# Patient Record
Sex: FEMALE | Race: WHITE | NOT HISPANIC OR LATINO | Employment: PART TIME | URBAN - METROPOLITAN AREA
[De-identification: names, ages, dates, MRNs, and addresses within clinical notes are randomized per-mention and may not be internally consistent; named-entity substitution may affect disease eponyms.]

---

## 2018-11-03 ENCOUNTER — APPOINTMENT (EMERGENCY)
Dept: ULTRASOUND IMAGING | Facility: HOSPITAL | Age: 38
End: 2018-11-03
Payer: COMMERCIAL

## 2018-11-03 ENCOUNTER — HOSPITAL ENCOUNTER (EMERGENCY)
Facility: HOSPITAL | Age: 38
Discharge: HOME | End: 2018-11-03
Attending: EMERGENCY MEDICINE
Payer: COMMERCIAL

## 2018-11-03 VITALS
OXYGEN SATURATION: 100 % | SYSTOLIC BLOOD PRESSURE: 173 MMHG | DIASTOLIC BLOOD PRESSURE: 92 MMHG | TEMPERATURE: 98.7 F | HEIGHT: 67 IN | BODY MASS INDEX: 45.99 KG/M2 | RESPIRATION RATE: 16 BRPM | WEIGHT: 293 LBS | HEART RATE: 93 BPM

## 2018-11-03 DIAGNOSIS — I82.4Z1 ACUTE DEEP VEIN THROMBOSIS (DVT) OF DISTAL END OF RIGHT LOWER EXTREMITY (HCC): Primary | ICD-10-CM

## 2018-11-03 DIAGNOSIS — L03.119 CELLULITIS OF LOWER EXTREMITY, UNSPECIFIED LATERALITY: ICD-10-CM

## 2018-11-03 LAB
ANION GAP SERPL CALCULATED.3IONS-SCNC: 5 MMOL/L (ref 4–13)
BASOPHILS # BLD AUTO: 0.05 THOUSANDS/ΜL (ref 0–0.1)
BASOPHILS NFR BLD AUTO: 1 % (ref 0–1)
BUN SERPL-MCNC: 11 MG/DL (ref 5–25)
CALCIUM SERPL-MCNC: 9.3 MG/DL (ref 8.3–10.1)
CHLORIDE SERPL-SCNC: 103 MMOL/L (ref 100–108)
CO2 SERPL-SCNC: 34 MMOL/L (ref 21–32)
CREAT SERPL-MCNC: 0.86 MG/DL (ref 0.6–1.3)
EOSINOPHIL # BLD AUTO: 0.35 THOUSAND/ΜL (ref 0–0.61)
EOSINOPHIL NFR BLD AUTO: 5 % (ref 0–6)
ERYTHROCYTE [DISTWIDTH] IN BLOOD BY AUTOMATED COUNT: 14.3 % (ref 11.6–15.1)
GFR SERPL CREATININE-BSD FRML MDRD: 86 ML/MIN/1.73SQ M
GLUCOSE SERPL-MCNC: 105 MG/DL (ref 65–140)
HCT VFR BLD AUTO: 40.6 % (ref 34.8–46.1)
HGB BLD-MCNC: 12.6 G/DL (ref 11.5–15.4)
IMM GRANULOCYTES # BLD AUTO: 0.03 THOUSAND/UL (ref 0–0.2)
IMM GRANULOCYTES NFR BLD AUTO: 0 % (ref 0–2)
LYMPHOCYTES # BLD AUTO: 1.41 THOUSANDS/ΜL (ref 0.6–4.47)
LYMPHOCYTES NFR BLD AUTO: 18 % (ref 14–44)
MCH RBC QN AUTO: 27.8 PG (ref 26.8–34.3)
MCHC RBC AUTO-ENTMCNC: 31 G/DL (ref 31.4–37.4)
MCV RBC AUTO: 90 FL (ref 82–98)
MONOCYTES # BLD AUTO: 0.52 THOUSAND/ΜL (ref 0.17–1.22)
MONOCYTES NFR BLD AUTO: 7 % (ref 4–12)
NEUTROPHILS # BLD AUTO: 5.5 THOUSANDS/ΜL (ref 1.85–7.62)
NEUTS SEG NFR BLD AUTO: 69 % (ref 43–75)
NRBC BLD AUTO-RTO: 0 /100 WBCS
PLATELET # BLD AUTO: 310 THOUSANDS/UL (ref 149–390)
PMV BLD AUTO: 9.1 FL (ref 8.9–12.7)
POTASSIUM SERPL-SCNC: 3.8 MMOL/L (ref 3.5–5.3)
RBC # BLD AUTO: 4.53 MILLION/UL (ref 3.81–5.12)
SODIUM SERPL-SCNC: 142 MMOL/L (ref 136–145)
WBC # BLD AUTO: 7.86 THOUSAND/UL (ref 4.31–10.16)

## 2018-11-03 PROCEDURE — 93970 EXTREMITY STUDY: CPT

## 2018-11-03 PROCEDURE — 85025 COMPLETE CBC W/AUTO DIFF WBC: CPT | Performed by: EMERGENCY MEDICINE

## 2018-11-03 PROCEDURE — 36415 COLL VENOUS BLD VENIPUNCTURE: CPT | Performed by: EMERGENCY MEDICINE

## 2018-11-03 PROCEDURE — 99284 EMERGENCY DEPT VISIT MOD MDM: CPT

## 2018-11-03 PROCEDURE — 80048 BASIC METABOLIC PNL TOTAL CA: CPT | Performed by: EMERGENCY MEDICINE

## 2018-11-03 RX ORDER — MELATONIN
1000 DAILY
COMMUNITY

## 2018-11-03 RX ORDER — CLINDAMYCIN HYDROCHLORIDE 150 MG/1
450 CAPSULE ORAL EVERY 8 HOURS SCHEDULED
Qty: 63 CAPSULE | Refills: 0 | Status: SHIPPED | OUTPATIENT
Start: 2018-11-03 | End: 2018-11-10

## 2018-11-03 RX ORDER — CLINDAMYCIN HYDROCHLORIDE 150 MG/1
450 CAPSULE ORAL ONCE
Status: COMPLETED | OUTPATIENT
Start: 2018-11-03 | End: 2018-11-03

## 2018-11-03 RX ORDER — PHENOL 1.4 %
600 AEROSOL, SPRAY (ML) MUCOUS MEMBRANE 2 TIMES DAILY WITH MEALS
COMMUNITY
End: 2019-01-02 | Stop reason: ALTCHOICE

## 2018-11-03 RX ADMIN — RIVAROXABAN 15 MG: 15 TABLET, FILM COATED ORAL at 21:01

## 2018-11-03 RX ADMIN — CLINDAMYCIN HYDROCHLORIDE 450 MG: 150 CAPSULE ORAL at 17:45

## 2018-11-03 NOTE — ED PROVIDER NOTES
History  Chief Complaint   Patient presents with    Leg Swelling     right leg swelling x a few days but worse over night     HPI     80-year-old female with history of bilateral oophorectomy in September for endometriosis, bilateral LE lymphedema, who presents with worsening swelling in her right leg with some overlying erythema and warmth to the R calf, and another area of redness to the L medial thigh  Symptoms started a few days ago, redness got worse overnight  She does have a history of DVT to her right lower extremity numerous years ago, but is no longer on blood thinners  She is on an estrogen patch status post or oophorectomy  She endorses pain over the area of redness  No recent trauma  Denies fevers, chills, nausea, or vomiting  She wears compression stockings and uses a lymphedema pump for her lower extremity swelling  Denies abdominal pain, and surgical incision sites are healing well  No other aggravating or alleviating factors or associated symptoms  Prior to Admission Medications   Prescriptions Last Dose Informant Patient Reported? Taking? Cranberry 1000 MG CAPS   Yes Yes   Sig: Take by mouth   calcium carbonate (OS-ALEXIS) 600 MG tablet   Yes Yes   Sig: Take 600 mg by mouth 2 (two) times a day with meals   cholecalciferol (VITAMIN D3) 1,000 units tablet 11/3/2018 at Unknown time  Yes Yes   Sig: Take 1,000 Units by mouth daily   esterified estrogens (MENEST) 0 3 MG tablet   Yes Yes   Sig: Take 0 3 mg by mouth daily      Facility-Administered Medications: None       Past Medical History:   Diagnosis Date    DVT (deep venous thrombosis) (HCC)     Endometriosis     Lymphedema of right lower extremity        Past Surgical History:   Procedure Laterality Date    HYSTERECTOMY         History reviewed  No pertinent family history  I have reviewed and agree with the history as documented      Social History   Substance Use Topics    Smoking status: Never Smoker    Smokeless tobacco: Never Used    Alcohol use No        Review of Systems   Constitutional: Negative for chills and fever  HENT: Negative for congestion  Eyes: Negative for visual disturbance  Respiratory: Negative for cough and shortness of breath  Cardiovascular: Positive for leg swelling (R>L)  Negative for chest pain  Gastrointestinal: Negative for abdominal pain, nausea and vomiting  Genitourinary: Negative for dysuria and frequency  Musculoskeletal: Positive for myalgias (R calf)  Negative for arthralgias, back pain, neck pain and neck stiffness  Skin: Positive for rash (R calf, L medial thigh)  Neurological: Negative for weakness, numbness and headaches  Psychiatric/Behavioral: Negative for agitation, behavioral problems and confusion  Physical Exam  Physical Exam   Constitutional: She is oriented to person, place, and time  She appears well-developed and well-nourished  No distress  HENT:   Head: Normocephalic and atraumatic  Right Ear: External ear normal    Left Ear: External ear normal    Nose: Nose normal    Mouth/Throat: Oropharynx is clear and moist    Eyes: Conjunctivae are normal    Neck: Normal range of motion  Neck supple  Cardiovascular: Normal rate, regular rhythm, normal heart sounds and intact distal pulses  Exam reveals no gallop and no friction rub  No murmur heard  Pulmonary/Chest: Effort normal and breath sounds normal  No respiratory distress  She has no wheezes  She has no rales  Abdominal: Soft  Bowel sounds are normal  She exhibits no distension  There is no tenderness  There is no guarding  Musculoskeletal: Normal range of motion  She exhibits edema (2+ non-pitting edema to the bilateral LEs, appears symmetric  TTP over the R calf  ~8 cm areas of erythema to the R calf and L medial thigh with warmth, no fluctuance or induration  )  She exhibits no deformity  Neurological: She is alert and oriented to person, place, and time  She exhibits normal muscle tone  Skin: Skin is warm and dry  She is not diaphoretic  Vital Signs  ED Triage Vitals   Temperature Pulse Respirations Blood Pressure SpO2   11/03/18 1721 11/03/18 1640 11/03/18 1640 11/03/18 1640 11/03/18 1640   98 7 °F (37 1 °C) 93 16 (!) 173/92 100 %      Temp Source Heart Rate Source Patient Position - Orthostatic VS BP Location FiO2 (%)   11/03/18 1640 11/03/18 1640 11/03/18 1640 11/03/18 1640 --   Oral Monitor Sitting Right arm       Pain Score       11/03/18 1640       5           Vitals:    11/03/18 1640   BP: (!) 173/92   Pulse: 93   Patient Position - Orthostatic VS: Sitting       Visual Acuity      ED Medications  Medications   clindamycin (CLEOCIN) capsule 450 mg (450 mg Oral Given 11/3/18 1745)   rivaroxaban (XARELTO) tablet 15 mg (15 mg Oral Given 11/3/18 2101)       Diagnostic Studies  Results Reviewed     Procedure Component Value Units Date/Time    Basic metabolic panel [[id number]]  (Abnormal) Collected:  11/03/18 1756    Lab Status:  Final result Specimen:  Blood from Arm, Left Updated:  11/03/18 1818     Sodium 142 mmol/L      Potassium 3 8 mmol/L      Chloride 103 mmol/L      CO2 34 (H) mmol/L      ANION GAP 5 mmol/L      BUN 11 mg/dL      Creatinine 0 86 mg/dL      Glucose 105 mg/dL      Calcium 9 3 mg/dL      eGFR 86 ml/min/1 73sq m     Narrative:         National Kidney Disease Education Program recommendations are as follows:  GFR calculation is accurate only with a steady state creatinine  Chronic Kidney disease less than 60 ml/min/1 73 sq  meters  Kidney failure less than 15 ml/min/1 73 sq  meters      CBC and differential [[id number]]  (Abnormal) Collected:  11/03/18 1756    Lab Status:  Final result Specimen:  Blood from Arm, Left Updated:  11/03/18 1803     WBC 7 86 Thousand/uL      RBC 4 53 Million/uL      Hemoglobin 12 6 g/dL      Hematocrit 40 6 %      MCV 90 fL      MCH 27 8 pg      MCHC 31 0 (L) g/dL      RDW 14 3 %      MPV 9 1 fL      Platelets 042 Thousands/uL      nRBC 0 /100 WBCs      Neutrophils Relative 69 %      Immat GRANS % 0 %      Lymphocytes Relative 18 %      Monocytes Relative 7 %      Eosinophils Relative 5 %      Basophils Relative 1 %      Neutrophils Absolute 5 50 Thousands/µL      Immature Grans Absolute 0 03 Thousand/uL      Lymphocytes Absolute 1 41 Thousands/µL      Monocytes Absolute 0 52 Thousand/µL      Eosinophils Absolute 0 35 Thousand/µL      Basophils Absolute 0 05 Thousands/µL                  VAS lower limb venous duplex study, complete bilateral    (Results Pending)              Procedures  Procedures       Phone Contacts  ED Phone Contact    ED Course                               MDM  Number of Diagnoses or Management Options  Acute deep vein thrombosis (DVT) of distal end of right lower extremity ([address] ): new and requires workup  Cellulitis of lower extremity, unspecified laterality: new and requires workup  Diagnosis management comments: Nontoxic appearing  Afebrile and hemodynamically stable  Patient has swelling to her bilateral lower extremities, appears equal bilaterally to me but pt feels is worse to the R leg, reports bilateral lymphedema  She has an area of erythema consistent with cellulitis and measures approximately 8 cm to the right calf, and another area measuring about 8 cm to the left thigh  Both areas are warm, with no fluctuance or induration  Doubt abscess  Patient denies fevers or systemic symptoms and appears nontoxic  No leukocytosis, normal hemoglobin  Patient given clindamycin for concern for cellulitis  She does have right lower extremity calf tenderness and is on estrogen medications status post oophorectomy and has history of right lower extremity DVT, so bilateral lower extremity DVTs studies were performed that shows a right PTV and right peroneal DVT, all distal to the knee    The risks and benefits of anticoagulation discussed with the patient, and she was given 1st dose of Xarelto in the ED, with plan to continue James  She has a vascular surgeon that she follows with for her lymphedema who she will follow up with for further management  I have outlined her areas of cellulitis with a skin marker, and asked her to return to the emergency department immediately for fevers, spreading redness, or worsening swelling  We also discussed signs or symptoms of PE, with instructions to return to the ED for development of these  Recommend continuing compression stockings, not using her lymphedema pump until she discusses this with her vascular surgeon  Pt discharged in good condition  Amount and/or Complexity of Data Reviewed  Clinical lab tests: ordered and reviewed  Tests in the radiology section of CPT®: ordered and reviewed    Patient Progress  Patient progress: stable    CritCare Time         Disposition  Final diagnoses:   Acute deep vein thrombosis (DVT) of distal end of right lower extremity ([address] )   Cellulitis of lower extremity, unspecified laterality     Time reflects when diagnosis was documented in both MDM as applicable and the Disposition within this note     Time User Action Codes Description Comment    11/3/2018  8:51 PM Trendlynette Rufus Add [I82 4Z1] Acute deep vein thrombosis (DVT) of distal end of right lower extremity ([facility] [address] )     11/3/2018  8:52 PM Pino Rufus Add [E05 [id number]] Cellulitis of lower extremity, unspecified laterality       ED Disposition     ED Disposition Condition Comment    Discharge  [address] discharge to home/self care  Condition at discharge: Good        Follow-up Information     Follow up With Specialties Details Why Contact Info Additional [address] [facility] Emergency Department Emergency Medicine  For worsening redness, fevers, chest pain, or shortness of breath    [address] [address] ED, [address] [place] [address], [id number]          Discharge Medication List as of 11/3/2018  8:54 PM START taking these medications    Details   clindamycin (CLEOCIN) 150 mg capsule Take 3 capsules (450 mg total) by mouth every 8 (eight) hours for 7 days, Starting Sat 11/3/2018, Until Sat 11/10/2018, Print      rivaroxaban (XARELTO) 15 & 20 MG starter pack Take 15 mg by mouth twice daily for 21 days, then 20 mg once daily thereafter , Print         CONTINUE these medications which have NOT CHANGED    Details   calcium carbonate (OS-ALEXIS) 600 MG tablet Take 600 mg by mouth 2 (two) times a day with meals, Historical Med      cholecalciferol (VITAMIN D3) 1,000 units tablet Take 1,000 Units by mouth daily, Historical Med      Cranberry 1000 MG CAPS Take by mouth, Historical Med      esterified estrogens (MENEST) 0 3 MG tablet Take 0 3 mg by mouth daily, Historical Med           No discharge procedures on file      ED Provider  Electronically Signed by           Jaimee Goldman MD  11/04/18 8958

## 2018-11-04 NOTE — DISCHARGE INSTRUCTIONS
Cellulitis   WHAT YOU NEED TO KNOW:   Cellulitis is a skin infection caused by bacteria  Cellulitis may go away on its own or you may need treatment  Your healthcare provider may draw a [place] around the outside edges of your cellulitis  If your cellulitis spreads, your healthcare provider will see it outside of the [place]  DISCHARGE INSTRUCTIONS:   Call 911 if:   · You have sudden trouble breathing or chest pain  Seek care immediately if:   · Your wound gets larger and more painful  · You feel a crackling under your skin when you touch it  · You have purple dots or bumps on your skin, or you see bleeding under your skin  · You have new swelling and pain in your legs  · The red, warm, swollen area gets larger  · You see red streaks coming from the infected area  Contact your healthcare provider if:   · You have a fever  · Your fever or pain does not go away or gets worse  · The area does not get smaller after 2 days of antibiotics  · Your skin is flaking or peeling off  · You have questions or concerns about your condition or care  Medicines:   · Antibiotics  help treat the bacterial infection  · NSAIDs , such as ibuprofen, help decrease swelling, pain, and fever  NSAIDs can cause stomach bleeding or kidney problems in certain people  If you take blood thinner medicine, always ask if NSAIDs are safe for you  Always read the medicine label and follow directions  Do not give these medicines to children under 10months of age without direction from your child's healthcare provider  · Acetaminophen  decreases pain and fever  It is available without a doctor's order  Ask how much to take and how often to take it  Follow directions  Read the labels of all other medicines you are using to see if they also contain acetaminophen, or ask your doctor or pharmacist  Acetaminophen can cause liver damage if not taken correctly   Do not use more than 4 grams (4,000 milligrams) total of acetaminophen in one day  · Take your medicine as directed  Contact your healthcare provider if you think your medicine is not helping or if you have side effects  Tell him or her if you are allergic to any medicine  Keep a list of the medicines, vitamins, and herbs you take  Include the amounts, and when and why you take them  Bring the list or the pill bottles to follow-up visits  Carry your medicine list with you in case of an emergency  Self-care:   · Elevate the area above the level of your heart  as often as you can  This will help decrease swelling and pain  Prop the area on pillows or blankets to keep it elevated comfortably  · Clean the area daily until the wound scabs over  Gently wash the area with soap and water  Pat dry  Use dressings as directed  · Place cool or warm, wet cloths on the area as directed  Use clean cloths and clean water  Leave it on the area until the cloth is room temperature  Pat the area dry with a clean, dry cloth  The cloths may help decrease pain  Prevent cellulitis:   · Do not scratch bug bites or areas of injury  You increase your risk for cellulitis by scratching these areas  · Do not share personal items, such as towels, clothing, and razors  · Clean exercise equipment  with germ-killing  before and after you use it  · Wash your hands often  Use soap and water  Wash your hands after you use the bathroom, change a child's diapers, or sneeze  Wash your hands before you prepare or eat food  Use lotion to prevent dry, cracked skin  · Wear pressure stockings as directed  You may be told to wear the stockings if you have peripheral edema  The stockings improve blood flow and decrease swelling  · Treat athlete's foot  This can help prevent the spread of a bacterial skin infection  Follow up with your healthcare provider within 3 days, or as directed: Your healthcare provider will check if your cellulitis is getting better   You may need different medicine  Write down your questions so you remember to ask them during your visits  © 2017 [address] [employer] Information is for End User's use only and may not be sold, redistributed or otherwise used for commercial purposes  All illustrations and images included in CareNotes® are the copyrighted property of A D A M , Inc  or Diony Camacho  The above information is an  only  It is not intended as medical advice for individual conditions or treatments  Talk to your doctor, nurse or pharmacist before following any medical regimen to see if it is safe and effective for you  Deep Venous Thrombosis   WHAT YOU NEED TO KNOW:   Deep venous thrombosis (DVT) is a blood clot that forms in a deep vein of the body  The deep veins in the legs, thighs, and hips are the most common sites for DVT  DVT can also occur in a deep vein within your arms  The clot prevents the normal flow of blood in the vein  The blood backs up and causes pain and swelling  The DVT can break into smaller pieces and travel to your lungs and cause a blockage called a pulmonary embolism  A pulmonary embolism can become life-threatening  DISCHARGE INSTRUCTIONS:   Call 911 for any of the following:   · You feel lightheaded, short of breath, and have chest pain  · You cough up blood  Seek care immediately if:   · Your symptoms get worse  · You develop new DVT symptoms in another leg or arm  Contact your healthcare provider if:   · Your gums or nose bleed  · You see blood in your urine or bowel movements  · Your bowel movements are black or darker than normal     · You have questions or concerns about your conditions or care  Medicines:   · Blood thinners  help prevent the DVT from getting bigger and prevent new clots from forming  Examples of blood thinners include heparin, rivaroxaban, apixiban, and warfarin   The following are general safety guidelines to follow while you are taking a blood thinner:     ¨ Watch for bleeding and bruising  Watch for bleeding from your gums or nose  Watch for blood in your urine and bowel movements  Use a soft washcloth on your skin, and a soft toothbrush to brush your teeth  This can keep your skin and gums from bleeding  If you shave, use an electric shaver  Do not play contact sports  ¨ Tell your dentist and other healthcare providers that you take a blood thinner  Wear a bracelet or necklace that says you take this medicine  ¨ Do not start or stop any medicines unless your healthcare provider tells you to  Many medicines cannot be used with blood thinners  ¨ Tell your healthcare provider right away if you forget to take the blood thinner , or if you take too much  ¨ Warfarin  is a blood thinner that you may need to take  The following are additional things you should be aware of if you take warfarin:    § Foods and medicines can affect the amount of warfarin in your blood  Do not make major changes to your diet  Warfarin works best when you eat about the same amount of vitamin K every day  Vitamin K is found in green leafy vegetables and certain other foods  Ask for more information about what to eat or not to eat  § You will need to see your healthcare provider for follow-up visits  You will need regular blood tests to decide how much warfarin you need  · Take your medicine as directed  Contact your healthcare provider if you think your medicine is not helping or if you have side effects  Tell him or her if you are allergic to any medicine  Keep a list of the medicines, vitamins, and herbs you take  Include the amounts, and when and why you take them  Bring the list or the pill bottles to follow-up visits  Carry your medicine list with you in case of an emergency  Manage your DVT:   · Wear pressure stockings  The stockings are tight and put pressure on your legs  This improves blood flow and helps prevent clots   Wear the stockings during the day  Do not wear them when you sleep  · Elevate your legs  above the level of your heart as often as you can  This will help decrease swelling and pain  Prop your legs on pillows or blankets to keep them elevated comfortably  · Exercise regularly  to help increase your blood flow  Walking is a good low-impact exercise  Talk to your healthcare provider about the best exercise plan for you  · Change body positions often  If you travel by car or work at a desk, move and stretch in your seat several times each hour  In an airplane, get up and walk every hour  If you are bedridden, ask for help to change your position every 1 to 2 hours  · Maintain a healthy weight  Ask your healthcare provider how much you should weigh  Ask him to help you create a weight loss plan if you are overweight  · Do not smoke  Nicotine and other chemicals in cigarettes and cigars can damage blood vessels and make it more difficult to manage your DVT  Ask your healthcare provider for information if you currently smoke and need help to quit  E-cigarettes or smokeless tobacco still contain nicotine  Talk to your healthcare provider before you use these products  Follow up with your healthcare provider as directed:  Write down your questions so you remember to ask them during your visits  © 2017 [address]  Information is for End User's use only and may not be sold, redistributed or otherwise used for commercial purposes  All illustrations and images included in CareNotes® are the copyrighted property of [employer]  or Diony Camacho  The above information is an  only  It is not intended as medical advice for individual conditions or treatments  Talk to your doctor, nurse or pharmacist before following any medical regimen to see if it is safe and effective for you

## 2018-11-05 PROCEDURE — 93970 EXTREMITY STUDY: CPT | Performed by: SURGERY

## 2018-11-19 RX ORDER — ACETAMINOPHEN AND CODEINE PHOSPHATE 300; 30 MG/1; MG/1
TABLET ORAL
Refills: 0 | COMMUNITY
Start: 2018-09-28 | End: 2019-05-01 | Stop reason: ALTCHOICE

## 2018-11-19 RX ORDER — CEPHALEXIN 500 MG/1
CAPSULE ORAL
Refills: 0 | COMMUNITY
Start: 2018-10-01 | End: 2018-12-05 | Stop reason: CLARIF

## 2018-11-19 RX ORDER — CLOTRIMAZOLE AND BETAMETHASONE DIPROPIONATE 10; .64 MG/G; MG/G
CREAM TOPICAL
Refills: 0 | COMMUNITY
Start: 2018-10-22 | End: 2019-05-14 | Stop reason: ALTCHOICE

## 2018-12-05 ENCOUNTER — OFFICE VISIT (OUTPATIENT)
Dept: FAMILY MEDICINE CLINIC | Facility: CLINIC | Age: 38
End: 2018-12-05
Payer: COMMERCIAL

## 2018-12-05 VITALS
HEIGHT: 67 IN | SYSTOLIC BLOOD PRESSURE: 132 MMHG | HEART RATE: 79 BPM | BODY MASS INDEX: 45.99 KG/M2 | WEIGHT: 293 LBS | OXYGEN SATURATION: 100 % | DIASTOLIC BLOOD PRESSURE: 84 MMHG

## 2018-12-05 DIAGNOSIS — Z12.31 ENCOUNTER FOR SCREENING MAMMOGRAM FOR BREAST CANCER: ICD-10-CM

## 2018-12-05 DIAGNOSIS — I10 ESSENTIAL HYPERTENSION: ICD-10-CM

## 2018-12-05 DIAGNOSIS — R53.82 CHRONIC FATIGUE: ICD-10-CM

## 2018-12-05 DIAGNOSIS — R73.01 IFG (IMPAIRED FASTING GLUCOSE): Primary | ICD-10-CM

## 2018-12-05 DIAGNOSIS — E78.01 FAMILIAL HYPERCHOLESTEROLEMIA: ICD-10-CM

## 2018-12-05 DIAGNOSIS — D68.9 COAGULATION DISORDER (HCC): ICD-10-CM

## 2018-12-05 DIAGNOSIS — E55.9 HYPOVITAMINOSIS D: ICD-10-CM

## 2018-12-05 PROCEDURE — 99204 OFFICE O/P NEW MOD 45 MIN: CPT | Performed by: FAMILY MEDICINE

## 2018-12-05 PROCEDURE — 3079F DIAST BP 80-89 MM HG: CPT | Performed by: FAMILY MEDICINE

## 2018-12-05 PROCEDURE — 3008F BODY MASS INDEX DOCD: CPT | Performed by: FAMILY MEDICINE

## 2018-12-05 PROCEDURE — 3075F SYST BP GE 130 - 139MM HG: CPT | Performed by: FAMILY MEDICINE

## 2018-12-05 NOTE — PATIENT INSTRUCTIONS
We need to get you into Hematology ASAP  I need the old records from [facility] about her coagulation disorder workup  I know it was read as negative  However a maternal aunt has a history of multiple clots your mother has a history of multiple clots now you have a history of multiple clots  And you just had a DVT your 2nd 1  You must continue on Xarelto  Take off that tourniquet affecting sock  I want you out of work and I want you lying with your leg elevated but not kinked at the hip  You must drink lots and lots of water  Hydration is 1 of the best blood thinners out there  I want you either lying with her leg up or walking around no prolonged sitting    The estrogen patch has me a bit worried however you are on the Xarelto  You are on the low was dose  Let us hope you do not want to stay on that for a prolonged period of time menopause is a gradual process let us try and wean you off of that  That get you into Hematology ASAP before the end of the year because of your deductible I want that coagulation panel repeated  I want to see you back in 3 weeks for a 30 min appointment after blood work

## 2018-12-05 NOTE — PROGRESS NOTES
Standard Visit   Assessment/Plan:     Visit Diagnosis:  Diagnoses and all orders for this visit:    IFG (impaired fasting glucose)  -     Hemoglobin A1C; Future    Chronic fatigue  -     CBC and differential; Future  -     TSH, 3rd generation with Free T4 reflex; Future    Familial hypercholesterolemia  -     Lipid panel; Future    Essential hypertension  -     Comprehensive metabolic panel; Future  -     Microalbumin / creatinine urine ratio    Hypovitaminosis D  -     Vitamin D 25 hydroxy; Future    Encounter for screening mammogram for breast cancer  -     Mammo screening bilateral w cad; Future    Coagulation disorder [facility])  -     Ambulatory referral to Hematology / Oncology;  Future    Other orders  -     estradiol (CLIMARA) 0 025 mg/24 hr; Place 1 patch on the skin once a week      We need to get you into Hematology ASAP  I need the old records from [facility] about her coagulation disorder workup  I know it was read as negative  However a maternal aunt has a history of multiple clots your mother has a history of multiple clots now you have a history of multiple clots  And you just had a DVT your 2nd 1  You must continue on Xarelto  Take off that tourniquet affecting sock  I want you out of work and I want you lying with your leg elevated but not kinked at the hip  You must drink lots and lots of water  Hydration is 1 of the best blood thinners out there  I want you either lying with her leg up or walking around no prolonged sitting    The estrogen patch has me a bit worried however you are on the Xarelto  You are on the low was dose  Let us hope you do not want to stay on that for a prolonged period of time menopause is a gradual process let us try and wean you off of that  That get you into Hematology ASAP before the end of the year because of your deductible I want that coagulation panel repeated  I want to see you back in 3 weeks for a 30 min appointment after blood work    Subjective:    Patient ID: Saturnino Lay is a 45 y o  female  Patient is here with the following concerns:          Surgery for CHELSEA BSO for severe endometriosis September 2018  Developed her 2nd DVT in the right leg November 3rd at [facility]  She was placed on Xarelto after that clot  This was not her 1st clot she had her 1st clot in 2007 after she was casted because of a broken ankle    A full coagulation workup was done by Dr Avinash Villaseñor at [address] [place] [address] because of her 1st surgery for endometriosis  This was because of her history of DVT and the fact that her mother has a history of clots and her maternal aunt has a history of clots  At that point it was determined that she did not have an identifiable coag disorder  Currently she remains on Xarelto  She was just seen in [address] [place] [address] Emergency room for a swollen right leg that was warm and had an area of swelling heat and tenderness  She was treated for cellulitis with an antibiotic and the area has resolved  She does have chronic lymphedema in that leg because of the 2 clots in that leg  Both clots were provoked and in the popliteal space          The following portions of the patient's history were reviewed and updated as appropriate: allergies, current medications, past family history, past medical history, past social history, past surgical history and problem list     Review of Systems   Constitutional: Positive for unexpected weight change  Cardiovascular:        DVT   Musculoskeletal: Positive for arthralgias, gait problem and myalgias  Psychiatric/Behavioral: Positive for sleep disturbance  /84   Pulse 79   Ht 5' 7" (1 702 m)   Wt (!) 150 kg (329 lb 9 6 oz)   SpO2 100%   BMI 51 62 kg/m²   Social History     Social History    Marital status: /Civil Union     Spouse name: N/A    Number of children: N/A    Years of education: N/A     Occupational History    Not on file       Social History Main Topics    Smoking status: Never Smoker    Smokeless tobacco: Never Used    Alcohol use No    Drug use: No    Sexual activity: Not on file     Other Topics Concern    Not on file     Social History Narrative    No narrative on file     Past Medical History:   Diagnosis Date    DVT [facility]deep venous thrombosis) ([facility] [address] )     Endometriosis     Lymphedema of right lower extremity      History reviewed  No pertinent family history  Past Surgical History:   Procedure Laterality Date    ABLATION COLPOCLESIS      HYSTERECTOMY      OVARY SURGERY         Current Outpatient Prescriptions:     acetaminophen-codeine (TYLENOL #3) 300-30 mg per tablet, TAKE 1 TO 2 TABLETS EVERY 4 HOURS AS NEEDED FOR PAIN, Disp: , Rfl: 0    calcium carbonate (OS-ALEXIS) 600 MG tablet, Take 600 mg by mouth 2 (two) times a day with meals, Disp: , Rfl:     cholecalciferol (VITAMIN D3) 1,000 units tablet, Take 1,000 Units by mouth daily, Disp: , Rfl:     clotrimazole-betamethasone (LOTRISONE) 1-0 05 % cream, APPLY TOPICALLY 2 (TWO) TIMES A DAY  APPLY TO AFFECTED AREA(S) , Disp: , Rfl: 0    Cranberry 1000 MG CAPS, Take by mouth, Disp: , Rfl:     estradiol (CLIMARA) 0 025 mg/24 hr, Place 1 patch on the skin once a week, Disp: , Rfl:     rivaroxaban (XARELTO) 15 & 20 MG starter pack, Take 15 mg by mouth twice daily for 21 days, then 20 mg once daily thereafter , Disp: 51 each, Rfl: 0      Objective:     Physical Exam   Eyes: Pupils are equal, round, and reactive to light  Neck: Normal range of motion  Neck supple  Cardiovascular: Normal rate and regular rhythm  Pulmonary/Chest: Effort normal and breath sounds normal    Musculoskeletal: Normal range of motion  Edema: Right leg  Skin: Skin is warm and dry  Social History     Social History    Marital status: /Civil Union     Spouse name: N/A    Number of children: N/A    Years of education: N/A     Occupational History    Not on file       Social History Main Topics    Smoking status: Never Smoker    Smokeless tobacco: Never Used    Alcohol use No    Drug use: No    Sexual activity: Not on file     Other Topics Concern    Not on file     Social History Narrative    No narrative on file     Past Medical History:   Diagnosis Date    DVT (deep venous thrombosis) (HCC)     Endometriosis     Lymphedema of right lower extremity        Current Outpatient Prescriptions:     acetaminophen-codeine (TYLENOL #3) 300-30 mg per tablet, TAKE 1 TO 2 TABLETS EVERY 4 HOURS AS NEEDED FOR PAIN, Disp: , Rfl: 0    calcium carbonate (OS-ALEXIS) 600 MG tablet, Take 600 mg by mouth 2 (two) times a day with meals, Disp: , Rfl:     cholecalciferol (VITAMIN D3) 1,000 units tablet, Take 1,000 Units by mouth daily, Disp: , Rfl:     clotrimazole-betamethasone (LOTRISONE) 1-0 05 % cream, APPLY TOPICALLY 2 (TWO) TIMES A DAY  APPLY TO AFFECTED AREA(S) , Disp: , Rfl: 0    Cranberry 1000 MG CAPS, Take by mouth, Disp: , Rfl:     estradiol (CLIMARA) 0 025 mg/24 hr, Place 1 patch on the skin once a week, Disp: , Rfl:     rivaroxaban (XARELTO) 15 & 20 MG starter pack, Take 15 mg by mouth twice daily for 21 days, then 20 mg once daily thereafter , Disp: 51 each, Rfl: 0  History reviewed  No pertinent family history      Patient Instructions   We need to get you into Hematology ASAP  I need the old records from [facility] about her coagulation disorder workup  I know it was read as negative  However a maternal aunt has a history of multiple clots your mother has a history of multiple clots now you have a history of multiple clots  And you just had a DVT your 2nd 1  You must continue on Xarelto  Take off that tourniquet affecting sock  I want you out of work and I want you lying with your leg elevated but not kinked at the hip  You must drink lots and lots of water  Hydration is 1 of the best blood thinners out there  I want you either lying with her leg up or walking around no prolonged sitting    The estrogen patch has me a bit worried however you are on the Xarelto  You are on the low was dose  Let us hope you do not want to stay on that for a prolonged period of time menopause is a gradual process let us try and wean you off of that  That get you into Hematology ASAP before the end of the year because of your deductible I want that coagulation panel repeated  I want to see you back in 3 weeks for a 30 min appointment after blood work          Brandi, [address]

## 2018-12-07 ENCOUNTER — HOSPITAL ENCOUNTER (OUTPATIENT)
Dept: MAMMOGRAPHY | Facility: CLINIC | Age: 38
Discharge: HOME | End: 2018-12-07
Payer: COMMERCIAL

## 2018-12-07 ENCOUNTER — APPOINTMENT (OUTPATIENT)
Dept: LAB | Facility: HOSPITAL | Age: 38
End: 2018-12-07
Payer: COMMERCIAL

## 2018-12-07 VITALS — BODY MASS INDEX: 45.99 KG/M2 | HEIGHT: 67 IN | WEIGHT: 293 LBS

## 2018-12-07 DIAGNOSIS — E78.01 FAMILIAL HYPERCHOLESTEROLEMIA: ICD-10-CM

## 2018-12-07 DIAGNOSIS — R73.01 IFG (IMPAIRED FASTING GLUCOSE): ICD-10-CM

## 2018-12-07 DIAGNOSIS — R53.82 CHRONIC FATIGUE: ICD-10-CM

## 2018-12-07 DIAGNOSIS — Z12.31 ENCOUNTER FOR SCREENING MAMMOGRAM FOR BREAST CANCER: ICD-10-CM

## 2018-12-07 DIAGNOSIS — I10 ESSENTIAL HYPERTENSION: ICD-10-CM

## 2018-12-07 DIAGNOSIS — E55.9 HYPOVITAMINOSIS D: ICD-10-CM

## 2018-12-07 LAB
25(OH)D3 SERPL-MCNC: 23.3 NG/ML (ref 30–100)
ALBUMIN SERPL BCP-MCNC: 3.5 G/DL (ref 3.5–5)
ALP SERPL-CCNC: 111 U/L (ref 46–116)
ALT SERPL W P-5'-P-CCNC: 30 U/L (ref 12–78)
ANION GAP SERPL CALCULATED.3IONS-SCNC: 8 MMOL/L (ref 4–13)
AST SERPL W P-5'-P-CCNC: 16 U/L (ref 5–45)
BASOPHILS # BLD AUTO: 0.05 THOUSANDS/ΜL (ref 0–0.1)
BASOPHILS NFR BLD AUTO: 1 % (ref 0–1)
BILIRUB SERPL-MCNC: 0.4 MG/DL (ref 0.2–1)
BUN SERPL-MCNC: 11 MG/DL (ref 5–25)
CALCIUM SERPL-MCNC: 9.2 MG/DL (ref 8.3–10.1)
CHLORIDE SERPL-SCNC: 105 MMOL/L (ref 100–108)
CHOLEST SERPL-MCNC: 155 MG/DL (ref 50–200)
CO2 SERPL-SCNC: 29 MMOL/L (ref 21–32)
CREAT SERPL-MCNC: 0.81 MG/DL (ref 0.6–1.3)
CREAT UR-MCNC: 77.5 MG/DL
EOSINOPHIL # BLD AUTO: 0.26 THOUSAND/ΜL (ref 0–0.61)
EOSINOPHIL NFR BLD AUTO: 5 % (ref 0–6)
ERYTHROCYTE [DISTWIDTH] IN BLOOD BY AUTOMATED COUNT: 14.2 % (ref 11.6–15.1)
EST. AVERAGE GLUCOSE BLD GHB EST-MCNC: 111 MG/DL
GFR SERPL CREATININE-BSD FRML MDRD: 92 ML/MIN/1.73SQ M
GLUCOSE P FAST SERPL-MCNC: 86 MG/DL (ref 65–99)
HBA1C MFR BLD: 5.5 % (ref 4.2–6.3)
HCT VFR BLD AUTO: 44.2 % (ref 34.8–46.1)
HDLC SERPL-MCNC: 57 MG/DL (ref 40–60)
HGB BLD-MCNC: 13.6 G/DL (ref 11.5–15.4)
IMM GRANULOCYTES # BLD AUTO: 0.02 THOUSAND/UL (ref 0–0.2)
IMM GRANULOCYTES NFR BLD AUTO: 0 % (ref 0–2)
LDLC SERPL CALC-MCNC: 74 MG/DL (ref 0–100)
LYMPHOCYTES # BLD AUTO: 0.91 THOUSANDS/ΜL (ref 0.6–4.47)
LYMPHOCYTES NFR BLD AUTO: 16 % (ref 14–44)
MCH RBC QN AUTO: 27 PG (ref 26.8–34.3)
MCHC RBC AUTO-ENTMCNC: 30.8 G/DL (ref 31.4–37.4)
MCV RBC AUTO: 88 FL (ref 82–98)
MICROALBUMIN UR-MCNC: <5 MG/L (ref 0–20)
MICROALBUMIN/CREAT 24H UR: <6 MG/G CREATININE (ref 0–30)
MONOCYTES # BLD AUTO: 0.36 THOUSAND/ΜL (ref 0.17–1.22)
MONOCYTES NFR BLD AUTO: 6 % (ref 4–12)
NEUTROPHILS # BLD AUTO: 4.01 THOUSANDS/ΜL (ref 1.85–7.62)
NEUTS SEG NFR BLD AUTO: 72 % (ref 43–75)
NONHDLC SERPL-MCNC: 98 MG/DL
NRBC BLD AUTO-RTO: 0 /100 WBCS
PLATELET # BLD AUTO: 321 THOUSANDS/UL (ref 149–390)
PMV BLD AUTO: 9.9 FL (ref 8.9–12.7)
POTASSIUM SERPL-SCNC: 4.2 MMOL/L (ref 3.5–5.3)
PROT SERPL-MCNC: 7.6 G/DL (ref 6.4–8.2)
RBC # BLD AUTO: 5.03 MILLION/UL (ref 3.81–5.12)
SODIUM SERPL-SCNC: 142 MMOL/L (ref 136–145)
TRIGL SERPL-MCNC: 121 MG/DL
TSH SERPL DL<=0.05 MIU/L-ACNC: 2.65 UIU/ML (ref 0.36–3.74)
WBC # BLD AUTO: 5.61 THOUSAND/UL (ref 4.31–10.16)

## 2018-12-07 PROCEDURE — 36415 COLL VENOUS BLD VENIPUNCTURE: CPT

## 2018-12-07 PROCEDURE — 82043 UR ALBUMIN QUANTITATIVE: CPT | Performed by: FAMILY MEDICINE

## 2018-12-07 PROCEDURE — 80053 COMPREHEN METABOLIC PANEL: CPT

## 2018-12-07 PROCEDURE — 83036 HEMOGLOBIN GLYCOSYLATED A1C: CPT

## 2018-12-07 PROCEDURE — 80061 LIPID PANEL: CPT

## 2018-12-07 PROCEDURE — 84443 ASSAY THYROID STIM HORMONE: CPT

## 2018-12-07 PROCEDURE — 77067 SCR MAMMO BI INCL CAD: CPT

## 2018-12-07 PROCEDURE — 82570 ASSAY OF URINE CREATININE: CPT | Performed by: FAMILY MEDICINE

## 2018-12-07 PROCEDURE — 85025 COMPLETE CBC W/AUTO DIFF WBC: CPT

## 2018-12-07 PROCEDURE — 82306 VITAMIN D 25 HYDROXY: CPT

## 2018-12-11 ENCOUNTER — HOSPITAL ENCOUNTER (EMERGENCY)
Facility: HOSPITAL | Age: 38
Discharge: HOME | End: 2018-12-12
Attending: EMERGENCY MEDICINE | Admitting: EMERGENCY MEDICINE
Payer: COMMERCIAL

## 2018-12-11 ENCOUNTER — HOSPITAL ENCOUNTER (OUTPATIENT)
Dept: MAMMOGRAPHY | Facility: CLINIC | Age: 38
Discharge: HOME | End: 2018-12-11
Payer: COMMERCIAL

## 2018-12-11 ENCOUNTER — HOSPITAL ENCOUNTER (OUTPATIENT)
Dept: ULTRASOUND IMAGING | Facility: CLINIC | Age: 38
Discharge: HOME | End: 2018-12-11
Payer: COMMERCIAL

## 2018-12-11 VITALS
SYSTOLIC BLOOD PRESSURE: 120 MMHG | OXYGEN SATURATION: 98 % | DIASTOLIC BLOOD PRESSURE: 59 MMHG | BODY MASS INDEX: 45.99 KG/M2 | RESPIRATION RATE: 16 BRPM | HEART RATE: 78 BPM | WEIGHT: 293 LBS | TEMPERATURE: 97.8 F | HEIGHT: 67 IN

## 2018-12-11 DIAGNOSIS — K62.5 RECTAL BLEEDING: Primary | ICD-10-CM

## 2018-12-11 DIAGNOSIS — R92.8 ABNORMAL MAMMOGRAM: ICD-10-CM

## 2018-12-11 LAB
ALBUMIN SERPL BCP-MCNC: 3.6 G/DL (ref 3.5–5)
ALP SERPL-CCNC: 107 U/L (ref 46–116)
ALT SERPL W P-5'-P-CCNC: 29 U/L (ref 12–78)
ANION GAP SERPL CALCULATED.3IONS-SCNC: 6 MMOL/L (ref 4–13)
APTT PPP: 31 SECONDS (ref 26–38)
AST SERPL W P-5'-P-CCNC: 13 U/L (ref 5–45)
BASOPHILS # BLD AUTO: 0.06 THOUSANDS/ΜL (ref 0–0.1)
BASOPHILS NFR BLD AUTO: 1 % (ref 0–1)
BILIRUB SERPL-MCNC: 0.2 MG/DL (ref 0.2–1)
BUN SERPL-MCNC: 12 MG/DL (ref 5–25)
CALCIUM SERPL-MCNC: 9.2 MG/DL (ref 8.3–10.1)
CHLORIDE SERPL-SCNC: 102 MMOL/L (ref 100–108)
CO2 SERPL-SCNC: 32 MMOL/L (ref 21–32)
CREAT SERPL-MCNC: 0.75 MG/DL (ref 0.6–1.3)
EOSINOPHIL # BLD AUTO: 0.29 THOUSAND/ΜL (ref 0–0.61)
EOSINOPHIL NFR BLD AUTO: 4 % (ref 0–6)
ERYTHROCYTE [DISTWIDTH] IN BLOOD BY AUTOMATED COUNT: 13.9 % (ref 11.6–15.1)
GFR SERPL CREATININE-BSD FRML MDRD: 101 ML/MIN/1.73SQ M
GLUCOSE SERPL-MCNC: 91 MG/DL (ref 65–140)
HCT VFR BLD AUTO: 43 % (ref 34.8–46.1)
HGB BLD-MCNC: 13.4 G/DL (ref 11.5–15.4)
IMM GRANULOCYTES # BLD AUTO: 0.02 THOUSAND/UL (ref 0–0.2)
IMM GRANULOCYTES NFR BLD AUTO: 0 % (ref 0–2)
INR PPP: 1.05 (ref 0.86–1.17)
LYMPHOCYTES # BLD AUTO: 1.53 THOUSANDS/ΜL (ref 0.6–4.47)
LYMPHOCYTES NFR BLD AUTO: 19 % (ref 14–44)
MCH RBC QN AUTO: 27.2 PG (ref 26.8–34.3)
MCHC RBC AUTO-ENTMCNC: 31.2 G/DL (ref 31.4–37.4)
MCV RBC AUTO: 87 FL (ref 82–98)
MONOCYTES # BLD AUTO: 0.54 THOUSAND/ΜL (ref 0.17–1.22)
MONOCYTES NFR BLD AUTO: 7 % (ref 4–12)
NEUTROPHILS # BLD AUTO: 5.7 THOUSANDS/ΜL (ref 1.85–7.62)
NEUTS SEG NFR BLD AUTO: 69 % (ref 43–75)
NRBC BLD AUTO-RTO: 0 /100 WBCS
PLATELET # BLD AUTO: 307 THOUSANDS/UL (ref 149–390)
PMV BLD AUTO: 9.8 FL (ref 8.9–12.7)
POTASSIUM SERPL-SCNC: 4 MMOL/L (ref 3.5–5.3)
PROT SERPL-MCNC: 7.6 G/DL (ref 6.4–8.2)
PROTHROMBIN TIME: 13.6 SECONDS (ref 11.8–14.2)
RBC # BLD AUTO: 4.92 MILLION/UL (ref 3.81–5.12)
SODIUM SERPL-SCNC: 140 MMOL/L (ref 136–145)
WBC # BLD AUTO: 8.14 THOUSAND/UL (ref 4.31–10.16)

## 2018-12-11 PROCEDURE — 77065 DX MAMMO INCL CAD UNI: CPT

## 2018-12-11 PROCEDURE — G0279 TOMOSYNTHESIS, MAMMO: HCPCS

## 2018-12-11 PROCEDURE — 76642 ULTRASOUND BREAST LIMITED: CPT

## 2018-12-11 PROCEDURE — 85610 PROTHROMBIN TIME: CPT | Performed by: PHYSICIAN ASSISTANT

## 2018-12-11 PROCEDURE — 99284 EMERGENCY DEPT VISIT MOD MDM: CPT

## 2018-12-11 PROCEDURE — 85730 THROMBOPLASTIN TIME PARTIAL: CPT | Performed by: PHYSICIAN ASSISTANT

## 2018-12-11 PROCEDURE — 36415 COLL VENOUS BLD VENIPUNCTURE: CPT | Performed by: PHYSICIAN ASSISTANT

## 2018-12-11 PROCEDURE — 85025 COMPLETE CBC W/AUTO DIFF WBC: CPT | Performed by: PHYSICIAN ASSISTANT

## 2018-12-11 PROCEDURE — 80053 COMPREHEN METABOLIC PANEL: CPT | Performed by: PHYSICIAN ASSISTANT

## 2018-12-11 PROCEDURE — 96360 HYDRATION IV INFUSION INIT: CPT

## 2018-12-11 PROCEDURE — 82272 OCCULT BLD FECES 1-3 TESTS: CPT

## 2018-12-11 RX ADMIN — SODIUM CHLORIDE 1000 ML: 0.9 INJECTION, SOLUTION INTRAVENOUS at 23:19

## 2018-12-11 NOTE — PROGRESS NOTES
Let pt know they have abnormal lab result  Her vitamin D is very low  I want her to start otc vit d 3  2,000  iu every day    Her liver, kidney, thyroid and choles tests were fine      Continue same advice discussed in office

## 2018-12-12 DIAGNOSIS — N63.10 MASS OF RIGHT BREAST: Primary | ICD-10-CM

## 2018-12-12 NOTE — PROGRESS NOTES
Let the pt know, the radiologist has advised additional views are needed for your screening mammo    The radiologist read your mammo and they suggest that you need a 6 month follow-up to follow a small density in the right breast  I will put the order in for diagnostic mammo an ultrasound that you will have done 6 months from the date of her last mammo

## 2018-12-12 NOTE — DISCHARGE INSTRUCTIONS
Gastrointestinal Bleeding   WHAT YOU NEED TO KNOW:   Gastrointestinal (GI) bleeding may occur in any part of your digestive tract  This includes your esophagus, stomach, intestines, rectum, or anus  Bleeding may be mild to severe  Your bleeding may begin suddenly, or start slowly and last for a longer period of time  Bleeding that lasts for a longer period of time is called chronic GI bleeding  DISCHARGE INSTRUCTIONS:   Call 911 for any of the following:   · You have shortness of breath or trouble breathing  · You faint or lose consciousness  · You have chest pain  Return to the emergency department if:   · You feel dizzy or are too weak to stand  · Your heart is beating faster than usual      · You vomit blood, or your vomit looks like coffee grounds  · You have blood in your bowel movement  · You have abdominal pain or swelling  Contact your healthcare provider if:   · You have bowel movements that are tarry or black  · You have nausea or are vomiting  · You have heartburn  · You have questions or concerns about your condition or care  Activity:  Rest as directed  Ask when you can return to your usual activities, such as work  Slowly do more each day  Nutrition:  Ask if you need to be on a special diet  A special diet can help treat GI conditions and prevent problems such as GI bleeding  Eat small meals more often while your digestive system heals  Avoid or limit caffeine and spicy foods  Also avoid foods that cause heartburn, nausea, or diarrhea  Prevent GI bleeding:   · Manage GI conditions as directed  Examples of GI conditions include gastroesophageal reflux, peptic ulcer disease, and ulcerative colitis  Take all medicines for these conditions as directed  · Limit or do not take NSAIDs  Ask your healthcare provider if it is safe for you to take NSAIDs  NSAIDs can increase your risk for ulcers and GI bleeding  · Do not drink alcohol    Alcohol can cause ulcers and esophageal varices  Esophageal varices are swollen blood vessels in your esophagus  Over time the blood vessels become weak and may bleed  · Do not smoke  Nicotine and other chemicals in cigarettes and cigars can increase your risk for ulcers  Ask your healthcare provider for information if you currently smoke and need help to quit  E-cigarettes or smokeless tobacco still contain nicotine  Talk to your healthcare provider before you use these products  Follow up with your healthcare provider as directed: You may need to return for a colonoscopy, endoscopy, or other tests  These tests can make sure you do not have more bleeding  Write down your questions so you remember to ask them during your visits  © 2017 [facility] Information is for End User's use only and may not be sold, redistributed or otherwise used for commercial purposes  All illustrations and images included in CareNotes® are the copyrighted property of A D A M , Inc  or Diony Camacho  The above information is an  only  It is not intended as medical advice for individual conditions or treatments  Talk to your doctor, nurse or pharmacist before following any medical regimen to see if it is safe and effective for you

## 2018-12-12 NOTE — ED PROVIDER NOTES
History  Chief Complaint   Patient presents with    Rectal Bleeding     Pt presents to ED with rectal bleeding x 2 days  Pt states the bleeding started dark and now is bright red blood  Pt is on xarelto for DVT      Patient is a 80-year-old female presents to the emergency department with complaints of bloody stool for the last 2 days  Patient is on Xarelto for DVT  She is on her 5th week of treatment  Patient states that she had 1 episode of bowel movement that appeared to be dark  She states that just prior to arrival she had a another bowel movement that had bright red blood  She denies chest pain, shortness of breath, fever, chills, abdominal pain, nausea, vomiting  Prior to Admission Medications   Prescriptions Last Dose Informant Patient Reported? Taking? Cranberry 1000 MG CAPS   Yes No   Sig: Take by mouth   acetaminophen-codeine (TYLENOL #3) 300-30 mg per tablet   Yes No   Sig: TAKE 1 TO 2 TABLETS EVERY 4 HOURS AS NEEDED FOR PAIN   calcium carbonate (OS-ALEXIS) 600 MG tablet   Yes No   Sig: Take 600 mg by mouth 2 (two) times a day with meals   cholecalciferol (VITAMIN D3) 1,000 units tablet   Yes No   Sig: Take 1,000 Units by mouth daily   clotrimazole-betamethasone (LOTRISONE) 1-0 05 % cream   Yes No   Sig: APPLY TOPICALLY 2 (TWO) TIMES A DAY  APPLY TO AFFECTED AREA(S)    estradiol (CLIMARA) 0 025 mg/24 hr   Yes No   Sig: Place 1 patch on the skin once a week   rivaroxaban (XARELTO) 15 & 20 MG starter pack   No No   Sig: Take 15 mg by mouth twice daily for 21 days, then 20 mg once daily thereafter        Facility-Administered Medications: None       Past Medical History:   Diagnosis Date    DVT (deep venous thrombosis) (HCC)     Endometriosis     Lymphedema of right lower extremity        Past Surgical History:   Procedure Laterality Date    ABLATION COLPOCLESIS      HYSTERECTOMY  2016    OOPHORECTOMY Bilateral 09/2016    OVARY SURGERY         Family History   Problem Relation Age of Onset    Pancreatic cancer Maternal Grandmother      I have reviewed and agree with the history as documented  Social History   Substance Use Topics    Smoking status: Never Smoker    Smokeless tobacco: Never Used    Alcohol use No        Review of Systems   Constitutional: Negative for fatigue and fever  Respiratory: Negative for shortness of breath  Cardiovascular: Negative for chest pain  Gastrointestinal: Positive for blood in stool  All other systems reviewed and are negative  Physical Exam  Physical Exam   Constitutional: She is oriented to person, place, and time  She appears well-developed and well-nourished  HENT:   Head: Normocephalic and atraumatic  Right Ear: External ear normal    Left Ear: External ear normal    Nose: Nose normal    Mouth/Throat: Oropharynx is clear and moist    Eyes: Pupils are equal, round, and reactive to light  Conjunctivae and EOM are normal    Neck: Normal range of motion  Cardiovascular: Normal rate, regular rhythm and normal heart sounds  Pulmonary/Chest: Effort normal and breath sounds normal    Abdominal: Soft  Bowel sounds are normal    Guaiac positive stool   Musculoskeletal: Normal range of motion  Neurological: She is alert and oriented to person, place, and time  Skin: Skin is dry  Psychiatric: She has a normal mood and affect  Her behavior is normal  Judgment and thought content normal    Vitals reviewed        Vital Signs  ED Triage Vitals [12/11/18 2045]   Temperature Pulse Respirations Blood Pressure SpO2   97 8 °F (36 6 °C) 73 17 149/90 99 %      Temp Source Heart Rate Source Patient Position - Orthostatic VS BP Location FiO2 (%)   Oral Monitor Sitting Left arm --      Pain Score       No Pain           Vitals:    12/11/18 2045 12/11/18 2320   BP: 149/90 120/59   Pulse: 73 78   Patient Position - Orthostatic VS: Sitting Lying       Visual Acuity      ED Medications  Medications   sodium chloride 0 9 % bolus 1,000 mL (0 mL Intravenous Stopped 12/12/18 0019)       Diagnostic Studies  Results Reviewed     Procedure Component Value Units Date/Time    Comprehensive metabolic panel [[id number]] Collected:  12/11/18 2312    Lab Status:  Final result Specimen:  Blood from Arm, Left Updated:  12/11/18 2333     Sodium 140 mmol/L      Potassium 4 0 mmol/L      Chloride 102 mmol/L      CO2 32 mmol/L      ANION GAP 6 mmol/L      BUN 12 mg/dL      Creatinine 0 75 mg/dL      Glucose 91 mg/dL      Calcium 9 2 mg/dL      AST 13 U/L      ALT 29 U/L      Alkaline Phosphatase 107 U/L      Total Protein 7 6 g/dL      Albumin 3 6 g/dL      Total Bilirubin 0 20 mg/dL      eGFR 101 ml/min/1 73sq m     Narrative:         National Kidney Disease Education Program recommendations are as follows:  GFR calculation is accurate only with a steady state creatinine  Chronic Kidney disease less than 60 ml/min/1 73 sq  meters  Kidney failure less than 15 ml/min/1 73 sq  meters      APTT [[id number]]  (Normal) Collected:  12/11/18 2312    Lab Status:  Final result Specimen:  Blood from Arm, Left Updated:  12/11/18 2329     PTT 31 seconds     Protime-INR [[id number]]  (Normal) Collected:  12/11/18 2312    Lab Status:  Final result Specimen:  Blood from Arm, Left Updated:  12/11/18 2329     Protime 13 6 seconds      INR 1 05    CBC and differential [[id number]]  (Abnormal) Collected:  12/11/18 2312    Lab Status:  Final result Specimen:  Blood from Arm, Left Updated:  12/11/18 2317     WBC 8 14 Thousand/uL      RBC 4 92 Million/uL      Hemoglobin 13 4 g/dL      Hematocrit 43 0 %      MCV 87 fL      MCH 27 2 pg      MCHC 31 2 (L) g/dL      RDW 13 9 %      MPV 9 8 fL      Platelets 748 Thousands/uL      nRBC 0 /100 WBCs      Neutrophils Relative 69 %      Immat GRANS % 0 %      Lymphocytes Relative 19 %      Monocytes Relative 7 %      Eosinophils Relative 4 %      Basophils Relative 1 %      Neutrophils Absolute 5 70 Thousands/µL      Immature Grans Absolute 0 02 Thousand/uL Lymphocytes Absolute 1 53 Thousands/µL      Monocytes Absolute 0 54 Thousand/µL      Eosinophils Absolute 0 29 Thousand/µL      Basophils Absolute 0 06 Thousands/µL                  No orders to display              Procedures  Procedures       Phone Contacts  ED Phone Contact    ED Course                               MDM  Number of Diagnoses or Management Options  Rectal bleeding:   Diagnosis management comments: Patient is a 55-year-old female who presents emergency department with complaint of blood in stool x2 episodes  Patient is on her 5th week after treatment  I recommended that she hold her Xarelto  She is to contact her vascular surgeon in the morning and inform and av at the bloody stool  She already has evaluation with the GI  She is to follow up with them  Hemoglobin is stable  Patient stable for discharge  Return parameters were discussed in length  Amount and/or Complexity of Data Reviewed  Clinical lab tests: ordered and reviewed      CritCare Time    Disposition  Final diagnoses:   Rectal bleeding     Time reflects when diagnosis was documented in both MDM as applicable and the Disposition within this note     Time User Action Codes Description Comment    12/11/2018 11:46 PM Allen Finn Add [K62 5] Rectal bleeding       ED Disposition     ED Disposition Condition Comment    [facility] [address] [facility]/self care      Condition at discharge: Good        Follow-up Information     Follow up With Specialties Details Why Contact Info    Mariola Rae MD Gastroenterology Schedule an appointment as soon as possible for a visit  [address] [address]            Discharge Medication List as of 12/11/2018 11:47 PM      CONTINUE these medications which have NOT CHANGED    Details   acetaminophen-codeine (TYLENOL #3) 300-30 mg per tablet TAKE 1 TO 2 TABLETS EVERY 4 HOURS AS NEEDED FOR PAIN, Historical Med      calcium carbonate (OS-ALEXIS) 600 MG tablet Take 600 mg by mouth 2 (two) times a day with meals, Historical Med      cholecalciferol (VITAMIN D3) 1,000 units tablet Take 1,000 Units by mouth daily, Historical Med      clotrimazole-betamethasone (LOTRISONE) 1-0 05 % cream APPLY TOPICALLY 2 (TWO) TIMES A DAY  APPLY TO AFFECTED AREA(S) , Historical Med      Cranberry 1000 MG CAPS Take by mouth, Historical Med      estradiol (CLIMARA) 0 025 mg/24 hr Place 1 patch on the skin once a week, Historical Med      rivaroxaban (XARELTO) 15 & 20 MG starter pack Take 15 mg by mouth twice daily for 21 days, then 20 mg once daily thereafter , Print           No discharge procedures on file      ED Provider  Electronically Signed by           Deric Marlow PA-C  12/12/18 9892

## 2018-12-20 ENCOUNTER — CONSULT (OUTPATIENT)
Dept: HEMATOLOGY ONCOLOGY | Facility: CLINIC | Age: 38
End: 2018-12-20
Payer: COMMERCIAL

## 2018-12-20 VITALS
SYSTOLIC BLOOD PRESSURE: 116 MMHG | HEART RATE: 102 BPM | OXYGEN SATURATION: 97 % | RESPIRATION RATE: 19 BRPM | BODY MASS INDEX: 45.99 KG/M2 | HEIGHT: 67 IN | WEIGHT: 293 LBS | TEMPERATURE: 97.6 F | DIASTOLIC BLOOD PRESSURE: 84 MMHG

## 2018-12-20 DIAGNOSIS — D68.9 COAGULATION DISORDER (HCC): ICD-10-CM

## 2018-12-20 PROCEDURE — 99245 OFF/OP CONSLTJ NEW/EST HI 55: CPT | Performed by: INTERNAL MEDICINE

## 2018-12-20 NOTE — PROGRESS NOTES
Oncology Outpatient Consult Note  Madhuri Johnson 45 y o  female MRN: @ Encounter: [id number]        Date:  12/20/2018        CC:  2 DVTs      HPI:  Madhuri Johnson is seen for initial consultation 12/20/2018 regarding Multiple blood clots  The patient states she had her first blood clot around 2007 after she was immobile after a broken ankle  She did well with that and finished up anticoagulation  After that she was at baseline doing well until recently in September when she had pelvic surgery  She states she had recovered from this and was fully ambulating but did have an estrogen patch on  Regardless she ended up having a second DVT  She does have a positive family history in both her mother and her maternal aunt was had multiple blood clots and is on lifelong anticoagulation  She states she had testing done at an outside facility and was told by a hematologist that her testing was negative  At this point I explained it was not very relevant to repeat the testing as with 2 blood clots the second one having been a few months after surgery and possibly not having been provoked I think she needs lifelong anticoagulation  As far as symptoms are concerned she denies any nausea denies any vomiting denies any diarrhea  The patient does have chronic lymphedema in her right lower extremity where she had blood clots  She does follow with a vascular surgeon for this  The rest of her 14 point review of systems today was negative  Test Results:    Imaging: Mammo Screening Bilateral W Cad    Result Date: 12/10/2018  Narrative: DIAGNOSIS: Encounter for screening mammogram for breast cancer RELEVANT HISTORY: Family Breast Cancer History: No known family history of breast cancer  Family Medical history: No relevant family history has been documented for this patient  Personal History: Hormone history includes estrogen replacement therapy  Surgical history includes hysterectomy and oophorectomy   No relevant medical history has been documented for this patient  COMPARISONS: N/A INDICATION: Nichelle Kowalski is a 45 y o  female presenting for Screening for breast cancer  TECHNOLOGIST: Muriel Bowie VIEWS: 2D views: CC, MLO views of right, left breast(s) were taken  2D synth views: No views of the breast(s) were taken  3D views: No views of the breast(s) were taken  TECHNIQUE: The current study was evaluated with Computer Aided Detection  TISSUE DENSITY: The breasts are almost entirely fatty  A breast health care nurse from our facility will be contacting the patient regarding the need for additional imaging  The patient is scheduled in a reminder system for screening mammography  8-10% of cancers will be missed on mammography  Management of a palpable abnormality must be based on clinical grounds  Patients will be notified of their results via letter from our facility  Accredited by [employer] and FDA  RISK ASSESSMENT: 5 Year Tyrer-Cuzick: 0 3 % 10 Year Tyrer-Cuzick: 0 79 % Lifetime Tyrer-Cuzick: 7 87 % FINDINGS: RIGHT 1) MASS: There is an oval mass with circumscribed margins seen in the lower outer quadrant of the right breast in the anterior depth  The remainder of the breast is otherwise within normal limits  LEFT There are no suspicious masses, grouped microcalcifications or areas of architectural distortion  The skin and nipple areolar complex are unremarkable  Impression:  Additional imaging required   ASSESSMENT/BI-RADS CATEGORY: Left: 1 - Negative Right: 0 - Incomplete: Needs Additional Imaging Evaluation Overall: 0 - Incomplete: Needs Additional Imaging Evaluation RECOMMENDATION:      - Ultrasound for the right breast       - Breast Diagnostic Gilmer for the right breast       - Routine Screening Mammogram in 1 year for the left breast  Workstation ID: [id number]     Mammo Diagnostic Right W 3d & Cad, Us Breast Right Limited (diagnostic)    Result Date: 12/11/2018  Narrative: DIAGNOSIS: Abnormal mammogram RELEVANT HISTORY: Family Breast Cancer History: No known family history of breast cancer  Family Medical history: No relevant family history has been documented for this patient  Personal History: Hormone history includes estrogen replacement therapy  Surgical history includes hysterectomy and oophorectomy  No relevant medical history has been documented for this patient  COMPARISONS: Compared to: 12/07/2018 Mammo screening bilateral w cad INDICATION: Jeffery Montes is a 45 y o  female presenting for abn mammo  TECHNOLOGIST: No Landrum VIEWS: 2D views: No views of the breast(s) were taken  2D synth views: CC, MLO views of right breast(s) were taken  3D views: CC, MLO views of right breast(s) were taken  TECHNIQUE: The current study was evaluated with Computer Aided Detection  TISSUE DENSITY: The breasts are almost entirely fatty  RISK ASSESSMENT: 5 Year Tyrer-Cuzick: 0 3 % 10 Year Tyrer-Cuzick: 0 79 % Lifetime Tyrer-Cuzick: 7 87 % FINDINGS: RIGHT 1) MASS Mammo diagnostic right w 3d & cad: There is an oval mass with circumscribed margins seen in the lower outer quadrant of the right breast in the anterior depth  US breast right limited (diagnostic): There is a 9 mm oval, hypoechoic mass with circumscribed margins seen in the lower outer quadrant of the right breast at 7 o'clock in the anterior depth, 5 cm from the nipple  Impression: Recommend six-month ultrasound follow-up of the 7 o'clock right breast mass   ASSESSMENT/BI-RADS CATEGORY: Right: 3 - Probably Benign Overall: 3 - Probably Benign RECOMMENDATION:      - Six-month follow-up ultrasound for the right breast  Workstation ID: [id number]       Labs:   Lab Results   Component Value Date    WBC 8 14 12/11/2018    HGB 13 4 12/11/2018    HCT 43 0 12/11/2018    MCV 87 12/11/2018     12/11/2018     Lab Results   Component Value Date    K 4 0 12/11/2018     12/11/2018    CO2 32 12/11/2018    BUN 12 12/11/2018    CREATININE 0 75 12/11/2018    GLUF 86 12/07/2018    CALCIUM 9 2 12/11/2018    AST 13 12/11/2018    ALT 29 12/11/2018    ALKPHOS 107 12/11/2018    EGFR 101 12/11/2018           ROS: As stated in history of present illness otherwise her 14 point review of systems today was negative  Active Problems:   Patient Active Problem List   Diagnosis    Rectal bleeding       Past Medical History:   Past Medical History:   Diagnosis Date    DVT (deep venous thrombosis) (HCC)     Endometriosis     Lymphedema of right lower extremity        Surgical History:   Past Surgical History:   Procedure Laterality Date    ABLATION COLPOCLESIS      HYSTERECTOMY  2016    OOPHORECTOMY Bilateral 09/2016    OVARY SURGERY         Family History:    Family History   Problem Relation Age of Onset    Pancreatic cancer Maternal Grandmother        Cancer-related family history includes Pancreatic cancer in her maternal grandmother  Social History:   Social History     Social History    Marital status: /Civil Union     Spouse name: N/A    Number of children: N/A    Years of education: N/A     Occupational History    Not on file  Social History Main Topics    Smoking status: Never Smoker    Smokeless tobacco: Never Used    Alcohol use No    Drug use: No    Sexual activity: Not on file     Other Topics Concern    Not on file     Social History Narrative    No narrative on file       Current Medications:   Current Outpatient Prescriptions   Medication Sig Dispense Refill    calcium carbonate (OS-ALEXIS) 600 MG tablet Take 600 mg by mouth 2 (two) times a day with meals      cholecalciferol (VITAMIN D3) 1,000 units tablet Take 1,000 Units by mouth daily      Cranberry 1000 MG CAPS Take by mouth      rivaroxaban (XARELTO) 15 & 20 MG starter pack Take 15 mg by mouth twice daily for 21 days, then 20 mg once daily thereafter   51 each 0    acetaminophen-codeine (TYLENOL #3) 300-30 mg per tablet TAKE 1 TO 2 TABLETS EVERY 4 HOURS AS NEEDED FOR PAIN  0    clotrimazole-betamethasone (LOTRISONE) 1-0 05 % cream APPLY TOPICALLY 2 (TWO) TIMES A DAY  APPLY TO AFFECTED AREA(S)   0    estradiol (CLIMARA) 0 025 mg/24 hr Place 1 patch on the skin once a week       No current facility-administered medications for this visit  Allergies: No Known Allergies      Physical Exam:    Body surface area is 2 5 meters squared  Wt Readings from Last 3 Encounters:   12/20/18 (!) 150 kg (331 lb)   12/11/18 (!) 150 kg (330 lb)   12/07/18 (!) 149 kg (329 lb)        Temp Readings from Last 3 Encounters:   12/20/18 97 6 °F (36 4 °C) (Tympanic)   12/11/18 97 8 °F (36 6 °C) (Oral)   11/03/18 98 7 °F (37 1 °C) (Oral)        BP Readings from Last 3 Encounters:   12/20/18 116/84   12/11/18 120/59   12/05/18 132/84         Pulse Readings from Last 3 Encounters:   12/20/18 102   12/11/18 78   12/05/18 79       Physical Exam     Constitutional   General appearance: No acute distress, well appearing and well nourished  Eyes   Conjunctiva and lids: No swelling, erythema or discharge  Pupils and irises: Equal, round and reactive to light  Ears, Nose, Mouth, and Throat   External inspection of ears and nose: Normal     Nasal mucosa, septum, and turbinates: Normal without edema or erythema  Oropharynx: Normal with no erythema, edema, exudate or lesions  Pulmonary   Respiratory effort: No increased work of breathing or signs of respiratory distress  Auscultation of lungs: Clear to auscultation  Cardiovascular   Palpation of heart: Normal PMI, no thrills  Auscultation of heart: Normal rate and rhythm, normal S1 and S2, without murmurs  Examination of extremities for edema and/or varicosities: Normal     Carotid pulses: Normal     Abdomen   Abdomen: Non-tender, no masses  Liver and spleen: No hepatomegaly or splenomegaly  Lymphatic   Palpation of lymph nodes in neck: No lymphadenopathy      Musculoskeletal   Gait and station: Normal     Digits and nails: Normal without clubbing or cyanosis  Inspection/palpation of joints, bones, and muscles: Does have pitting edema and right lower extremity  She states it is chronic  Skin   Skin and subcutaneous tissue: Normal without rashes or lesions  Neurologic   Cranial nerves: Cranial nerves 2-12 intact  Sensation: No sensory loss  Psychiatric   Orientation to person, place, and time: Normal     Mood and affect: Normal           Assessment/ Plan:    The patient is a pleasant 80-year-old female has a personal history of 2 blood clots  The first one was provoked by an ankle fracture but the second one was 2 months after she had GYN surgery but was fully mobile and had no limitations on her movement  Her mother and her aunt both have had blood clots and her aunt is on lifelong anticoagulation because of multiple blood clots  She reports her hypercoagulable testing has been negative in the past  We could consider repeating it but he will not  and my recommendation would be for her to stay on lifelong anticoagulation  I spent the rationale to the patient and she agrees  I will see her as needed  She will follow-up with her primary care provider who will be managing her anticoagulation  She will stay on this lifelong  She'll see me as needed  Goals and Barriers:  Current Goal: Prolong Survival from Multiple DVTs  Barriers: None  Patient's Capacity to Self Care:  Patient able to self care  Portions of the record may have been created with voice recognition software   Occasional wrong word or "sound a like" substitutions may have occurred due to the inherent limitations of voice recognition software   Read the chart carefully and recognize, using context, where substitutions have occurred

## 2018-12-20 NOTE — LETTER
December 20, 2018     Brandi, [address] [facility],[address] [place] [address]    Patient: Luisa Raphael   YOB: 1980   Date of Visit: 12/20/2018       Dear Dr Zeke Abraham: Thank you for referring Luisa Raphael to me for evaluation  Below are my notes for this consultation  If you have questions, please do not hesitate to call me  I look forward to following your patient along with you  Sincerely,        Lynn Salcido MD        CC: No Recipients  Lynn Salcido MD  12/20/2018  1:37 PM  Sign at close encounter     Oncology Outpatient Consult Note  Stu Roland Sphar 45 y o  female MRN: @ Encounter: [id number]        Date:  12/20/2018        CC:  2 DVTs      HPI:  Luisa Raphael is seen for initial consultation 12/20/2018 regarding Multiple blood clots  The patient states she had her first blood clot around 2007 after she was immobile after a broken ankle  She did well with that and finished up anticoagulation  After that she was at baseline doing well until recently in September when she had pelvic surgery  She states she had recovered from this and was fully ambulating but did have an estrogen patch on  Regardless she ended up having a second DVT  She does have a positive family history in both her mother and her maternal aunt was had multiple blood clots and is on lifelong anticoagulation  She states she had testing done at an outside facility and was told by a hematologist that her testing was negative  At this point I explained it was not very relevant to repeat the testing as with 2 blood clots the second one having been a few months after surgery and possibly not having been provoked I think she needs lifelong anticoagulation  As far as symptoms are concerned she denies any nausea denies any vomiting denies any diarrhea  The patient does have chronic lymphedema in her right lower extremity where she had blood clots  She does follow with a vascular surgeon for this   The rest of her 14 point review of systems today was negative  Test Results:    Imaging: Mammo Screening Bilateral W Cad    Result Date: 12/10/2018  Narrative: DIAGNOSIS: Encounter for screening mammogram for breast cancer RELEVANT HISTORY: Family Breast Cancer History: No known family history of breast cancer  Family Medical history: No relevant family history has been documented for this patient  Personal History: Hormone history includes estrogen replacement therapy  Surgical history includes hysterectomy and oophorectomy  No relevant medical history has been documented for this patient  COMPARISONS: N/A INDICATION: Lino Campos is a 45 y o  female presenting for Screening for breast cancer  TECHNOLOGIST: Jaun Guerrero VIEWS: 2D views: CC, MLO views of right, left breast(s) were taken  2D synth views: No views of the breast(s) were taken  3D views: No views of the breast(s) were taken  TECHNIQUE: The current study was evaluated with Computer Aided Detection  TISSUE DENSITY: The breasts are almost entirely fatty  A breast health care nurse from our facility will be contacting the patient regarding the need for additional imaging  The patient is scheduled in a reminder system for screening mammography  8-10% of cancers will be missed on mammography  Management of a palpable abnormality must be based on clinical grounds  Patients will be notified of their results via letter from our facility  Accredited by [employer] and FDA  RISK ASSESSMENT: 5 Year Tyrer-Cuzick: 0 3 % 10 Year Tyrer-Cuzick: 0 79 % Lifetime Tyrer-Cuzick: 7 87 % FINDINGS: RIGHT 1) MASS: There is an oval mass with circumscribed margins seen in the lower outer quadrant of the right breast in the anterior depth  The remainder of the breast is otherwise within normal limits  LEFT There are no suspicious masses, grouped microcalcifications or areas of architectural distortion  The skin and nipple areolar complex are unremarkable       Impression:  Additional imaging required  ASSESSMENT/BI-RADS CATEGORY: Left: 1 - Negative Right: 0 - Incomplete: Needs Additional Imaging Evaluation Overall: 0 - Incomplete: Needs Additional Imaging Evaluation RECOMMENDATION:      - Ultrasound for the right breast       - Breast Diagnostic Gilmer for the right breast       - Routine Screening Mammogram in 1 year for the left breast  Workstation ID: [id number]     Mammo Diagnostic Right W 3d & Cad, Us Breast Right Limited (diagnostic)    Result Date: 12/11/2018  Narrative: DIAGNOSIS: Abnormal mammogram RELEVANT HISTORY: Family Breast Cancer History: No known family history of breast cancer  Family Medical history: No relevant family history has been documented for this patient  Personal History: Hormone history includes estrogen replacement therapy  Surgical history includes hysterectomy and oophorectomy  No relevant medical history has been documented for this patient  COMPARISONS: Compared to: 12/07/2018 Mammo screening bilateral w cad INDICATION: Rakesh James is a 45 y o  female presenting for abn mammo  TECHNOLOGIST: Mendez Stakes VIEWS: 2D views: No views of the breast(s) were taken  2D synth views: CC, MLO views of right breast(s) were taken  3D views: CC, MLO views of right breast(s) were taken  TECHNIQUE: The current study was evaluated with Computer Aided Detection  TISSUE DENSITY: The breasts are almost entirely fatty  RISK ASSESSMENT: 5 Year Tyrer-Cuzick: 0 3 % 10 Year Tyrer-Cuzick: 0 79 % Lifetime Tyrer-Cuzick: 7 87 % FINDINGS: RIGHT 1) MASS Mammo diagnostic right w 3d & cad: There is an oval mass with circumscribed margins seen in the lower outer quadrant of the right breast in the anterior depth  US breast right limited (diagnostic): There is a 9 mm oval, hypoechoic mass with circumscribed margins seen in the lower outer quadrant of the right breast at 7 o'clock in the anterior depth, 5 cm from the nipple        Impression: Recommend six-month ultrasound follow-up of the 7 o'clock right breast mass  ASSESSMENT/BI-RADS CATEGORY: Right: 3 - Probably Benign Overall: 3 - Probably Benign RECOMMENDATION:      - Six-month follow-up ultrasound for the right breast  Workstation ID: [id number]       Labs:   Lab Results   Component Value Date    WBC 8 14 12/11/2018    HGB 13 4 12/11/2018    HCT 43 0 12/11/2018    MCV 87 12/11/2018     12/11/2018     Lab Results   Component Value Date    K 4 0 12/11/2018     12/11/2018    CO2 32 12/11/2018    BUN 12 12/11/2018    CREATININE 0 75 12/11/2018    GLUF 86 12/07/2018    CALCIUM 9 2 12/11/2018    AST 13 12/11/2018    ALT 29 12/11/2018    ALKPHOS 107 12/11/2018    EGFR 101 12/11/2018           ROS: As stated in history of present illness otherwise her 14 point review of systems today was negative  Active Problems:   Patient Active Problem List   Diagnosis    Rectal bleeding       Past Medical History:   Past Medical History:   Diagnosis Date    DVT (deep venous thrombosis) (HCC)     Endometriosis     Lymphedema of right lower extremity        Surgical History:   Past Surgical History:   Procedure Laterality Date    ABLATION COLPOCLESIS      HYSTERECTOMY  2016    OOPHORECTOMY Bilateral 09/2016    OVARY SURGERY         Family History:    Family History   Problem Relation Age of Onset    Pancreatic cancer Maternal Grandmother        Cancer-related family history includes Pancreatic cancer in her maternal grandmother  Social History:   Social History     Social History    Marital status: /Civil Union     Spouse name: N/A    Number of children: N/A    Years of education: N/A     Occupational History    Not on file       Social History Main Topics    Smoking status: Never Smoker    Smokeless tobacco: Never Used    Alcohol use No    Drug use: No    Sexual activity: Not on file     Other Topics Concern    Not on file     Social History Narrative    No narrative on file       Current Medications:   Current Outpatient Prescriptions   Medication Sig Dispense Refill    calcium carbonate (OS-ALEXIS) 600 MG tablet Take 600 mg by mouth 2 (two) times a day with meals      cholecalciferol (VITAMIN D3) 1,000 units tablet Take 1,000 Units by mouth daily      Cranberry 1000 MG CAPS Take by mouth      rivaroxaban (XARELTO) 15 & 20 MG starter pack Take 15 mg by mouth twice daily for 21 days, then 20 mg once daily thereafter  51 each 0    acetaminophen-codeine (TYLENOL #3) 300-30 mg per tablet TAKE 1 TO 2 TABLETS EVERY 4 HOURS AS NEEDED FOR PAIN  0    clotrimazole-betamethasone (LOTRISONE) 1-0 05 % cream APPLY TOPICALLY 2 (TWO) TIMES A DAY  APPLY TO AFFECTED AREA(S)   0    estradiol (CLIMARA) 0 025 mg/24 hr Place 1 patch on the skin once a week       No current facility-administered medications for this visit  Allergies: No Known Allergies      Physical Exam:    Body surface area is 2 5 meters squared  Wt Readings from Last 3 Encounters:   12/20/18 (!) 150 kg (331 lb)   12/11/18 (!) 150 kg (330 lb)   12/07/18 (!) 149 kg (329 lb)        Temp Readings from Last 3 Encounters:   12/20/18 97 6 °F (36 4 °C) (Tympanic)   12/11/18 97 8 °F (36 6 °C) (Oral)   11/03/18 98 7 °F (37 1 °C) (Oral)        BP Readings from Last 3 Encounters:   12/20/18 116/84   12/11/18 120/59   12/05/18 132/84         Pulse Readings from Last 3 Encounters:   12/20/18 102   12/11/18 78   12/05/18 79       Physical Exam     Constitutional   General appearance: No acute distress, well appearing and well nourished  Eyes   Conjunctiva and lids: No swelling, erythema or discharge  Pupils and irises: Equal, round and reactive to light  Ears, Nose, Mouth, and Throat   External inspection of ears and nose: Normal     Nasal mucosa, septum, and turbinates: Normal without edema or erythema  Oropharynx: Normal with no erythema, edema, exudate or lesions  Pulmonary   Respiratory effort: No increased work of breathing or signs of respiratory distress  Auscultation of lungs: Clear to auscultation  Cardiovascular   Palpation of heart: Normal PMI, no thrills  Auscultation of heart: Normal rate and rhythm, normal S1 and S2, without murmurs  Examination of extremities for edema and/or varicosities: Normal     Carotid pulses: Normal     Abdomen   Abdomen: Non-tender, no masses  Liver and spleen: No hepatomegaly or splenomegaly  Lymphatic   Palpation of lymph nodes in neck: No lymphadenopathy  Musculoskeletal   Gait and station: Normal     Digits and nails: Normal without clubbing or cyanosis  Inspection/palpation of joints, bones, and muscles: Normal     Skin   Skin and subcutaneous tissue: Normal without rashes or lesions  Neurologic   Cranial nerves: Cranial nerves 2-12 intact  Sensation: No sensory loss  Psychiatric   Orientation to person, place, and time: Normal     Mood and affect: Normal           Assessment/ Plan:    The patient is a pleasant 77-year-old female has a personal history of 2 blood clots  The first one was provoked by an ankle fracture but the second one was 2 months after she had GYN surgery but was fully mobile and had no limitations on her movement  Her mother and her aunt both have had blood clots and her aunt is on lifelong anticoagulation because of multiple blood clots  She reports her hypercoagulable testing has been negative in the past  We could consider repeating it but he will not  and my recommendation would be for her to stay on lifelong anticoagulation  I spent the rationale to the patient and she agrees  I will see her as needed  She will follow-up with her primary care provider who will be managing her anticoagulation  She will stay on this lifelong  She'll see me as needed  Goals and Barriers:  Current Goal: Prolong Survival from Multiple DVTs  Barriers: None  Patient's Capacity to Self Care:  Patient able to self care      Portions of the record may have been created with voice recognition software   Occasional wrong word or "sound a like" substitutions may have occurred due to the inherent limitations of voice recognition software   Read the chart carefully and recognize, using context, where substitutions have occurred

## 2019-01-02 ENCOUNTER — OFFICE VISIT (OUTPATIENT)
Dept: FAMILY MEDICINE CLINIC | Facility: CLINIC | Age: 39
End: 2019-01-02
Payer: COMMERCIAL

## 2019-01-02 VITALS
SYSTOLIC BLOOD PRESSURE: 118 MMHG | OXYGEN SATURATION: 98 % | HEART RATE: 89 BPM | HEIGHT: 67 IN | DIASTOLIC BLOOD PRESSURE: 76 MMHG | WEIGHT: 293 LBS | BODY MASS INDEX: 45.99 KG/M2

## 2019-01-02 DIAGNOSIS — I82.411 DEEP VENOUS THROMBOSIS OF RIGHT PROFUNDA FEMORIS VEIN (HCC): ICD-10-CM

## 2019-01-02 DIAGNOSIS — G44.209 TENSION-TYPE HEADACHE, NOT INTRACTABLE, UNSPECIFIED CHRONICITY PATTERN: ICD-10-CM

## 2019-01-02 DIAGNOSIS — D68.9 COAGULATION DEFECT (HCC): Primary | ICD-10-CM

## 2019-01-02 DIAGNOSIS — R63.5 WEIGHT GAIN: ICD-10-CM

## 2019-01-02 PROCEDURE — 99214 OFFICE O/P EST MOD 30 MIN: CPT | Performed by: FAMILY MEDICINE

## 2019-01-02 RX ORDER — TOPIRAMATE 50 MG/1
50 TABLET, FILM COATED ORAL EVERY 12 HOURS SCHEDULED
Qty: 180 TABLET | Refills: 1 | Status: SHIPPED | OUTPATIENT
Start: 2019-01-02 | End: 2019-05-01 | Stop reason: CLARIF

## 2019-01-02 NOTE — PATIENT INSTRUCTIONS
Xarelto 20 mg daily  Must keep moving  Hydrate  Weight loss  Thirty days weight check  Phentermine 37 5 daily  Topamax 50 mg twice a day  Use the treadmill daily  Diet discussed

## 2019-01-02 NOTE — PROGRESS NOTES
Standard Visit   Assessment/Plan:     Visit Diagnosis:  Diagnoses and all orders for this visit:    Coagulation defect ([address] )    Deep venous thrombosis of right profunda femoris vein (HCC)    Weight gain  -     Discontinue: Phentermine HCl 37 5 MG TBDP; Take 1 pill daily  -     Discontinue: Phentermine HCl 37 5 MG TBDP; Take 1 pill daily  -     Phentermine HCl 37 5 MG TBDP; Take 1 pill daily    Tension-type headache, not intractable, unspecified chronicity pattern  -     topiramate (TOPAMAX) 50 MG tablet; Take 1 tablet (50 mg total) by mouth every 12 (twelve) hours      Xarelto 20 mg daily  Must keep moving  Hydrate  Weight loss  Thirty days weight check  Phentermine 37 5 daily  Topamax 50 mg twice a day  Use the treadmill daily  Diet discussed      Subjective:    Patient ID: Luisa Raphael is a 45 y o  female  Patient is here with the following concerns:    Here for follow-up visit  She was seen by Hematology  It was determined she should be on a anticoagulation agent for the rest of her life due to her family history  And also her 2 DVTs  Both of her DVTs were provoked however she has a strong family history of DVT  She is on Xarelto 20 mg once a day she also has a problem with lymphedema in the right leg and that is an issue for her  She is a nonsmoker  She does not live with a smoker   with 2 children  She just graduated from massage therapy school  Our goals here at this point are to feel better  To be active  Weight loss  And get back to Life  And get a job        The following portions of the patient's history were reviewed and updated as appropriate: allergies, current medications, past family history, past medical history, past social history, past surgical history and problem list     Review of Systems   Constitutional: Positive for activity change, fatigue and unexpected weight change  Negative for fever  Respiratory: Negative for cough, choking, chest tightness and shortness of breath  Cardiovascular: Positive for leg swelling  Musculoskeletal: Positive for back pain and myalgias  Neurological: Negative  Hematological: Bruises/bleeds easily  /76   Pulse 89   Ht 5' 7" (1 702 m)   Wt (!) 152 kg (335 lb)   SpO2 98%   BMI 52 47 kg/m²   Social History     Social History    Marital status: /Civil Union     Spouse name: N/A    Number of children: N/A    Years of education: N/A     Occupational History    Not on file  Social History Main Topics    Smoking status: Never Smoker    Smokeless tobacco: Never Used    Alcohol use No    Drug use: No    Sexual activity: Not on file     Other Topics Concern    Not on file     Social History Narrative    No narrative on file     Past Medical History:   Diagnosis Date    DVT (deep venous thrombosis) (HCC)     Endometriosis     Lymphedema of right lower extremity      Family History   Problem Relation Age of Onset    Pancreatic cancer Maternal Grandmother      Past Surgical History:   Procedure Laterality Date    ABLATION COLPOCLESIS      HYSTERECTOMY  2016    OOPHORECTOMY Bilateral 09/2016    OVARY SURGERY         Current Outpatient Prescriptions:     acetaminophen-codeine (TYLENOL #3) 300-30 mg per tablet, TAKE 1 TO 2 TABLETS EVERY 4 HOURS AS NEEDED FOR PAIN, Disp: , Rfl: 0    cholecalciferol (VITAMIN D3) 1,000 units tablet, Take 1,000 Units by mouth daily, Disp: , Rfl:     clotrimazole-betamethasone (LOTRISONE) 1-0 05 % cream, APPLY TOPICALLY 2 (TWO) TIMES A DAY   APPLY TO AFFECTED AREA(S) , Disp: , Rfl: 0    Cranberry 1000 MG CAPS, Take by mouth, Disp: , Rfl:     rivaroxaban (XARELTO) 15 & 20 MG starter pack, Take 15 mg by mouth twice daily for 21 days, then 20 mg once daily thereafter , Disp: 51 each, Rfl: 0    Phentermine HCl 37 5 MG TBDP, Take 1 pill daily, Disp: 30 tablet, Rfl: 0    topiramate (TOPAMAX) 50 MG tablet, Take 1 tablet (50 mg total) by mouth every 12 (twelve) hours, Disp: 180 tablet, Rfl: 1      Objective:     Physical Exam   Constitutional: She is oriented to person, place, and time  She appears well-developed and well-nourished  Obese   HENT:   Head: Normocephalic and atraumatic  Eyes: Pupils are equal, round, and reactive to light  Neck: Normal range of motion  Neck supple  Cardiovascular: Normal rate  Pulmonary/Chest: Effort normal and breath sounds normal    Abdominal: Soft  Musculoskeletal: Normal range of motion  She exhibits edema  Lymphedema of right leg   Neurological: She is alert and oriented to person, place, and time  Skin: Skin is warm and dry  Psychiatric: She has a normal mood and affect  Nursing note and vitals reviewed  Social History     Social History    Marital status: /Civil Union     Spouse name: N/A    Number of children: N/A    Years of education: N/A     Occupational History    Not on file  Social History Main Topics    Smoking status: Never Smoker    Smokeless tobacco: Never Used    Alcohol use No    Drug use: No    Sexual activity: Not on file     Other Topics Concern    Not on file     Social History Narrative    No narrative on file     Past Medical History:   Diagnosis Date    DVT (deep venous thrombosis) (HCC)     Endometriosis     Lymphedema of right lower extremity        Current Outpatient Prescriptions:     acetaminophen-codeine (TYLENOL #3) 300-30 mg per tablet, TAKE 1 TO 2 TABLETS EVERY 4 HOURS AS NEEDED FOR PAIN, Disp: , Rfl: 0    cholecalciferol (VITAMIN D3) 1,000 units tablet, Take 1,000 Units by mouth daily, Disp: , Rfl:     clotrimazole-betamethasone (LOTRISONE) 1-0 05 % cream, APPLY TOPICALLY 2 (TWO) TIMES A DAY   APPLY TO AFFECTED AREA(S) , Disp: , Rfl: 0    Cranberry 1000 MG CAPS, Take by mouth, Disp: , Rfl:     rivaroxaban (XARELTO) 15 & 20 MG starter pack, Take 15 mg by mouth twice daily for 21 days, then 20 mg once daily thereafter , Disp: 51 each, Rfl: 0    Phentermine HCl 37 5 MG TBDP, Take 1 pill daily, Disp: 30 tablet, Rfl: 0    topiramate (TOPAMAX) 50 MG tablet, Take 1 tablet (50 mg total) by mouth every 12 (twelve) hours, Disp: 180 tablet, Rfl: 1  Family History   Problem Relation Age of Onset    Pancreatic cancer Maternal Grandmother        Patient Instructions   Xarelto 20 mg daily  Must keep moving  Hydrate  Weight loss  Thirty days weight check  Phentermine 37 5 daily  Topamax 50 mg twice a day  Use the treadmill daily  Diet discussed            EYAD Jenkins

## 2019-01-21 ENCOUNTER — PATIENT MESSAGE (OUTPATIENT)
Dept: FAMILY MEDICINE CLINIC | Facility: CLINIC | Age: 39
End: 2019-01-21

## 2019-01-21 DIAGNOSIS — R63.5 WEIGHT GAIN: ICD-10-CM

## 2019-01-22 DIAGNOSIS — R63.5 WEIGHT GAIN: ICD-10-CM

## 2019-01-22 RX ORDER — PHENTERMINE HYDROCHLORIDE 37.5 MG/1
37.5 CAPSULE ORAL EVERY MORNING
Qty: 30 CAPSULE | Refills: 0
Start: 2019-01-22 | End: 2019-01-22 | Stop reason: SDUPTHER

## 2019-01-22 RX ORDER — PHENTERMINE HYDROCHLORIDE 37.5 MG/1
37.5 CAPSULE ORAL EVERY MORNING
Qty: 30 CAPSULE | Refills: 1 | Status: SHIPPED | OUTPATIENT
Start: 2019-01-22 | End: 2019-05-01 | Stop reason: ALTCHOICE

## 2019-01-22 NOTE — TELEPHONE ENCOUNTER
From: Maria Del Carmen Andino  To: EYAD Collins  Sent: 1/21/2019 2:13 PM EST  Subject: Prescription Question    I was wondering if you could change my phentermine 37 5 mg prescription from tablets to capsules  The taste of the tablets are very sour and I like the capsules better   Thank Ifeoma Poole

## 2019-01-22 NOTE — TELEPHONE ENCOUNTER
Nicola Wilkes! This is not a refill request  I changed pt's phentermine from tablets to capsules to avoid confusion next time pt needs a refill

## 2019-02-06 ENCOUNTER — OFFICE VISIT (OUTPATIENT)
Dept: FAMILY MEDICINE CLINIC | Facility: CLINIC | Age: 39
End: 2019-02-06
Payer: COMMERCIAL

## 2019-02-06 VITALS
SYSTOLIC BLOOD PRESSURE: 114 MMHG | DIASTOLIC BLOOD PRESSURE: 72 MMHG | OXYGEN SATURATION: 98 % | BODY MASS INDEX: 45.99 KG/M2 | HEART RATE: 76 BPM | WEIGHT: 293 LBS | HEIGHT: 67 IN

## 2019-02-06 DIAGNOSIS — G44.229 CHRONIC TENSION-TYPE HEADACHE, NOT INTRACTABLE: ICD-10-CM

## 2019-02-06 DIAGNOSIS — I82.4Z1 ACUTE DEEP VEIN THROMBOSIS (DVT) OF DISTAL END OF RIGHT LOWER EXTREMITY (HCC): ICD-10-CM

## 2019-02-06 DIAGNOSIS — I89.0 LYMPHEDEMA OF RIGHT LOWER EXTREMITY: Primary | ICD-10-CM

## 2019-02-06 DIAGNOSIS — R63.5 WEIGHT GAIN: ICD-10-CM

## 2019-02-06 DIAGNOSIS — E66.01 CLASS 3 SEVERE OBESITY IN ADULT, UNSPECIFIED BMI, UNSPECIFIED OBESITY TYPE, UNSPECIFIED WHETHER SERIOUS COMORBIDITY PRESENT (HCC): ICD-10-CM

## 2019-02-06 PROBLEM — I82.409 DVT (DEEP VENOUS THROMBOSIS) (HCC): Status: RESOLVED | Noted: 2019-02-06 | Resolved: 2019-02-06

## 2019-02-06 PROBLEM — N80.9 ENDOMETRIOSIS: Status: RESOLVED | Noted: 2019-02-06 | Resolved: 2019-02-06

## 2019-02-06 PROBLEM — K62.5 RECTAL BLEEDING: Status: RESOLVED | Noted: 2018-12-11 | Resolved: 2019-02-06

## 2019-02-06 PROCEDURE — 1036F TOBACCO NON-USER: CPT | Performed by: FAMILY MEDICINE

## 2019-02-06 PROCEDURE — 99214 OFFICE O/P EST MOD 30 MIN: CPT | Performed by: FAMILY MEDICINE

## 2019-02-06 PROCEDURE — 3008F BODY MASS INDEX DOCD: CPT | Performed by: FAMILY MEDICINE

## 2019-02-06 NOTE — PROGRESS NOTES
Standard Visit   Assessment/Plan:     Visit Diagnosis:  Diagnoses and all orders for this visit:    Lymphedema of right lower extremity    Class 3 severe obesity in adult, unspecified BMI, unspecified obesity type, unspecified whether serious comorbidity present (HCC)    Chronic tension-type headache, not intractable    Acute deep vein thrombosis (DVT) of distal end of right lower extremity (HCC)  -     rivaroxaban (XARELTO) 20 mg tablet; Take 1 pill daily    Start the Topamax 50 mg in the evening  After 1 week bump it up to 1 pill in the morning and 1 pill at night   the goal for the Topamax is eventually 100 mg in the morning and 100 mg in the evening  Continue the phentermine once a day  Do not take the phentermine with orange juice as it decreases its absorption  Do not take the phentermine late in the afternoon because of will interfere with sleep  When the phentermine starts to lose its effectiveness then take it with the Tums tablet and it will help is absorption  I will see back in 3 months for weight check          Subjective:    Patient ID: Virginia Marino is a 45 y o  female       Patient is here with the following concerns:    Here for a checkup  Is currently taking Topamax 50 mg in the evening  Will eventually get to twice a day and will be using this for weight loss  Phentermine 37 5 mg once a day  Today's weight is actually down 331 lbs  Austin Zayas has a coagulation defect for which she will have to be on Xarelto lifelong  She had DVT in the right leg  She got cleared from hematologist  to use a manual lymphedema pump to relieve the lymphedema  She was also cleared by the vascular surgeon to be up to use the pump          The following portions of the patient's history were reviewed and updated as appropriate: allergies, current medications, past family history, past medical history, past social history, past surgical history and problem list     Review of Systems   Cardiovascular: Positive for leg swelling  Musculoskeletal: Positive for arthralgias and joint swelling  /72   Pulse 76   Ht 5' 7" (1 702 m)   Wt (!) 150 kg (331 lb)   SpO2 98%   BMI 51 84 kg/m²   Social History     Social History    Marital status: /Civil Union     Spouse name: N/A    Number of children: N/A    Years of education: N/A     Occupational History    Not on file  Social History Main Topics    Smoking status: Never Smoker    Smokeless tobacco: Never Used    Alcohol use No    Drug use: No    Sexual activity: Not on file     Other Topics Concern    Not on file     Social History Narrative    No narrative on file     Past Medical History:   Diagnosis Date    DVT (deep venous thrombosis) (HCC)     Endometriosis     Lymphedema of right lower extremity      Family History   Problem Relation Age of Onset    Pancreatic cancer Maternal Grandmother      Past Surgical History:   Procedure Laterality Date    ABLATION COLPOCLESIS      HYSTERECTOMY  2016    OOPHORECTOMY Bilateral 09/2016    OVARY SURGERY         Current Outpatient Prescriptions:     acetaminophen-codeine (TYLENOL #3) 300-30 mg per tablet, TAKE 1 TO 2 TABLETS EVERY 4 HOURS AS NEEDED FOR PAIN, Disp: , Rfl: 0    cholecalciferol (VITAMIN D3) 1,000 units tablet, Take 1,000 Units by mouth daily, Disp: , Rfl:     clotrimazole-betamethasone (LOTRISONE) 1-0 05 % cream, APPLY TOPICALLY 2 (TWO) TIMES A DAY   APPLY TO AFFECTED AREA(S) , Disp: , Rfl: 0    Cranberry 1000 MG CAPS, Take by mouth, Disp: , Rfl:     phentermine 37 5 MG capsule, Take 1 capsule (37 5 mg total) by mouth every morning, Disp: 30 capsule, Rfl: 1    rivaroxaban (XARELTO) 15 & 20 MG starter pack, Take 15 mg by mouth twice daily for 21 days, then 20 mg once daily thereafter , Disp: 51 each, Rfl: 0    rivaroxaban (XARELTO) 20 mg tablet, Take 1 pill daily, Disp: 90 tablet, Rfl: 2    topiramate (TOPAMAX) 50 MG tablet, Take 1 tablet (50 mg total) by mouth every 12 (twelve) hours, Disp: 180 tablet, Rfl: 1      Objective:     Physical Exam   Constitutional: She is oriented to person, place, and time  She appears well-developed and well-nourished  HENT:   Head: Normocephalic and atraumatic  Eyes: Pupils are equal, round, and reactive to light  Neck: Normal range of motion  Neck supple  Cardiovascular: Normal rate  Pulmonary/Chest: Effort normal and breath sounds normal    Musculoskeletal: Normal range of motion  She exhibits edema  Neurological: She is alert and oriented to person, place, and time  Skin: Skin is warm and dry  Psychiatric: She has a normal mood and affect  Nursing note and vitals reviewed  Social History     Social History    Marital status: /Civil Union     Spouse name: N/A    Number of children: N/A    Years of education: N/A     Occupational History    Not on file  Social History Main Topics    Smoking status: Never Smoker    Smokeless tobacco: Never Used    Alcohol use No    Drug use: No    Sexual activity: Not on file     Other Topics Concern    Not on file     Social History Narrative    No narrative on file     Past Medical History:   Diagnosis Date    DVT (deep venous thrombosis) (HCC)     Endometriosis     Lymphedema of right lower extremity        Current Outpatient Prescriptions:     acetaminophen-codeine (TYLENOL #3) 300-30 mg per tablet, TAKE 1 TO 2 TABLETS EVERY 4 HOURS AS NEEDED FOR PAIN, Disp: , Rfl: 0    cholecalciferol (VITAMIN D3) 1,000 units tablet, Take 1,000 Units by mouth daily, Disp: , Rfl:     clotrimazole-betamethasone (LOTRISONE) 1-0 05 % cream, APPLY TOPICALLY 2 (TWO) TIMES A DAY   APPLY TO AFFECTED AREA(S) , Disp: , Rfl: 0    Cranberry 1000 MG CAPS, Take by mouth, Disp: , Rfl:     phentermine 37 5 MG capsule, Take 1 capsule (37 5 mg total) by mouth every morning, Disp: 30 capsule, Rfl: 1    rivaroxaban (XARELTO) 15 & 20 MG starter pack, Take 15 mg by mouth twice daily for 21 days, then 20 mg once daily thereafter , Disp: 51 each, Rfl: 0    rivaroxaban (XARELTO) 20 mg tablet, Take 1 pill daily, Disp: 90 tablet, Rfl: 2    topiramate (TOPAMAX) 50 MG tablet, Take 1 tablet (50 mg total) by mouth every 12 (twelve) hours, Disp: 180 tablet, Rfl: 1  Family History   Problem Relation Age of Onset    Pancreatic cancer Maternal Grandmother        Patient Instructions   Start the Topamax 50 mg in the evening  After 1 week bump it up to 1 pill in the morning and 1 pill at night   the goal for the Topamax is eventually 100 mg in the morning and 100 mg in the evening  Continue the phentermine once a day  Do not take the phentermine with orange juice as it decreases its absorption  Do not take the phentermine late in the afternoon because of will interfere with sleep  When the phentermine starts to lose its effectiveness then take it with the Tums tablet and it will help is absorption  I will see back in 3 months for weight check            EYAD Fountain

## 2019-02-06 NOTE — PATIENT INSTRUCTIONS
Start the Topamax 50 mg in the evening  After 1 week bump it up to 1 pill in the morning and 1 pill at night   the goal for the Topamax is eventually 100 mg in the morning and 100 mg in the evening  Continue the phentermine once a day  Do not take the phentermine with orange juice as it decreases its absorption  Do not take the phentermine late in the afternoon because of will interfere with sleep  When the phentermine starts to lose its effectiveness then take it with the Tums tablet and it will help is absorption  I will see back in 3 months for weight check

## 2019-02-21 ENCOUNTER — APPOINTMENT (OUTPATIENT)
Dept: LAB | Facility: HOSPITAL | Age: 39
End: 2019-02-21
Payer: COMMERCIAL

## 2019-02-21 ENCOUNTER — TELEPHONE (OUTPATIENT)
Dept: FAMILY MEDICINE CLINIC | Facility: CLINIC | Age: 39
End: 2019-02-21

## 2019-02-21 DIAGNOSIS — K92.1 BLOOD IN STOOL: Primary | ICD-10-CM

## 2019-02-21 DIAGNOSIS — K92.1 BLOOD IN STOOL: ICD-10-CM

## 2019-02-21 LAB
ERYTHROCYTE [DISTWIDTH] IN BLOOD BY AUTOMATED COUNT: 15.1 % (ref 11.6–15.1)
HCT VFR BLD AUTO: 41.4 % (ref 34.8–46.1)
HGB BLD-MCNC: 12.8 G/DL (ref 11.5–15.4)
MCH RBC QN AUTO: 26.7 PG (ref 26.8–34.3)
MCHC RBC AUTO-ENTMCNC: 30.9 G/DL (ref 31.4–37.4)
MCV RBC AUTO: 86 FL (ref 82–98)
PLATELET # BLD AUTO: 354 THOUSANDS/UL (ref 149–390)
PMV BLD AUTO: 9.8 FL (ref 8.9–12.7)
RBC # BLD AUTO: 4.79 MILLION/UL (ref 3.81–5.12)
WBC # BLD AUTO: 7.37 THOUSAND/UL (ref 4.31–10.16)

## 2019-02-21 PROCEDURE — 36415 COLL VENOUS BLD VENIPUNCTURE: CPT

## 2019-02-21 PROCEDURE — 85027 COMPLETE CBC AUTOMATED: CPT

## 2019-02-21 NOTE — TELEPHONE ENCOUNTER
Pt called stating she is having some bleeding while moving her bowels did ask pt how much blood does she see afterwards stated there is some blood after she wipes and some in the bowel as well, pt was treated for same sx's in ER back in December pt is currently taking Xarelto for DVT  Pt states she had appt with GI w/ colo done which came back normal, Lupis will be placing CBC for pt to have done pt states she will head over to [facility] lab today or tomorrow morning, I did advise pt if bleeding seems to worsen or concern her to head over to the ER as soon as possible

## 2019-02-21 NOTE — PROGRESS NOTES
Pt reported having blood in stool  Is taking xarelto for a dvt  Advised to follow up with gasterenterology  Said she saw a gastroenterologist for this already  Will order a cbc  Advised not to discontinue xarelto  Will monitor and provide plan of care when cbc is resulted  May need to follow up with hematology or go to the ED if the bleeding gets worse

## 2019-02-26 ENCOUNTER — HOSPITAL ENCOUNTER (OUTPATIENT)
Dept: ULTRASOUND IMAGING | Facility: HOSPITAL | Age: 39
Discharge: HOME | End: 2019-02-26
Payer: COMMERCIAL

## 2019-02-26 ENCOUNTER — TELEPHONE (OUTPATIENT)
Dept: FAMILY MEDICINE CLINIC | Facility: CLINIC | Age: 39
End: 2019-02-26

## 2019-02-26 DIAGNOSIS — R60.0 LEG EDEMA, RIGHT: Primary | ICD-10-CM

## 2019-02-26 DIAGNOSIS — R60.0 LEG EDEMA, RIGHT: ICD-10-CM

## 2019-02-26 PROCEDURE — 93971 EXTREMITY STUDY: CPT

## 2019-02-26 NOTE — TELEPHONE ENCOUNTER
I called central scheduling and spoke with [facility] and she stated that the person who schedules the [facility] is no longer there she left at 4:30 so the patient would have to wait until tomorrow I expressed to her that the order needs to be STAT because we are looking for DVT and she told me "well when you change the order you can give us a call back" so I told her that I will change the order while she is on the phone and then I proceed to change the order and got the patient scheduled at the [facility] for now

## 2019-02-27 ENCOUNTER — TELEPHONE (OUTPATIENT)
Dept: FAMILY MEDICINE CLINIC | Facility: CLINIC | Age: 39
End: 2019-02-27

## 2019-02-27 PROCEDURE — 93971 EXTREMITY STUDY: CPT | Performed by: SURGERY

## 2019-02-27 NOTE — TELEPHONE ENCOUNTER
Spoke to pt she is aware pt would like to know results of labs she had done as well resulted in pt's chart

## 2019-02-27 NOTE — TELEPHONE ENCOUNTER
Spoke to pt stated she is leaning off of the phentermine and Topamax right now is taking pills every other day then will be doing down to every two days for both  Pt states she met with specialist today and discovered she has a lot of scar tissue in the stomach due to past procedures and was directed to lay off of weight loss medication for the time being, Lupis did run labs and [address] on pt due to excessive bleeding but all came back normal as well

## 2019-05-01 ENCOUNTER — OFFICE VISIT (OUTPATIENT)
Dept: FAMILY MEDICINE CLINIC | Facility: CLINIC | Age: 39
End: 2019-05-01
Payer: COMMERCIAL

## 2019-05-01 VITALS
HEIGHT: 67 IN | DIASTOLIC BLOOD PRESSURE: 72 MMHG | SYSTOLIC BLOOD PRESSURE: 122 MMHG | WEIGHT: 293 LBS | BODY MASS INDEX: 45.99 KG/M2 | HEART RATE: 92 BPM | OXYGEN SATURATION: 98 %

## 2019-05-01 DIAGNOSIS — E78.2 MIXED HYPERLIPIDEMIA: ICD-10-CM

## 2019-05-01 DIAGNOSIS — I89.0 LYMPHEDEMA OF RIGHT LOWER EXTREMITY: Primary | ICD-10-CM

## 2019-05-01 DIAGNOSIS — I10 ESSENTIAL HYPERTENSION: ICD-10-CM

## 2019-05-01 DIAGNOSIS — D68.9 COAGULATION DEFECT (HCC): ICD-10-CM

## 2019-05-01 DIAGNOSIS — K92.1 BLOOD IN STOOL: ICD-10-CM

## 2019-05-01 DIAGNOSIS — E66.01 CLASS 3 SEVERE OBESITY IN ADULT, UNSPECIFIED BMI, UNSPECIFIED OBESITY TYPE, UNSPECIFIED WHETHER SERIOUS COMORBIDITY PRESENT (HCC): ICD-10-CM

## 2019-05-01 PROCEDURE — 99214 OFFICE O/P EST MOD 30 MIN: CPT | Performed by: FAMILY MEDICINE

## 2019-05-01 PROCEDURE — 3074F SYST BP LT 130 MM HG: CPT | Performed by: FAMILY MEDICINE

## 2019-05-01 PROCEDURE — 3078F DIAST BP <80 MM HG: CPT | Performed by: FAMILY MEDICINE

## 2019-05-01 RX ORDER — HYDROCHLOROTHIAZIDE 12.5 MG/1
TABLET ORAL
Qty: 90 TABLET | Refills: 1 | Status: SHIPPED | OUTPATIENT
Start: 2019-05-01 | End: 2019-11-06 | Stop reason: SDUPTHER

## 2019-05-14 ENCOUNTER — OFFICE VISIT (OUTPATIENT)
Dept: BARIATRICS | Facility: CLINIC | Age: 39
End: 2019-05-14
Payer: COMMERCIAL

## 2019-05-14 VITALS
WEIGHT: 293 LBS | HEIGHT: 67 IN | TEMPERATURE: 97.9 F | HEART RATE: 72 BPM | DIASTOLIC BLOOD PRESSURE: 76 MMHG | SYSTOLIC BLOOD PRESSURE: 122 MMHG | BODY MASS INDEX: 45.99 KG/M2

## 2019-05-14 DIAGNOSIS — I89.0 LYMPHEDEMA OF RIGHT LOWER EXTREMITY: ICD-10-CM

## 2019-05-14 DIAGNOSIS — E66.01 MORBID OBESITY WITH BMI OF 50.0-59.9, ADULT (HCC): Primary | ICD-10-CM

## 2019-05-14 PROBLEM — E66.813 OBESITY, CLASS III, BMI 40-49.9 (MORBID OBESITY): Status: ACTIVE | Noted: 2019-05-14

## 2019-05-14 PROCEDURE — 99203 OFFICE O/P NEW LOW 30 MIN: CPT | Performed by: PHYSICIAN ASSISTANT

## 2019-05-16 ENCOUNTER — OFFICE VISIT (OUTPATIENT)
Dept: BARIATRICS | Facility: CLINIC | Age: 39
End: 2019-05-16

## 2019-05-16 DIAGNOSIS — R63.5 ABNORMAL WEIGHT GAIN: ICD-10-CM

## 2019-05-16 PROCEDURE — RECHECK

## 2019-05-16 PROCEDURE — WMPRO12

## 2019-05-22 ENCOUNTER — CLINICAL SUPPORT (OUTPATIENT)
Dept: BARIATRICS | Facility: CLINIC | Age: 39
End: 2019-05-22

## 2019-05-22 VITALS — BODY MASS INDEX: 45.99 KG/M2 | WEIGHT: 293 LBS | HEIGHT: 67 IN

## 2019-05-22 DIAGNOSIS — R63.5 ABNORMAL WEIGHT GAIN: Primary | ICD-10-CM

## 2019-05-22 PROCEDURE — RECHECK

## 2019-05-23 VITALS — WEIGHT: 293 LBS | HEIGHT: 67 IN | BODY MASS INDEX: 45.99 KG/M2

## 2019-05-29 ENCOUNTER — CLINICAL SUPPORT (OUTPATIENT)
Dept: BARIATRICS | Facility: CLINIC | Age: 39
End: 2019-05-29

## 2019-05-29 VITALS — BODY MASS INDEX: 45.99 KG/M2 | HEIGHT: 67 IN | WEIGHT: 293 LBS

## 2019-05-29 DIAGNOSIS — R63.5 ABNORMAL WEIGHT GAIN: Primary | ICD-10-CM

## 2019-05-29 PROCEDURE — RECHECK

## 2019-05-30 ENCOUNTER — OFFICE VISIT (OUTPATIENT)
Dept: BARIATRICS | Facility: CLINIC | Age: 39
End: 2019-05-30

## 2019-05-30 VITALS — HEIGHT: 67 IN | BODY MASS INDEX: 45.99 KG/M2 | WEIGHT: 293 LBS

## 2019-05-30 DIAGNOSIS — R63.5 ABNORMAL WEIGHT GAIN: Primary | ICD-10-CM

## 2019-05-30 PROCEDURE — RECHECK

## 2019-06-12 ENCOUNTER — HOSPITAL ENCOUNTER (OUTPATIENT)
Dept: MAMMOGRAPHY | Facility: CLINIC | Age: 39
Discharge: HOME | End: 2019-06-12

## 2019-06-12 ENCOUNTER — HOSPITAL ENCOUNTER (OUTPATIENT)
Dept: ULTRASOUND IMAGING | Facility: CLINIC | Age: 39
Discharge: HOME | End: 2019-06-12
Payer: COMMERCIAL

## 2019-06-12 DIAGNOSIS — N63.10 MASS OF RIGHT BREAST: ICD-10-CM

## 2019-06-12 DIAGNOSIS — N63.10 LUMP OF BREAST, RIGHT: ICD-10-CM

## 2019-06-12 PROCEDURE — 76642 ULTRASOUND BREAST LIMITED: CPT

## 2019-06-24 ENCOUNTER — TELEPHONE (OUTPATIENT)
Dept: FAMILY MEDICINE CLINIC | Facility: CLINIC | Age: 39
End: 2019-06-24

## 2019-06-24 NOTE — TELEPHONE ENCOUNTER
Called pt left v/m to return call         ----- Message from Sy Aguilera, Rosalind Duran sent at 6/21/2019  2:40 PM EDT -----  The lump in thr right breast is now smaller  They want a 6 month follow up US and at that time she will be due for her routine bilat mammo

## 2019-06-28 ENCOUNTER — APPOINTMENT (OUTPATIENT)
Dept: LAB | Facility: HOSPITAL | Age: 39
End: 2019-06-28
Payer: COMMERCIAL

## 2019-06-28 ENCOUNTER — TELEPHONE (OUTPATIENT)
Dept: BARIATRICS | Facility: CLINIC | Age: 39
End: 2019-06-28

## 2019-06-28 DIAGNOSIS — E66.01 MORBID OBESITY WITH BMI OF 50.0-59.9, ADULT (HCC): ICD-10-CM

## 2019-06-28 DIAGNOSIS — E66.01 MORBID OBESITY WITH BMI OF 50.0-59.9, ADULT (HCC): Primary | ICD-10-CM

## 2019-06-28 LAB
ALBUMIN SERPL BCP-MCNC: 3.5 G/DL (ref 3.5–5)
ALP SERPL-CCNC: 128 U/L (ref 46–116)
ALT SERPL W P-5'-P-CCNC: 26 U/L (ref 12–78)
ANION GAP SERPL CALCULATED.3IONS-SCNC: 8 MMOL/L (ref 4–13)
AST SERPL W P-5'-P-CCNC: 16 U/L (ref 5–45)
BILIRUB SERPL-MCNC: 0.2 MG/DL (ref 0.2–1)
BUN SERPL-MCNC: 15 MG/DL (ref 5–25)
CALCIUM SERPL-MCNC: 9.2 MG/DL (ref 8.3–10.1)
CHLORIDE SERPL-SCNC: 102 MMOL/L (ref 100–108)
CO2 SERPL-SCNC: 31 MMOL/L (ref 21–32)
CREAT SERPL-MCNC: 0.81 MG/DL (ref 0.6–1.3)
GFR SERPL CREATININE-BSD FRML MDRD: 92 ML/MIN/1.73SQ M
GLUCOSE SERPL-MCNC: 105 MG/DL (ref 65–140)
POTASSIUM SERPL-SCNC: 3.8 MMOL/L (ref 3.5–5.3)
PROT SERPL-MCNC: 7.7 G/DL (ref 6.4–8.2)
SODIUM SERPL-SCNC: 141 MMOL/L (ref 136–145)

## 2019-06-28 PROCEDURE — 36415 COLL VENOUS BLD VENIPUNCTURE: CPT

## 2019-06-28 PROCEDURE — 80053 COMPREHEN METABOLIC PANEL: CPT

## 2019-07-01 ENCOUNTER — TELEPHONE (OUTPATIENT)
Dept: BARIATRICS | Facility: CLINIC | Age: 39
End: 2019-07-01

## 2019-07-01 NOTE — TELEPHONE ENCOUNTER
----- Message from Anitha Gallagher PA-C sent at 7/1/2019  9:49 AM EDT -----  Please call patient and inform her that her cmp is within acceptable range and ok to start vlcd

## 2019-07-01 NOTE — TELEPHONE ENCOUNTER
Pt informed of lab results  No questions at this time   Transferred to  to make appt with dietitian for VLCD

## 2019-07-02 ENCOUNTER — OFFICE VISIT (OUTPATIENT)
Dept: BARIATRICS | Facility: CLINIC | Age: 39
End: 2019-07-02

## 2019-07-02 VITALS — HEIGHT: 67 IN | WEIGHT: 293 LBS | BODY MASS INDEX: 45.99 KG/M2

## 2019-07-02 DIAGNOSIS — R63.5 ABNORMAL WEIGHT GAIN: ICD-10-CM

## 2019-07-02 PROCEDURE — RECHECK

## 2019-07-02 PROCEDURE — VLCD

## 2019-07-02 NOTE — PROGRESS NOTES
Reviewed VLCD diet principles  Developed meal plan and reviewed product use  Ordered 2 week supply  Gave patient written VLCD education packet and left patient contact number  She had started the [employer] and wanted to switch to VLCD  OK per ILDA Ashraf  Explained program and how it works  She kept asking about how many snacks she could have  Reiterated that the snacks are used only if needed, and if she needs to have one, she should only take one that is on the list   She also asked about following it for 1 week and then going off and back on a few weeks later  Explained again that she cannot go on and off  She also mentioned that she would like to follow a vegan diet  Reviewed ingredients with her  She did not take the soups, but said she was fine with the other products even though there is milk in some  Not clear if patient has an understanding of how important it is to follow this plan exactly  Reviewed patient expectation page in detail  Will call contact patient in 2-3 days to assess tolerance

## 2019-07-17 ENCOUNTER — APPOINTMENT (OUTPATIENT)
Dept: LAB | Facility: HOSPITAL | Age: 39
End: 2019-07-17
Payer: COMMERCIAL

## 2019-07-17 ENCOUNTER — OFFICE VISIT (OUTPATIENT)
Dept: BARIATRICS | Facility: CLINIC | Age: 39
End: 2019-07-17

## 2019-07-17 DIAGNOSIS — R63.5 ABNORMAL WEIGHT GAIN: Primary | ICD-10-CM

## 2019-07-17 DIAGNOSIS — R63.5 ABNORMAL WEIGHT GAIN: ICD-10-CM

## 2019-07-17 LAB
ANION GAP SERPL CALCULATED.3IONS-SCNC: 8 MMOL/L (ref 4–13)
BUN SERPL-MCNC: 15 MG/DL (ref 5–25)
CALCIUM SERPL-MCNC: 9.7 MG/DL (ref 8.3–10.1)
CHLORIDE SERPL-SCNC: 103 MMOL/L (ref 100–108)
CO2 SERPL-SCNC: 30 MMOL/L (ref 21–32)
CREAT SERPL-MCNC: 0.82 MG/DL (ref 0.6–1.3)
GFR SERPL CREATININE-BSD FRML MDRD: 91 ML/MIN/1.73SQ M
GLUCOSE SERPL-MCNC: 117 MG/DL (ref 65–140)
MAGNESIUM SERPL-MCNC: 1.9 MG/DL (ref 1.6–2.6)
POTASSIUM SERPL-SCNC: 3.4 MMOL/L (ref 3.5–5.3)
SODIUM SERPL-SCNC: 141 MMOL/L (ref 136–145)

## 2019-07-17 PROCEDURE — 83735 ASSAY OF MAGNESIUM: CPT

## 2019-07-17 PROCEDURE — 80048 BASIC METABOLIC PNL TOTAL CA: CPT

## 2019-07-17 PROCEDURE — RECHECK

## 2019-07-17 PROCEDURE — VLCD

## 2019-07-17 PROCEDURE — 36415 COLL VENOUS BLD VENIPUNCTURE: CPT

## 2019-07-17 NOTE — PROGRESS NOTES
Weight Management Medical Nutrition Assessment  Jenn Echols was her today for her 2 week follow-up in VLCD  Today she weighs 329 8 lbs giving her a loss of 8 8 lbs in the last 2 weeks  She reports that she did have some constipation but is taking colace and she order some meal replacements with fiber  She reports that once in ketosis, she was not hungry and did struggle to have that last shake at dinner time  She reports that there were a few times when she did not have it  Reiterated the importance of eating all 4 meal replacements and explainted how not eating them all could caouse her to lose less weight  At this time she plans to continue on VLCD  BP taken and labs ordered  Anthropometric Measurements  Start Weight (lbs): 338 6 lbs  Current Weight (lbs):  329 8  lbs  TBW % Change from start weight: 2%  Ideal Body Weight (lbs): 125 lbs  Goal Weight (lbs):  200lbs    Weight Loss History  Previous weight loss attempts: Self Created Diets (Portion Control, Healthy Food Choices, etc )    Food and Nutrition Related History    Food Recall  Breakfast:Shake   Snack:bar  Lunch:shake  Snack:  Dinner:shake  Snack:      Beverages: water  Volume of beverage intake: 80oz    Weekends: Same  Cravings: sweets  Trouble area of day: 3pm    Frequency of Eating out: irregularly  Food restrictions:none  Cooking: self   Food Shopping: self    Physical Activity Intake  Activity:none  Frequency:rarely  Physical limitations/barriers to exercise: none    Estimated Needs  Energy  [employer] Needs: BMR :  3005  1-2# loss weekly sedentary:  1650 - 2150        1-2# loss weekly lightly active: 2057 - 2537  Protein:   74 - 92 gr   (1 2-1 5g/kg IBW)  Fluid:  72 oz   (35mL/kg IBW)    Nutrition Diagnosis  Yes;     Overweight/obesity  related to Excess energy intake as evidenced by  BMI more than normative standard for age and sex (obesity-grade III 36+)       Nutrition Intervention    Nutrition Prescription  Calories:800  Protein:96  Fluid:80    Meal Plan  Breakfast: shake  Snack:  Lunch: shake  Snack  Dinner:soup  Snack:bar    Nutrition Education:    VLCD guidelines  Calorie controlled menu  Lean protein food choices  Healthy snack options  Food journaling tips      Nutrition Counseling:  Strategies: meal planning, portion sizes, healthy snack choices, hydration, fiber intake, protein intake, exercise, food journal      Monitoring and Evaluation:  Evaluation criteria:  Energy Intake  Meet protein needs  Maintain adequate hydration  Monitor weekly weight  Meal planning/preparation  Food journal   Decreased portions at mealtimes and snacks  Physical activity     Barriers to learning:none  Readiness to change: Action  Comprehension: good  Expected Compliance: good

## 2019-07-18 ENCOUNTER — TELEPHONE (OUTPATIENT)
Dept: BARIATRICS | Facility: CLINIC | Age: 39
End: 2019-07-18

## 2019-07-18 NOTE — TELEPHONE ENCOUNTER
I left a generic message on patient's voicemail explaining her labs are within normal range and she could return my call to discuss the results  ----- Message from Alethea Parks PA-C sent at 7/18/2019  8:39 AM EDT -----  Please call patient and inform her that her bmp and magnesium are within normal range   Ok to continue with vlcd

## 2019-07-31 ENCOUNTER — OFFICE VISIT (OUTPATIENT)
Dept: BARIATRICS | Facility: CLINIC | Age: 39
End: 2019-07-31

## 2019-07-31 DIAGNOSIS — R63.5 ABNORMAL WEIGHT GAIN: ICD-10-CM

## 2019-07-31 PROCEDURE — VLCD

## 2019-07-31 PROCEDURE — RECHECK

## 2019-07-31 NOTE — PROGRESS NOTES
Weight Management Medical Nutrition Assessment  Remy Lowry was here for her 2 week VLCD follow up   Today she weighs 326 6 lbs giving her a loss of 3 2 lbs in 2 weeks   She reports that she continues to struggle to eat the last meal replacement of the day since she is not hungry so there were several days that she had one of the snacks from the list instead of the shake  She also did not get her labs done so at this point she will not continue on VLCD  She plans to have 2 meal replacements and 1 sensible meal plus a bar  Reviewed meal plan and ordered more product      Start Weight (lbs): 335 8 lbs  Current Weight (lbs): 326 5 lbs  TBW % Change from start weight:3%  Ideal Body Weight (lbs): 135 lbs  Goal Weight (lbs): 200 lbs     Weight Loss History  Previous weight loss attempts: Self Created Diets (Portion Control, Healthy Food Choices, etc )     Food and Nutrition Related History  Wake up: 6:30 am         Bed Time: 10 pm     Food Recall  Breakfast: shake         Snack:bar  Lunch: shake  Snack:   Dinner: string cheese, vegetables  Snack:         Beverages: water  Volume of beverage intake: 100 oz     Weekends: Same  Cravings: sweets  Trouble area of day: evening     Frequency of Eating out: every 3 days  Food restrictions:none  Cooking: self   Food Shopping: self     Physical Activity Intake  Activity:Walking  Frequency:daily  Physical limitations/barriers to exercise:  Abdominal surgery and lymphedema     Estimated Needs  Energy     [employer] Needs:  BMR :  7809  1-2# loss weekly sedentary:   1021 - 2133     1-2# loss weekly lightly active: 2017 - 2517  Protein:   74 - 92  (1 2-1 5g/kg IBW)  Fluid: 72 oz     (35mL/kg IBW)     Nutrition Diagnosis  Yes;    Overweight/obesity  related to Excess energy intake as evidenced by  BMI more than normative standard for age and sex (obesity-grade III 36+)     Nutrition Intervention     Nutrition Prescription  Calories:1000  Protein:80  Fluid:72 oz     Nutrition Education:    Calorie controlled menu  Lean protein food choices  Healthy snack options  Food journaling tips        Nutrition Counseling:  Strategies: meal planning, portion sizes, healthy snack choices, hydration, fiber intake, protein intake, exercise, food journal        Monitoring and Evaluation:  Evaluation criteria:  Energy Intake  Meet protein needs  Maintain adequate hydration  Monitor weekly weight  Meal planning/preparation  Food journal   Decreased portions at mealtimes and snacks  Physical activity      Barriers to learning:none  Readiness to change: Action  Comprehension: fair  Expected Compliance: fair

## 2019-08-07 ENCOUNTER — CLINICAL SUPPORT (OUTPATIENT)
Dept: BARIATRICS | Facility: CLINIC | Age: 39
End: 2019-08-07

## 2019-08-07 VITALS — WEIGHT: 293 LBS | BODY MASS INDEX: 45.99 KG/M2 | HEIGHT: 67 IN

## 2019-08-07 DIAGNOSIS — R63.5 ABNORMAL WEIGHT GAIN: Primary | ICD-10-CM

## 2019-08-07 PROCEDURE — RECHECK

## 2019-08-21 ENCOUNTER — CLINICAL SUPPORT (OUTPATIENT)
Dept: BARIATRICS | Facility: CLINIC | Age: 39
End: 2019-08-21

## 2019-08-21 VITALS — BODY MASS INDEX: 45.99 KG/M2 | WEIGHT: 293 LBS | HEIGHT: 67 IN

## 2019-08-21 DIAGNOSIS — R63.5 ABNORMAL WEIGHT GAIN: Primary | ICD-10-CM

## 2019-08-21 PROCEDURE — RECHECK

## 2019-10-02 ENCOUNTER — OFFICE VISIT (OUTPATIENT)
Dept: FAMILY MEDICINE CLINIC | Facility: CLINIC | Age: 39
End: 2019-10-02
Payer: COMMERCIAL

## 2019-10-02 VITALS
WEIGHT: 293 LBS | TEMPERATURE: 98.1 F | BODY MASS INDEX: 45.99 KG/M2 | HEIGHT: 67 IN | DIASTOLIC BLOOD PRESSURE: 80 MMHG | OXYGEN SATURATION: 97 % | HEART RATE: 85 BPM | SYSTOLIC BLOOD PRESSURE: 132 MMHG

## 2019-10-02 DIAGNOSIS — R22.33 MASS OF AXILLA, BILATERAL: Primary | ICD-10-CM

## 2019-10-02 DIAGNOSIS — I89.0 LYMPHEDEMA OF RIGHT LOWER EXTREMITY: ICD-10-CM

## 2019-10-02 DIAGNOSIS — E66.01 MORBID OBESITY WITH BMI OF 50.0-59.9, ADULT (HCC): ICD-10-CM

## 2019-10-02 PROCEDURE — 99214 OFFICE O/P EST MOD 30 MIN: CPT | Performed by: FAMILY MEDICINE

## 2019-10-02 PROCEDURE — 3008F BODY MASS INDEX DOCD: CPT | Performed by: FAMILY MEDICINE

## 2019-10-02 NOTE — PROGRESS NOTES
Standard Visit   Assessment/Plan:     Visit Diagnosis:  Diagnoses and all orders for this visit:    Mass of axilla, bilateral  -     US extremity soft tissue; Future    Lymphedema of right lower extremity    Morbid obesity with BMI of 50 0-59 9, adult (HCC)          Subjective:    Patient ID: Tejas Crump is a 44 y o  female  Patient is here with the following concerns:    Patient is here with complaints of lump under left arm  The lump has been there for about a year  Last mammo was done June 2019 and she had pain during the mammo  The pain was described to her as the cause - a blocked duct  The mammo was read as negative  The lump under her arm sometimes gets softer and harder  The lump is not described as becoming inflamed  It does not pop and drain  It is never under the right armpit  She does not have any lumps underneath the other arm or in the groin  She denies any lumps or bumps anywhere else  She does have lymphatic issues  She does have lymphedema  A she does have a history of DVTs  She does get massages to help drain the lymph drainage system  She does have a lymph pump that she uses  He also complains of a sore throat  Rapid strep test done in the office was negative      Last mammo was done June 2019 and she had pain during the mammo  The pain was described to her as the cause - a blocked duct  The mammo was read as negative        The following portions of the patient's history were reviewed and updated as appropriate: allergies, current medications, past family history, past medical history, past social history, past surgical history and problem list     Review of Systems   Constitutional: Negative for activity change and fever  HENT: Positive for sore throat  Negative for nosebleeds and trouble swallowing  Eyes: Negative  Respiratory: Negative  Cardiovascular: Positive for leg swelling  Negative for palpitations     Gastrointestinal: Negative for abdominal pain, blood in stool and vomiting  Endocrine: Negative for cold intolerance  Genitourinary: Negative  Musculoskeletal: Negative for neck stiffness  Skin: Negative for pallor  Allergic/Immunologic: Negative for immunocompromised state  Neurological: Negative for seizures and facial asymmetry  Hematological: Positive for adenopathy  Bilateral axilla mass   Psychiatric/Behavioral: Negative for agitation and suicidal ideas           /80   Pulse 85   Temp 98 1 °F (36 7 °C)   Ht 5' 7" (1 702 m)   Wt (!) 149 kg (329 lb)   SpO2 97%   BMI 51 53 kg/m²   Social History     Socioeconomic History    Marital status: /Civil Union     Spouse name: Not on file    Number of children: Not on file    Years of education: Not on file    Highest education level: Not on file   Occupational History    Not on file   Social Needs    Financial resource strain: Not on file    Food insecurity:     Worry: Not on file     Inability: Not on file    Transportation needs:     Medical: Not on file     Non-medical: Not on file   Tobacco Use    Smoking status: Never Smoker    Smokeless tobacco: Never Used   Substance and Sexual Activity    Alcohol use: Yes     Comment: extremely rare    Drug use: No    Sexual activity: Not on file   Lifestyle    Physical activity:     Days per week: Not on file     Minutes per session: Not on file    Stress: Not on file   Relationships    Social connections:     Talks on phone: Not on file     Gets together: Not on file     Attends Anabaptism service: Not on file     Active member of club or organization: Not on file     Attends meetings of clubs or organizations: Not on file     Relationship status: Not on file    Intimate partner violence:     Fear of current or ex partner: Not on file     Emotionally abused: Not on file     Physically abused: Not on file     Forced sexual activity: Not on file   Other Topics Concern    Not on file   Social History Narrative    Not on file     Past Medical History:   Diagnosis Date    DVT (deep venous thrombosis) (HCC)     Endometriosis     Lymphedema of right lower extremity     Obesity, Class III, BMI 40-49 9 (morbid obesity) ([facility] [address] )      Family History   Problem Relation Age of Onset    Pancreatic cancer Maternal Grandmother     Clotting disorder Mother     Stroke Maternal Aunt     Diabetes Paternal Grandmother     Thyroid disease Paternal Grandmother     Hypertension Paternal Grandfather     Heart disease Paternal Grandfather      Past Surgical History:   Procedure Laterality Date    ABLATION COLPOCLESIS      HYSTERECTOMY  2016    OOPHORECTOMY Bilateral 09/2016    OVARY SURGERY      TUBAL LIGATION         Current Outpatient Medications:     cholecalciferol (VITAMIN D3) 1,000 units tablet, Take 1,000 Units by mouth daily, Disp: , Rfl:     Cranberry 1000 MG CAPS, Take by mouth, Disp: , Rfl:     hydrochlorothiazide (HYDRODIURIL) 12 5 mg tablet, May take 1 pill every 24 hours p r n  Edema in legs, Disp: 90 tablet, Rfl: 1    rivaroxaban (XARELTO) 20 mg tablet, Take 1 pill daily, Disp: 90 tablet, Rfl: 2      Objective:     Physical Exam   Constitutional: She is oriented to person, place, and time  She appears well-developed and well-nourished  HENT:   Head: Normocephalic and atraumatic  Eyes: Pupils are equal, round, and reactive to light  Cardiovascular: Normal rate  Pulmonary/Chest: Effort normal and breath sounds normal    Musculoskeletal: Normal range of motion  Bilateral lymphadenopathy axilla  area   Neurological: She is alert and oriented to person, place, and time  Skin: Skin is warm and dry  Nursing note and vitals reviewed        Social History     Socioeconomic History    Marital status: /Civil Union     Spouse name: Not on file    Number of children: Not on file    Years of education: Not on file    Highest education level: Not on file   Occupational History    Not on file   Social Needs    Financial resource strain: Not on file    Food insecurity:     Worry: Not on file     Inability: Not on file    Transportation needs:     Medical: Not on file     Non-medical: Not on file   Tobacco Use    Smoking status: Never Smoker    Smokeless tobacco: Never Used   Substance and Sexual Activity    Alcohol use: Yes     Comment: extremely rare    Drug use: No    Sexual activity: Not on file   Lifestyle    Physical activity:     Days per week: Not on file     Minutes per session: Not on file    Stress: Not on file   Relationships    Social connections:     Talks on phone: Not on file     Gets together: Not on file     Attends Moravian service: Not on file     Active member of club or organization: Not on file     Attends meetings of clubs or organizations: Not on file     Relationship status: Not on file    Intimate partner violence:     Fear of current or ex partner: Not on file     Emotionally abused: Not on file     Physically abused: Not on file     Forced sexual activity: Not on file   Other Topics Concern    Not on file   Social History Narrative    Not on file     Past Medical History:   Diagnosis Date    [facility] thrombosis) ([facility] )     Endometriosis     Lymphedema of right lower extremity     Obesity, Class III, BMI 40-49 9 (morbid obesity) ([place] [address] )        Current Outpatient Medications:     cholecalciferol (VITAMIN D3) 1,000 units tablet, Take 1,000 Units by mouth daily, Disp: , Rfl:     Cranberry 1000 MG CAPS, Take by mouth, Disp: , Rfl:     hydrochlorothiazide (HYDRODIURIL) 12 5 mg tablet, May take 1 pill every 24 hours p r n   Edema in legs, Disp: 90 tablet, Rfl: 1    rivaroxaban (XARELTO) 20 mg tablet, Take 1 pill daily, Disp: 90 tablet, Rfl: 2  Family History   Problem Relation Age of Onset    Pancreatic cancer Maternal Grandmother     Clotting disorder Mother     Stroke Maternal Aunt     Diabetes Paternal Grandmother     Thyroid disease Paternal Grandmother     Hypertension Paternal Grandfather     Heart disease Paternal Grandfather      Social History     Socioeconomic History    Marital status: /Civil Union     Spouse name: Not on file    Number of children: Not on file    Years of education: Not on file    Highest education level: Not on file   Occupational History    Not on file   Social Needs    Financial resource strain: Not on file    Food insecurity:     Worry: Not on file     Inability: Not on file    Transportation needs:     Medical: Not on file     Non-medical: Not on file   Tobacco Use    Smoking status: Never Smoker    Smokeless tobacco: Never Used   Substance and Sexual Activity    Alcohol use: Yes     Comment: extremely rare    Drug use: No    Sexual activity: Not on file   Lifestyle    Physical activity:     Days per week: Not on file     Minutes per session: Not on file    Stress: Not on file   Relationships    Social connections:     Talks on phone: Not on file     Gets together: Not on file     Attends Caodaism service: Not on file     Active member of club or organization: Not on file     Attends meetings of clubs or organizations: Not on file     Relationship status: Not on file    Intimate partner violence:     Fear of current or ex partner: Not on file     Emotionally abused: Not on file     Physically abused: Not on file     Forced sexual activity: Not on file   Other Topics Concern    Not on file   Social History Narrative    Not on file     Past Medical History:   Diagnosis Date    DVT (deep venous thrombosis) ([facility] )     Endometriosis     Lymphedema of right lower extremity     Obesity, Class III, BMI 40-49 9 (morbid obesity) ([facility] )        Current Outpatient Medications:     cholecalciferol (VITAMIN D3) 1,000 units tablet, Take 1,000 Units by mouth daily, Disp: , Rfl:     Cranberry 1000 MG CAPS, Take by mouth, Disp: , Rfl:     hydrochlorothiazide (HYDRODIURIL) 12 5 mg tablet, May take 1 pill every 24 hours p r n   Edema in legs, Disp: 90 tablet, Rfl: 1    rivaroxaban (XARELTO) 20 mg tablet, Take 1 pill daily, Disp: 90 tablet, Rfl: 2  Family History   Problem Relation Age of Onset    Pancreatic cancer Maternal Grandmother     Clotting disorder Mother     Stroke Maternal Aunt     Diabetes Paternal Grandmother     Thyroid disease Paternal Grandmother     Hypertension Paternal Grandfather     Heart disease Paternal Grandfather        There are no Patient Instructions on file for this visit          EYAD Joshi

## 2019-10-23 ENCOUNTER — HOSPITAL ENCOUNTER (OUTPATIENT)
Dept: ULTRASOUND IMAGING | Facility: CLINIC | Age: 39
Discharge: HOME | End: 2019-10-23
Payer: COMMERCIAL

## 2019-10-23 ENCOUNTER — TRANSCRIBE ORDERS (OUTPATIENT)
Dept: MAMMOGRAPHY | Facility: CLINIC | Age: 39
End: 2019-10-23

## 2019-10-23 ENCOUNTER — HOSPITAL ENCOUNTER (OUTPATIENT)
Dept: ULTRASOUND IMAGING | Facility: HOSPITAL | Age: 39
End: 2019-10-23
Payer: COMMERCIAL

## 2019-10-23 DIAGNOSIS — N63.0 PALPABLE MASS OF BREAST: ICD-10-CM

## 2019-10-23 DIAGNOSIS — N63.0 PALPABLE MASS OF BREAST: Primary | ICD-10-CM

## 2019-10-23 PROCEDURE — 76642 ULTRASOUND BREAST LIMITED: CPT

## 2019-10-31 ENCOUNTER — TELEPHONE (OUTPATIENT)
Dept: FAMILY MEDICINE CLINIC | Facility: CLINIC | Age: 39
End: 2019-10-31

## 2019-10-31 NOTE — TELEPHONE ENCOUNTER
----- Message from Juaquin Wren, [address] sent at 10/31/2019  1:24 PM EDT -----  Let the pt know, the radiologist has advised repeat ultrasound of the right breast this coming December  And since her having pain in the left breast we will do an ultrasound of that left breast as well  If any thing changes if you develop any other symptoms in either breast your to let us know immediately

## 2019-10-31 NOTE — RESULT ENCOUNTER NOTE
Let the pt know, the radiologist has advised repeat ultrasound of the right breast this coming December  And since her having pain in the left breast we will do an ultrasound of that left breast as well  If any thing changes if you develop any other symptoms in either breast your to let us know immediately

## 2019-11-06 DIAGNOSIS — I89.0 LYMPHEDEMA OF RIGHT LOWER EXTREMITY: ICD-10-CM

## 2019-11-06 RX ORDER — HYDROCHLOROTHIAZIDE 12.5 MG/1
TABLET ORAL
Qty: 30 TABLET | Refills: 5 | Status: SHIPPED | OUTPATIENT
Start: 2019-11-06 | End: 2021-02-11

## 2020-02-08 ENCOUNTER — APPOINTMENT (EMERGENCY)
Dept: RADIOLOGY | Facility: HOSPITAL | Age: 40
End: 2020-02-08
Payer: COMMERCIAL

## 2020-02-08 ENCOUNTER — HOSPITAL ENCOUNTER (EMERGENCY)
Facility: HOSPITAL | Age: 40
Discharge: HOME | End: 2020-02-08
Attending: EMERGENCY MEDICINE | Admitting: EMERGENCY MEDICINE
Payer: COMMERCIAL

## 2020-02-08 VITALS
OXYGEN SATURATION: 99 % | RESPIRATION RATE: 18 BRPM | HEART RATE: 70 BPM | TEMPERATURE: 98 F | HEIGHT: 67 IN | DIASTOLIC BLOOD PRESSURE: 70 MMHG | WEIGHT: 293 LBS | BODY MASS INDEX: 45.99 KG/M2 | SYSTOLIC BLOOD PRESSURE: 151 MMHG

## 2020-02-08 DIAGNOSIS — R59.1 LYMPHADENOPATHY: Primary | ICD-10-CM

## 2020-02-08 DIAGNOSIS — R07.89 CHEST TIGHTNESS: ICD-10-CM

## 2020-02-08 LAB
ALBUMIN SERPL BCP-MCNC: 3.7 G/DL (ref 3.5–5)
ALP SERPL-CCNC: 121 U/L (ref 46–116)
ALT SERPL W P-5'-P-CCNC: 49 U/L (ref 12–78)
ANION GAP SERPL CALCULATED.3IONS-SCNC: 8 MMOL/L (ref 4–13)
AST SERPL W P-5'-P-CCNC: 39 U/L (ref 5–45)
BASOPHILS # BLD AUTO: 0.05 THOUSANDS/ΜL (ref 0–0.1)
BASOPHILS NFR BLD AUTO: 1 % (ref 0–1)
BILIRUB SERPL-MCNC: 0.3 MG/DL (ref 0.2–1)
BUN SERPL-MCNC: 12 MG/DL (ref 5–25)
CALCIUM SERPL-MCNC: 9 MG/DL (ref 8.3–10.1)
CHLORIDE SERPL-SCNC: 101 MMOL/L (ref 100–108)
CO2 SERPL-SCNC: 30 MMOL/L (ref 21–32)
CREAT SERPL-MCNC: 0.79 MG/DL (ref 0.6–1.3)
EOSINOPHIL # BLD AUTO: 0.24 THOUSAND/ΜL (ref 0–0.61)
EOSINOPHIL NFR BLD AUTO: 4 % (ref 0–6)
ERYTHROCYTE [DISTWIDTH] IN BLOOD BY AUTOMATED COUNT: 14.4 % (ref 11.6–15.1)
EXT PREG TEST URINE: NEGATIVE
EXT. CONTROL ED NAV: NORMAL
GFR SERPL CREATININE-BSD FRML MDRD: 95 ML/MIN/1.73SQ M
GLUCOSE SERPL-MCNC: 85 MG/DL (ref 65–140)
HCT VFR BLD AUTO: 44.5 % (ref 34.8–46.1)
HGB BLD-MCNC: 13.5 G/DL (ref 11.5–15.4)
IMM GRANULOCYTES # BLD AUTO: 0.02 THOUSAND/UL (ref 0–0.2)
IMM GRANULOCYTES NFR BLD AUTO: 0 % (ref 0–2)
LYMPHOCYTES # BLD AUTO: 1.97 THOUSANDS/ΜL (ref 0.6–4.47)
LYMPHOCYTES NFR BLD AUTO: 29 % (ref 14–44)
MCH RBC QN AUTO: 27.3 PG (ref 26.8–34.3)
MCHC RBC AUTO-ENTMCNC: 30.3 G/DL (ref 31.4–37.4)
MCV RBC AUTO: 90 FL (ref 82–98)
MONOCYTES # BLD AUTO: 0.53 THOUSAND/ΜL (ref 0.17–1.22)
MONOCYTES NFR BLD AUTO: 8 % (ref 4–12)
NEUTROPHILS # BLD AUTO: 3.99 THOUSANDS/ΜL (ref 1.85–7.62)
NEUTS SEG NFR BLD AUTO: 58 % (ref 43–75)
NRBC BLD AUTO-RTO: 0 /100 WBCS
PLATELET # BLD AUTO: 317 THOUSANDS/UL (ref 149–390)
PMV BLD AUTO: 10.1 FL (ref 8.9–12.7)
POTASSIUM SERPL-SCNC: 3.5 MMOL/L (ref 3.5–5.3)
PROT SERPL-MCNC: 7.8 G/DL (ref 6.4–8.2)
RBC # BLD AUTO: 4.95 MILLION/UL (ref 3.81–5.12)
SODIUM SERPL-SCNC: 139 MMOL/L (ref 136–145)
TROPONIN I SERPL-MCNC: <0.02 NG/ML
WBC # BLD AUTO: 6.8 THOUSAND/UL (ref 4.31–10.16)

## 2020-02-08 PROCEDURE — 99283 EMERGENCY DEPT VISIT LOW MDM: CPT | Performed by: EMERGENCY MEDICINE

## 2020-02-08 PROCEDURE — 84484 ASSAY OF TROPONIN QUANT: CPT | Performed by: EMERGENCY MEDICINE

## 2020-02-08 PROCEDURE — 80053 COMPREHEN METABOLIC PANEL: CPT | Performed by: EMERGENCY MEDICINE

## 2020-02-08 PROCEDURE — 81025 URINE PREGNANCY TEST: CPT | Performed by: EMERGENCY MEDICINE

## 2020-02-08 PROCEDURE — 85025 COMPLETE CBC W/AUTO DIFF WBC: CPT | Performed by: EMERGENCY MEDICINE

## 2020-02-08 PROCEDURE — 36415 COLL VENOUS BLD VENIPUNCTURE: CPT | Performed by: EMERGENCY MEDICINE

## 2020-02-08 PROCEDURE — 99284 EMERGENCY DEPT VISIT MOD MDM: CPT

## 2020-02-08 PROCEDURE — 71046 X-RAY EXAM CHEST 2 VIEWS: CPT

## 2020-02-08 PROCEDURE — 93005 ELECTROCARDIOGRAM TRACING: CPT

## 2020-02-08 NOTE — ED PROVIDER NOTES
History  Chief Complaint   Patient presents with    Mass     Patient c/o lump in groin area  Patient states"lower right leg is hurting and she has a history of DVT and lymphedema"     HPI      70-year-old female with history of 2 prior provoked DVTs on Xarelto with which she reports compliance, lymphedema to the bilateral lower extremities who presents for evaluation of a lump in my groin   Patient states that over the last couple of days she has had a lump in her right groin  She is concerned that this is another DVT and it is going to go to my heart and kill me    She also endorses some tightness in her chest since the onset of symptoms  No shortness of breath  States that her right leg hurts over the area of the lump in her groin  No increased swelling to her legs  No overlying erythema  Denies fevers or chills  Prior to Admission Medications   Prescriptions Last Dose Informant Patient Reported? Taking?    Cranberry 1000 MG CAPS   Yes Yes   Sig: Take by mouth   cholecalciferol (VITAMIN D3) 1,000 units tablet   Yes Yes   Sig: Take 1,000 Units by mouth daily   hydrochlorothiazide (HYDRODIURIL) 12 5 mg tablet   No Yes   Sig: MAY TAKE 1 PILL EVERY 24 HOURS AS NEEDED FOR EDEMA IN LEGS   rivaroxaban (XARELTO) 20 mg tablet   No Yes   Sig: Take 1 pill daily      Facility-Administered Medications: None       Past Medical History:   Diagnosis Date    DVT (deep venous thrombosis) (HCC)     Endometriosis     Lymphedema of right lower extremity     Obesity, Class III, BMI 40-49 9 (morbid obesity) (HCC)        Past Surgical History:   Procedure Laterality Date    ABLATION COLPOCLESIS      HYSTERECTOMY  2016    OOPHORECTOMY Bilateral 09/2016    OVARY SURGERY      TUBAL LIGATION         Family History   Problem Relation Age of Onset    Pancreatic cancer Maternal Grandmother     Clotting disorder Mother     Stroke Maternal Aunt     Diabetes Paternal Grandmother     Thyroid disease Paternal Grandmother  Hypertension Paternal Grandfather     Heart disease Paternal Grandfather      I have reviewed and agree with the history as documented  Social History     Tobacco Use    Smoking status: Never Smoker    Smokeless tobacco: Never Used   Substance Use Topics    Alcohol use: Yes     Comment: extremely rare    Drug use: No        Review of Systems   Constitutional: Negative for chills and fever  HENT: Negative for congestion  Eyes: Negative for visual disturbance  Respiratory: Positive for chest tightness  Negative for cough, shortness of breath and wheezing  Cardiovascular: Negative for chest pain and leg swelling  Gastrointestinal: Negative for abdominal pain, diarrhea, nausea and vomiting  Genitourinary: Negative for dysuria and frequency  Musculoskeletal: Negative for arthralgias, back pain, joint swelling, myalgias, neck pain and neck stiffness         "lump" to right groin   Skin: Negative for rash  Neurological: Negative for weakness, numbness and headaches  Psychiatric/Behavioral: Negative for agitation, behavioral problems and confusion  Physical Exam  Physical Exam   Constitutional: She is oriented to person, place, and time  She appears well-developed and well-nourished  No distress  HENT:   Head: Normocephalic and atraumatic  Right Ear: External ear normal    Left Ear: External ear normal    Nose: Nose normal    Mouth/Throat: Oropharynx is clear and moist    Eyes: Conjunctivae are normal    Neck: Normal range of motion  Neck supple  Cardiovascular: Normal rate, regular rhythm, normal heart sounds and intact distal pulses  Exam reveals no gallop and no friction rub  No murmur heard  Pulmonary/Chest: Effort normal and breath sounds normal  No respiratory distress  She has no wheezes  She has no rales  Abdominal: Soft  Bowel sounds are normal  She exhibits no distension  There is no tenderness  There is no guarding  Musculoskeletal: Normal range of motion   She exhibits edema  She exhibits no deformity  Tenderness: 2+ non-pitting edema to the bilateral LEs, no overlying erythema or calf tenderness  1 cm mobile tender lymph node in the R inguinal crease, no overlying erythema   Neurological: She is alert and oriented to person, place, and time  She exhibits normal muscle tone  Skin: Skin is warm and dry  She is not diaphoretic         Vital Signs  ED Triage Vitals [02/08/20 1729]   Temperature Pulse Respirations Blood Pressure SpO2   98 °F (36 7 °C) 67 18 158/65 100 %      Temp Source Heart Rate Source Patient Position - Orthostatic VS BP Location FiO2 (%)   Oral Monitor -- -- --      Pain Score       5           Vitals:    02/08/20 1729 02/08/20 2103   BP: 158/65 151/70   Pulse: 67 70         Visual Acuity      ED Medications  Medications - No data to display    Diagnostic Studies  Results Reviewed     Procedure Component Value Units Date/Time    Troponin I [[id number]]  (Normal) Collected:  02/08/20 1831    Lab Status:  Final result Specimen:  Blood from Arm, Left Updated:  02/08/20 1857     Troponin I <0 02 ng/mL     Comprehensive metabolic panel [[id number]]  (Abnormal) Collected:  02/08/20 1831    Lab Status:  Final result Specimen:  Blood from Arm, Left Updated:  02/08/20 1855     Sodium 139 mmol/L      Potassium 3 5 mmol/L      Chloride 101 mmol/L      CO2 30 mmol/L      ANION GAP 8 mmol/L      BUN 12 mg/dL      Creatinine 0 79 mg/dL      Glucose 85 mg/dL      Calcium 9 0 mg/dL      AST 39 U/L      ALT 49 U/L      Alkaline Phosphatase 121 U/L      Total Protein 7 8 g/dL      Albumin 3 7 g/dL      Total Bilirubin 0 30 mg/dL      eGFR 95 ml/min/1 73sq m     Narrative:       Romaine guidelines for Chronic Kidney Disease (CKD):     Stage 1 with normal or high GFR (GFR > 90 mL/min/1 73 square meters)    Stage 2 Mild CKD (GFR = 60-89 mL/min/1 73 square meters)    Stage 3A Moderate CKD (GFR = 45-59 mL/min/1 73 square meters)    Stage 3B Moderate CKD (GFR = 30-44 mL/min/1 73 square meters)    Stage 4 Severe CKD (GFR = 15-29 mL/min/1 73 square meters)    Stage 5 End Stage CKD (GFR <15 mL/min/1 73 square meters)  Note: GFR calculation is accurate only with a steady state creatinine    POCT pregnancy, urine [[id number]]  (Normal) Resulted:  02/08/20 1836    Lab Status:  Final result Updated:  02/08/20 1836     EXT PREG TEST UR (Ref: Negative) negative     Control valid    CBC and differential [[id number]]  (Abnormal) Collected:  02/08/20 1831    Lab Status:  Final result Specimen:  Blood from Arm, Left Updated:  02/08/20 1836     WBC 6 80 Thousand/uL      RBC 4 95 Million/uL      Hemoglobin 13 5 g/dL      Hematocrit 44 5 %      MCV 90 fL      MCH 27 3 pg      MCHC 30 3 g/dL      RDW 14 4 %      MPV 10 1 fL      Platelets 739 Thousands/uL      nRBC 0 /100 WBCs      Neutrophils Relative 58 %      Immat GRANS % 0 %      Lymphocytes Relative 29 %      Monocytes Relative 8 %      Eosinophils Relative 4 %      Basophils Relative 1 %      Neutrophils Absolute 3 99 Thousands/µL      Immature Grans Absolute 0 02 Thousand/uL      Lymphocytes Absolute 1 97 Thousands/µL      Monocytes Absolute 0 53 Thousand/µL      Eosinophils Absolute 0 24 Thousand/µL      Basophils Absolute 0 05 Thousands/µL                  XR chest 2 views   Final Result by Cordell Calvert MD (02/08 2024)      No acute cardiopulmonary disease              Workstation performed: [id number]                    Procedures  Procedures         ED Course         HEART Risk Score      Most Recent Value   History  0 Filed at: 02/09/2020 0216   ECG  0 Filed at: 02/09/2020 0216   Age  0 Filed at: 02/09/2020 0216   Risk Factors  0 Filed at: 02/09/2020 0216   Troponin  0 Filed at: 02/09/2020 0216   Heart Score Risk Calculator   History  0 Filed at: 02/09/2020 0216   ECG  0 Filed at: 02/09/2020 0216   Age  0 Filed at: 02/09/2020 0216   Risk Factors  0 Filed at: 02/09/2020 0216   Troponin  0 Filed at: 02/09/2020 0216   HEART Score  0 Filed at: 02/09/2020 0216   HEART Score  0 Filed at: 02/09/2020 0216                            MDM  Number of Diagnoses or Management Options  Chest tightness: new and requires workup  Lymphadenopathy: new and requires workup  Diagnosis management comments: Well appearing  Afebrile and hemodynamically stable  Patient's swelling a 1 cm mobile, tender lymph node in the R inguinal crease, no overlying erythema  Patient was reassured that this is lymphadenopathy, currently of unknown origin, though she tells me that she does have lymphadenopathy that comes and goes with her lymphedema  She was counseled to have a recheck by her doctor in 1 week, if it remains persistently swollen, grows in size, or gets overlying erythema, she may need further testing  No calf swelling or tenderness, no swelling to the lower extremities that is out of the range of normal for the patient with her lymphedema  She does endorse some chest tightness  I personally interpreted her EKG which reveals normal rate, sinus rhythm with sinus arrhythmia, normal axis, normal intervals, no ischemic changes, no prior available for comparison  Doubt PE as the patient has been compliant with her home Xarelto  Single troponin obtained which is undetectable  Patient is low risk by HEART score  No indication for delta troponin as pain is been present for greater than 3 hours  Remainder of labs unremarkable  Patient discharged with PCP follow-up         Amount and/or Complexity of Data Reviewed  Clinical lab tests: ordered and reviewed  Independent visualization of images, tracings, or specimens: yes    Patient Progress  Patient progress: stable          Disposition  Final diagnoses:   Lymphadenopathy   Chest tightness     Time reflects when diagnosis was documented in both MDM as applicable and the Disposition within this note     Time User Action Codes Description Comment    2/8/2020  8:27 PM Migel Griffith Add [R59 1] Lymphadenopathy     2/8/2020  8:28 PM Desire Camacho Add [R07 89] Chest tightness       ED Disposition     ED Disposition Condition Date/Time Comment    Discharge Stable Sat Feb 8, 2020  8:27 PM Marilyn Andino discharge to home/self care  Follow-up Information     Follow up With Specialties Details Why Contact Info Additional Information    Izabel Cornea, [address], Nurse Practitioner In 1 week For recheck of your swollen lymph node  [address] [facility] Emergency Medicine  As we discussed, return to the Emergency Department for worsening chest discomfort, shortness of breath, an abnormal level of swelling or pain in your right leg, or redness overlying your swollen lymph node  [address] [facility] [address] ED, [address] [place], [place], [facility]          Discharge Medication List as of 2/8/2020  8:29 PM      CONTINUE these medications which have NOT CHANGED    Details   cholecalciferol (VITAMIN D3) 1,000 units tablet Take 1,000 Units by mouth daily, Historical Med      Cranberry 1000 MG CAPS Take by mouth, Historical Med      hydrochlorothiazide (HYDRODIURIL) 12 5 mg tablet MAY TAKE 1 PILL EVERY 24 HOURS AS NEEDED FOR EDEMA IN LEGS, Normal      rivaroxaban (XARELTO) 20 mg tablet Take 1 pill daily, Normal           No discharge procedures on file      ED Provider  Electronically Signed by           Shonda Patel MD  02/09/20 0128

## 2020-02-09 ENCOUNTER — PATIENT MESSAGE (OUTPATIENT)
Dept: FAMILY MEDICINE CLINIC | Facility: CLINIC | Age: 40
End: 2020-02-09

## 2020-02-09 LAB
ATRIAL RATE: 62 BPM
P AXIS: 76 DEGREES
PR INTERVAL: 150 MS
QRS AXIS: 31 DEGREES
QRSD INTERVAL: 84 MS
QT INTERVAL: 418 MS
QTC INTERVAL: 424 MS
T WAVE AXIS: 61 DEGREES
VENTRICULAR RATE: 62 BPM

## 2020-02-09 PROCEDURE — 93010 ELECTROCARDIOGRAM REPORT: CPT | Performed by: INTERNAL MEDICINE

## 2020-02-09 NOTE — ED NOTES
Discharge instructions and medications reviewed  Pt with no questions or concerns at this time  Pt ambulatory off unit with steady gait        Lena Stokes RN  02/08/20 [id number]

## 2020-02-10 NOTE — TELEPHONE ENCOUNTER
Sharita Driver 2/10/2020 7:18 AM EST        ----- Message -----  From: Jeane Malcolm  Sent: 2/9/2020 10:04 PM EST  To: [address] [place] [address] Clinical  Subject: Test Results Question     I have a question about POCT PERFORM URINE PREGNANCY resulted on 2/8/20, 6:36 PM   Not sure why a pregnancy urine test was ran on me in the ER??? I have no uterus or ovaries  It's quite impossible for me to be pregnant

## 2020-07-22 ENCOUNTER — OFFICE VISIT (OUTPATIENT)
Dept: FAMILY MEDICINE CLINIC | Facility: CLINIC | Age: 40
End: 2020-07-22
Payer: COMMERCIAL

## 2020-07-22 VITALS
SYSTOLIC BLOOD PRESSURE: 126 MMHG | OXYGEN SATURATION: 97 % | DIASTOLIC BLOOD PRESSURE: 78 MMHG | TEMPERATURE: 98.2 F | BODY MASS INDEX: 45.99 KG/M2 | WEIGHT: 293 LBS | HEIGHT: 67 IN | HEART RATE: 105 BPM

## 2020-07-22 DIAGNOSIS — E55.9 HYPOVITAMINOSIS D: ICD-10-CM

## 2020-07-22 DIAGNOSIS — E78.01 FAMILIAL HYPERCHOLESTEROLEMIA: ICD-10-CM

## 2020-07-22 DIAGNOSIS — R52 PAIN: ICD-10-CM

## 2020-07-22 DIAGNOSIS — I10 ESSENTIAL HYPERTENSION: ICD-10-CM

## 2020-07-22 DIAGNOSIS — K08.89 PAIN, DENTAL: ICD-10-CM

## 2020-07-22 DIAGNOSIS — Z86.718 HISTORY OF DVT (DEEP VEIN THROMBOSIS): ICD-10-CM

## 2020-07-22 DIAGNOSIS — Z00.00 ANNUAL PHYSICAL EXAM: Primary | ICD-10-CM

## 2020-07-22 DIAGNOSIS — Z91.89 AT RISK FOR DENTAL PROBLEMS: ICD-10-CM

## 2020-07-22 DIAGNOSIS — R53.82 CHRONIC FATIGUE: ICD-10-CM

## 2020-07-22 DIAGNOSIS — Z79.01 CURRENT USE OF LONG TERM ANTICOAGULATION: ICD-10-CM

## 2020-07-22 PROCEDURE — 99395 PREV VISIT EST AGE 18-39: CPT | Performed by: FAMILY MEDICINE

## 2020-07-22 RX ORDER — TRAMADOL HYDROCHLORIDE 50 MG/1
TABLET ORAL
Qty: 45 TABLET | Refills: 0 | Status: SHIPPED | OUTPATIENT
Start: 2020-07-22 | End: 2021-04-01 | Stop reason: ALTCHOICE

## 2020-07-22 NOTE — PATIENT INSTRUCTIONS
Wellness Visit for Adults   AMBULATORY CARE:   A wellness visit  is when you see your healthcare provider to get screened for health problems  You can also get advice on how to stay healthy  Write down your questions so you remember to ask them  Ask your healthcare provider how often you should have a wellness visit  What happens at a wellness visit:  Your healthcare provider will ask about your health, and your family history of health problems  This includes high blood pressure, heart disease, and cancer  He or she will ask if you have symptoms that concern you, if you smoke, and about your mood  You may also be asked about your intake of medicines, supplements, food, and alcohol  Any of the following may be done:  · Your weight  will be checked  Your height may also be checked so your body mass index (BMI) can be calculated  Your BMI shows if you are at a healthy weight  · Your blood pressure  and heart rate will be checked  Your temperature may also be checked  · Blood and urine tests  may be done  Blood tests may be done to check your cholesterol levels  Abnormal cholesterol levels increase your risk for heart disease and stroke  You may also need a blood or urine test to check for diabetes if you are at increased risk  Urine tests may be done to look for signs of an infection or kidney disease  · A physical exam  includes checking your heartbeat and lungs with a stethoscope  Your healthcare provider may also check your skin to look for sun damage  · Screening tests  may be recommended  A screening test is done to check for diseases that may not cause symptoms  The screening tests you may need depend on your age, gender, family history, and lifestyle habits  For example, colorectal screening may be recommended if you are 48years old or older  Screening tests you need if you are a woman:   · A Pap smear  is used to screen for cervical cancer   Pap smears are usually done every 3 to 5 years depending on your age  You may need them more often if you have had abnormal Pap smear test results in the past  Ask your healthcare provider how often you should have a Pap smear  · A mammogram  is an x-ray of your breasts to screen for breast cancer  Experts recommend mammograms every 2 years starting at age 48 years  You may need a mammogram at age 52 years or younger if you have an increased risk for breast cancer  Talk to your healthcare provider about when you should start having mammograms and how often you need them  Vaccines you may need:   · Get an influenza vaccine  every year  The influenza vaccine protects you from the flu  Several types of viruses cause the flu  The viruses change over time, so new vaccines are made each year  · Get a tetanus-diphtheria (Td) booster vaccine  every 10 years  This vaccine protects you against tetanus and diphtheria  Tetanus is a severe infection that may cause painful muscle spasms and lockjaw  Diphtheria is a severe bacterial infection that causes a thick covering in the back of your mouth and throat  · Get a human papillomavirus (HPV) vaccine  if you are female and aged 23 to 32 or male 23 to 24 and never received it  This vaccine protects you from HPV infection  HPV is the most common infection spread by sexual contact  HPV may also cause vaginal, penile, and anal cancers  · Get a pneumococcal vaccine  if you are aged 72 years or older  The pneumococcal vaccine is an injection given to protect you from pneumococcal disease  Pneumococcal disease is an infection caused by pneumococcal bacteria  The infection may cause pneumonia, meningitis, or an ear infection  · Get a shingles vaccine  if you are aged 61 or older, even if you have had shingles before  The shingles vaccine is an injection to protect you from the varicella-zoster virus  This is the same virus that causes chickenpox   Shingles is a painful rash that develops in people who had chickenpox or have been exposed to the virus  How to eat healthy:  My Plate is a model for planning healthy meals  It shows the types and amounts of foods that should go on your plate  Fruits and vegetables make up about half of your plate, and grains and protein make up the other half  A serving of dairy is included on the side of your plate  The amount of calories and serving sizes you need depends on your age, gender, weight, and height  Examples of healthy foods are listed below:  · Eat a variety of vegetables  such as dark green, red, and orange vegetables  You can also include canned vegetables low in sodium (salt) and frozen vegetables without added butter or sauces  · Eat a variety of fresh fruits , canned fruit in 100% juice, frozen fruit, and dried fruit  · Include whole grains  At least half of the grains you eat should be whole grains  Examples include whole-wheat bread, wheat pasta, brown rice, and whole-grain cereals such as oatmeal     · Eat a variety of protein foods such as seafood (fish and shellfish), lean meat, and poultry without skin (turkey and chicken)  Examples of lean meats include pork leg, shoulder, or tenderloin, and beef round, sirloin, tenderloin, and extra lean ground beef  Other protein foods include eggs and egg substitutes, beans, peas, soy products, nuts, and seeds  · Choose low-fat dairy products such as skim or 1% milk or low-fat yogurt, cheese, and cottage cheese  · Limit unhealthy fats  such as butter, hard margarine, and shortening  Exercise:  Exercise at least 30 minutes per day on most days of the week  Some examples of exercise include walking, biking, dancing, and swimming  You can also fit in more physical activity by taking the stairs instead of the elevator or parking farther away from stores  Include muscle strengthening activities 2 days each week  Regular exercise provides many health benefits   It helps you manage your weight, and decreases your risk for type 2 diabetes, heart disease, stroke, and high blood pressure  Exercise can also help improve your mood  Ask your healthcare provider about the best exercise plan for you  General health and safety guidelines:   · Do not smoke  Nicotine and other chemicals in cigarettes and cigars can cause lung damage  Ask your healthcare provider for information if you currently smoke and need help to quit  E-cigarettes or smokeless tobacco still contain nicotine  Talk to your healthcare provider before you use these products  · Limit alcohol  A drink of alcohol is 12 ounces of beer, 5 ounces of wine, or 1½ ounces of liquor  · Lose weight, if needed  Being overweight increases your risk of certain health conditions  These include heart disease, high blood pressure, type 2 diabetes, and certain types of cancer  · Protect your skin  Do not sunbathe or use tanning beds  Use sunscreen with a SPF 15 or higher  Apply sunscreen at least 15 minutes before you go outside  Reapply sunscreen every 2 hours  Wear protective clothing, hats, and sunglasses when you are outside  · Drive safely  Always wear your seatbelt  Make sure everyone in your car wears a seatbelt  A seatbelt can save your life if you are in an accident  Do not use your cell phone when you are driving  This could distract you and cause an accident  Pull over if you need to make a call or send a text message  · Practice safe sex  Use latex condoms if are sexually active and have more than one partner  Your healthcare provider may recommend screening tests for sexually transmitted infections (STIs)  · Wear helmets, lifejackets, and protective gear  Always wear a helmet when you ride a bike or motorcycle, go skiing, or play sports that could cause a head injury  Wear protective equipment when you play sports  Wear a lifejacket when you are on a boat or doing water sports    © 2017 [employer] Information is for End User's use only and may not be sold, redistributed or otherwise used for commercial purposes  All illustrations and images included in CareNotes® are the copyrighted property of [employer]  or [employer]  The above information is an  only  It is not intended as medical advice for individual conditions or treatments  Talk to your doctor, nurse or pharmacist before following any medical regimen to see if it is safe and effective for you  Low Fat Diet   AMBULATORY CARE:   A low-fat diet  is an eating plan that is low in total fat, unhealthy fat, and cholesterol  You may need to follow a low-fat diet if you have trouble digesting or absorbing fat  You may also need to follow this diet if you have high cholesterol  You can also lower your cholesterol by increasing the amount of fiber in your diet  Soluble fiber is a type of fiber that helps to decrease cholesterol levels  Different types of fat in food:   · Limit unhealthy fats  A diet that is high in cholesterol, saturated fat, and trans fat may cause unhealthy cholesterol levels  Unhealthy cholesterol levels increase your risk of heart disease  ¨ Cholesterol:  Limit intake of cholesterol to less than 200 mg per day  Cholesterol is found in meat, eggs, and dairy  ¨ Saturated fat:  Limit saturated fat to less than 7% of your total daily calories  Ask your dietitian how many calories you need each day  Saturated fat is found in butter, cheese, ice cream, whole milk, and palm oil  Saturated fat is also found in meat, such as beef, pork, chicken skin, and processed meats  Processed meats include sausage, hot dogs, and bologna  ¨ Trans fat:  Avoid trans fat as much as possible  Trans fat is used in fried and baked foods  Foods that say trans fat free on the label may still have up to 0 5 grams of trans fat per serving  · Include healthy fats  Replace foods that are high in saturated and trans fat with foods high in healthy fats   This may help to decrease high cholesterol levels  ¨ Monounsaturated fats: These are found in avocados, nuts, and vegetable oils, such as olive, canola, and sunflower oil  ¨ Polyunsaturated fats: These can be found in vegetable oils, such as soybean or corn oil  Omega-3 fats can help to decrease the risk of heart disease  Omega-3 fats are found in fish, such as salmon, herring, trout, and tuna  Omega-3 fats can also be found in plant foods, such as walnuts, flaxseed, soybeans, and canola oil    Foods to limit or avoid:   · Grains:      ¨ Snacks that are made with partially hydrogenated oils, such as chips, regular crackers, and butter-flavored popcorn    ¨ High-fat baked goods, such as biscuits, croissants, doughnuts, pies, cookies, and pastries    · Dairy:      ¨ Whole milk, 2% milk, and yogurt and ice cream made with whole milk    ¨ Half and half creamer, heavy cream, and whipping cream    ¨ Cheese, cream cheese, and sour cream    · Meats and proteins:      ¨ High-fat cuts of meat (T-bone steak, regular hamburger, and ribs)    ¨ Fried meat, poultry (turkey and chicken), and fish    ¨ Poultry (chicken and turkey) with skin    ¨ Cold cuts (salami or bologna), hot dogs, hallman, and sausage    ¨ Whole eggs and egg yolks    · Vegetables and fruits with added fat:      ¨ Fried vegetables or vegetables in butter or high-fat sauces, such as cream or cheese sauces    ¨ Fried fruit or fruit served with butter or cream    · Fats:      ¨ Butter, stick margarine, and shortening    ¨ Coconut, palm oil, and palm kernel oil  Foods to include:   · Grains:      ¨ Whole-grain breads, cereals, pasta, and brown rice    ¨ Low-fat crackers and pretzels    · Vegetables and fruits:      ¨ Fresh, frozen, or canned vegetables (no salt or low-sodium)    ¨ Fresh, frozen, dried, or canned fruit (canned in light syrup or fruit juice)    ¨ Avocado    · Low-fat dairy products:      ¨ Nonfat (skim) or 1% milk    ¨ Nonfat or low-fat cheese, yogurt, and cottage cheese    · Meats and proteins:      ¨ Chicken or turkey with no skin    ¨ Baked or broiled fish    ¨ Lean beef and pork (loin, round, extra lean hamburger)    ¨ Beans and peas, unsalted nuts, soy products    ¨ Egg whites and substitutes    ¨ Seeds and nuts    · Fats:      ¨ Unsaturated oil, such as canola, olive, peanut, soybean, or sunflower oil    ¨ Soft or liquid margarine and vegetable oil spread    ¨ Low-fat salad dressing  Other ways to decrease fat:   · Read food labels before you buy foods  Choose foods that have less than 30% of calories from fat  Choose low-fat or fat-free dairy products  Remember that fat free does not mean calorie free  These foods still contain calories, and too many calories can lead to weight gain  · Trim fat from meat and avoid fried food  Trim all visible fat from meat before you cook it  Remove the skin from poultry  Do not guidry meat, fish, or poultry  Bake, roast, boil, or broil these foods instead  Avoid fried foods  Eat a baked potato instead of [place] fries  Steam vegetables instead of sautéing them in butter  · Add less fat to foods  Use imitation hallman bits on salads and baked potatoes instead of regular hallman bits  Use fat-free or low-fat salad dressings instead of regular dressings  Use low-fat or nonfat butter-flavored topping instead of regular butter or margarine on popcorn and other foods  Ways to decrease fat in recipes:  Replace high-fat ingredients with low-fat or nonfat ones  This may cause baked goods to be drier than usual  You may need to use nonfat cooking spray on pans to prevent food from sticking  You also may need to change the amount of other ingredients, such as water, in the recipe  Try the following:  · Use low-fat or light margarine instead of regular margarine or shortening  · Use lean ground turkey breast or chicken, or lean ground beef (less than 5% fat) instead of hamburger       · Add 1 teaspoon of canola oil to 8 ounces of skim milk instead of using cream or half and half  · Use grated zucchini, carrots, or apples in breads instead of coconut  · Use blenderized, low-fat cottage cheese, plain tofu, or low-fat ricotta cheese instead of cream cheese  · Use 1 egg white and 1 teaspoon of canola oil, or use ¼ cup (2 ounces) of fat-free egg substitute instead of a whole egg  · Replace half of the oil that is called for in a recipe with applesauce when you bake  Use 3 tablespoons of cocoa powder and 1 tablespoon of canola oil instead of a square of baking chocolate  How to increase fiber:  Eat enough high-fiber foods to get 20 to 30 grams of fiber every day  Slowly increase your fiber intake to avoid stomach cramps, gas, and other problems  · Eat 3 ounces of whole-grain foods each day  An ounce is about 1 slice of bread  Eat whole-grain breads, such as whole-wheat bread  Whole wheat, whole-wheat flour, or other whole grains should be listed as the first ingredient on the food label  Replace white flour with whole-grain flour or use half of each in recipes  Whole-grain flour is heavier than white flour, so you may have to add more yeast or baking powder  · Eat a high-fiber cereal for breakfast   Oatmeal is a good source of soluble fiber  Look for cereals that have bran or fiber in the name  Choose whole-grain products, such as brown rice, barley, and whole-wheat pasta  · Eat more beans, peas, and lentils  For example, add beans to soups or salads  Eat at least 5 cups of fruits and vegetables each day  Eat fruits and vegetables with the peel because the peel is high in fiber  © 2017 [address] [employer]  Information is for End User's use only and may not be sold, redistributed or otherwise used for commercial purposes  All illustrations and images included in CareNotes® are the copyrighted property of [employer]  or [employer]  The above information is an  only   It is not intended as medical advice for individual conditions or treatments  Talk to your doctor, nurse or pharmacist before following any medical regimen to see if it is safe and effective for you  Heart Healthy Diet   AMBULATORY CARE:   A heart healthy diet  is an eating plan low in total fat, unhealthy fats, and sodium (salt)  A heart healthy diet helps decrease your risk for heart disease and stroke  Limit the amount of fat you eat to 25% to 35% of your total daily calories  Limit sodium to less than 2,300 mg each day  Healthy fats:  Healthy fats can help improve cholesterol levels  The risk for heart disease is decreased when cholesterol levels are normal  Choose healthy fats, such as the following:  · Unsaturated fat  is found in foods such as soybean, canola, olive, corn, and safflower oils  It is also found in soft tub margarine that is made with liquid vegetable oil  · Omega-3 fat  is found in certain fish, such as salmon, tuna, and trout, and in walnuts and flaxseed  Unhealthy fats:  Unhealthy fats can cause unhealthy cholesterol levels in your blood and increase your risk of heart disease  Limit unhealthy fats, such as the following:  · Cholesterol  is found in animal foods, such as eggs and lobster, and in dairy products made from whole milk  Limit cholesterol to less than 300 milligrams (mg) each day  You may need to limit cholesterol to 200 mg each day if you have heart disease  · Saturated fat  is found in meats, such as hallman and hamburger  It is also found in chicken or turkey skin, whole milk, and butter  Limit saturated fat to less than 7% of your total daily calories  Limit saturated fat to less than 6% if you have heart disease or are at increased risk for it  · Trans fat  is found in packaged foods, such as potato chips and cookies  It is also in hard margarine, some fried foods, and shortening  Avoid trans fats as much as possible    Heart healthy foods and drinks to include:  Ask your dietitian or healthcare provider how many servings to have from each of the following food groups:  · Grains:      ¨ Whole-wheat breads, cereals, and pastas, and brown rice    ¨ Low-fat, low-sodium crackers and chips    · Vegetables:      ¨ Broccoli, green beans, green peas, and spinach    ¨ Collards, kale, and lima beans    ¨ Carrots, sweet potatoes, tomatoes, and peppers    ¨ Canned vegetables with no salt added    · Fruits:      ¨ Bananas, peaches, pears, and pineapple    ¨ Grapes, raisins, and dates    ¨ Oranges, tangerines, grapefruit, orange juice, and grapefruit juice    ¨ Apricots, mangoes, melons, and papaya    ¨ Raspberries and strawberries    ¨ Canned fruit with no added sugar    · Low-fat dairy products:      ¨ Nonfat (skim) milk, 1% milk, and low-fat almond, cashew, or soy milks fortified with calcium    ¨ Low-fat cheese, regular or frozen yogurt, and cottage cheese    · Meats and proteins , such as lean cuts of beef and pork (loin, leg, round), skinless chicken and turkey, legumes, soy products, egg whites, and nuts  Foods and drinks to limit or avoid:  Ask your dietitian or healthcare provider about these and other foods that are high in unhealthy fat, sodium, and sugar:  · Snack or packaged foods , such as frozen dinners, cookies, macaroni and cheese, and cereals with more than 300 mg of sodium per serving    · Canned or dry mixes  for cakes, soups, sauces, or gravies    · Vegetables with added sodium , such as instant potatoes, vegetables with added sauces, or regular canned vegetables    · Other foods high in sodium , such as ketchup, barbecue sauce, salad dressing, pickles, olives, soy sauce, and miso    · High-fat dairy foods  such as whole or 2% milk, cream cheese, or sour cream, and cheeses     · High-fat protein foods  such as high-fat cuts of beef (T-bone steaks, ribs), chicken or turkey with skin, and organ meats, such as liver    · Cured or smoked meats , such as hot dogs, hallman, and sausage    · Unhealthy fats and oils , such as butter, stick margarine, shortening, and cooking oils such as coconut or palm oil    · Food and drinks high in sugar , such as soft drinks (soda), sports drinks, sweetened tea, candy, cake, cookies, pies, and doughnuts  Other diet guidelines to follow:   · Eat more foods containing omega-3 fats  Eat fish high in omega-3 fats at least 2 times a week  · Limit alcohol  Too much alcohol can damage your heart and raise your blood pressure  Women should limit alcohol to 1 drink a day  Men should limit alcohol to 2 drinks a day  A drink of alcohol is 12 ounces of beer, 5 ounces of wine, or 1½ ounces of liquor  · Choose low-sodium foods  High-sodium foods can lead to high blood pressure  Add little or no salt to food you prepare  Use herbs and spices in place of salt  · Eat more fiber  to help lower cholesterol levels  Eat at least 5 servings of fruits and vegetables each day  Eat 3 ounces of whole-grain foods each day  Legumes (beans) are also a good source of fiber  Lifestyle guidelines:   · Do not smoke  Nicotine and other chemicals in cigarettes and cigars can cause lung and heart damage  Ask your healthcare provider for information if you currently smoke and need help to quit  E-cigarettes or smokeless tobacco still contain nicotine  Talk to your healthcare provider before you use these products  · Exercise regularly  to help you maintain a healthy weight and improve your blood pressure and cholesterol levels  Ask your healthcare provider about the best exercise plan for you  Do not start an exercise program without asking your healthcare provider  Follow up with your healthcare provider as directed:  Write down your questions so you remember to ask them during your visits  © 2017 [employer] [employer] Information is for End User's use only and may not be sold, redistributed or otherwise used for commercial purposes   All illustrations and images included in CareNotes® are the copyrighted property of [employer]  or [employer]  The above information is an  only  It is not intended as medical advice for individual conditions or treatments  Talk to your doctor, nurse or pharmacist before following any medical regimen to see if it is safe and effective for you

## 2020-07-22 NOTE — PROGRESS NOTES
[facility] [address] [facility]    NAME: Maria Del Carmen Andino  AGE: 44 y o  SEX: female  : 1980     DATE: 2020     Assessme     Problem List Items Addressed This Visit     None      Visit Diagnoses     Annual physical exam    -  Primary    At risk for dental problems        Essential hypertension        Relevant Orders    Microalbumin / creatinine urine ratio    Chronic fatigue        Relevant Orders    TSH, 3rd generation with Free T4 reflex    Comprehensive metabolic panel    CBC and differential    Vitamin D 25 hydroxy    Hypovitaminosis D        Relevant Orders    Vitamin D 25 hydroxy    Familial hypercholesterolemia        Relevant Orders    Lipid panel    Pain, dental        Relevant Medications    traMADol (ULTRAM) 50 mg tablet    Current use of long term anticoagulation        Relevant Orders    Ambulatory referral to Hematology / Oncology    History of DVT (deep vein thrombosis)        Relevant Orders    Ambulatory referral to Hematology / Oncology    Pain              Immunizations and preventive care screenings were discussed with patient today  Appropriate education was printed on patient's after visit summary  Counseling:  Alcohol/drug use: discussed moderation in alcohol intake, the recommendations for healthy alcohol use, and avoidance of illicit drug use  Dental Health: discussed importance of regular tooth brushing, flossing, and dental visits  Injury prevention: discussed safety/seat belts, safety helmets, smoke detectors, carbon dioxide detectors, and smoking near bedding or upholstery  Sexual health: discussed sexually transmitted diseases, partner selection, use of condoms, avoidance of unintended pregnancy, and contraceptive alternatives  · Exercise: the importance of regular exercise/physical activity was discussed  Recommend exercise 3-5 times per week for at least 30 minutes       BMI Counseling: Body mass index is 53 25 kg/m²  The BMI is above normal  Nutrition recommendations include consuming healthier snacks, limiting drinks that contain sugar, increasing intake of lean protein, reducing intake of saturated and trans fat and reducing intake of cholesterol  Exercise recommendations include vigorous physical activity 75 minutes/week  No pharmacotherapy was ordered  No follow-ups on file  Chief Complaint:     Chief Complaint   Patient presents with    Physical Exam      History of Present Illness:   Concerned about her right ankle   she injured her right ankle in March     She was seen in the emergency room she got the x-ray  But she was not seen   She got an email saying there was no fracture  But she continues to have pain   Hurts to walk on it   She has a limp         Diet and Physical Activity  · Diet/Nutrition: well balanced diet  · Exercise: walking  Depression Screening  PHQ-9 Depression Screening    PHQ-9:    Frequency of the following problems over the past two weeks:       Little interest or pleasure in doing things:  0 - not at all  Feeling down, depressed, or hopeless:  0 - not at all  PHQ-2 Score:  0       General Health  · Sleep: sleeps well  · Hearing: normal - bilateral   · Vision: no vision problems  · Dental: regular dental visits  /GYN Health  · Last menstrual period: CHELSEA  ·      Review of Systems:     Review of Systems   Constitutional: Negative for activity change and fever  HENT: Positive for dental problem  Negative for nosebleeds and trouble swallowing  Has to dental issues that need dealt with   Her dentist has told her that she needs oral surgery and he wishes her to come off her anticoagulant Xarelto for at least 2 weeks   She is on Xarelto for 2 DVTs and needs to stay on this for life  She has been seen by Hematology in the past   Eyes: Negative  Respiratory: Negative  Cardiovascular: Negative for palpitations  Gastrointestinal: Negative for abdominal pain, blood in stool and vomiting  Endocrine: Negative for cold intolerance  Genitourinary: Negative  Musculoskeletal: Negative for neck stiffness  Skin: Negative for pallor  Allergic/Immunologic: Negative for immunocompromised state  Neurological: Negative for seizures and facial asymmetry  Hematological: Negative for adenopathy  Psychiatric/Behavioral: Negative for agitation and suicidal ideas        Past Medical History:     Past Medical History:   Diagnosis Date    DVT [facility]deep venous thrombosis) ([facility]     Endometriosis     Lymphedema of right lower extremity     Obesity, Class III, BMI 40-49 9 (morbid obesity) ([facility])       Past Surgical History:     Past Surgical History:   Procedure Laterality Date    ABLATION COLPOCLESIS      HYSTERECTOMY  2016    OOPHORECTOMY Bilateral 09/2016    OVARY SURGERY      TUBAL LIGATION        Social History:        Social History     Socioeconomic History    Marital status: /Civil Union     Spouse name: None    Number of children: None    Years of education: None    Highest education level: None   Occupational History    None   Social Needs    Financial resource strain: None    Food insecurity:     Worry: None     Inability: None    Transportation needs:     Medical: None     Non-medical: None   Tobacco Use    Smoking status: Never Smoker    Smokeless tobacco: Never Used   Substance and Sexual Activity    Alcohol use: Yes     Comment: extremely rare    Drug use: No    Sexual activity: None   Lifestyle    Physical activity:     Days per week: None     Minutes per session: None    Stress: None   Relationships    Social connections:     Talks on phone: None     Gets together: None     Attends [employer] service: None     Active member of club or organization: None     Attends meetings of clubs or organizations: None     Relationship status: None    Intimate partner violence:     Fear of current or ex partner: None     Emotionally abused: None     Physically abused: None     Forced sexual activity: None   Other Topics Concern    None   Social History Narrative    None      Family History:     Family History   Problem Relation Age of Onset    Pancreatic cancer Maternal Grandmother     Clotting disorder Mother     Stroke Maternal Aunt     Diabetes Paternal Grandmother     Thyroid disease Paternal Grandmother     Hypertension Paternal Grandfather     Heart disease Paternal Grandfather       Current Medications:     Current Outpatient Medications   Medication Sig Dispense Refill    cholecalciferol (VITAMIN D3) 1,000 units tablet Take 1,000 Units by mouth daily      Cranberry 1000 MG CAPS Take by mouth      hydrochlorothiazide (HYDRODIURIL) 12 5 mg tablet MAY TAKE 1 PILL EVERY 24 HOURS AS NEEDED FOR EDEMA IN LEGS 30 tablet 5    rivaroxaban (XARELTO) 20 mg tablet Take 1 pill daily 90 tablet 2    traMADol (ULTRAM) 50 mg tablet May take 1 pill twice a day p r n  Dental pain 45 tablet 0     No current facility-administered medications for this visit  Allergies:     No Known Allergies   Physical Exam:     /78   Pulse 105   Temp 98 2 °F (36 8 °C)   Ht 5' 7" (1 702 m)   Wt (!) 154 kg (340 lb)   SpO2 97%   BMI 53 25 kg/m²     Physical Exam   Constitutional: She is oriented to person, place, and time  She appears well-developed and well-nourished  HENT:   Head: Normocephalic and atraumatic  Right Ear: External ear normal    Left Ear: External ear normal    Eyes: Pupils are equal, round, and reactive to light  Right eye exhibits no discharge  Left eye exhibits no discharge  No scleral icterus  Neck: Normal range of motion  Neck supple  No JVD present  No thyromegaly present  Cardiovascular: Normal rate  Pulmonary/Chest: Effort normal and breath sounds normal    Musculoskeletal: Normal range of motion  She exhibits no edema     Uses the lymphedema pump   had the DVT in the right leg  Two clots in the right leg   no clots during pregnancy   Two pregnancies   First clot was after a broken ankle  Second clot was after surgery   Neurological: She is alert and oriented to person, place, and time  Skin: Skin is warm and dry  Psychiatric: She has a normal mood and affect  Her behavior is normal  Thought content normal    Nursing note and vitals reviewed  EYAD Mike   [address]  BMI Counseling: Body mass index is 53 25 kg/m²  The BMI is above normal  Nutrition recommendations include reducing portion sizes

## 2020-07-23 ENCOUNTER — TELEPHONE (OUTPATIENT)
Dept: SURGICAL ONCOLOGY | Facility: CLINIC | Age: 40
End: 2020-07-23

## 2020-07-23 NOTE — TELEPHONE ENCOUNTER
New Patient Encounter    New Patient Intake Form   Patient Details:  Tamara Ramsey  1980  [id number]    Background Information:  [address] starts by opening a telephone encounter and gathering the following information   Who is calling to schedule? If not self, relationship to patient? SELF   Referring Provider Steve Ruiz   What is the diagnosis? HX OF DVT LONG TERM ANTICOAG   Is this diagnosis confirmed? Yes   When was the diagnosis? 7/2020   Is there a confirmed diagnosis from a biopsy/tissue reviewed by pathology? Is patient aware of diagnosis? Yes   Is there a personal history and what kind? Is there a family history and what kind? Reason for visit? History Of   Have you had any imaging or labs done? If so: when, where? yes     Are records in EPIC? yes   Was the patient told to bring a disk? no   Does the patient smoke or Vape? no   If yes, how many packs or cartridges per day? Scheduling Information:   Preferred [place]:  Any     Are there any dates/time the patient cannot be seen? Miscellaneous:    After completing the above information, please route to Financial Counselor and the appropriate Nurse Navigator for review

## 2020-07-28 ENCOUNTER — HOSPITAL ENCOUNTER (OUTPATIENT)
Dept: RADIOLOGY | Facility: HOSPITAL | Age: 40
Discharge: HOME | End: 2020-07-28
Payer: COMMERCIAL

## 2020-07-28 ENCOUNTER — TELEPHONE (OUTPATIENT)
Dept: FAMILY MEDICINE CLINIC | Facility: CLINIC | Age: 40
End: 2020-07-28

## 2020-07-28 DIAGNOSIS — R52 PAIN: ICD-10-CM

## 2020-07-28 DIAGNOSIS — S82.891D CLOSED FRACTURE OF RIGHT ANKLE WITH ROUTINE HEALING, SUBSEQUENT ENCOUNTER: Primary | ICD-10-CM

## 2020-07-28 PROCEDURE — 73610 X-RAY EXAM OF ANKLE: CPT

## 2020-07-28 NOTE — TELEPHONE ENCOUNTER
Please let patient know that she a distal medial tibial healing non displaced fracture-- please follow with ortho, will place order in her chart

## 2020-07-29 ENCOUNTER — TELEPHONE (OUTPATIENT)
Dept: OBGYN CLINIC | Facility: CLINIC | Age: 40
End: 2020-07-29

## 2020-07-30 ENCOUNTER — OFFICE VISIT (OUTPATIENT)
Dept: OBGYN CLINIC | Facility: CLINIC | Age: 40
End: 2020-07-30
Payer: COMMERCIAL

## 2020-07-30 VITALS
HEIGHT: 67 IN | SYSTOLIC BLOOD PRESSURE: 111 MMHG | HEART RATE: 73 BPM | WEIGHT: 293 LBS | DIASTOLIC BLOOD PRESSURE: 76 MMHG | BODY MASS INDEX: 45.99 KG/M2

## 2020-07-30 DIAGNOSIS — M76.829 POSTERIOR TIBIALIS TENDON INSUFFICIENCY: Primary | ICD-10-CM

## 2020-07-30 PROCEDURE — 99203 OFFICE O/P NEW LOW 30 MIN: CPT | Performed by: ORTHOPAEDIC SURGERY

## 2020-07-30 PROCEDURE — 3078F DIAST BP <80 MM HG: CPT | Performed by: ORTHOPAEDIC SURGERY

## 2020-07-30 PROCEDURE — 3008F BODY MASS INDEX DOCD: CPT | Performed by: ORTHOPAEDIC SURGERY

## 2020-07-30 PROCEDURE — 3074F SYST BP LT 130 MM HG: CPT | Performed by: ORTHOPAEDIC SURGERY

## 2020-07-30 PROCEDURE — 1036F TOBACCO NON-USER: CPT | Performed by: ORTHOPAEDIC SURGERY

## 2020-07-30 NOTE — PROGRESS NOTES
_____________________________________________________  CHIEF COMPLAINT:  Chief Complaint   Patient presents with    Right Ankle - Pain         SUBJECTIVE:  Lino Campos is a 44y o  year old female who presents right ankle pain  Patient states that she has noticed the ankle pain since February 2020  She did have a fall and had x-rays at a different hospital which demonstrate no fractures  She states she has continued to notice discomfort over the medial aspect of the ankle that radiates up the medial aspect of the tibia  She states she has pain with certain motions like inversion and dorsiflexion  She has discomfort while performing weightlifting exercises, particularly leg day  She denies any numbness or tingling into the extremity  Patient does have a history of DVTs into the right lower extremity as well as lymphedema on the right side        PAST MEDICAL HISTORY:  Past Medical History:   Diagnosis Date    [facility]     Endometriosis     Lymphedema of right lower extremity     Obesity, Class III, BMI 40-49 9 (morbid obesity) ([address] )        PAST SURGICAL HISTORY:  Past Surgical History:   Procedure Laterality Date    ABLATION COLPOCLESIS      HYSTERECTOMY  2016    OOPHORECTOMY Bilateral 09/2016    OVARY SURGERY      TUBAL LIGATION         FAMILY HISTORY:  Family History   Problem Relation Age of Onset    Pancreatic cancer Maternal Grandmother     Clotting disorder Mother     Stroke Maternal Aunt     Diabetes Paternal Grandmother     Thyroid disease Paternal Grandmother     Hypertension Paternal Grandfather     Heart disease Paternal Grandfather        SOCIAL HISTORY:  Social History     Tobacco Use    Smoking status: Never Smoker    Smokeless tobacco: Never Used   Substance Use Topics    Alcohol use: Yes     Comment: extremely rare    Drug use: No       MEDICATIONS:    Current Outpatient Medications:     cholecalciferol (VITAMIN D3) 1,000 units tablet, Take 1,000 Units by mouth daily, Disp: , Rfl:     Cranberry 1000 MG CAPS, Take by mouth, Disp: , Rfl:     hydrochlorothiazide (HYDRODIURIL) 12 5 mg tablet, MAY TAKE 1 PILL EVERY 24 HOURS AS NEEDED FOR EDEMA IN LEGS, Disp: 30 tablet, Rfl: 5    rivaroxaban (XARELTO) 20 mg tablet, Take 1 pill daily, Disp: 90 tablet, Rfl: 2    traMADol (ULTRAM) 50 mg tablet, May take 1 pill twice a day p r n   Dental pain, Disp: 45 tablet, Rfl: 0    diclofenac sodium (VOLTAREN) 1 %, Apply 2 g topically 4 (four) times a day, Disp: 1 Tube, Rfl: 0    ALLERGIES:  No Known Allergies    REVIEW OF SYSTEMS:  General: no fever, no chills  HEENT:  No loss of hearing or eyesight problems  Eyes:  No red eyes  Respiratory:  No coughing, shortness of breath or wheezing  Cardiovascular:  No chest pain, no palpitations  GI:  Abdomen soft nontender, denies nausea  Endocrine:  No muscle weakness, no frequent urination, no excessive thirst  Urinary:  No dysuria, no incontinence  Musculoskeletal: see HPI and PE  SKIN:  No skin rash, no dry skin  Neurological:  No headaches, no confusion  Psychiatric:  No suicide thoughts, no anxiety, no depression  Review of all other systems is negative    LABS:  HgA1c:   Lab Results   Component Value Date    HGBA1C 5 5 12/07/2018     BMP:   Lab Results   Component Value Date    CALCIUM 9 0 02/08/2020    K 3 5 02/08/2020    CO2 30 02/08/2020     02/08/2020    BUN 12 02/08/2020    CREATININE 0 79 02/08/2020       _____________________________________________________  PHYSICAL EXAMINATION:  Vitals:    07/30/20 0906   BP: 111/76   Pulse: 73       General: well developed and well nourished, alert, oriented times 3 and appears comfortable  Psychiatric: Normal  HEENT: Trachea Midline, No torticollis  Cardiovascular: No discernable arrhythmia  Pulmonary: No wheezing or stridor  Skin: No masses, erthema, lacerations, fluctation, ulcerations  Neurovascular:  L1-S1 motor and sensory intact, pulses were compared bilaterally and were equal, capillary refill less than 3 sec    MUSCULOSKELETAL EXAMINATION:  Right ankle  Swelling is noted diffusely throughout the ankle, no erythema or ecchymosis noted  Tenderness to palpation over the navicular, medial malleolus and up the medial aspect of the tibia  Patient has full range of motion of the ankle compared to the contralateral side with dorsiflexion, plantar flexion, inversion and eversion  Patient has pain with resisted dorsiflexion inversion and plantar flexion inversion  Negative Devries test, negative Sree sign, negative anterior drawer, negative posterior drawer, negative Kleiger's test  Patient is neurovascular intact    _____________________________________________________  STUDIES REVIEWED:  I personally reviewed the x-rays from 7- which demonstrated some callus formation noted at the distal tibial metaphysis which is healed at this time        ASSESSMENT/PLAN:      Right Posterior tibialis tendon insufficiency  - will have the patient start some topical diclofenac gel as the patient is not able to take oral anti-inflammatories due to being on a blood thinner for previous DVT  - she will start physical therapy to work on range of motion, stretching, strengthening modalities as needed for pain as well as proprioception  - she can take Tylenol as needed for the pain  - she will follow-up in 6 weeks with Dr Romi Orantes for re-evaluation

## 2020-08-17 ENCOUNTER — EVALUATION (OUTPATIENT)
Dept: PHYSICAL THERAPY | Facility: CLINIC | Age: 40
End: 2020-08-17
Payer: COMMERCIAL

## 2020-08-17 DIAGNOSIS — M76.829 POSTERIOR TIBIALIS TENDON INSUFFICIENCY: Primary | ICD-10-CM

## 2020-08-17 PROCEDURE — 97161 PT EVAL LOW COMPLEX 20 MIN: CPT | Performed by: PHYSICAL THERAPIST

## 2020-08-17 PROCEDURE — 97110 THERAPEUTIC EXERCISES: CPT | Performed by: PHYSICAL THERAPIST

## 2020-08-17 NOTE — PROGRESS NOTES
PT Evaluation     Today's date: 2020  Patient name: Madhuri Johnson  : 1980  MRN: [id number]  Referring provider: Elizabet Lawson, FRANCO  Dx:   Encounter Diagnosis     ICD-10-CM    1  Posterior tibialis tendon insufficiency  [id number] Ambulatory referral to Physical Therapy     PT plan of care cert/re-cert       Start Time: 0438  Stop Time: 1310  Total time in clinic (min): 35 minutes    Assessment  Assessment details: Pt is a 43 y/o female presenting to physical therapy with chief complaint of medial ankle/foot pain following a fall in 2020  Pt presents with significant calcaneovalgus in standing, decreased R inversion/eversion AROM and strength, and TTP over the medial malleolus  Pt's lymphedema in the RLE is contributing to her swelling in the R ankle  Pt would benefit from physical therapy in order to improve the aforementioned deficits to return to PLOF  Impairments: abnormal gait, abnormal or restricted ROM, activity intolerance, impaired balance, impaired physical strength, lacks appropriate home exercise program and pain with function  Functional limitations: stairs, walking  Symptom irritability: lowUnderstanding of Dx/Px/POC: good   Prognosis: good    Goals  STG: 3 weeks  1  Pt will demonstrate independence with HEP  2  Pt will improve R ankle A/PROM by at least 10%  3  Pt will improve R ankle strength by at least 1/2 grade  4  Pt will be able to perform SLS for at least 5s without use of BUE  LT weeks  1  Pt will improve R ankle to equal that of the L to return to PLOF  2  Pt will improve R ankle strength to at least 4+/5  3  Pt will be able to perform SLS on foam for at least 30s to show improved balance and proprioception  4  Pt will demonstrate WNL joint mobility to return to PLOF  5  Pt will ambulate without pain and without gait abnormalities        Plan  Patient would benefit from: skilled physical therapy  Planned modality interventions: cryotherapy and thermotherapy: hydrocollator packs  Planned therapy interventions: therapeutic exercise, therapeutic activities, stretching, strengthening, patient education, neuromuscular re-education, massage, manual therapy, balance, gait training and home exercise program  Frequency: 2x week  Duration in weeks: 6  Treatment plan discussed with: patient        Subjective Evaluation    History of Present Illness  Mechanism of injury: trauma  Mechanism of injury: Pt had an ankle fracture in  and then this past January she fell and the urgent care said there was not a break  She reports that when she had an x-ray recently, it showed a healing fracture  She continues to go to the gym and do her daily activities  She is not taking pain medications, but she has tried ice, heat, salt baths, essential oils, and compression  Pt reports walking is "okay" while she is doing it, but she is sore afterwards  Recurrent probem    Quality of life: good    Pain  Current pain ratin  At best pain ratin  At worst pain ratin  Quality: sharp, discomfort and dull ache  Aggravating factors: standing, walking, lifting and stair climbing  Progression: improved    Patient Goals  Patient goals for therapy: decreased pain, increased strength, increased motion, independence with ADLs/IADLs and return to sport/leisure activities          Objective     Observations     Additional Observation Details  Standing: significant pes planus/calcaneovalgus on the R foot    Tenderness     Right Ankle/Foot   Tenderness in the medial malleolus       Active Range of Motion   Left Ankle/Foot   Dorsiflexion (ke): 0 degrees   Inversion: 20 degrees   Eversion: 30 degrees     Right Ankle/Foot   Dorsiflexion (ke): -3 degrees   Dorsiflexion (kf): 0 degrees   Inversion: 10 degrees   Eversion: 15 degrees     Passive Range of Motion     Right Ankle/Foot    Inversion: 18 degrees   Eversion: 28 degrees     Joint Play     Right Ankle/Foot  Joints within functional limits are the talocrural joint       Strength/Myotome Testing     Right Ankle/Foot   Dorsiflexion: 4  Inversion: 4-  Eversion: 4-    Swelling     Right Ankle/Foot   Figure 8: 60 5 cm      Flowsheet Rows      Most Recent Value   PT/OT G-Codes   Current Score  65   Projected Score  78             Precautions: RLE lymphedema      Manuals 8/17                                                                Neuro Re-Ed             Arch raises 10x            SLS                                                                              Ther Ex             Bike             Ankle 4 way 10x ea            [facility] wipers                                                                              Ther Activity                                       Gait Training                                       Modalities

## 2020-08-19 ENCOUNTER — CONSULT (OUTPATIENT)
Dept: HEMATOLOGY ONCOLOGY | Facility: CLINIC | Age: 40
End: 2020-08-19
Payer: COMMERCIAL

## 2020-08-19 VITALS
WEIGHT: 293 LBS | OXYGEN SATURATION: 97 % | DIASTOLIC BLOOD PRESSURE: 76 MMHG | HEIGHT: 67 IN | HEART RATE: 72 BPM | SYSTOLIC BLOOD PRESSURE: 112 MMHG | RESPIRATION RATE: 18 BRPM | BODY MASS INDEX: 45.99 KG/M2 | TEMPERATURE: 98.2 F

## 2020-08-19 DIAGNOSIS — Z79.01 CURRENT USE OF LONG TERM ANTICOAGULATION: ICD-10-CM

## 2020-08-19 DIAGNOSIS — Z86.718 HISTORY OF DVT (DEEP VEIN THROMBOSIS): ICD-10-CM

## 2020-08-19 PROCEDURE — 1036F TOBACCO NON-USER: CPT | Performed by: INTERNAL MEDICINE

## 2020-08-19 PROCEDURE — 3008F BODY MASS INDEX DOCD: CPT | Performed by: INTERNAL MEDICINE

## 2020-08-19 PROCEDURE — 99243 OFF/OP CNSLTJ NEW/EST LOW 30: CPT | Performed by: INTERNAL MEDICINE

## 2020-08-19 NOTE — PROGRESS NOTES
Hematology/Oncology Outpatient Follow-up  Alberto Sethi 44 y o  female 1980 [id number]    Date:  8/19/2020    Assessment and Plan:  1  Current use of long term anticoagulation  I had a lengthy discussion with the patient regarding the 2 previous history is of deep vein thrombosis of the right lower extremity  The patient seems to have multiple reasons to stay on indefinite anticoagulation  She has morbid obesity and significant family history of clotting of unknown etiology on top of the unprovoked deep vein thrombosis of the right lower extremity in 2018  I did review with the patient the potential risk of getting another clotting event if she would be off of anticoagulation for a long time, the risk is estimated to be around somewhere between 10 to 25% within the 1st 2 years after stopping the anticoagulation  She was asked to stop the Xarelto about 48-72 hours before the planned dental work since she is going to have extraction to prevent excessive bleeding  The patient seems to be interested in more continuing the anticoagulation after her planned dental work for the time being  She stated that she is having active lifestyle and interested in weight loss/health lifestyle  - CBC and differential; Future  - Comprehensive metabolic panel; Future  - Beta-2 glycoprotein antibodies; Future  - Cardiolipin antibody; Future  - Prothrombin gene mutation; Future  - Factor 5 leiden; Future    2  History of DVT (deep vein thrombosis)  The patient seems to be interested in pursuing some limited workup to see if she has any hint of inherited etiology for clotting  She also stated that she would like to know also for the family 6 since her daughter is currently pregnant     - CBC and differential; Future  - Comprehensive metabolic panel; Future  - Beta-2 glycoprotein antibodies; Future  - Cardiolipin antibody; Future  - Prothrombin gene mutation;  Future  - Factor 5 leiden; Future      HPI:  This is a 26-year-old female with multiple comorbid conditions including morbid obesity, lymphedema of the right lower extremity, endometriosis status post hysterectomy and bilateral salpingo-oophorectomy  She also had a right ankle fracture in 2007 followed by a deep vein thrombosis of the right lower extremity below the knee  She then had a 2nd right lower extremity deep vein thrombosis 2 months after her abdominal surgery in 2018  She has a history of 2 pregnancies without any history of miscarriages  The patient was seen by Dr Desire Narvaez from our Hematology team around December 2018 who recommended lifelong anticoagulation  The patient has been taking Xarelto 20 mg once a day since November 3, 2018 after she was found to have her 2nd episode of right lower extremity DVT which was in the posterior tibial veins and the peroneal veins  The patient also stated that she has a significant family history of clotting events of unknown etiology  The patient came in to discuss anticoagulation recommendation since she is about to get significant dental workup including extraction of 1 of her teeth  She did complain about easy bruising but no hint of erné bleeding from any sites  Interval history:    ROS: Review of Systems   Constitutional: Negative for activity change, appetite change, chills, diaphoresis, fatigue, fever and unexpected weight change  HENT: Negative for congestion, dental problem, ear discharge, ear pain, facial swelling, hearing loss, mouth sores, nosebleeds, postnasal drip, sore throat, tinnitus and trouble swallowing  Eyes: Negative for discharge, redness, itching and visual disturbance  Respiratory: Negative for cough, chest tightness, shortness of breath and wheezing  Cardiovascular: Negative for chest pain, palpitations and leg swelling  Gastrointestinal: Negative for abdominal distention, abdominal pain, anal bleeding, blood in stool, constipation, diarrhea, nausea and vomiting  Genitourinary: Negative for difficulty urinating, dysuria, flank pain, frequency, hematuria and urgency  Musculoskeletal: Positive for arthralgias  Negative for back pain, gait problem, joint swelling, myalgias and neck pain  Skin: Negative for color change, pallor, rash and wound  Neurological: Positive for numbness  Negative for dizziness, syncope, speech difficulty, weakness, light-headedness and headaches  Hematological: Negative for adenopathy  Does not bruise/bleed easily  Psychiatric/Behavioral: Negative for agitation, behavioral problems, confusion and sleep disturbance         Past Medical History:   Diagnosis Date    [facility])     Endometriosis     Lymphedema of right lower extremity     Obesity, Class III, BMI 40-49 9 (morbid obesity) ([facility] [address] )        Past Surgical History:   Procedure Laterality Date    ABLATION COLPOCLESIS      HYSTERECTOMY  2016    OOPHORECTOMY Bilateral 09/2016    OVARY SURGERY      TUBAL LIGATION         Social History     Socioeconomic History    Marital status: /Civil Union     Spouse name: None    Number of children: None    Years of education: None    Highest education level: None   Occupational History    None   Social Needs    Financial resource strain: None    Food insecurity     Worry: None     Inability: None    Transportation needs     Medical: None     Non-medical: None   Tobacco Use    Smoking status: Never Smoker    Smokeless tobacco: Never Used   Substance and Sexual Activity    Alcohol use: Yes     Comment: extremely rare    Drug use: No    Sexual activity: None   Lifestyle    Physical activity     Days per week: None     Minutes per session: None    Stress: None   Relationships    Social connections     Talks on phone: None     Gets together: None     Attends Yarsanism service: None     Active member of club or organization: None     Attends meetings of clubs or organizations: None     Relationship status: None    Intimate partner violence     Fear of current or ex partner: None     Emotionally abused: None     Physically abused: None     Forced sexual activity: None   Other Topics Concern    None   Social History Narrative    None       Family History   Problem Relation Age of Onset    Pancreatic cancer Maternal Grandmother     Clotting disorder Mother     Stroke Maternal Aunt     Diabetes Paternal Grandmother     Thyroid disease Paternal Grandmother     Hypertension Paternal Grandfather     Heart disease Paternal Grandfather        No Known Allergies      Current Outpatient Medications:     cholecalciferol (VITAMIN D3) 1,000 units tablet, Take 1,000 Units by mouth daily, Disp: , Rfl:     Cranberry 1000 MG CAPS, Take by mouth, Disp: , Rfl:     hydrochlorothiazide (HYDRODIURIL) 12 5 mg tablet, MAY TAKE 1 PILL EVERY 24 HOURS AS NEEDED FOR EDEMA IN LEGS, Disp: 30 tablet, Rfl: 5    rivaroxaban (XARELTO) 20 mg tablet, Take 1 pill daily, Disp: 90 tablet, Rfl: 2    traMADol (ULTRAM) 50 mg tablet, May take 1 pill twice a day p r n  Dental pain, Disp: 45 tablet, Rfl: 0    diclofenac sodium (VOLTAREN) 1 %, Apply 2 g topically 4 (four) times a day (Patient not taking: Reported on 8/19/2020), Disp: 1 Tube, Rfl: 0      Physical Exam:  /76 (BP Location: Left arm, Patient Position: Sitting, Cuff Size: Adult)   Pulse 72   Temp 98 2 °F (36 8 °C) (Tympanic)   Resp 18   Ht 5' 7" (1 702 m)   Wt (!) 154 kg (339 lb 9 6 oz)   SpO2 97%   BMI 53 19 kg/m²     Physical Exam  Constitutional:       General: She is not in acute distress  Appearance: She is well-developed  She is obese  She is not diaphoretic  HENT:      Head: Normocephalic and atraumatic  Eyes:      General: No scleral icterus  Right eye: No discharge  Left eye: No discharge  Conjunctiva/sclera: Conjunctivae normal       Pupils: Pupils are equal, round, and reactive to light     Neck:      Musculoskeletal: Normal range of motion and neck supple  Thyroid: No thyromegaly  Vascular: No JVD  Trachea: No tracheal deviation  Cardiovascular:      Rate and Rhythm: Normal rate and regular rhythm  Heart sounds: Normal heart sounds  No murmur  No friction rub  Pulmonary:      Effort: Pulmonary effort is normal  No respiratory distress  Breath sounds: Normal breath sounds  No stridor  No wheezing or rales  Chest:      Chest wall: No tenderness  Abdominal:      General: Bowel sounds are normal  There is no distension  Palpations: Abdomen is soft  There is no hepatomegaly, splenomegaly or mass  Tenderness: There is no abdominal tenderness  There is no guarding or rebound  Comments: Morbidly obese abdomen   Musculoskeletal: Normal range of motion  General: No tenderness or deformity  Lymphadenopathy:      Cervical: No cervical adenopathy  Skin:     General: Skin is warm and dry  Coloration: Skin is not pale  Findings: No erythema or rash  Neurological:      Mental Status: She is alert and oriented to person, place, and time  Cranial Nerves: No cranial nerve deficit  Coordination: Coordination normal       Deep Tendon Reflexes: Reflexes are normal and symmetric  Psychiatric:         Behavior: Behavior normal          Thought Content: Thought content normal          Judgment: Judgment normal            Labs:  Lab Results   Component Value Date    WBC 6 80 02/08/2020    HGB 13 5 02/08/2020    HCT 44 5 02/08/2020    MCV 90 02/08/2020     02/08/2020     Lab Results   Component Value Date    K 3 5 02/08/2020     02/08/2020    CO2 30 02/08/2020    BUN 12 02/08/2020    CREATININE 0 79 02/08/2020    GLUF 86 12/07/2018    CALCIUM 9 0 02/08/2020    AST 39 02/08/2020    ALT 49 02/08/2020    ALKPHOS 121 (H) 02/08/2020    EGFR 95 02/08/2020       Patient voiced understanding and agreement in the above discussion   Aware to contact our office with questions/symptoms in the interim

## 2020-08-24 ENCOUNTER — OFFICE VISIT (OUTPATIENT)
Dept: PHYSICAL THERAPY | Facility: CLINIC | Age: 40
End: 2020-08-24
Payer: COMMERCIAL

## 2020-08-24 DIAGNOSIS — M76.829 POSTERIOR TIBIALIS TENDON INSUFFICIENCY: Primary | ICD-10-CM

## 2020-08-24 PROCEDURE — 97112 NEUROMUSCULAR REEDUCATION: CPT | Performed by: PHYSICAL THERAPIST

## 2020-08-24 PROCEDURE — 97110 THERAPEUTIC EXERCISES: CPT | Performed by: PHYSICAL THERAPIST

## 2020-08-24 NOTE — PROGRESS NOTES
Daily Note     Today's date: 2020  Patient name: Yogesh Gordon  : 1980  MRN: [id number]  Referring provider: Yessy Epps PA-C  Dx:   Encounter Diagnosis     ICD-10-CM    1  Posterior tibialis tendon insufficiency  M76 829        Start Time: 317  Stop Time: 1301  Total time in clinic (min): 33 minutes    Subjective: Pt reports the exercises and walking on the TM have been making her foot sore  Objective: See treatment diary below      Assessment: Minimal discomfort with IASTM to the medial malleolus  Difficulty with eversion windshield wipers secondary to discomfort  Minimal postural sway and min A from each UE to maintain balance during SLS on foam       Plan: Progress as tolerated       Precautions: RLE lymphedema      Manuals            IASTM R med mallelous  5'                                                  Neuro Re-Ed             Arch raises 10x 20x           SLS foam  15"x5                                                                            Ther Ex             Bike  5'           Ankle 4 way 10x ea 20x ea           Windshield wipers  10x ea                                                                            Ther Activity                                       Gait Training                                       Modalities

## 2020-08-26 ENCOUNTER — OFFICE VISIT (OUTPATIENT)
Dept: PHYSICAL THERAPY | Facility: CLINIC | Age: 40
End: 2020-08-26
Payer: COMMERCIAL

## 2020-08-26 DIAGNOSIS — M76.829 POSTERIOR TIBIALIS TENDON INSUFFICIENCY: Primary | ICD-10-CM

## 2020-08-26 PROCEDURE — 97110 THERAPEUTIC EXERCISES: CPT

## 2020-08-26 PROCEDURE — 97140 MANUAL THERAPY 1/> REGIONS: CPT

## 2020-08-26 PROCEDURE — 97112 NEUROMUSCULAR REEDUCATION: CPT

## 2020-08-26 NOTE — PROGRESS NOTES
Daily Note     Today's date: 2020  Patient name: Justin Gallegos  : 1980  MRN: [id number]  Referring provider: Ike Simpson PA-C  Dx:   Encounter Diagnosis     ICD-10-CM    1  Posterior tibialis tendon insufficiency  M76 829                   Subjective: Pt reports R medial ankle feels "better than it has been in awhile"  Objective: See treatment diary below      Assessment:  Resisted inversion is painful  Tissue restriction present along R posterior tib  Light IASTM only performed in localized region posterior, superior and inferior to R medial malleolus  Patient demonstrating decreased ankle stability during SLS  Plan: Continue per plan of care        Precautions: RLE lymphedema      Manuals           IASTM R med mallelous  5' 8' lightly                                                 Neuro Re-Ed             Arch raises 10x 20x x20          SLS foam  15"x5 15"x5                                                                           Ther Ex             Bike  5' 5'          Ankle 4 way 10x ea 20x ea RTB x20 ea          Windshield wipers  10x ea x10 ea          HR/TR   x20 ea          Pro stretch   30"x3                                                 Ther Activity                                       Gait Training                                       Modalities

## 2020-08-31 ENCOUNTER — OFFICE VISIT (OUTPATIENT)
Dept: PHYSICAL THERAPY | Facility: CLINIC | Age: 40
End: 2020-08-31
Payer: COMMERCIAL

## 2020-08-31 DIAGNOSIS — M76.829 POSTERIOR TIBIALIS TENDON INSUFFICIENCY: Primary | ICD-10-CM

## 2020-08-31 PROCEDURE — 97112 NEUROMUSCULAR REEDUCATION: CPT

## 2020-08-31 PROCEDURE — 97110 THERAPEUTIC EXERCISES: CPT

## 2020-08-31 PROCEDURE — 97140 MANUAL THERAPY 1/> REGIONS: CPT

## 2020-08-31 NOTE — PROGRESS NOTES
Daily Note     Today's date: 2020  Patient name: Valeen Lombard  : 1980  MRN: [id number]  Referring provider: Gabriella Mendoza PA-C  Dx:   Encounter Diagnosis     ICD-10-CM    1  Posterior tibialis tendon insufficiency  M76 829                   Subjective: Pt reports she was walking on TM right before PT and is experiencing R arch discomfort  Pt states this discomfort is not as bad as it used to be  Objective: See treatment diary below      Assessment: Tissue restriction remains along R posterior tib tendon  Patient does have pain with eversion in R medial arch  Requires FTA in order to maintain SLS  Demonstrates decreased proprioception and has difficulty maintaining weight distribution on lateral portion of R foot  Plan: Continue per plan of care        Precautions: RLE lymphedema      Manuals          IASTM R med mallelous  5' 8' lightly 8' lightly                                                Neuro Re-Ed             Arch raises 10x 20x x20 x20         SLS foam  15"x5 15"x5 15"x5                                                                            Ther Ex             Bike  5' 5' 8'         Ankle 4 way 10x ea 20x ea RTB x20 ea RTB x20 ea         Windshield wipers  10x ea x10 ea x10 ea         HR/TR   x20 ea x20 ea         Pro stretch   30"x3 30"x3         Spider walk    RTB x20                                   Ther Activity                                       Gait Training                                       Modalities

## 2020-09-02 ENCOUNTER — APPOINTMENT (OUTPATIENT)
Dept: PHYSICAL THERAPY | Facility: CLINIC | Age: 40
End: 2020-09-02
Payer: COMMERCIAL

## 2020-09-09 ENCOUNTER — APPOINTMENT (OUTPATIENT)
Dept: PHYSICAL THERAPY | Facility: CLINIC | Age: 40
End: 2020-09-09
Payer: COMMERCIAL

## 2020-09-10 ENCOUNTER — OFFICE VISIT (OUTPATIENT)
Dept: PHYSICAL THERAPY | Facility: CLINIC | Age: 40
End: 2020-09-10
Payer: COMMERCIAL

## 2020-09-10 DIAGNOSIS — M76.829 POSTERIOR TIBIALIS TENDON INSUFFICIENCY: Primary | ICD-10-CM

## 2020-09-10 PROCEDURE — 97140 MANUAL THERAPY 1/> REGIONS: CPT

## 2020-09-10 PROCEDURE — 97112 NEUROMUSCULAR REEDUCATION: CPT

## 2020-09-10 PROCEDURE — 97110 THERAPEUTIC EXERCISES: CPT

## 2020-09-10 NOTE — PROGRESS NOTES
Daily Note     Today's date: 9/10/2020  Patient name: Abby Kulkarni  : 1980  MRN: [id number]  Referring provider: Benigno Vazquez PA-C  Dx:   Encounter Diagnosis     ICD-10-CM    1  Posterior tibialis tendon insufficiency  M76 829                   Subjective: Patient reports R medial arch usually feels painful following PT and improves by next day  Upon arrival, pt reports pain in R medial ankle (post tib region)  Objective: See treatment diary below      Assessment: Patient has difficulty with SLS secondary to decreased proprioception and lateral ankle stability  Presents with collapsed arches during SLS  Patient reported increased R medial ankle sx with addition of new TE  Resisted inversion is painful  Plan: Continue per plan of care        Precautions: RLE lymphedema      Manuals 8/17 8/24 8/26 8/31 9/10        IASTM R med mallelous  5' 8' lightly 8' lightly 8' lightly                                               Neuro Re-Ed             Arch raises 10x 20x x20 x20 x20        SLS foam  15"x5 15"x5 15"x5 15"x5        SLS star tap on foam       x10        Biodex LOS     L12 x3                                               Ther Ex             Bike  5' 5' 8' 10'        Ankle 4 way 10x ea 20x ea RTB x20 ea RTB x20 ea GTB x20 ea        Windshield wipers  10x ea x10 ea x10 ea         HR/TR   x20 ea x20 ea x20 ea        Pro stretch   30"x3 30"x3 30"x3        Spider walk    RTB x20 GTB x20        Long sit gastroc stretch     30"x3                     Ther Activity                                       Gait Training                                       Modalities

## 2020-09-14 ENCOUNTER — OFFICE VISIT (OUTPATIENT)
Dept: PHYSICAL THERAPY | Facility: CLINIC | Age: 40
End: 2020-09-14
Payer: COMMERCIAL

## 2020-09-14 DIAGNOSIS — M76.829 POSTERIOR TIBIALIS TENDON INSUFFICIENCY: Primary | ICD-10-CM

## 2020-09-14 PROCEDURE — 97140 MANUAL THERAPY 1/> REGIONS: CPT | Performed by: PHYSICAL THERAPIST

## 2020-09-14 PROCEDURE — 97112 NEUROMUSCULAR REEDUCATION: CPT | Performed by: PHYSICAL THERAPIST

## 2020-09-14 PROCEDURE — 97110 THERAPEUTIC EXERCISES: CPT | Performed by: PHYSICAL THERAPIST

## 2020-09-14 NOTE — PROGRESS NOTES
Daily Note     Today's date: 2020  Patient name: Lino Campos  : 1980  MRN: [id number]  Referring provider: Randall Tucker PA-C  Dx:   Encounter Diagnosis     ICD-10-CM    1  Posterior tibialis tendon insufficiency  M76 829        Start Time:   Stop Time: 131  Total time in clinic (min): 37 minutes    Subjective: Pt reports she just came from the gym, so her foot is a little aggravated  She reports overall she is having more good days than bad days  Objective: See treatment diary below      Assessment: Minimal difficulty with BAPS today, particularly with eversion  Pt continues to have pain with resisted inversion  Difficulty avoiding toe flexion/scrunching during arch raises, showing decreased motor control of the posterior tib  Plan: Progress as tolerated       Precautions: RLE lymphedema      Manuals 8/17 8/24 8/26 8/31 9/10 9/14       IASTM R med mallelous  5' 8' lightly 8' lightly 8' lightly 8' lightly                                              Neuro Re-Ed             Arch raises 10x 20x x20 x20 x20 30x       SLS foam  15"x5 15"x5 15"x5 15"x5        SLS star tap on foam       x10        Biodex LOS     L12 x3 L12 3x       BAPS      20x ea       Biodex HR      20x                    Ther Ex             Bike  5' 5' 8' 10' 5'       Ankle 4 way 10x ea 20x ea RTB x20 ea RTB x20 ea GTB x20 ea GTB 20x       Windshield wipers  10x ea x10 ea x10 ea         HR/TR   x20 ea x20 ea x20 ea        Pro stretch   30"x3 30"x3 30"x3        Spider walk    RTB x20 GTB x20 GTB 20x       Long sit gastroc stretch     30"x3 Stand 30"x3                    Ther Activity                                       Gait Training                                       Modalities

## 2020-09-16 ENCOUNTER — APPOINTMENT (OUTPATIENT)
Dept: PHYSICAL THERAPY | Facility: CLINIC | Age: 40
End: 2020-09-16
Payer: COMMERCIAL

## 2020-09-18 ENCOUNTER — APPOINTMENT (OUTPATIENT)
Dept: PHYSICAL THERAPY | Facility: CLINIC | Age: 40
End: 2020-09-18
Payer: COMMERCIAL

## 2020-09-21 ENCOUNTER — OFFICE VISIT (OUTPATIENT)
Dept: PHYSICAL THERAPY | Facility: CLINIC | Age: 40
End: 2020-09-21
Payer: COMMERCIAL

## 2020-09-21 ENCOUNTER — OFFICE VISIT (OUTPATIENT)
Dept: OBGYN CLINIC | Facility: CLINIC | Age: 40
End: 2020-09-21
Payer: COMMERCIAL

## 2020-09-21 VITALS
WEIGHT: 293 LBS | BODY MASS INDEX: 45.99 KG/M2 | HEART RATE: 74 BPM | HEIGHT: 67 IN | DIASTOLIC BLOOD PRESSURE: 79 MMHG | SYSTOLIC BLOOD PRESSURE: 115 MMHG | TEMPERATURE: 98.7 F

## 2020-09-21 DIAGNOSIS — M76.829 POSTERIOR TIBIALIS TENDON INSUFFICIENCY: Primary | ICD-10-CM

## 2020-09-21 DIAGNOSIS — S99.911D INJURY OF RIGHT ANKLE, SUBSEQUENT ENCOUNTER: Primary | ICD-10-CM

## 2020-09-21 PROCEDURE — 97112 NEUROMUSCULAR REEDUCATION: CPT | Performed by: PHYSICAL THERAPIST

## 2020-09-21 PROCEDURE — 97110 THERAPEUTIC EXERCISES: CPT | Performed by: PHYSICAL THERAPIST

## 2020-09-21 PROCEDURE — 99213 OFFICE O/P EST LOW 20 MIN: CPT | Performed by: FAMILY MEDICINE

## 2020-09-21 PROCEDURE — 97140 MANUAL THERAPY 1/> REGIONS: CPT | Performed by: PHYSICAL THERAPIST

## 2020-09-21 NOTE — PROGRESS NOTES
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, so she is feeling some discomfort in her foot  Objective: See treatment diary below      Assessment: Discomfort noted during cone taps on foam, suggesting decreased strength and stability of the foot intrinsics  Pt able to tolerate more IASTM today compared to previous visits  Plan: Progress as tolerated       Precautions: RLE lymphedema      Manuals 8/17 8/24 8/26 8/31 9/10 9/14 9/21      IASTM R med mallelous  5' 8' lightly 8' lightly 8' lightly 8' lightly 10' lightly                                             Neuro Re-Ed             Arch raises 10x 20x x20 x20 x20 30x Stand 20x      SLS foam  15"x5 15"x5 15"x5 15"x5  15"x5      SLS star tap on foam       x10  10x      Biodex LOS     L12 x3 L12 3x L12 3x      BAPS      20x ea 20x ea      Biodex HR      20x 20x                   Ther Ex             Bike  5' 5' 8' 10' 5' 5'      Ankle 4 way 10x ea 20x ea RTB x20 ea RTB x20 ea GTB x20 ea GTB 20x GTB 30x      Windshield wipers  10x ea x10 ea x10 ea         HR/TR   x20 ea x20 ea x20 ea        Pro stretch   30"x3 30"x3 30"x3        Spider walk    RTB x20 GTB x20 GTB 20x       Long sit gastroc stretch     30"x3 Stand 30"x3 Stand 30"x3                   Ther Activity                                       Gait Training                                       Modalities

## 2020-09-21 NOTE — PROGRESS NOTES
Assessment/Plan:  Assessment/Plan   Diagnoses and all orders for this visit:    Injury of right ankle, subsequent encounter  -     Ambulatory referral to Orthopedic Surgery  -     MRI ankle/heel right  wo contrast; Future        77-year-old active female with persistent right ankle and distal leg pain of onset from injury in January 2020  Discussed with patient physical exam, impression and plan  Physical exam noted for tenderness at the anterior and medial aspect the distal tibia, anterior ankle, and navicular  She has intact range of motion and pain with inversion  There is no pain with passive external rotation or syndesmosis squeeze  She has decreased sensation light touch S1 dermatome  She is now more than 8 months since injury and still symptomatic despite conservative management a form of rest,  topical Voltaren gel since 07/30/2020, attending formal physical therapy and doing home exercises since 08/17/2020  There is concern for osteochondral lesion as well as loose body within the ankle  At this time I will refer for MRI of the ankle to evaluate for occult osseous and soft tissue abnormality as more invasive management may be warranted  She will follow up with me after getting MRI done  Subjective:   Patient ID: Armida Clemens is a 44 y o  female  Chief Complaint   Patient presents with    Right Ankle - Pain       77-year-old active female following up for right ankle pain of onset from injury in January 2020  She reports having sustained injury and having pain described as sudden onset, localized to the anterior medial aspect the ankle, radiating proximally along the medial aspect of lower leg, achy and throbbing, worse with standing and ambulation, associated with swelling, and improved with rest   She rested and stress activity for several months  She had follow-up appointment with her primary care provider and was referred for x-rays  X-rays were noted for healing fracture of distal tibia    She was referred to Orthopedics  She was seen by Orthopedics on 07/30/2020 at which point she was referred to physical therapy  She has been attending formal physical therapy and doing home exercises since 08/17/2020  She has also been applying topical Voltaren gel 3 to 4 times a day as she is on oral anticoagulation  She reports very little improvement in her symptoms  Ankle Pain   This is a chronic problem  The current episode started more than 1 month ago  The problem occurs constantly  The problem has been unchanged  Associated symptoms include arthralgias, joint swelling and numbness  Pertinent negatives include no weakness  The symptoms are aggravated by standing, twisting and walking  She has tried rest and NSAIDs (Physical therapy, home exercise) for the symptoms  The treatment provided mild relief  The following portions of the patient's history were reviewed and updated as appropriate: She  has a past medical history of DVT (deep venous thrombosis) ([facility] ), Endometriosis, Lymphedema of right lower extremity, and Obesity, Class III, BMI 40-49 9 (morbid obesity) ([facility] )  She  has a past surgical history that includes Ovary surgery; Ablation colpoclesis; Hysterectomy (2016); Oophorectomy (Bilateral, 09/2016); and Tubal ligation  Her family history includes Clotting disorder in her mother; Diabetes in her paternal grandmother; Heart disease in her paternal grandfather; Hypertension in her paternal grandfather; Pancreatic cancer in her maternal grandmother; Stroke in her maternal aunt; Thyroid disease in her paternal grandmother  She  reports that she has never smoked  She has never used smokeless tobacco  She reports current alcohol use  She reports that she does not use drugs  She has No Known Allergies       Review of Systems   Musculoskeletal: Positive for arthralgias and joint swelling  Neurological: Positive for numbness  Negative for weakness         Objective:  Vitals:    09/21/20 1335   BP: 115/79   Pulse: 74   Temp: 98 7 °F (37 1 °C)   Weight: (!) 153 kg (338 lb)   Height: 5' 7" (1 702 m)     Right Ankle Exam     Muscle Strength   The patient has normal right ankle strength  Other   Sensation: decreased       Right Knee Exam     Muscle Strength   The patient has normal right knee strength  Tenderness   The patient is experiencing no tenderness  Range of Motion   Extension: normal           Observations     Right Ankle/Foot   Negative for deformity  Tenderness     Right Ankle/Foot   Tenderness in the anterior ankle, medial malleolus, navicular, plantar fascia and talar dome  No tenderness in the Achilles insertion, anterior talofibular ligament, fifth metatarsal base, calcaneofibular ligament, deltoid ligament, dorsum foot, lateral malleolus, peroneal tendon, posterior talofibular ligament and proximal Achilles  Active Range of Motion     Right Ankle/Foot   Normal active range of motion  Inversion: with pain    Strength/Myotome Testing     Right Ankle/Foot   Normal strength    Tests     Right Ankle/Foot   Negative for anterior drawer, calcaneal squeeze, metatarsal squeeze, posterior drawer, syndesmosis squeeze and syndesmosis external rotation  Physical Exam  Vitals signs and nursing note reviewed  Constitutional:       General: She is not in acute distress  Appearance: She is well-developed  HENT:      Head: Normocephalic  Eyes:      Conjunctiva/sclera: Conjunctivae normal    Neck:      Trachea: No tracheal deviation  Cardiovascular:      Rate and Rhythm: Normal rate  Pulmonary:      Effort: Pulmonary effort is normal  No respiratory distress  Abdominal:      General: There is no distension  Musculoskeletal:         General: Swelling and tenderness present  Right ankle: Medial malleolus tenderness found  No lateral malleolus, no CF ligament and no posterior TFL tenderness found  Right foot: No deformity  Skin:     General: Skin is warm and dry  Neurological:      Mental Status: She is alert and oriented to person, place, and time     Psychiatric:         Behavior: Behavior normal

## 2020-09-23 ENCOUNTER — APPOINTMENT (OUTPATIENT)
Dept: PHYSICAL THERAPY | Facility: CLINIC | Age: 40
End: 2020-09-23
Payer: COMMERCIAL

## 2020-09-24 ENCOUNTER — APPOINTMENT (OUTPATIENT)
Dept: PHYSICAL THERAPY | Facility: CLINIC | Age: 40
End: 2020-09-24
Payer: COMMERCIAL

## 2020-09-25 ENCOUNTER — APPOINTMENT (OUTPATIENT)
Dept: PHYSICAL THERAPY | Facility: CLINIC | Age: 40
End: 2020-09-25
Payer: COMMERCIAL

## 2020-09-28 ENCOUNTER — APPOINTMENT (OUTPATIENT)
Dept: PHYSICAL THERAPY | Facility: CLINIC | Age: 40
End: 2020-09-28
Payer: COMMERCIAL

## 2020-09-30 ENCOUNTER — APPOINTMENT (OUTPATIENT)
Dept: PHYSICAL THERAPY | Facility: CLINIC | Age: 40
End: 2020-09-30
Payer: COMMERCIAL

## 2020-10-08 ENCOUNTER — HOSPITAL ENCOUNTER (OUTPATIENT)
Dept: MRI IMAGING | Facility: HOSPITAL | Age: 40
Discharge: HOME | End: 2020-10-08
Attending: FAMILY MEDICINE
Payer: COMMERCIAL

## 2020-10-08 DIAGNOSIS — S99.911D INJURY OF RIGHT ANKLE, SUBSEQUENT ENCOUNTER: ICD-10-CM

## 2020-10-08 PROCEDURE — 73721 MRI JNT OF LWR EXTRE W/O DYE: CPT

## 2020-10-08 PROCEDURE — G1004 CDSM NDSC: HCPCS

## 2020-10-20 ENCOUNTER — OFFICE VISIT (OUTPATIENT)
Dept: OBGYN CLINIC | Facility: CLINIC | Age: 40
End: 2020-10-20
Payer: COMMERCIAL

## 2020-10-20 VITALS
HEART RATE: 76 BPM | DIASTOLIC BLOOD PRESSURE: 76 MMHG | SYSTOLIC BLOOD PRESSURE: 119 MMHG | WEIGHT: 293 LBS | BODY MASS INDEX: 45.99 KG/M2 | HEIGHT: 67 IN

## 2020-10-20 DIAGNOSIS — M21.42 PES PLANUS OF BOTH FEET: ICD-10-CM

## 2020-10-20 DIAGNOSIS — M21.6X2 PRONATION OF BOTH FEET: ICD-10-CM

## 2020-10-20 DIAGNOSIS — S93.491D SPRAIN OF ANTERIOR TALOFIBULAR LIGAMENT, RIGHT, SUBSEQUENT ENCOUNTER: ICD-10-CM

## 2020-10-20 DIAGNOSIS — M21.6X1 PRONATION OF BOTH FEET: ICD-10-CM

## 2020-10-20 DIAGNOSIS — M76.821 TIBIALIS POSTERIOR TENDINITIS, RIGHT: Primary | ICD-10-CM

## 2020-10-20 DIAGNOSIS — M19.071 ARTHRITIS OF RIGHT ANKLE: ICD-10-CM

## 2020-10-20 DIAGNOSIS — M21.41 PES PLANUS OF BOTH FEET: ICD-10-CM

## 2020-10-20 PROCEDURE — 1036F TOBACCO NON-USER: CPT | Performed by: FAMILY MEDICINE

## 2020-10-20 PROCEDURE — 3078F DIAST BP <80 MM HG: CPT | Performed by: FAMILY MEDICINE

## 2020-10-20 PROCEDURE — 99213 OFFICE O/P EST LOW 20 MIN: CPT | Performed by: FAMILY MEDICINE

## 2020-12-01 ENCOUNTER — LAB (OUTPATIENT)
Dept: LAB | Facility: HOSPITAL | Age: 40
End: 2020-12-01
Payer: COMMERCIAL

## 2020-12-01 DIAGNOSIS — Z79.01 CURRENT USE OF LONG TERM ANTICOAGULATION: ICD-10-CM

## 2020-12-01 DIAGNOSIS — Z86.718 HISTORY OF DVT (DEEP VEIN THROMBOSIS): ICD-10-CM

## 2020-12-01 LAB
ALBUMIN SERPL BCP-MCNC: 3.8 G/DL (ref 3.5–5)
ALP SERPL-CCNC: 119 U/L (ref 46–116)
ALT SERPL W P-5'-P-CCNC: 22 U/L (ref 12–78)
ANION GAP SERPL CALCULATED.3IONS-SCNC: 8 MMOL/L (ref 4–13)
AST SERPL W P-5'-P-CCNC: 13 U/L (ref 5–45)
BASOPHILS # BLD AUTO: 0.03 THOUSANDS/ΜL (ref 0–0.1)
BASOPHILS NFR BLD AUTO: 1 % (ref 0–1)
BILIRUB SERPL-MCNC: 0.3 MG/DL (ref 0.2–1)
BUN SERPL-MCNC: 12 MG/DL (ref 5–25)
CALCIUM SERPL-MCNC: 9.4 MG/DL (ref 8.3–10.1)
CHLORIDE SERPL-SCNC: 105 MMOL/L (ref 100–108)
CO2 SERPL-SCNC: 31 MMOL/L (ref 21–32)
CREAT SERPL-MCNC: 0.83 MG/DL (ref 0.6–1.3)
EOSINOPHIL # BLD AUTO: 0.18 THOUSAND/ΜL (ref 0–0.61)
EOSINOPHIL NFR BLD AUTO: 3 % (ref 0–6)
ERYTHROCYTE [DISTWIDTH] IN BLOOD BY AUTOMATED COUNT: 14.4 % (ref 11.6–15.1)
GFR SERPL CREATININE-BSD FRML MDRD: 88 ML/MIN/1.73SQ M
GLUCOSE SERPL-MCNC: 78 MG/DL (ref 65–140)
HCT VFR BLD AUTO: 45.3 % (ref 34.8–46.1)
HGB BLD-MCNC: 13.9 G/DL (ref 11.5–15.4)
IMM GRANULOCYTES # BLD AUTO: 0.02 THOUSAND/UL (ref 0–0.2)
IMM GRANULOCYTES NFR BLD AUTO: 0 % (ref 0–2)
LYMPHOCYTES # BLD AUTO: 1.28 THOUSANDS/ΜL (ref 0.6–4.47)
LYMPHOCYTES NFR BLD AUTO: 22 % (ref 14–44)
MCH RBC QN AUTO: 27.3 PG (ref 26.8–34.3)
MCHC RBC AUTO-ENTMCNC: 30.7 G/DL (ref 31.4–37.4)
MCV RBC AUTO: 89 FL (ref 82–98)
MONOCYTES # BLD AUTO: 0.36 THOUSAND/ΜL (ref 0.17–1.22)
MONOCYTES NFR BLD AUTO: 6 % (ref 4–12)
NEUTROPHILS # BLD AUTO: 3.92 THOUSANDS/ΜL (ref 1.85–7.62)
NEUTS SEG NFR BLD AUTO: 68 % (ref 43–75)
NRBC BLD AUTO-RTO: 0 /100 WBCS
PLATELET # BLD AUTO: 310 THOUSANDS/UL (ref 149–390)
PMV BLD AUTO: 10.1 FL (ref 8.9–12.7)
POTASSIUM SERPL-SCNC: 4.4 MMOL/L (ref 3.5–5.3)
PROT SERPL-MCNC: 7.9 G/DL (ref 6.4–8.2)
RBC # BLD AUTO: 5.1 MILLION/UL (ref 3.81–5.12)
SODIUM SERPL-SCNC: 144 MMOL/L (ref 136–145)
WBC # BLD AUTO: 5.79 THOUSAND/UL (ref 4.31–10.16)

## 2020-12-01 PROCEDURE — 85025 COMPLETE CBC W/AUTO DIFF WBC: CPT

## 2020-12-01 PROCEDURE — 86146 BETA-2 GLYCOPROTEIN ANTIBODY: CPT

## 2020-12-01 PROCEDURE — 36415 COLL VENOUS BLD VENIPUNCTURE: CPT

## 2020-12-01 PROCEDURE — 86147 CARDIOLIPIN ANTIBODY EA IG: CPT

## 2020-12-01 PROCEDURE — 80053 COMPREHEN METABOLIC PANEL: CPT

## 2020-12-02 LAB
CARDIOLIPIN IGA SER IA-ACNC: <9 APL U/ML (ref 0–11)
CARDIOLIPIN IGG SER IA-ACNC: <9 GPL U/ML (ref 0–14)
CARDIOLIPIN IGM SER IA-ACNC: 14 MPL U/ML (ref 0–12)

## 2020-12-03 ENCOUNTER — TELEPHONE (OUTPATIENT)
Dept: HEMATOLOGY ONCOLOGY | Facility: CLINIC | Age: 40
End: 2020-12-03

## 2020-12-03 LAB
B2 GLYCOPROT1 IGA SER-ACNC: <9 GPI IGA UNITS (ref 0–25)
B2 GLYCOPROT1 IGG SER-ACNC: <9 GPI IGG UNITS (ref 0–20)
B2 GLYCOPROT1 IGM SER-ACNC: <9 GPI IGM UNITS (ref 0–32)
F2 GENE MUT ANL BLD/T: NORMAL

## 2020-12-06 NOTE — PROGRESS NOTES
PT Discharge    Today's date: 2020  Patient name: Luisa Raphael  : 1980  MRN: [id number]  Referring provider: David Zuluaga PA-C  Dx:   Encounter Diagnosis     ICD-10-CM    1  Posterior tibialis tendon insufficiency  M76 [id number]    Assessment  Assessment details: PT and pt discussed discontinuing physical therapy over messaging system, as pt was scheduled for an MRI and did not see improvements with physical therapy  Subjective and objective information and goals unable to be updated at this time  Pt DC from skilled therapy  Impairments: abnormal gait, abnormal or restricted ROM, activity intolerance, impaired balance, impaired physical strength, lacks appropriate home exercise program and pain with function  Functional limitations: stairs, walking  Symptom irritability: lowUnderstanding of Dx/Px/POC: good   Prognosis: good    Goals  STG: 3 weeks - unable to assess  1  Pt will demonstrate independence with HEP  2  Pt will improve R ankle A/PROM by at least 10%  3  Pt will improve R ankle strength by at least 1/2 grade  4  Pt will be able to perform SLS for at least 5s without use of BUE  LT weeks - unable to assess  1  Pt will improve R ankle to equal that of the L to return to PLOF  2  Pt will improve R ankle strength to at least 4+/5  3  Pt will be able to perform SLS on foam for at least 30s to show improved balance and proprioception  4  Pt will demonstrate WNL joint mobility to return to PLOF  5  Pt will ambulate without pain and without gait abnormalities        Plan  Patient would benefit from: skilled physical therapy  Planned modality interventions: cryotherapy and thermotherapy: hydrocollator packs  Planned therapy interventions: therapeutic exercise, therapeutic activities, stretching, strengthening, patient education, neuromuscular re-education, massage, manual therapy, balance, gait training and home exercise program  Treatment plan discussed with: patient        Subjective Evaluation    History of Present Illness  Mechanism of injury: trauma  Mechanism of injury: Pt had an ankle fracture in  and then this past January she fell and the urgent care said there was not a break  She reports that when she had an x-ray recently, it showed a healing fracture  She continues to go to the gym and do her daily activities  She is not taking pain medications, but she has tried ice, heat, salt baths, essential oils, and compression  Pt reports walking is "okay" while she is doing it, but she is sore afterwards  Recurrent probem    Quality of life: good    Pain  Current pain ratin  At best pain ratin  At worst pain ratin  Quality: sharp, discomfort and dull ache  Aggravating factors: standing, walking, lifting and stair climbing  Progression: improved    Patient Goals  Patient goals for therapy: decreased pain, increased strength, increased motion, independence with ADLs/IADLs and return to sport/leisure activities          Objective     Observations     Additional Observation Details  Standing: significant pes planus/calcaneovalgus on the R foot    Tenderness     Right Ankle/Foot   Tenderness in the medial malleolus  Active Range of Motion   Left Ankle/Foot   Dorsiflexion (ke): 0 degrees   Inversion: 20 degrees   Eversion: 30 degrees     Right Ankle/Foot   Dorsiflexion (ke): -3 degrees   Dorsiflexion (kf): 0 degrees   Inversion: 10 degrees   Eversion: 15 degrees     Passive Range of Motion     Right Ankle/Foot    Inversion: 18 degrees   Eversion: 28 degrees     Joint Play     Right Ankle/Foot  Joints within functional limits are the talocrural joint       Strength/Myotome Testing     Right Ankle/Foot   Dorsiflexion: 4  Inversion: 4-  Eversion: 4-    Swelling     Right Ankle/Foot   Figure 8: 60 5 cm

## 2020-12-07 LAB — F5 GENE MUT ANL BLD/T: NORMAL

## 2020-12-10 ENCOUNTER — OFFICE VISIT (OUTPATIENT)
Dept: HEMATOLOGY ONCOLOGY | Facility: CLINIC | Age: 40
End: 2020-12-10
Payer: COMMERCIAL

## 2020-12-10 VITALS
OXYGEN SATURATION: 98 % | TEMPERATURE: 97.7 F | HEART RATE: 92 BPM | WEIGHT: 293 LBS | BODY MASS INDEX: 45.99 KG/M2 | RESPIRATION RATE: 18 BRPM | SYSTOLIC BLOOD PRESSURE: 112 MMHG | DIASTOLIC BLOOD PRESSURE: 82 MMHG | HEIGHT: 67 IN

## 2020-12-10 DIAGNOSIS — Z79.01 CURRENT USE OF LONG TERM ANTICOAGULATION: Primary | ICD-10-CM

## 2020-12-10 DIAGNOSIS — Z86.718 HISTORY OF DVT (DEEP VEIN THROMBOSIS): ICD-10-CM

## 2020-12-10 PROCEDURE — 1036F TOBACCO NON-USER: CPT | Performed by: INTERNAL MEDICINE

## 2020-12-10 PROCEDURE — 99213 OFFICE O/P EST LOW 20 MIN: CPT | Performed by: INTERNAL MEDICINE

## 2020-12-10 PROCEDURE — 3079F DIAST BP 80-89 MM HG: CPT | Performed by: INTERNAL MEDICINE

## 2020-12-10 PROCEDURE — 3074F SYST BP LT 130 MM HG: CPT | Performed by: INTERNAL MEDICINE

## 2020-12-10 PROCEDURE — 3008F BODY MASS INDEX DOCD: CPT | Performed by: INTERNAL MEDICINE

## 2021-01-20 DIAGNOSIS — I82.4Z1 ACUTE DEEP VEIN THROMBOSIS (DVT) OF DISTAL END OF RIGHT LOWER EXTREMITY (HCC): ICD-10-CM

## 2021-02-09 DIAGNOSIS — I89.0 LYMPHEDEMA OF RIGHT LOWER EXTREMITY: ICD-10-CM

## 2021-02-10 DIAGNOSIS — I89.0 LYMPHEDEMA OF RIGHT LOWER EXTREMITY: ICD-10-CM

## 2021-02-11 RX ORDER — HYDROCHLOROTHIAZIDE 12.5 MG/1
TABLET ORAL
Qty: 30 TABLET | Refills: 5 | Status: SHIPPED | OUTPATIENT
Start: 2021-02-11 | End: 2021-07-27

## 2021-02-11 RX ORDER — HYDROCHLOROTHIAZIDE 12.5 MG/1
TABLET ORAL
Qty: 30 TABLET | Refills: 0 | Status: SHIPPED | OUTPATIENT
Start: 2021-02-11 | End: 2021-09-04 | Stop reason: SDUPTHER

## 2021-04-01 ENCOUNTER — OFFICE VISIT (OUTPATIENT)
Dept: FAMILY MEDICINE CLINIC | Facility: CLINIC | Age: 41
End: 2021-04-01
Payer: COMMERCIAL

## 2021-04-01 VITALS
TEMPERATURE: 97.4 F | OXYGEN SATURATION: 95 % | DIASTOLIC BLOOD PRESSURE: 78 MMHG | HEIGHT: 67 IN | HEART RATE: 102 BPM | BODY MASS INDEX: 45.99 KG/M2 | WEIGHT: 293 LBS | SYSTOLIC BLOOD PRESSURE: 102 MMHG

## 2021-04-01 DIAGNOSIS — Z12.31 ENCOUNTER FOR SCREENING MAMMOGRAM FOR MALIGNANT NEOPLASM OF BREAST: ICD-10-CM

## 2021-04-01 DIAGNOSIS — R63.5 WEIGHT GAIN: Primary | ICD-10-CM

## 2021-04-01 PROCEDURE — 1036F TOBACCO NON-USER: CPT | Performed by: NURSE PRACTITIONER

## 2021-04-01 PROCEDURE — 99213 OFFICE O/P EST LOW 20 MIN: CPT | Performed by: NURSE PRACTITIONER

## 2021-04-01 RX ORDER — PHENTERMINE HYDROCHLORIDE 15 MG/1
15 CAPSULE ORAL EVERY MORNING
Qty: 30 CAPSULE | Refills: 0 | Status: SHIPPED | OUTPATIENT
Start: 2021-04-01 | End: 2021-04-30

## 2021-04-01 NOTE — PATIENT INSTRUCTIONS
Take phentermine  Daily   Maintain 1500 calorie diet keep food diary continue with exercise    Obesity   AMBULATORY CARE:   Obesity  is when your body mass index (BMI) is greater than 30  Your healthcare provider will use your height and weight to measure your BMI  The risks of obesity include  many health problems, such as injuries or physical disability  You may need tests to check for the following:  · Diabetes    · High blood pressure or high cholesterol    · Heart disease    · Gallbladder or liver disease    · Cancer of the colon, breast, prostate, liver, or kidney    · Sleep apnea    · Arthritis or gout    Seek care immediately if:   · You have a severe headache, confusion, or difficulty speaking  · You have weakness on one side of your body  · You have chest pain, sweating, or shortness of breath  Contact your healthcare provider if:   · You have symptoms of gallbladder or liver disease, such as pain in your upper abdomen  · You have knee or hip pain and discomfort while walking  · You have symptoms of diabetes, such as intense hunger and thirst, and frequent urination  · You have symptoms of sleep apnea, such as snoring or daytime sleepiness  · You have questions or concerns about your condition or care  Treatment for obesity  focuses on helping you lose weight to improve your health  Even a small decrease in BMI can reduce the risk for many health problems  Your healthcare provider will help you set a weight-loss goal   · Lifestyle changes  are the first step in treating obesity  These include making healthy food choices and getting regular physical activity  Your healthcare provider may suggest a weight-loss program that involves coaching, education, and therapy  · Medicine  may help you lose weight when it is used with a healthy diet and physical activity  · Surgery  can help you lose weight if you are very obese and have other health problems   There are several types of weight-loss surgery  Ask your healthcare provider for more information  Be successful losing weight:   · Set small, realistic goals  An example of a small goal is to walk for 20 minutes 5 days a week  Anther goal is to lose 5% of your body weight  · Tell friends, family members, and coworkers about your goals  and ask for their support  Ask a friend to lose weight with you, or join a weight-loss support group  · Identify foods or triggers that may cause you to overeat , and find ways to avoid them  Remove tempting high-calorie foods from your home and workplace  Place a bowl of fresh fruit on your kitchen counter  If stress causes you to eat, then find other ways to cope with stress  · Keep a diary to track what you eat and drink  Also write down how many minutes of physical activity you do each day  Weigh yourself once a week and record it in your diary  Eating changes: You will need to eat 500 to 1,000 fewer calories each day than you currently eat to lose 1 to 2 pounds a week  The following changes will help you cut calories:  · Eat smaller portions  Use small plates, no larger than 9 inches in diameter  Fill your plate half full of fruits and vegetables  Measure your food using measuring cups until you know what a serving size looks like  · Eat 3 meals and 1 or 2 snacks each day  Plan your meals in advance  Terry[place] and eat at home most of the time  Eat slowly  Do not skip meals  Skipping meals can lead to overeating later in the day  This can make it harder for you to lose weight  Talk with a dietitian to help you make a meal plan and schedule that is right for you  · Eat fruits and vegetables at every meal   They are low in calories and high in fiber, which makes you feel full  Do not add butter, margarine, or cream sauce to vegetables  Use herbs to season steamed vegetables  · Eat less fat and fewer fried foods  Eat more baked or grilled chicken and fish   These protein sources are lower in calories and fat than red meat  Limit fast food  Dress your salads with olive oil and vinegar instead of bottled dressing  · Limit the amount of sugar you eat  Do not drink sugary beverages  Limit alcohol  Activity changes:  Physical activity is good for your body in many ways  It helps you burn calories and build strong muscles  It decreases stress and depression, and improves your mood  It can also help you sleep better  Talk to your healthcare provider before you begin an exercise program   · Exercise for at least 30 minutes 5 days a week  Start slowly  Set aside time each day for physical activity that you enjoy and that is convenient for you  It is best to do both weight training and an activity that increases your heart rate, such as walking, bicycling, or swimming  · Find ways to be more active  Do yard work and housecleaning  Walk up the stairs instead of using elevators  Spend your leisure time going to events that require walking, such as outdoor festivals or fairs  This extra physical activity can help you lose weight and keep it off  Follow up with your healthcare provider as directed: You may need to meet with a dietitian  Write down your questions so you remember to ask them during your visits  © Copyright [address] [place] [address] Drive Information is for End User's use only and may not be sold, redistributed or otherwise used for commercial purposes  All illustrations and images included in CareNotes® are the copyrighted property of [employer]  or [employer]  The above information is an  only  It is not intended as medical advice for individual conditions or treatments  Talk to your doctor, nurse or pharmacist before following any medical regimen to see if it is safe and effective for you      Weight Management   AMBULATORY CARE:   Why it is important to manage your weight:  Being overweight increases your risk of health conditions such as heart disease, high blood pressure, type 2 diabetes, and certain types of cancer  It can also increase your risk for osteoarthritis, sleep apnea, and other respiratory problems  Aim for a slow, steady weight loss  Even a small amount of weight loss can lower your risk of health problems  How to lose weight safely:  A safe and healthy way to lose weight is to eat fewer calories and get regular exercise  · You can lose up about 1 pound a week by decreasing the number of calories you eat by 500 calories each day  You can decrease calories by eating smaller portion sizes or by cutting out high-calorie foods  Read labels to find out how many calories are in the foods you eat  · You can also burn calories with exercise such as walking, swimming, or biking  You will be more likely to keep weight off if you make these changes part of your lifestyle  Exercise at least 30 minutes per day on most days of the week  You can also fit in more physical activity by taking the stairs instead of the elevator or parking farther away from stores  Ask your healthcare provider about the best exercise plan for you  Healthy meal plan for weight management:  A healthy meal plan includes a variety of foods, contains fewer calories, and helps you stay healthy  A healthy meal plan includes the following:     · Eat whole-grain foods more often  A healthy meal plan should contain fiber  Fiber is the part of grains, fruits, and vegetables that is not broken down by your body  Whole-grain foods are healthy and provide extra fiber in your diet  Some examples of whole-grain foods are whole-wheat breads and pastas, oatmeal, brown rice, and bulgur  · Eat a variety of vegetables every day  Include dark, leafy greens such as spinach, kale, nayana greens, and mustard greens  Eat yellow and orange vegetables such as carrots, sweet potatoes, and winter squash  · Eat a variety of fruits every day    Choose fresh or canned fruit (canned in its own juice or light syrup) instead of juice  Fruit juice has very little or no fiber  · Eat low-fat dairy foods  Drink fat-free (skim) milk or 1% milk  Eat fat-free yogurt and low-fat cottage cheese  Try low-fat cheeses such as mozzarella and other reduced-fat cheeses  · Choose meat and other protein foods that are low in fat  Choose beans or other legumes such as split peas or lentils  Choose fish, skinless poultry (chicken or turkey), or lean cuts of red meat (beef or pork)  Before you cook meat or poultry, cut off any visible fat  · Use less fat and oil  Try baking foods instead of frying them  Add less fat, such as margarine, sour cream, regular salad dressing and mayonnaise to foods  Eat fewer high-fat foods  Some examples of high-fat foods include french fries, doughnuts, ice cream, and cakes  · Eat fewer sweets  Limit foods and drinks that are high in sugar  This includes candy, cookies, regular soda, and sweetened drinks  Ways to decrease calories:   · Eat smaller portions  ? Use a small plate with smaller servings  ? Do not eat second helpings  ? When you eat at a restaurant, ask for a box and place half of your meal in the box before you eat  ? Share an entrée with someone else  · Replace high-calorie snacks with healthy, low-calorie snacks  ? Choose fresh fruit, vegetables, fat-free rice cakes, or air-popped popcorn instead of potato chips, nuts, or chocolate  ? Choose water or calorie-free drinks instead of soda or sweetened drinks  · Do not shop for groceries when you are hungry  You may be more likely to make unhealthy food choices  Take a grocery list of healthy foods and shop after you have eaten  · Eat regular meals  Do not skip meals  Skipping meals can lead to overeating later in the day  This can make it harder for you to lose weight   Eat a healthy snack in place of a meal if you do not have time to eat a regular meal  Talk with a dietitian to help you create a meal plan and schedule that is right for you  Other things to consider as you try to lose weight:   · Be aware of situations that may give you the urge to overeat, such as eating while watching television  Find ways to avoid these situations  For example, read a book, go for a walk, or do crafts  · Meet with a weight loss support group or friends who are also trying to lose weight  This may help you stay motivated to continue working on your weight loss goals  © Copyright [address] Information is for End User's use only and may not be sold, redistributed or otherwise used for commercial purposes  All illustrations and images included in CareNotes® are the copyrighted property of [employer]  or [facility] Samuel Parker   The above information is an  only  It is not intended as medical advice for individual conditions or treatments  Talk to your doctor, nurse or pharmacist before following any medical regimen to see if it is safe and effective for you  Low Fat Diet   AMBULATORY CARE:   A low-fat diet  is an eating plan that is low in total fat, unhealthy fat, and cholesterol  You may need to follow a low-fat diet if you have trouble digesting or absorbing fat  You may also need to follow this diet if you have high cholesterol  You can also lower your cholesterol by increasing the amount of fiber in your diet  Soluble fiber is a type of fiber that helps to decrease cholesterol levels  Different types of fat in food:   · Limit unhealthy fats  A diet that is high in cholesterol, saturated fat, and trans fat may cause unhealthy cholesterol levels  Unhealthy cholesterol levels increase your risk of heart disease  ? Cholesterol:  Limit intake of cholesterol to less than 200 mg per day  Cholesterol is found in meat, eggs, and dairy  ? Saturated fat:  Limit saturated fat to less than 7% of your total daily calories   Ask your dietitian how many calories you need each day  Saturated fat is found in butter, cheese, ice cream, whole milk, and palm oil  Saturated fat is also found in meat, such as beef, pork, chicken skin, and processed meats  Processed meats include sausage, hot dogs, and bologna  ? Trans fat:  Avoid trans fat as much as possible  Trans fat is used in fried and baked foods  Foods that say trans fat free on the label may still have up to 0 5 grams of trans fat per serving  · Include healthy fats  Replace foods that are high in saturated and trans fat with foods high in healthy fats  This may help to decrease high cholesterol levels  ? Monounsaturated fats: These are found in avocados, nuts, and vegetable oils, such as olive, canola, and sunflower oil  ? Polyunsaturated fats: These can be found in vegetable oils, such as soybean or corn oil  Omega-3 fats can help to decrease the risk of heart disease  Omega-3 fats are found in fish, such as salmon, herring, trout, and tuna  Omega-3 fats can also be found in plant foods, such as walnuts, flaxseed, soybeans, and canola oil  Foods to limit or avoid:   · Grains:      ? Snacks that are made with partially hydrogenated oils, such as chips, regular crackers, and butter-flavored popcorn    ? High-fat baked goods, such as biscuits, croissants, doughnuts, pies, cookies, and pastries    · Dairy:      ? Whole milk, 2% milk, and yogurt and ice cream made with whole milk    ? Half and half creamer, heavy cream, and whipping cream    ? Cheese, cream cheese, and sour cream    · Meats and proteins:      ? High-fat cuts of meat (T-bone steak, regular hamburger, and ribs)    ? Fried meat, poultry (turkey and chicken), and fish    ? Poultry (chicken and turkey) with skin    ? Cold cuts (salami or bologna), hot dogs, hallman, and sausage    ? Whole eggs and egg yolks    · Vegetables and fruits with added fat:      ? Fried vegetables or vegetables in butter or high-fat sauces, such as cream or cheese sauces    ?  [place] fruit or fruit served with butter or cream    · Fats:      ? Butter, stick margarine, and shortening    ? Coconut, palm oil, and palm kernel oil    Foods to include:   · Grains:      ? Whole-grain breads, cereals, pasta, and brown rice    ? Low-fat crackers and pretzels    · Vegetables and fruits:      ? Fresh, frozen, or canned vegetables (no salt or low-sodium)    ? Fresh, frozen, dried, or canned fruit (canned in light syrup or fruit juice)    ? Avocado    · Low-fat dairy products:      ? Nonfat (skim) or 1% milk    ? Nonfat or low-fat cheese, yogurt, and cottage cheese    · Meats and proteins:      ? Chicken or turkey with no skin    ? Baked or broiled fish    ? Lean beef and pork (loin, round, extra lean hamburger)    ? Beans and peas, unsalted nuts, soy products    ? Egg whites and substitutes    ? Seeds and nuts    · Fats:      ? Unsaturated oil, such as canola, olive, peanut, soybean, or sunflower oil    ? Soft or liquid margarine and vegetable oil spread    ? Low-fat salad dressing    Other ways to decrease fat:   · Read food labels before you buy foods  Choose foods that have less than 30% of calories from fat  Choose low-fat or fat-free dairy products  Remember that fat free does not mean calorie free  These foods still contain calories, and too many calories can lead to weight gain  · Trim fat from meat and avoid fried food  Trim all visible fat from meat before you cook it  Remove the skin from poultry  Do not guidry meat, fish, or poultry  Bake, roast, boil, or broil these foods instead  Avoid fried foods  Eat a baked potato instead of [place] fries  Steam vegetables instead of sautéing them in butter  · Add less fat to foods  Use imitation hallman bits on salads and baked potatoes instead of regular hallman bits  Use fat-free or low-fat salad dressings instead of regular dressings   Use low-fat or nonfat butter-flavored topping instead of regular butter or margarine on popcorn and other foods  Ways to decrease fat in recipes:  Replace high-fat ingredients with low-fat or nonfat ones  This may cause baked goods to be drier than usual  You may need to use nonfat cooking spray on pans to prevent food from sticking  You also may need to change the amount of other ingredients, such as water, in the recipe  Try the following:  · Use low-fat or light margarine instead of regular margarine or shortening  · Use lean ground turkey breast or chicken, or lean ground beef (less than 5% fat) instead of hamburger  · Add 1 teaspoon of canola oil to 8 ounces of skim milk instead of using cream or half and half  · Use grated zucchini, carrots, or apples in breads instead of coconut  · Use blenderized, low-fat cottage cheese, plain tofu, or low-fat ricotta cheese instead of cream cheese  · Use 1 egg white and 1 teaspoon of canola oil, or use ¼ cup (2 ounces) of fat-free egg substitute instead of a whole egg  · Replace half of the oil that is called for in a recipe with applesauce when you bake  Use 3 tablespoons of cocoa powder and 1 tablespoon of canola oil instead of a square of baking chocolate  How to increase fiber:  Eat enough high-fiber foods to get 20 to 30 grams of fiber every day  Slowly increase your fiber intake to avoid stomach cramps, gas, and other problems  · Eat 3 ounces of whole-grain foods each day  An ounce is about 1 slice of bread  Eat whole-grain breads, such as whole-wheat bread  Whole wheat, whole-wheat flour, or other whole grains should be listed as the first ingredient on the food label  Replace white flour with whole-grain flour or use half of each in recipes  Whole-grain flour is heavier than white flour, so you may have to add more yeast or baking powder  · Eat a high-fiber cereal for breakfast   Oatmeal is a good source of soluble fiber  Look for cereals that have bran or fiber in the name   Choose whole-grain products, such as brown rice, barley, and whole-wheat pasta  · Eat more beans, peas, and lentils  For example, add beans to soups or salads  Eat at least 5 cups of fruits and vegetables each day  Eat fruits and vegetables with the peel because the peel is high in fiber  © Copyright [address] Information is for End User's use only and may not be sold, redistributed or otherwise used for commercial purposes  All illustrations and images included in CareNotes® are the copyrighted property of [employer] Inc  or [facility] Samuel Parker   The above information is an  only  It is not intended as medical advice for individual conditions or treatments  Talk to your doctor, nurse or pharmacist before following any medical regimen to see if it is safe and effective for you  Heart Healthy Diet   AMBULATORY CARE:   A heart healthy diet  is an eating plan low in unhealthy fats and sodium (salt)  The plan is high in healthy fats and fiber  A heart healthy diet helps improve your cholesterol levels and lowers your risk for heart disease and stroke  A dietitian will teach you how to read and understand food labels  Heart healthy diet guidelines to follow:   · Choose foods that contain healthy fats  ? Unsaturated fats  include monounsaturated and polyunsaturated fats  Unsaturated fat is found in foods such as soybean, canola, olive, corn, and safflower oils  It is also found in soft tub margarine that is made with liquid vegetable oil  ? Omega-3 fat  is found in certain fish, such as salmon, tuna, and trout, and in walnuts and flaxseed  Eat fish high in omega-3 fats at least 2 times a week  · Get 20 to 30 grams of fiber each day  Fruits, vegetables, whole-grain foods, and legumes (cooked beans) are good sources of fiber  · Limit or do not have unhealthy fats  ? Cholesterol  is found in animal foods, such as eggs and lobster, and in dairy products made from whole milk   Limit cholesterol to less than 200 mg each day     ? Saturated fat  is found in meats, such as hallman and hamburger  It is also found in chicken or turkey skin, whole milk, and butter  Limit saturated fat to less than 7% of your total daily calories  ? Trans fat  is found in packaged foods, such as potato chips and cookies  It is also in hard margarine, some fried foods, and shortening  Do not eat foods that contain trans fats  · Limit sodium as directed  You may be told to limit sodium to 2,000 to 2,300 mg each day  Choose low-sodium or no-salt-added foods  Add little or no salt to food you prepare  Use herbs and spices in place of salt  Include the following in your heart healthy plan:  Ask your dietitian or healthcare provider how many servings to have from each of the following food groups:  · Grains:      ? Whole-wheat breads, cereals, and pastas, and brown rice    ? Low-fat, low-sodium crackers and chips    · Vegetables:      ? Broccoli, green beans, green peas, and spinach    ? Collards, kale, and lima beans    ? Carrots, sweet potatoes, tomatoes, and peppers    ? Canned vegetables with no salt added    · Fruits:      ? Bananas, peaches, pears, and pineapple    ? Grapes, raisins, and dates    ? Oranges, tangerines, grapefruit, orange juice, and grapefruit juice    ? Apricots, mangoes, melons, and papaya    ? Raspberries and strawberries    ? Canned fruit with no added sugar    · Low-fat dairy:      ? Nonfat (skim) milk, 1% milk, and low-fat almond, cashew, or soy milks fortified with calcium    ? Low-fat cheese, regular or frozen yogurt, and cottage cheese    · Meats and proteins:      ? Lean cuts of beef and pork (loin, leg, round), skinless chicken and turkey    ? Legumes, soy products, egg whites, or nuts    Limit or do not include the following in your heart healthy plan:   · Unhealthy fats and oils:      ? Whole or 2% milk, cream cheese, sour cream, or cheese    ?  High-fat cuts of beef (T-bone steaks, ribs), chicken or turkey with skin, and organ meats such as liver    ? Butter, stick margarine, shortening, and cooking oils such as coconut or palm oil    · Foods and liquids high in sodium:      ? Packaged foods, such as frozen dinners, cookies, macaroni and cheese, and cereals with more than 300 mg of sodium per serving    ? Vegetables with added sodium, such as instant potatoes, vegetables with added sauces, or regular canned vegetables    ? Cured or smoked meats, such as hot dogs, hallman, and sausage    ? High-sodium ketchup, barbecue sauce, salad dressing, pickles, olives, soy sauce, or miso    · Foods and liquids high in sugar:      ? Candy, cake, cookies, pies, or doughnuts    ? Soft drinks (soda), sports drinks, or sweetened tea    ? Canned or dry mixes for cakes, soups, sauces, or gravies    Other healthy heart guidelines:   · Do not smoke  Nicotine and other chemicals in cigarettes and cigars can cause lung and heart damage  Ask your healthcare provider for information if you currently smoke and need help to quit  E-cigarettes or smokeless tobacco still contain nicotine  Talk to your healthcare provider before you use these products  · Limit or do not drink alcohol as directed  Alcohol can damage your heart and raise your blood pressure  Your healthcare provider may give you specific daily and weekly limits  The general recommended limit is 1 drink a day for women 21 or older and for men 72 or older  Do not have more than 3 drinks in a day or 7 in a week  The recommended limit is 2 drinks a day for men 24to 59years of age  Do not have more than 4 drinks in a day or 14 in a week  A drink of alcohol is 12 ounces of beer, 5 ounces of wine, or 1½ ounces of liquor  · Exercise regularly  Exercise can help you maintain a healthy weight and improve your blood pressure and cholesterol levels  Regular exercise can also decrease your risk for heart problems  Ask your healthcare provider about the best exercise plan for you   Do not start an exercise program without asking your healthcare provider  Follow up with your doctor or cardiologist as directed:  Write down your questions so you remember to ask them during your visits  © Copyright [address] Information is for End User's use only and may not be sold, redistributed or otherwise used for commercial purposes  All illustrations and images included in CareNotes® are the copyrighted property of [employer]  or Former[facility] Samuel Parker   The above information is an  only  It is not intended as medical advice for individual conditions or treatments  Talk to your doctor, nurse or pharmacist before following any medical regimen to see if it is safe and effective for you  Calorie Counting Diet   WHAT YOU NEED TO KNOW:   What is a calorie counting diet? It is a meal plan based on counting calories each day to reach a healthy body weight  You will need to eat fewer calories if you are trying to lose weight  Weight loss may decrease your risk for certain health problems or improve your health if you have health problems  Some of these health problems include heart disease, high blood pressure, and diabetes  What foods should I avoid? Your dietitian will tell you if you need to avoid certain foods based on your body weight and health condition  You may need to avoid high-fat foods if you are at risk for or have heart disease  You may need to eat fewer foods from the breads and starches food group if you have diabetes  How many calories are in foods? The following is a list of foods and drinks with the approximate number of calories in each  Check the food label to find the exact number of calories  A dietitian can tell you how many calories you should have from each food group each day    · Carbohydrate:      ? ½ of a 3-inch bagel, 1 slice of bread, or ½ of a hamburger bun or hot dog bun (80)    ? 1 (8-inch) flour tortilla or ½ cup of cooked rice (100)    ? 1 (6-inch) corn tortilla (80)    ? 1 (6-inch) pancake or 1 cup of bran flakes cereal (110)    ? ½ cup of cooked cereal (80)    ? ½ cup of cooked pasta (85)    ? 1 ounce of pretzels (100)    ? 3 cups of air-popped popcorn without butter or oil (80)    · Dairy:      ? 1 cup of skim or 1% milk (90)    ? 1 cup of 2% milk (120)    ? 1 cup of whole milk (160)    ? 1 cup of 2% chocolate milk (220)    ? 1 ounce of low-fat cheese with 3 grams of fat per ounce (70)    ? 1 ounce of cheddar cheese (114)    ? ½ cup of 1% fat cottage cheese (80)    ? 1 cup of plain or sugar-free, fat-free yogurt (90)    · Protein foods:      ? 3 ounces of fish (not breaded or fried) (95)    ? 3 ounces of breaded, fried fish (195)    ? ¾ cup of tuna canned in water (105)    ? 3 ounces of chicken breast without skin (105)    ? 1 fried chicken breast with skin (350)    ? ¼ cup of fat free egg substitute (40)    ? 1 large egg (75)    ? 3 ounces of lean beef or pork (165)    ? 3 ounces of fried pork chop or ham (185)    ? ½ cup of cooked dried beans, such as kidney, wood, lentils, or navy (115)    ? 3 ounces of bologna or lunch meat (225)    ? 2 links of breakfast sausage (140)    · Vegetables:      ? ½ cup of sliced mushrooms (10)    ? 1 cup of salad greens, such as lettuce, spinach, or michelle (15)    ? ½ cup of steamed asparagus (20)    ? ½ cup of cooked summer squash, zucchini squash, or green or wax beans (25)    ? 1 cup of broccoli or cauliflower florets, or 1 medium tomato (25)    ? 1 large raw carrot or ½ cup of cooked carrots (40)    ? ? of a medium cucumber or 1 stalk of celery (5)    ? 1 small baked potato (160)    ? 1 cup of breaded, fried vegetables (230)    · Fruit:      ? 1 (6-inch) banana (55)     ? ½ of a 4-inch grapefruit (55)    ? 15 grapes (60)    ? 1 medium orange or apple (70)    ? 1 large peach (65)    ? 1 cup of fresh pineapple chunks (75)    ? 1 cup of melon cubes (50)    ? 1¼ cups of whole strawberries (45)    ?  ½ cup of fruit canned in juice (55)    ? ½ cup of fruit canned in heavy syrup (110)    ? ? cup of raisins (130)    ? ½ cup of unsweetened fruit juice (60)    ? ½ cup of grape, cranberry, or prune juice (90)    · Fat:      ? 10 peanuts or 2 teaspoons of peanut butter (55)    ? 2 tablespoons of avocado or 1 tablespoon of regular salad dressing (45)    ? 2 slices of hallman (90)    ? 1 teaspoon of oil, such as safflower, canola, corn, or olive oil (45)    ? 2 teaspoons of low-fat margarine, or 1 tablespoon of low-fat mayonnaise (50)    ? 1 teaspoon of regular margarine (40)    ? 1 tablespoon of regular mayonnaise (135)    ? 1 tablespoon of cream cheese or 2 tablespoons of low-fat cream cheese (45)    ? 2 tablespoons of vegetable shortening (215)    · Dessert and sweets:      ? 8 animal crackers or 5 vanilla wafers (80)    ? 1 frozen fruit juice bar (80)    ? ½ cup of ice milk or low-fat frozen yogurt (90)    ? ½ cup of sherbet or sorbet (125)    ? ½ cup of sugar-free pudding or custard (60)    ? ½ cup of ice cream (140)    ? ½ cup of pudding or custard (175)    ? 1 (2-inch) square chocolate brownie (185)    · Combination foods:      ? Bean burrito made with an 8-inch tortilla, without cheese (275)    ? Chicken breast sandwich with lettuce and tomato (325)    ? 1 cup of chicken noodle soup (60)    ? 1 beef taco (175)    ? Regular hamburger with lettuce and tomato (310)    ? Regular cheeseburger with lettuce and tomato (410)     ? ¼ of a 12-inch cheese pizza (280)    ? Fried fish sandwich with lettuce and tomato (425)    ? Hot dog and bun (275)    ? 1½ cups of macaroni and cheese (310)    ? Taco salad with a fried tortilla shell (870)    · Low-calorie foods:      ? 1 tablespoon of ketchup or 1 tablespoon of fat free sour cream (15)    ? 1 teaspoon of mustard (5)    ? ¼ cup of salsa (20)    ? 1 large dill pickle (15)    ? 1 tablespoon of fat free salad dressing (10)    ?  2 teaspoons of low-sugar, light jam or jelly, or 1 tablespoon of sugar-free syrup (15)    ? 1 sugar-free popsicle (15)    ? 1 cup of club soda, seltzer water, or diet soda (0)    CARE AGREEMENT:   You have the right to help plan your care  Discuss treatment options with your healthcare provider to decide what care you want to receive  You always have the right to refuse treatment  The above information is an  only  It is not intended as medical advice for individual conditions or treatments  Talk to your doctor, nurse or pharmacist before following any medical regimen to see if it is safe and effective for you  © Copyright [address] [place] [address] Information is for End User's use only and may not be sold, redistributed or otherwise used for commercial purposes   All illustrations and images included in CareNotes® are the copyrighted property of A D [employer] M [employer]  or [address] [place] [address] [facility]

## 2021-04-01 NOTE — PROGRESS NOTES
Assessment/Plan:  BMI Counseling: Body mass index is 54 82 kg/m²  The BMI is above normal  Nutrition recommendations include decreasing portion sizes, encouraging healthy choices of fruits and vegetables, decreasing fast food intake, consuming healthier snacks, limiting drinks that contain sugar, moderation in carbohydrate intake, increasing intake of lean protein, reducing intake of saturated and trans fat and reducing intake of cholesterol  Exercise recommendations include exercising 3-5 times per week and strength training exercises  Pharmacotherapy was ordered to help aid in weight loss  Weight gain    Phentermine ordered  Education regarding the medication provided  Keep a food diary  Maintain 1500 calorie diet  Increase exercise activity  Monitor blood pressure  Discussed that needs with monthly weight check  Problem List Items Addressed This Visit        Other    Weight gain - Primary       Phentermine ordered  Education regarding the medication provided  Keep a food diary  Maintain 1500 calorie diet  Increase exercise activity  Monitor blood pressure  Discussed that needs with monthly weight check  Relevant Medications    phentermine 15 MG capsule      Other Visit Diagnoses     Encounter for screening mammogram for malignant neoplasm of breast        Relevant Orders    Mammo screening bilateral w cad            Subjective:      Patient ID: Shemar Villeda is a 36 y o  female  Patient is being seen with complaints of weight gain  Has been doing weight watchers, has been exercising in the gym  States is very difficult for her to lose weight  Does have lymphedema and sometimes has increased water weight  Feels like to weight loss will help with pain in her knees hips help with mobility  Reports that she just had her 1st grandbaby, grandson and would like to be able to be more active with him        The following portions of the patient's history were reviewed and updated as appropriate: allergies, current medications, past family history, past medical history, past social history, past surgical history and problem list     Review of Systems   Constitutional: Negative  HENT: Negative  Eyes: Negative  Respiratory: Negative  Cardiovascular: Negative  Gastrointestinal: Negative  Endocrine: Negative  Genitourinary: Negative  Musculoskeletal: Positive for arthralgias  Skin: Negative  Allergic/Immunologic: Negative  Neurological: Negative  Psychiatric/Behavioral: Negative  Objective:      /78 (BP Location: Left arm, Patient Position: Sitting)   Pulse 102   Temp (!) 97 4 °F (36 3 °C) (Tympanic)   Ht 5' 7" (1 702 m)   Wt (!) 159 kg (350 lb)   SpO2 95%   BMI 54 82 kg/m²          Physical Exam  Vitals signs and nursing note reviewed  Constitutional:       Appearance: She is well-developed  HENT:      Head: Normocephalic and atraumatic  Neck:      Musculoskeletal: Normal range of motion  Pulmonary:      Effort: Pulmonary effort is normal       Breath sounds: Normal breath sounds  Musculoskeletal: Normal range of motion  Skin:     General: Skin is warm and dry  Neurological:      General: No focal deficit present  Mental Status: She is alert and oriented to person, place, and time  Psychiatric:         Mood and Affect: Mood normal          Behavior: Behavior normal          Thought Content:  Thought content normal          Judgment: Judgment normal            Labs:    Lab Results   Component Value Date    WBC 5 79 12/01/2020    HGB 13 9 12/01/2020    HCT 45 3 12/01/2020    MCV 89 12/01/2020     12/01/2020     Lab Results   Component Value Date    K 4 4 12/01/2020     12/01/2020    CO2 31 12/01/2020    BUN 12 12/01/2020    CREATININE 0 83 12/01/2020    GLUF 86 12/07/2018    CALCIUM 9 4 12/01/2020    AST 13 12/01/2020    ALT 22 12/01/2020    ALKPHOS 119 (H) 12/01/2020    EGFR 88 12/01/2020     Lab Results Component Value Date    CALCIUM 9 4 12/01/2020    K 4 4 12/01/2020    CO2 31 12/01/2020     12/01/2020    BUN 12 12/01/2020    CREATININE 0 83 12/01/2020       BMI Counseling: Body mass index is 54 82 kg/m²  The BMI is above normal  Nutrition recommendations include reducing portion sizes, decreasing overall calorie intake, 3-5 servings of fruits/vegetables daily, reducing fast food intake, consuming healthier snacks, decreasing soda and/or juice intake, moderation in carbohydrate intake, increasing intake of lean protein, reducing intake of saturated fat and trans fat and reducing intake of cholesterol  Exercise recommendations include joining a gym and strength training exercises

## 2021-04-01 NOTE — ASSESSMENT & PLAN NOTE
Phentermine ordered  Education regarding the medication provided  Keep a food diary  Maintain 1500 calorie diet  Increase exercise activity  Monitor blood pressure  Discussed that needs with monthly weight check

## 2021-04-13 DIAGNOSIS — Z23 ENCOUNTER FOR IMMUNIZATION: ICD-10-CM

## 2021-04-30 ENCOUNTER — OFFICE VISIT (OUTPATIENT)
Dept: FAMILY MEDICINE CLINIC | Facility: CLINIC | Age: 41
End: 2021-04-30
Payer: COMMERCIAL

## 2021-04-30 VITALS
HEART RATE: 88 BPM | WEIGHT: 293 LBS | BODY MASS INDEX: 45.99 KG/M2 | OXYGEN SATURATION: 95 % | HEIGHT: 67 IN | TEMPERATURE: 97.9 F | RESPIRATION RATE: 19 BRPM | SYSTOLIC BLOOD PRESSURE: 128 MMHG | DIASTOLIC BLOOD PRESSURE: 84 MMHG

## 2021-04-30 DIAGNOSIS — B49 FUNGAL INFECTION: ICD-10-CM

## 2021-04-30 DIAGNOSIS — R63.5 WEIGHT GAIN: Primary | ICD-10-CM

## 2021-04-30 PROCEDURE — 99213 OFFICE O/P EST LOW 20 MIN: CPT | Performed by: NURSE PRACTITIONER

## 2021-04-30 PROCEDURE — 3008F BODY MASS INDEX DOCD: CPT | Performed by: NURSE PRACTITIONER

## 2021-04-30 RX ORDER — CLOTRIMAZOLE AND BETAMETHASONE DIPROPIONATE 10; .64 MG/G; MG/G
CREAM TOPICAL 2 TIMES DAILY
Qty: 30 G | Refills: 0 | Status: SHIPPED | OUTPATIENT
Start: 2021-04-30 | End: 2021-07-27

## 2021-04-30 RX ORDER — PHENTERMINE HYDROCHLORIDE 30 MG/1
30 CAPSULE ORAL EVERY MORNING
Qty: 30 CAPSULE | Refills: 0 | Status: SHIPPED | OUTPATIENT
Start: 2021-04-30 | End: 2021-05-28 | Stop reason: DRUGHIGH

## 2021-04-30 RX ORDER — FLUCONAZOLE 150 MG/1
150 TABLET ORAL ONCE
Qty: 2 TABLET | Refills: 0 | Status: SHIPPED | OUTPATIENT
Start: 2021-04-30 | End: 2021-04-30

## 2021-04-30 NOTE — PROGRESS NOTES
Assessment/Plan:     Weight gain  Crease phentermine to 30 mg  Discussed importance of making good food choices  Good nutrition, decrease calorie intake  Increase exercise activity  Discussed in order for phentermine to work the other components also has to be present  Education and encouragement provided  Fungal infection    Patient has been outside gardening  Looks like ringworm  Diflucan ordered  Topical cream ordered  Call if no improvement         Problem List Items Addressed This Visit        Other    Weight gain - Primary     Crease phentermine to 30 mg  Discussed importance of making good food choices  Good nutrition, decrease calorie intake  Increase exercise activity  Discussed in order for phentermine to work the other components also has to be present  Education and encouragement provided  Relevant Medications    phentermine 30 MG capsule    Fungal infection       Patient has been outside gardening  Looks like ringworm  Diflucan ordered  Topical cream ordered  Call if no improvement         Relevant Medications    fluconazole (DIFLUCAN) 150 mg tablet    clotrimazole-betamethasone (LOTRISONE) 1-0 05 % cream            Subjective:      Patient ID: Ashutosh Dorado is a 36 y o  female  Patient is here for evaluation of weight loss efforts  Has been taking the phentermine 15 mg  Has lost 1 lb  States the scale at home was better  Does not feel like  15 mg is helping appetite  Has not been able to walk  Patient also has a rash on her left hand  Reports that she has been gardening and since then  itis very itchy  The following portions of the patient's history were reviewed and updated as appropriate: allergies, current medications, past family history, past medical history, past social history, past surgical history and problem list     Review of Systems   Constitutional: Negative  HENT: Negative  Eyes: Negative  Respiratory: Negative      Cardiovascular: Negative  Gastrointestinal: Negative  Endocrine: Negative  Genitourinary: Negative  Musculoskeletal: Negative  Skin: Positive for rash (  Left hand)  Allergic/Immunologic: Negative  Neurological: Negative  Psychiatric/Behavioral: Negative  Objective:      /84   Pulse 88   Temp 97 9 °F (36 6 °C)   Resp 19   Ht 5' 7" (1 702 m)   Wt (!) 158 kg (349 lb)   SpO2 95%   BMI 54 66 kg/m²          Physical Exam  Vitals signs and nursing note reviewed  Constitutional:       Appearance: She is well-developed  HENT:      Head: Normocephalic and atraumatic  Right Ear: External ear normal       Left Ear: External ear normal    Eyes:      Pupils: Pupils are equal, round, and reactive to light  Neck:      Musculoskeletal: Normal range of motion  Cardiovascular:      Rate and Rhythm: Normal rate and regular rhythm  Pulmonary:      Effort: Pulmonary effort is normal    Abdominal:      General: Bowel sounds are normal       Palpations: Abdomen is soft  Musculoskeletal: Normal range of motion  Skin:     General: Skin is warm and dry  Findings: Rash ( left hand mild edema erythema noted) present  Neurological:      Mental Status: She is alert and oriented to person, place, and time  Psychiatric:         Mood and Affect: Mood normal          Behavior: Behavior normal          Thought Content:  Thought content normal          Judgment: Judgment normal            Labs:    Lab Results   Component Value Date    WBC 5 79 12/01/2020    HGB 13 9 12/01/2020    HCT 45 3 12/01/2020    MCV 89 12/01/2020     12/01/2020     Lab Results   Component Value Date    K 4 4 12/01/2020     12/01/2020    CO2 31 12/01/2020    BUN 12 12/01/2020    CREATININE 0 83 12/01/2020    GLUF 86 12/07/2018    CALCIUM 9 4 12/01/2020    AST 13 12/01/2020    ALT 22 12/01/2020    ALKPHOS 119 (H) 12/01/2020    EGFR 88 12/01/2020     Lab Results   Component Value Date    CALCIUM 9 4 12/01/2020 K 4 4 12/01/2020    CO2 31 12/01/2020     12/01/2020    BUN 12 12/01/2020    CREATININE 0 83 12/01/2020

## 2021-04-30 NOTE — ASSESSMENT & PLAN NOTE
Crease phentermine to 30 mg  Discussed importance of making good food choices  Good nutrition, decrease calorie intake  Increase exercise activity  Discussed in order for phentermine to work the other components also has to be present  Education and encouragement provided

## 2021-04-30 NOTE — ASSESSMENT & PLAN NOTE
Patient has been outside gardening  Looks like ringworm  Diflucan ordered  Topical cream ordered    Call if no improvement

## 2021-05-28 ENCOUNTER — OFFICE VISIT (OUTPATIENT)
Dept: FAMILY MEDICINE CLINIC | Facility: CLINIC | Age: 41
End: 2021-05-28
Payer: COMMERCIAL

## 2021-05-28 VITALS
HEART RATE: 97 BPM | BODY MASS INDEX: 45.99 KG/M2 | SYSTOLIC BLOOD PRESSURE: 127 MMHG | RESPIRATION RATE: 19 BRPM | DIASTOLIC BLOOD PRESSURE: 68 MMHG | OXYGEN SATURATION: 99 % | WEIGHT: 293 LBS | HEIGHT: 67 IN

## 2021-05-28 DIAGNOSIS — L70.0 ACNE VULGARIS: ICD-10-CM

## 2021-05-28 DIAGNOSIS — R63.5 WEIGHT GAIN: Primary | ICD-10-CM

## 2021-05-28 PROCEDURE — 3008F BODY MASS INDEX DOCD: CPT | Performed by: NURSE PRACTITIONER

## 2021-05-28 PROCEDURE — 99213 OFFICE O/P EST LOW 20 MIN: CPT | Performed by: NURSE PRACTITIONER

## 2021-05-28 PROCEDURE — 1036F TOBACCO NON-USER: CPT | Performed by: NURSE PRACTITIONER

## 2021-05-28 RX ORDER — PHENTERMINE HYDROCHLORIDE 37.5 MG/1
37.5 CAPSULE ORAL EVERY MORNING
Qty: 30 CAPSULE | Refills: 0 | Status: SHIPPED | OUTPATIENT
Start: 2021-05-28 | End: 2021-06-30 | Stop reason: SDUPTHER

## 2021-05-28 RX ORDER — CLINDAMYCIN PHOSPHATE 10 MG/G
GEL TOPICAL 2 TIMES DAILY
Qty: 30 G | Refills: 0 | Status: SHIPPED | OUTPATIENT
Start: 2021-05-28 | End: 2021-08-10 | Stop reason: SDUPTHER

## 2021-05-28 NOTE — ASSESSMENT & PLAN NOTE
Reports that she does get a rash with the phentermine  Has use Clindagel with success  Medication reordered  Discussed good skin care  Routine

## 2021-05-28 NOTE — ASSESSMENT & PLAN NOTE
Patient did lose 5lbs with phentermine 30 mg  Will increase dosage 37 5 mg   Encouraged diet, exercise

## 2021-05-28 NOTE — PATIENT INSTRUCTIONS
Increase phentermine to 37 5 mg may take Tums at times with the medication continue with diet continue with exercise increase fluid hydration lots of water  Apply clinda gel at least nightly to acne

## 2021-06-30 ENCOUNTER — OFFICE VISIT (OUTPATIENT)
Dept: FAMILY MEDICINE CLINIC | Facility: CLINIC | Age: 41
End: 2021-06-30
Payer: COMMERCIAL

## 2021-06-30 VITALS
BODY MASS INDEX: 45.99 KG/M2 | OXYGEN SATURATION: 100 % | HEART RATE: 98 BPM | SYSTOLIC BLOOD PRESSURE: 122 MMHG | HEIGHT: 67 IN | WEIGHT: 293 LBS | DIASTOLIC BLOOD PRESSURE: 82 MMHG

## 2021-06-30 DIAGNOSIS — R60.0 LEG EDEMA, LEFT: ICD-10-CM

## 2021-06-30 DIAGNOSIS — R10.9 ABDOMINAL DISCOMFORT: Primary | ICD-10-CM

## 2021-06-30 DIAGNOSIS — I89.0 LYMPHEDEMA: ICD-10-CM

## 2021-06-30 DIAGNOSIS — R63.5 WEIGHT GAIN: ICD-10-CM

## 2021-06-30 PROCEDURE — 99214 OFFICE O/P EST MOD 30 MIN: CPT | Performed by: NURSE PRACTITIONER

## 2021-06-30 RX ORDER — PHENTERMINE HYDROCHLORIDE 37.5 MG/1
37.5 CAPSULE ORAL EVERY MORNING
Qty: 30 CAPSULE | Refills: 0 | Status: SHIPPED | OUTPATIENT
Start: 2021-06-30 | End: 2021-07-13 | Stop reason: SDUPTHER

## 2021-06-30 RX ORDER — FLUCONAZOLE 150 MG/1
TABLET ORAL
COMMUNITY
Start: 2021-04-30 | End: 2021-07-27

## 2021-06-30 NOTE — PATIENT INSTRUCTIONS
With phentermine 37 5 good job with the weight loss so far   Follow-up with ultrasound for the hernia evaluation in abdomen also ultrasound for your left leg  Wound Care Dr Kennedy Flowers call make an appointment at your convenience continue with fluid hydration

## 2021-07-01 PROBLEM — R60.0 LEG EDEMA, LEFT: Status: ACTIVE | Noted: 2021-07-01

## 2021-07-01 PROBLEM — I89.0 LYMPHEDEMA: Status: ACTIVE | Noted: 2021-07-01

## 2021-07-01 PROBLEM — R10.9 ABDOMINAL DISCOMFORT: Status: ACTIVE | Noted: 2021-07-01

## 2021-07-01 NOTE — PROGRESS NOTES
Assessment/Plan:     Weight gain   Continue phentermine 37 5  Continue with diet and exercise  Education and encouragement provided    Abdominal discomfort   Patient reports discomfort in upper abdomen area  Feels like something moves in and out  Sometimes a lump is there sometimes disappears  Will get ultrasound  Discussed with patient possible hernia  Reports that she help moved her family member to a new home  The lower lifting  Felt like this started with that  Lymphedema    Referral made to wound care  Problem List Items Addressed This Visit        Other    Weight gain      Continue phentermine 37 5  Continue with diet and exercise  Education and encouragement provided         Relevant Medications    phentermine 37 5 MG capsule    Abdominal discomfort - Primary      Patient reports discomfort in upper abdomen area  Feels like something moves in and out  Sometimes a lump is there sometimes disappears  Will get ultrasound  Discussed with patient possible hernia  Reports that she help moved her family member to a new home  The lower lifting  Felt like this started with that  Relevant Orders    US abdomen limited    Leg edema, left    Relevant Orders    VAS lower limb venous duplex study, unilateral/limited    Lymphedema       Referral made to wound care  Relevant Orders    Ambulatory referral to Wound Care            Subjective:      Patient ID: Clerance Meckel is a 36 y o  female  Patient is here for follow-up on her weight loss efforts  Has lost 11 lb  Reports the medication is working  Also has complaints of a lump in her abdomen  Feels at it pokes road times  Does become uncomfortable  Also has concerns regarding return of lymphedema  He has a swollen area in the back of her left leg  Is very concerned that there might be some circulation problems   Needs to have a new wound care referral       The following portions of the patient's history were reviewed and updated as appropriate: allergies, current medications, past family history, past medical history, past social history, past surgical history and problem list     Review of Systems   Constitutional: Negative  HENT: Negative  Eyes: Negative  Respiratory: Negative  Cardiovascular: Negative  Gastrointestinal: Negative  Endocrine: Negative  Genitourinary: Negative  Musculoskeletal: Negative  Skin: Negative  Allergic/Immunologic: Negative  Neurological: Negative  Psychiatric/Behavioral: Negative  Objective:      /82 (BP Location: Right arm, Patient Position: Sitting)   Pulse 98   Ht 5' 7" (1 702 m)   Wt (!) 150 kg (331 lb)   SpO2 100%   BMI 51 84 kg/m²          Physical Exam  Vitals and nursing note reviewed  Constitutional:       Appearance: She is well-developed  She is obese  HENT:      Head: Normocephalic and atraumatic  Right Ear: External ear normal       Left Ear: External ear normal    Eyes:      Pupils: Pupils are equal, round, and reactive to light  Cardiovascular:      Rate and Rhythm: Normal rate and regular rhythm  Pulses: Normal pulses  Heart sounds: Normal heart sounds  Pulmonary:      Effort: Pulmonary effort is normal    Abdominal:      General: Bowel sounds are normal       Palpations: Abdomen is soft  Hernia: A hernia is present  Musculoskeletal:         General: Tenderness (  Left thigh) present  Normal range of motion  Cervical back: Normal range of motion  Skin:     General: Skin is warm and dry  Neurological:      General: No focal deficit present  Mental Status: She is alert and oriented to person, place, and time  Psychiatric:         Mood and Affect: Mood normal          Behavior: Behavior normal          Thought Content:  Thought content normal          Judgment: Judgment normal            Labs:    Lab Results   Component Value Date    WBC 5 79 12/01/2020    HGB 13 9 12/01/2020    HCT 45 3 12/01/2020    MCV 89 12/01/2020     12/01/2020     Lab Results   Component Value Date    K 4 4 12/01/2020     12/01/2020    CO2 31 12/01/2020    BUN 12 12/01/2020    CREATININE 0 83 12/01/2020    GLUF 86 12/07/2018    CALCIUM 9 4 12/01/2020    AST 13 12/01/2020    ALT 22 12/01/2020    ALKPHOS 119 (H) 12/01/2020    EGFR 88 12/01/2020     Lab Results   Component Value Date    CALCIUM 9 4 12/01/2020    K 4 4 12/01/2020    CO2 31 12/01/2020     12/01/2020    BUN 12 12/01/2020    CREATININE 0 83 12/01/2020

## 2021-07-01 NOTE — ASSESSMENT & PLAN NOTE
Patient reports discomfort in upper abdomen area  Feels like something moves in and out  Sometimes a lump is there sometimes disappears  Will get ultrasound  Discussed with patient possible hernia  Reports that she help moved her family member to a new home  The lower lifting  Felt like this started with that

## 2021-07-04 ENCOUNTER — HOSPITAL ENCOUNTER (EMERGENCY)
Facility: HOSPITAL | Age: 41
Discharge: HOME | End: 2021-07-04
Attending: EMERGENCY MEDICINE | Admitting: EMERGENCY MEDICINE
Payer: COMMERCIAL

## 2021-07-04 ENCOUNTER — APPOINTMENT (EMERGENCY)
Dept: CT IMAGING | Facility: HOSPITAL | Age: 41
End: 2021-07-04
Payer: COMMERCIAL

## 2021-07-04 VITALS
SYSTOLIC BLOOD PRESSURE: 111 MMHG | HEART RATE: 60 BPM | WEIGHT: 293 LBS | HEIGHT: 69 IN | RESPIRATION RATE: 14 BRPM | BODY MASS INDEX: 43.4 KG/M2 | OXYGEN SATURATION: 99 % | DIASTOLIC BLOOD PRESSURE: 63 MMHG | TEMPERATURE: 97.8 F

## 2021-07-04 DIAGNOSIS — K29.70 GASTRITIS: Primary | ICD-10-CM

## 2021-07-04 DIAGNOSIS — N39.0 UTI (URINARY TRACT INFECTION): ICD-10-CM

## 2021-07-04 LAB
ALBUMIN SERPL BCP-MCNC: 3.8 G/DL (ref 3.5–5)
ALP SERPL-CCNC: 119 U/L (ref 46–116)
ALT SERPL W P-5'-P-CCNC: 24 U/L (ref 12–78)
ANION GAP SERPL CALCULATED.3IONS-SCNC: 8 MMOL/L (ref 4–13)
AST SERPL W P-5'-P-CCNC: 13 U/L (ref 5–45)
ATRIAL RATE: 67 BPM
BACTERIA UR QL AUTO: NORMAL /HPF
BASOPHILS # BLD AUTO: 0.06 THOUSANDS/ΜL (ref 0–0.1)
BASOPHILS NFR BLD AUTO: 1 % (ref 0–1)
BILIRUB SERPL-MCNC: 0.37 MG/DL (ref 0.2–1)
BILIRUB UR QL STRIP: NEGATIVE
BUN SERPL-MCNC: 10 MG/DL (ref 5–25)
CALCIUM SERPL-MCNC: 9.7 MG/DL (ref 8.3–10.1)
CHLORIDE SERPL-SCNC: 103 MMOL/L (ref 100–108)
CLARITY UR: CLEAR
CO2 SERPL-SCNC: 30 MMOL/L (ref 21–32)
COLOR UR: ABNORMAL
CREAT SERPL-MCNC: 0.96 MG/DL (ref 0.6–1.3)
EOSINOPHIL # BLD AUTO: 0.14 THOUSAND/ΜL (ref 0–0.61)
EOSINOPHIL NFR BLD AUTO: 2 % (ref 0–6)
ERYTHROCYTE [DISTWIDTH] IN BLOOD BY AUTOMATED COUNT: 14.3 % (ref 11.6–15.1)
GFR SERPL CREATININE-BSD FRML MDRD: 74 ML/MIN/1.73SQ M
GLUCOSE SERPL-MCNC: 95 MG/DL (ref 65–140)
GLUCOSE UR STRIP-MCNC: NEGATIVE MG/DL
HCG SERPL QL: NEGATIVE
HCT VFR BLD AUTO: 42.6 % (ref 34.8–46.1)
HGB BLD-MCNC: 13.3 G/DL (ref 11.5–15.4)
HGB UR QL STRIP.AUTO: NEGATIVE
IMM GRANULOCYTES # BLD AUTO: 0.03 THOUSAND/UL (ref 0–0.2)
IMM GRANULOCYTES NFR BLD AUTO: 0 % (ref 0–2)
KETONES UR STRIP-MCNC: NEGATIVE MG/DL
LEUKOCYTE ESTERASE UR QL STRIP: ABNORMAL
LIPASE SERPL-CCNC: 68 U/L (ref 73–393)
LYMPHOCYTES # BLD AUTO: 1.62 THOUSANDS/ΜL (ref 0.6–4.47)
LYMPHOCYTES NFR BLD AUTO: 18 % (ref 14–44)
MCH RBC QN AUTO: 27.2 PG (ref 26.8–34.3)
MCHC RBC AUTO-ENTMCNC: 31.2 G/DL (ref 31.4–37.4)
MCV RBC AUTO: 87 FL (ref 82–98)
MONOCYTES # BLD AUTO: 0.57 THOUSAND/ΜL (ref 0.17–1.22)
MONOCYTES NFR BLD AUTO: 6 % (ref 4–12)
NEUTROPHILS # BLD AUTO: 6.47 THOUSANDS/ΜL (ref 1.85–7.62)
NEUTS SEG NFR BLD AUTO: 73 % (ref 43–75)
NITRITE UR QL STRIP: NEGATIVE
NON-SQ EPI CELLS URNS QL MICRO: NORMAL /HPF
NRBC BLD AUTO-RTO: 0 /100 WBCS
P AXIS: 54 DEGREES
PH UR STRIP.AUTO: 6.5 [PH]
PLATELET # BLD AUTO: 304 THOUSANDS/UL (ref 149–390)
PMV BLD AUTO: 9.8 FL (ref 8.9–12.7)
POTASSIUM SERPL-SCNC: 4.1 MMOL/L (ref 3.5–5.3)
PR INTERVAL: 152 MS
PROT SERPL-MCNC: 7.4 G/DL (ref 6.4–8.2)
PROT UR STRIP-MCNC: NEGATIVE MG/DL
QRS AXIS: 47 DEGREES
QRSD INTERVAL: 84 MS
QT INTERVAL: 392 MS
QTC INTERVAL: 414 MS
RBC # BLD AUTO: 4.89 MILLION/UL (ref 3.81–5.12)
RBC #/AREA URNS AUTO: NORMAL /HPF
SODIUM SERPL-SCNC: 141 MMOL/L (ref 136–145)
SP GR UR STRIP.AUTO: <=1.005 (ref 1–1.03)
T WAVE AXIS: 50 DEGREES
UROBILINOGEN UR QL STRIP.AUTO: 0.2 E.U./DL
VENTRICULAR RATE: 67 BPM
WBC # BLD AUTO: 8.89 THOUSAND/UL (ref 4.31–10.16)
WBC #/AREA URNS AUTO: NORMAL /HPF

## 2021-07-04 PROCEDURE — 85025 COMPLETE CBC W/AUTO DIFF WBC: CPT | Performed by: EMERGENCY MEDICINE

## 2021-07-04 PROCEDURE — 93010 ELECTROCARDIOGRAM REPORT: CPT | Performed by: INTERNAL MEDICINE

## 2021-07-04 PROCEDURE — 96375 TX/PRO/DX INJ NEW DRUG ADDON: CPT

## 2021-07-04 PROCEDURE — 93005 ELECTROCARDIOGRAM TRACING: CPT

## 2021-07-04 PROCEDURE — 81001 URINALYSIS AUTO W/SCOPE: CPT | Performed by: EMERGENCY MEDICINE

## 2021-07-04 PROCEDURE — 99285 EMERGENCY DEPT VISIT HI MDM: CPT

## 2021-07-04 PROCEDURE — C9113 INJ PANTOPRAZOLE SODIUM, VIA: HCPCS | Performed by: EMERGENCY MEDICINE

## 2021-07-04 PROCEDURE — 99285 EMERGENCY DEPT VISIT HI MDM: CPT | Performed by: EMERGENCY MEDICINE

## 2021-07-04 PROCEDURE — 36415 COLL VENOUS BLD VENIPUNCTURE: CPT | Performed by: EMERGENCY MEDICINE

## 2021-07-04 PROCEDURE — 96361 HYDRATE IV INFUSION ADD-ON: CPT

## 2021-07-04 PROCEDURE — 83690 ASSAY OF LIPASE: CPT | Performed by: EMERGENCY MEDICINE

## 2021-07-04 PROCEDURE — 80053 COMPREHEN METABOLIC PANEL: CPT | Performed by: EMERGENCY MEDICINE

## 2021-07-04 PROCEDURE — 84703 CHORIONIC GONADOTROPIN ASSAY: CPT | Performed by: EMERGENCY MEDICINE

## 2021-07-04 PROCEDURE — 74177 CT ABD & PELVIS W/CONTRAST: CPT

## 2021-07-04 PROCEDURE — 96374 THER/PROPH/DIAG INJ IV PUSH: CPT

## 2021-07-04 RX ORDER — ONDANSETRON 2 MG/ML
4 INJECTION INTRAMUSCULAR; INTRAVENOUS ONCE
Status: COMPLETED | OUTPATIENT
Start: 2021-07-04 | End: 2021-07-04

## 2021-07-04 RX ORDER — PANTOPRAZOLE SODIUM 40 MG/1
40 INJECTION, POWDER, FOR SOLUTION INTRAVENOUS ONCE
Status: COMPLETED | OUTPATIENT
Start: 2021-07-04 | End: 2021-07-04

## 2021-07-04 RX ORDER — CEPHALEXIN 250 MG/1
500 CAPSULE ORAL ONCE
Status: COMPLETED | OUTPATIENT
Start: 2021-07-04 | End: 2021-07-04

## 2021-07-04 RX ORDER — CEPHALEXIN 500 MG/1
500 CAPSULE ORAL EVERY 12 HOURS SCHEDULED
Qty: 14 CAPSULE | Refills: 0 | Status: SHIPPED | OUTPATIENT
Start: 2021-07-04 | End: 2021-07-11

## 2021-07-04 RX ORDER — OMEPRAZOLE 20 MG/1
20 CAPSULE, DELAYED RELEASE ORAL DAILY
Qty: 30 CAPSULE | Refills: 0 | Status: SHIPPED | OUTPATIENT
Start: 2021-07-04 | End: 2021-07-27 | Stop reason: SDUPTHER

## 2021-07-04 RX ORDER — MAGNESIUM HYDROXIDE/ALUMINUM HYDROXICE/SIMETHICONE 120; 1200; 1200 MG/30ML; MG/30ML; MG/30ML
30 SUSPENSION ORAL ONCE
Status: COMPLETED | OUTPATIENT
Start: 2021-07-04 | End: 2021-07-04

## 2021-07-04 RX ORDER — MORPHINE SULFATE 10 MG/ML
6 INJECTION, SOLUTION INTRAMUSCULAR; INTRAVENOUS ONCE
Status: COMPLETED | OUTPATIENT
Start: 2021-07-04 | End: 2021-07-04

## 2021-07-04 RX ORDER — SUCRALFATE 1 G/1
1 TABLET ORAL 4 TIMES DAILY
Qty: 56 TABLET | Refills: 0 | Status: SHIPPED | OUTPATIENT
Start: 2021-07-04 | End: 2021-08-10 | Stop reason: ALTCHOICE

## 2021-07-04 RX ADMIN — ALUMINA, MAGNESIA, AND SIMETHICONE ORAL SUSPENSION REGULAR STRENGTH 30 ML: 1200; 1200; 120 SUSPENSION ORAL at 20:48

## 2021-07-04 RX ADMIN — MORPHINE SULFATE 6 MG: 10 INJECTION INTRAVENOUS at 18:34

## 2021-07-04 RX ADMIN — IOHEXOL 100 ML: 350 INJECTION, SOLUTION INTRAVENOUS at 19:30

## 2021-07-04 RX ADMIN — SODIUM CHLORIDE 1000 ML: 0.9 INJECTION, SOLUTION INTRAVENOUS at 18:30

## 2021-07-04 RX ADMIN — ONDANSETRON 4 MG: 2 INJECTION INTRAMUSCULAR; INTRAVENOUS at 18:32

## 2021-07-04 RX ADMIN — FAMOTIDINE 20 MG: 10 INJECTION, SOLUTION INTRAVENOUS at 20:52

## 2021-07-04 RX ADMIN — PANTOPRAZOLE SODIUM 40 MG: 40 INJECTION, POWDER, FOR SOLUTION INTRAVENOUS at 20:50

## 2021-07-04 RX ADMIN — CEPHALEXIN 500 MG: 250 CAPSULE ORAL at 21:23

## 2021-07-04 NOTE — ED PROVIDER NOTES
History  Chief Complaint   Patient presents with    Abdominal Pain     Patient "c/o mid left abd radiating towards groin, pain with urination and diarrhea, denies nausea vomiting"     Patient is a 66-year-old female past medical history of DVT on Xarelto, endometriosis presenting with abdominal pain  Patient notes and mid left-sided abdominal pain radiating into her groin which is stabbing in nature and has been intermittent for 1 year, constant x1 week and worsened today at 3:00 p m  Roughly 3-1/2 hours ago  Notes urinary urgency as well as dysuria and diarrhea, states the pain is worse with defecation and worse with bending today  Has not taken any medication for it denies any nausea or vomiting  Notes decreased p o  And states that she was recently helping someone move and a large amount of heavy lifting  States that days ago it seemed to be radiating to her chest but that has since resolved  She denies any current chest pain, shortness breath, dizziness, fevers, rashes, vision changes, upper respiratory symptoms but does note a cough times several days  Notes compliance with her Xarelto  Prior to Admission Medications   Prescriptions Last Dose Informant Patient Reported? Taking?    Cranberry 1000 MG CAPS  Self Yes Yes   Sig: Take by mouth   cholecalciferol (VITAMIN D3) 1,000 units tablet  Self Yes Yes   Sig: Take 1,000 Units by mouth daily   clindamycin (CLINDAGEL) 1 % gel   No Yes   Sig: Apply topically 2 (two) times a day   clotrimazole-betamethasone (LOTRISONE) 1-0 05 % cream   No No   Sig: Apply topically 2 (two) times a day   diclofenac sodium (VOLTAREN) 1 %  Self No No   Sig: Apply 2 g topically 4 (four) times a day   Patient not taking: Reported on 8/19/2020   fluconazole (DIFLUCAN) 150 mg tablet Not Taking at Unknown time  Yes No   Sig: TAKE 1 TABLET BY MOUTH AS ONE DOSE   Patient not taking: Reported on 7/4/2021   hydrochlorothiazide (HYDRODIURIL) 12 5 mg tablet   No Yes   Sig: MAY TAKE 1 PILL EVERY 24 HOURS AS NEEDED FOR EDEMA IN LEGS   hydrochlorothiazide (HYDRODIURIL) 12 5 mg tablet   No Yes   Sig: MAY TAKE 1 PILL EVERY 24 HOURS AS NEEDED FOR EDEMA IN LEGS   mupirocin (BACTROBAN) 2 % ointment   Yes No   Sig: Apply 1 application topically 2 (two) times a day To affected area   phentermine 37 5 MG capsule   No Yes   Sig: Take 1 capsule (37 5 mg total) by mouth every morning   rivaroxaban (XARELTO) 20 mg tablet   No Yes   Sig: Take 1 pill daily      Facility-Administered Medications: None       Past Medical History:   Diagnosis Date    DVT (deep venous thrombosis) (HCC)     Endometriosis     Lymphedema of right lower extremity     Obesity, Class III, BMI 40-49 9 (morbid obesity) (HCC)        Past Surgical History:   Procedure Laterality Date    ABLATION COLPOCLESIS      HYSTERECTOMY  2016    OOPHORECTOMY Bilateral 09/2016    OVARY SURGERY      TUBAL LIGATION         Family History   Problem Relation Age of Onset    Pancreatic cancer Maternal Grandmother     Clotting disorder Mother     Stroke Maternal Aunt     Diabetes Paternal Grandmother     Thyroid disease Paternal Grandmother     Hypertension Paternal Grandfather     Heart disease Paternal Grandfather      I have reviewed and agree with the history as documented  E-Cigarette/Vaping     E-Cigarette/Vaping Substances     Social History     Tobacco Use    Smoking status: Never Smoker    Smokeless tobacco: Never Used   Substance Use Topics    Alcohol use: Yes     Comment: extremely rare    Drug use: No       Review of Systems   All other systems reviewed and are negative  Physical Exam  Physical Exam  Vitals reviewed  Constitutional:       General: She is not in acute distress  Appearance: Normal appearance  She is not ill-appearing     HENT:      Mouth/Throat:      Mouth: Mucous membranes are moist    Eyes:      Conjunctiva/sclera: Conjunctivae normal    Cardiovascular:      Rate and Rhythm: Normal rate and regular rhythm  Heart sounds: Normal heart sounds  Pulmonary:      Effort: Pulmonary effort is normal       Breath sounds: Normal breath sounds  Abdominal:      General: Abdomen is flat  Palpations: Abdomen is soft  Tenderness: There is abdominal tenderness in the left upper quadrant and left lower quadrant  There is no right CVA tenderness, left CVA tenderness or guarding  Musculoskeletal:         General: No swelling  Normal range of motion  Cervical back: Neck supple  Skin:     General: Skin is warm and dry  Neurological:      General: No focal deficit present  Mental Status: She is alert     Psychiatric:         Mood and Affect: Mood normal          Vital Signs  ED Triage Vitals [07/04/21 1752]   Temperature Pulse Respirations Blood Pressure SpO2   97 8 °F (36 6 °C) 84 20 125/78 100 %      Temp Source Heart Rate Source Patient Position - Orthostatic VS BP Location FiO2 (%)   Oral Monitor -- Right arm --      Pain Score       9           Vitals:    07/04/21 1752 07/04/21 1930 07/04/21 2000   BP: 125/78 113/64 111/63   Pulse: 84 74 60         Visual Acuity      ED Medications  Medications   morphine (PF) 10 mg/mL injection 6 mg (6 mg Intravenous Given 7/4/21 1834)   ondansetron (ZOFRAN) injection 4 mg (4 mg Intravenous Given 7/4/21 1832)   sodium chloride 0 9 % bolus 1,000 mL (0 mL Intravenous Stopped 7/4/21 2010)   iohexol (OMNIPAQUE) 350 MG/ML injection (SINGLE-DOSE) 100 mL (100 mL Intravenous Given 7/4/21 1930)   pantoprazole (PROTONIX) injection 40 mg (40 mg Intravenous Given 7/4/21 2050)   famotidine (PEPCID) injection 20 mg (20 mg Intravenous Given 7/4/21 2052)   aluminum-magnesium hydroxide-simethicone (MYLANTA) oral suspension 30 mL (30 mL Oral Given 7/4/21 2048)   cephalexin (KEFLEX) capsule 500 mg (500 mg Oral Given 7/4/21 2123)       Diagnostic Studies  Results Reviewed     Procedure Component Value Units Date/Time    Urine Microscopic [[id number]]  (Normal) Collected: 07/04/21 1917    Lab Status: Final result Specimen: Urine, Clean Catch Updated: 07/04/21 1946     RBC, UA None Seen /hpf      WBC, UA 1-2 /hpf      Epithelial Cells Occasional /hpf      Bacteria, UA None Seen /hpf     UA w Reflex to Microscopic w Reflex to Culture [[id number]]  (Abnormal) Collected: 07/04/21 1917    Lab Status: Final result Specimen: Urine, Clean Catch Updated: 07/04/21 1930     Color, UA Light Yellow     Clarity, UA Clear     Specific Gravity, UA <=1 005     pH, UA 6 5     Leukocytes, UA Trace     Nitrite, UA Negative     Protein, UA Negative mg/dl      Glucose, UA Negative mg/dl      Ketones, UA Negative mg/dl      Urobilinogen, UA 0 2 E U /dl      Bilirubin, UA Negative     Blood, UA Negative    hCG, qualitative pregnancy [[id number]]  (Normal) Collected: 07/04/21 1830    Lab Status: Final result Specimen: Blood from Arm, Left Updated: 07/04/21 1907     Preg, Serum Negative    Lipase [[id number]]  (Abnormal) Collected: 07/04/21 1830    Lab Status: Final result Specimen: Blood from Arm, Left Updated: 07/04/21 1907     Lipase 68 u/L     Comprehensive metabolic panel [[id number]]  (Abnormal) Collected: 07/04/21 1830    Lab Status: Final result Specimen: Blood from Arm, Left Updated: 07/04/21 1902     Sodium 141 mmol/L      Potassium 4 1 mmol/L      Chloride 103 mmol/L      CO2 30 mmol/L      ANION GAP 8 mmol/L      BUN 10 mg/dL      Creatinine 0 96 mg/dL      Glucose 95 mg/dL      Calcium 9 7 mg/dL      AST 13 U/L      ALT 24 U/L      Alkaline Phosphatase 119 U/L      Total Protein 7 4 g/dL      Albumin 3 8 g/dL      Total Bilirubin 0 37 mg/dL      eGFR 74 ml/min/1 73sq m     Narrative:      [facility] for Chronic Kidney Disease (CKD):     Stage 1 with normal or high GFR (GFR > 90 mL/min/1 73 square meters)    Stage 2 Mild CKD (GFR = 60-89 mL/min/1 73 square meters)    Stage 3A Moderate CKD (GFR = 45-59 mL/min/1 73 square meters)    Stage 3B Moderate CKD (GFR = 30-44 mL/min/1 73 square meters)    Stage 4 Severe CKD (GFR = 15-29 mL/min/1 73 square meters)    Stage 5 End Stage CKD (GFR <15 mL/min/1 73 square meters)  Note: GFR calculation is accurate only with a steady state creatinine    CBC and differential [[id number]]  (Abnormal) Collected: 07/04/21 1830    Lab Status: Final result Specimen: Blood from Arm, Left Updated: 07/04/21 1843     WBC 8 89 Thousand/uL      RBC 4 89 Million/uL      Hemoglobin 13 3 g/dL      Hematocrit 42 6 %      MCV 87 fL      MCH 27 2 pg      MCHC 31 2 g/dL      RDW 14 3 %      MPV 9 8 fL      Platelets 916 Thousands/uL      nRBC 0 /100 WBCs      Neutrophils Relative 73 %      Immat GRANS % 0 %      Lymphocytes Relative 18 %      Monocytes Relative 6 %      Eosinophils Relative 2 %      Basophils Relative 1 %      Neutrophils Absolute 6 47 Thousands/µL      Immature Grans Absolute 0 03 Thousand/uL      Lymphocytes Absolute 1 62 Thousands/µL      Monocytes Absolute 0 57 Thousand/µL      Eosinophils Absolute 0 14 Thousand/µL      Basophils Absolute 0 06 Thousands/µL                  CT abdomen pelvis with contrast   Final Result by Dalton Harrell DO (07/04 2032)      No acute findings            Workstation performed: [id number]                    Procedures  ECG 12 Lead Documentation Only    Date/Time: 7/4/2021 6:31 PM  Performed by: Onelia Dutta DO  Authorized by: Onelia Dutta DO     ECG reviewed by me, the ED Provider: yes    Patient location:  ED  Previous ECG:     Previous ECG:  Compared to current    Similarity:  No change  Interpretation:     Interpretation: normal    Rate:     ECG rate assessment: normal    Rhythm:     Rhythm: sinus rhythm    Ectopy:     Ectopy: none    QRS:     QRS axis:  Normal    QRS intervals:  Normal  Conduction:     Conduction: normal    ST segments:     ST segments:  Normal  T waves:     T waves: normal               ED Course  ED Course as of Jul 07 1332   Sun Jul 04, 2021 2036 CT unremarkable, patient did note urinary symptoms, has very mild white blood cells urine however with location of pain, urinary symptoms and no other acute causes will treat for pyelonephritis and have discussed return precautions worse or change abdominal pain, vomiting, fevers not responding to medication, lightheadedness or passing out patient states she understands  2037 Patient notes no change in pain however appears more comfortable, patient states that she has been having increasing acid reflux symptoms over the past several days, concern for gastritis and will treat with PPI, H2 blocker, Maalox and reassess  If patient has improved symptoms will likely treat for UTI as opposed to pyelonephritis as well as gastritis  2120 Patient notes mildly improved pressure after PPI, will discharge with PPI, gastroenterology follow-up, treatment for UTI and have discussed return precautions worse or change abdominal pain, fevers, uncontrollable vomiting, lightheadedness or passing out and patient states she understands  SBIRT 20yo+      Most Recent Value   SBIRT (22 yo +)   In order to provide better care to our patients, we are screening all of our patients for alcohol and drug use  Would it be okay to ask you these screening questions? Yes Filed at: 07/04/2021 1801   Initial Alcohol Screen: US AUDIT-C    1  How often do you have a drink containing alcohol?  0 Filed at: 07/04/2021 1801   2  How many drinks containing alcohol do you have on a typical day you are drinking? 0 Filed at: 07/04/2021 1801   3b  FEMALE Any Age, or MALE 65+: How often do you have 4 or more drinks on one occassion? 0 Filed at: 07/04/2021 1801   Audit-C Score  0 Filed at: 07/04/2021 1801   KWABENA: How many times in the past year have you    Used an illegal drug or used a prescription medication for non-medical reasons?   Never Filed at: 07/04/2021 1801                    MDM  Number of Diagnoses or Management Options  Gastritis  UTI (urinary tract infection)  Diagnosis management comments: Patient is a 79-year-old female past medical history of DVT on Xarelto, endometriosis presenting with left-sided abdominal pain  Patient is well-appearing bedside stable vitals and in no acute distress  She does have tenderness the left upper and lower quadrants without guarding, no CVA tenderness, no other significant physical exam findings  Patient notes chronicity to her pain and suspect that she is experiencing flare of gastritis versus IBS however will obtain CT abdomen pelvis to rule out obstruction, perforation, other pathology as well as urinalysis to rule out pyelonephritis, give pain control and reassess  Disposition  Final diagnoses:   Gastritis   UTI (urinary tract infection)     Time reflects when diagnosis was documented in both MDM as applicable and the Disposition within this note     Time User Action Codes Description Comment    7/4/2021  9:21 PM Brendashannan Barrera Add [K29 70] Gastritis     7/4/2021  9:21 PM Jacey Knight Add [N39 0] UTI (urinary tract infection)       ED Disposition     ED Disposition Condition Date/Time Comment    Discharge Stable Sun Jul 4, 2021  9:20 PM Andrew Andino discharge to home/self care              Follow-up Information     Follow up With Specialties Details Why Contact Info Additional Information    General [facility], [address] [facility] In 1 week  [address]  [address] [place] [address]  [phone]       [facility] Schedule an appointment as soon as possible for a visit in 1 week  [facility] [address] [facility], [address] [facility], [address] [place], [place], [address]          Discharge Medication List as of 7/4/2021  9:22 PM      START taking these medications Details   cephalexin (KEFLEX) 500 mg capsule Take 1 capsule (500 mg total) by mouth every 12 (twelve) hours for 7 days, Starting Sun 7/4/2021, Until Sun 7/11/2021, Normal      omeprazole (PriLOSEC) 20 mg delayed release capsule Take 1 capsule (20 mg total) by mouth daily, Starting Sun 7/4/2021, Until Tue 8/3/2021, Normal      sucralfate (CARAFATE) 1 g tablet Take 1 tablet (1 g total) by mouth 4 (four) times a day for 14 days, Starting Sun 7/4/2021, Until Sun 7/18/2021, Normal         CONTINUE these medications which have NOT CHANGED    Details   cholecalciferol (VITAMIN D3) 1,000 units tablet Take 1,000 Units by mouth daily, Historical Med      clindamycin (CLINDAGEL) 1 % gel Apply topically 2 (two) times a day, Starting Fri 5/28/2021, Normal      clotrimazole-betamethasone (LOTRISONE) 1-0 05 % cream Apply topically 2 (two) times a day, Starting Fri 4/30/2021, Normal      Cranberry 1000 MG CAPS Take by mouth, Historical Med      diclofenac sodium (VOLTAREN) 1 % Apply 2 g topically 4 (four) times a day, Starting Thu 7/30/2020, Normal      fluconazole (DIFLUCAN) 150 mg tablet TAKE 1 TABLET BY MOUTH AS ONE DOSE, Historical Med      !! hydrochlorothiazide (HYDRODIURIL) 12 5 mg tablet MAY TAKE 1 PILL EVERY 24 HOURS AS NEEDED FOR EDEMA IN LEGS, Normal      !! hydrochlorothiazide (HYDRODIURIL) 12 5 mg tablet MAY TAKE 1 PILL EVERY 24 HOURS AS NEEDED FOR EDEMA IN LEGS, Normal      mupirocin (BACTROBAN) 2 % ointment Apply 1 application topically 2 (two) times a day To affected area, Starting Thu 4/15/2021, Historical Med      phentermine 37 5 MG capsule Take 1 capsule (37 5 mg total) by mouth every morning, Starting Wed 6/30/2021, Normal      rivaroxaban (XARELTO) 20 mg tablet Take 1 pill daily, Normal       !! - Potential duplicate medications found  Please discuss with provider  No discharge procedures on file      PDMP Review       Value Time User    PDMP Reviewed  Yes 6/30/2021  2:26 PM Alonzo Vazquez, [address] [place] [address] Provider  Electronically Signed by           Ivon Luna DO  07/07/21 7851

## 2021-07-12 ENCOUNTER — HOSPITAL ENCOUNTER (OUTPATIENT)
Dept: VASCULAR ULTRASOUND | Facility: HOSPITAL | Age: 41
Discharge: HOME | End: 2021-07-12
Payer: COMMERCIAL

## 2021-07-12 DIAGNOSIS — R60.0 LEG EDEMA, LEFT: ICD-10-CM

## 2021-07-12 PROCEDURE — 93971 EXTREMITY STUDY: CPT | Performed by: SURGERY

## 2021-07-12 PROCEDURE — 93971 EXTREMITY STUDY: CPT

## 2021-07-13 ENCOUNTER — OFFICE VISIT (OUTPATIENT)
Dept: FAMILY MEDICINE CLINIC | Facility: CLINIC | Age: 41
End: 2021-07-13
Payer: COMMERCIAL

## 2021-07-13 VITALS
TEMPERATURE: 97.2 F | SYSTOLIC BLOOD PRESSURE: 118 MMHG | DIASTOLIC BLOOD PRESSURE: 80 MMHG | HEIGHT: 69 IN | HEART RATE: 83 BPM | BODY MASS INDEX: 43.4 KG/M2 | OXYGEN SATURATION: 96 % | WEIGHT: 293 LBS

## 2021-07-13 DIAGNOSIS — R63.5 WEIGHT GAIN: ICD-10-CM

## 2021-07-13 DIAGNOSIS — K29.00 ACUTE SUPERFICIAL GASTRITIS WITHOUT HEMORRHAGE: Primary | ICD-10-CM

## 2021-07-13 PROCEDURE — 99213 OFFICE O/P EST LOW 20 MIN: CPT | Performed by: NURSE PRACTITIONER

## 2021-07-13 PROCEDURE — 3008F BODY MASS INDEX DOCD: CPT | Performed by: NURSE PRACTITIONER

## 2021-07-13 RX ORDER — PHENTERMINE HYDROCHLORIDE 37.5 MG/1
37.5 CAPSULE ORAL EVERY MORNING
Qty: 30 CAPSULE | Refills: 0 | Status: SHIPPED | OUTPATIENT
Start: 2021-07-13 | End: 2021-08-10 | Stop reason: SDUPTHER

## 2021-07-13 RX ORDER — NITROFURANTOIN 25; 75 MG/1; MG/1
CAPSULE ORAL
COMMUNITY
Start: 2021-07-09 | End: 2021-08-10 | Stop reason: ALTCHOICE

## 2021-07-13 NOTE — PATIENT INSTRUCTIONS
Phentermine  Anti inflammatory diet  Follow up with gastro    Diet for Stomach Ulcers and Gastritis   WHAT YOU NEED TO KNOW:   A diet for stomach ulcers and gastritis is a meal plan that limits foods that irritate your stomach  Certain foods may worsen symptoms such as stomach pain, bloating, heartburn, or indigestion  DISCHARGE INSTRUCTIONS:   Foods to limit or avoid:  You may need to avoid acidic, spicy, or high-fat foods  Not all foods affect everyone the same way  You will need to learn which foods worsen your symptoms and limit those foods  The following are some foods that may worsen ulcer or gastritis symptoms:  · Beverages:      ? Whole milk and chocolate milk    ? Hot cocoa and cola    ? Any beverage with caffeine    ? Regular and decaffeinated coffee    ? Peppermint and spearmint tea    ? Green and black tea, with or without caffeine    ? Orange and grapefruit juices    ? Drinks that contain alcohol    · Spices and seasonings:      ? Black and red pepper    ? Chili powder    ? Mustard seed and nutmeg    · Other foods:      ? Dairy foods made from whole milk or cream    ? Chocolate    ? Spicy or strongly flavored cheeses, such as jalapeno or black pepper    ? Highly seasoned, high-fat meats, such as sausage, salami, hallman, ham, and cold cuts    ? Hot chiles and peppers    ? Tomato products, such as tomato paste, tomato sauce, or tomato juice    Foods to include:  Eat a variety of healthy foods from all the food groups  Eat fruits, vegetables, whole grains, and fat-free or low-fat dairy foods  Whole grains include whole-wheat breads, cereals, pasta, and brown rice  Choose lean meats, poultry (chicken and turkey), fish, beans, eggs, and nuts  A healthy meal plan is low in unhealthy fats, salt, and added sugar  Healthy fats include olive oil and canola oil  Ask your dietitian for more information about a healthy diet  Other helpful guidelines:   · Do not eat right before bedtime    Stop eating at least 2 hours before bedtime  · Eat small, frequent meals  Your stomach may tolerate small, frequent meals better than large meals  © Copyright [address] Information is for End User's use only and may not be sold, redistributed or otherwise used for commercial purposes  All illustrations and images included in CareNotes® are the copyrighted property of [employer]  or [address] [place], [place] [address]isaiah Parker   The above information is an  only  It is not intended as medical advice for individual conditions or treatments  Talk to your doctor, nurse or pharmacist before following any medical regimen to see if it is safe and effective for you

## 2021-07-14 PROBLEM — K29.00 ACUTE SUPERFICIAL GASTRITIS WITHOUT HEMORRHAGE: Status: ACTIVE | Noted: 2021-07-14

## 2021-07-14 NOTE — PROGRESS NOTES
Assessment/Plan:     Weight gain   Continue with phentermine 37 5  Continue with diet  Increase exercise activity  Acute superficial gastritis without hemorrhage   Discussed management of gastritis  Give hours a rest   Anti-inflammatory diet  Increase fluid hydration  Decrease caffeine intake  Decrease spicy food intake  Continue with Carafate  Problem List Items Addressed This Visit        Digestive    Acute superficial gastritis without hemorrhage - Primary      Discussed management of gastritis  Give hours a rest   Anti-inflammatory diet  Increase fluid hydration  Decrease caffeine intake  Decrease spicy food intake  Continue with Carafate  Other    Weight gain      Continue with phentermine 37 5  Continue with diet  Increase exercise activity  Relevant Medications    phentermine 37 5 MG capsule            Subjective:      Patient ID: Cielo Headley is a 36 y o  female  Patient is here for follow-up on weight loss efforts  Has been doing well with the phentermine  Also reports that she has changed her diet  Is trying to do an anti-inflammatory diet  Has a lot of problem with acid reflux  Recently seen in the emergency room  Diagnosed with a hydration hernia and gastritis  Reports no new diet has been helping a little bit  Needed a letter for work to limit  Heavy lifting  Works at a [employer]      The following portions of the patient's history were reviewed and updated as appropriate: allergies, current medications, past family history, past medical history, past social history, past surgical history and problem list     Review of Systems   Constitutional: Negative  HENT: Negative  Eyes: Negative  Respiratory: Negative  Cardiovascular: Negative  Gastrointestinal: Positive for abdominal distention and abdominal pain  Endocrine: Negative  Genitourinary: Negative  Musculoskeletal: Positive for joint swelling  Skin: Negative  Allergic/Immunologic: Negative  Neurological: Negative  Psychiatric/Behavioral: Negative  Objective:      /80 (BP Location: Right arm, Patient Position: Sitting)   Pulse 83   Temp (!) 97 2 °F (36 2 °C) (Tympanic)   Ht 5' 9" (1 753 m)   Wt (!) 146 kg (322 lb 9 6 oz)   SpO2 96%   BMI 47 64 kg/m²          Physical Exam  Vitals and nursing note reviewed  Constitutional:       Appearance: She is well-developed  She is obese  HENT:      Head: Normocephalic and atraumatic  Cardiovascular:      Rate and Rhythm: Normal rate and regular rhythm  Pulses: Normal pulses  Heart sounds: Normal heart sounds  Pulmonary:      Effort: Pulmonary effort is normal       Breath sounds: Normal breath sounds  Abdominal:      General: Bowel sounds are normal  There is distension  Palpations: Abdomen is soft  Musculoskeletal:         General: Normal range of motion  Cervical back: Normal range of motion  Skin:     General: Skin is warm and dry  Neurological:      General: No focal deficit present  Mental Status: She is alert and oriented to person, place, and time  Psychiatric:         Mood and Affect: Mood normal          Behavior: Behavior normal          Thought Content:  Thought content normal          Judgment: Judgment normal            Labs:    Lab Results   Component Value Date    WBC 8 89 07/04/2021    HGB 13 3 07/04/2021    HCT 42 6 07/04/2021    MCV 87 07/04/2021     07/04/2021     Lab Results   Component Value Date    K 4 1 07/04/2021     07/04/2021    CO2 30 07/04/2021    BUN 10 07/04/2021    CREATININE 0 96 07/04/2021    GLUF 86 12/07/2018    CALCIUM 9 7 07/04/2021    AST 13 07/04/2021    ALT 24 07/04/2021    ALKPHOS 119 (H) 07/04/2021    EGFR 74 07/04/2021     Lab Results   Component Value Date    CALCIUM 9 7 07/04/2021    K 4 1 07/04/2021    CO2 30 07/04/2021     07/04/2021    BUN 10 07/04/2021    CREATININE 0 96 07/04/2021

## 2021-07-14 NOTE — ASSESSMENT & PLAN NOTE
Discussed management of gastritis  Give hours a rest   Anti-inflammatory diet  Increase fluid hydration  Decrease caffeine intake  Decrease spicy food intake  Continue with Carafate

## 2021-07-16 ENCOUNTER — TELEPHONE (OUTPATIENT)
Dept: FAMILY MEDICINE CLINIC | Facility: CLINIC | Age: 41
End: 2021-07-16

## 2021-07-16 ENCOUNTER — CLINICAL SUPPORT (OUTPATIENT)
Dept: FAMILY MEDICINE CLINIC | Facility: CLINIC | Age: 41
End: 2021-07-16
Payer: COMMERCIAL

## 2021-07-16 DIAGNOSIS — R30.0 DYSURIA: Primary | ICD-10-CM

## 2021-07-16 LAB
SL AMB  POCT GLUCOSE, UA: NORMAL
SL AMB LEUKOCYTE ESTERASE,UA: NORMAL
SL AMB POCT BILIRUBIN,UA: NORMAL
SL AMB POCT BLOOD,UA: NORMAL
SL AMB POCT CLARITY,UA: CLEAR
SL AMB POCT COLOR,UA: YELLOW
SL AMB POCT KETONES,UA: NORMAL
SL AMB POCT NITRITE,UA: NORMAL
SL AMB POCT PH,UA: 5
SL AMB POCT SPECIFIC GRAVITY,UA: 1.01
SL AMB POCT URINE PROTEIN: 15
SL AMB POCT UROBILINOGEN: 0.2

## 2021-07-16 PROCEDURE — 81002 URINALYSIS NONAUTO W/O SCOPE: CPT

## 2021-07-16 NOTE — TELEPHONE ENCOUNTER
I spoke with the patient int he office  She stated that her symptoms has subsided  However, she feels that she does not urinate enough  I Spoke with Lev Tesfaye in regards to the patients urine dip and her complaint of feeling as though is not urinating enough  Lupis advised that the patient increases her fluid intake(water) and of there is no change we will refer her to urology  I called the patient and made her aware

## 2021-07-27 ENCOUNTER — OFFICE VISIT (OUTPATIENT)
Dept: GASTROENTEROLOGY | Facility: CLINIC | Age: 41
End: 2021-07-27
Payer: COMMERCIAL

## 2021-07-27 VITALS
DIASTOLIC BLOOD PRESSURE: 82 MMHG | SYSTOLIC BLOOD PRESSURE: 104 MMHG | WEIGHT: 293 LBS | HEART RATE: 91 BPM | BODY MASS INDEX: 43.4 KG/M2 | HEIGHT: 69 IN

## 2021-07-27 DIAGNOSIS — R10.13 EPIGASTRIC PAIN: Primary | ICD-10-CM

## 2021-07-27 DIAGNOSIS — K29.70 GASTRITIS: ICD-10-CM

## 2021-07-27 DIAGNOSIS — K44.9 HIATAL HERNIA: ICD-10-CM

## 2021-07-27 PROCEDURE — 1036F TOBACCO NON-USER: CPT | Performed by: PHYSICIAN ASSISTANT

## 2021-07-27 PROCEDURE — 99204 OFFICE O/P NEW MOD 45 MIN: CPT | Performed by: PHYSICIAN ASSISTANT

## 2021-07-27 RX ORDER — OMEPRAZOLE 20 MG/1
20 CAPSULE, DELAYED RELEASE ORAL DAILY
Qty: 30 CAPSULE | Refills: 1 | Status: SHIPPED | OUTPATIENT
Start: 2021-07-27 | End: 2022-02-14

## 2021-07-27 NOTE — H&P (VIEW-ONLY)
[facility] - Outpatient Consultation  Starr Fernandes 36 y o  female MRN: [id number]  Encounter: [id number]          ASSESSMENT AND PLAN:      1  Epigastric pain  2  Hiatal hernia    Patient presents for an evaluation of epigastric abdominal discomfort x 1 month  She had a CT A/P which showed a small hiatal hernia  She was started on Omeprazole 20mg po daily and is reporting some improvement  Will plan for EGD to investigate for PUD, gastritis, h pylori, etc   Follow up ultrasound ordered by PCP to evaluate the gallbladder  Continue the Omeprazole 20mg po daily course  GERD modifications/dietary modifications reviewed  ______________________________________________________________________    HPI:  Patient is a 36year old female who presents to the office for an evaluation of abdominal pain  Patient reports she has been experiencing epigastric pain over the past month  She went to the ER and had a CT Scan A/P which showed a hiatal hernia  She also reports reflux symptoms over the past months  She denies vomiting  No melena or rectal bleeding  She was started on Omeprazole and is reporting some improvement  She also has made dietary changes and is working on weight loss  She denies frequent NSAIDs  She has never had an EGD  She is on Xarelto for DVTs and follows with Hematology  She has held her Xarelto for other procedures for 48 hours in the past without problems  She will hold the Xarelto 2 days before the EGD  REVIEW OF SYSTEMS:    CONSTITUTIONAL: Denies any fever, chills, rigors, and weight loss  HEENT: No earache or tinnitus  Denies hearing loss or visual disturbances  CARDIOVASCULAR: No chest pain or palpitations  RESPIRATORY: Denies any cough, hemoptysis, shortness of breath or dyspnea on exertion  GASTROINTESTINAL: As noted in the History of Present Illness  GENITOURINARY: No problems with urination  Denies any hematuria or dysuria    NEUROLOGIC: No dizziness or vertigo, denies headaches  MUSCULOSKELETAL: Denies any muscle or joint pain  SKIN: Denies skin rashes or itching  ENDOCRINE: Denies excessive thirst  Denies intolerance to heat or cold  PSYCHOSOCIAL: Denies depression or anxiety  Denies any recent memory loss  Historical Information   Past Medical History:   Diagnosis Date    Diverticulosis     DVT (deep venous thrombosis) (HCC)     Endometriosis     Gastritis     Lymphedema of right lower extremity     Obesity, Class III, BMI 40-49 9 (morbid obesity) (HCC)      Past Surgical History:   Procedure Laterality Date    ABLATION COLPOCLESIS      HYSTERECTOMY  2016    OOPHORECTOMY Bilateral 09/2016    OVARY SURGERY      TUBAL LIGATION       Social History   Social History     Substance and Sexual Activity   Alcohol Use Not Currently    Comment: extremely rare     Social History     Substance and Sexual Activity   Drug Use No     Social History     Tobacco Use   Smoking Status Never Smoker   Smokeless Tobacco Never Used     Family History   Problem Relation Age of Onset    Pancreatic cancer Maternal Grandmother     Clotting disorder Mother     Stroke Maternal Aunt     Diabetes Paternal Grandmother     Thyroid disease Paternal Grandmother     Hypertension Paternal Grandfather     Heart disease Paternal Grandfather        Meds/Allergies       Current Outpatient Medications:     cholecalciferol (VITAMIN D3) 1,000 units tablet    clindamycin (CLINDAGEL) 1 % gel    hydrochlorothiazide (HYDRODIURIL) 12 5 mg tablet    nitrofurantoin (MACROBID) 100 mg capsule    omeprazole (PriLOSEC) 20 mg delayed release capsule    phentermine 37 5 MG capsule    rivaroxaban (XARELTO) 20 mg tablet    sucralfate (CARAFATE) 1 g tablet    No Known Allergies        Objective     Blood pressure 104/82, pulse 91, height 5' 9" (1 753 m), weight (!) 146 kg (322 lb 3 2 oz), not currently breastfeeding  Body mass index is 47 58 kg/m²          PHYSICAL EXAM: General Appearance:   Alert, cooperative, no distress   HEENT:   Normocephalic, atraumatic, anicteric      Neck:  Supple, symmetrical, trachea midline   Lungs:   Clear to auscultation bilaterally; no rales, rhonchi or wheezing; respirations unlabored    Heart[de-identified]   Regular rate and rhythm; no murmur, rub, or gallop  Abdomen:   Soft, non-tender, non-distended; normal bowel sounds; no masses, no organomegaly    Genitalia:   Deferred    Rectal:   Deferred    Extremities:  No cyanosis, clubbing or edema    Pulses:  2+ and symmetric    Skin:  No jaundice, rashes, or lesions    Lymph nodes:  No palpable cervical lymphadenopathy        Lab Results:   No visits with results within 1 Day(s) from this visit  Latest known visit with results is:   Clinical Support on 07/16/2021   Component Date Value    LEUKOCYTE ESTERASE,UA 07/16/2021 -     NITRITE,UA 07/16/2021 -     SL AMB POCT UROBILINOGEN 07/16/2021 0 2     POCT URINE PROTEIN 07/16/2021 15      PH,UA 07/16/2021 5 0     BLOOD,UA 07/16/2021 -     SPECIFIC GRAVITY,UA 07/16/2021 1 010     KETONES,UA 07/16/2021 -     BILIRUBIN,UA 07/16/2021 -     GLUCOSE, UA 07/16/2021 -      COLOR,UA 07/16/2021 yellow     CLARITY,UA 07/16/2021 clear          Radiology Results:   VAS lower limb venous duplex study, unilateral/limited    Result Date: 7/12/2021  Narrative:  [facility]ICAL: Indications: Localized edema [R60 0]  Patient presents with left distal posterior thigh tenderness/lump x several days  History of DVT  Currently on Xarelto  Operative History: 2018-09-28 Oophorectomy Risk Factors The patient has history of Obesity and DVT  FINDINGS:  Segment  Right            Left              Impression       Impression       CFV      Normal (Patent)  Normal (Patent)     CONCLUSION:  Impression:  RIGHT LOWER LIMB LIMITED: Evaluation shows no evidence of thrombus in the common femoral vein   Doppler evaluation shows a normal response to augmentation maneuvers  LEFT LOWER LIMB: No evidence of acute or chronic deep vein thrombosis No evidence of superficial thrombophlebitis noted  Doppler evaluation shows a normal response to augmentation maneuvers  Popliteal, posterior tibial and anterior tibial arterial Doppler waveforms are triphasic/biphasic  SIGNATURE: Electronically Signed Jason Petersen on 2021-07-12 04:24:53 PM    CT abdomen pelvis with contrast    Result Date: 7/4/2021  Narrative: CT ABDOMEN AND PELVIS WITH IV CONTRAST INDICATION:   luq pain  COMPARISON:  None  TECHNIQUE:  CT examination of the abdomen and pelvis was performed  Axial, sagittal, and coronal 2D reformatted images were created from the source data and submitted for interpretation  Radiation dose length product (DLP) for this visit:  1864 mGy-cm   This examination, like all CT scans performed in the [facility], was performed utilizing techniques to minimize radiation dose exposure, including the use of iterative reconstruction and automated exposure control  IV Contrast:  100 mL of iohexol (OMNIPAQUE) Enteric Contrast:  Enteric contrast was not administered  FINDINGS: ABDOMEN LOWER CHEST:  No clinically significant abnormality identified in the visualized lower chest  LIVER/BILIARY TREE:  Unremarkable  GALLBLADDER:  No calcified gallstones  No pericholecystic inflammatory change  SPLEEN:  Unremarkable  PANCREAS:  Unremarkable  ADRENAL GLANDS:  Unremarkable  KIDNEYS/URETERS:  Unremarkable  No hydronephrosis  STOMACH AND BOWEL:  There is colonic diverticulosis without evidence of acute diverticulitis  Small hiatal hernia is seen  APPENDIX:  No findings to suggest appendicitis  ABDOMINOPELVIC CAVITY:  No ascites  No pneumoperitoneum  No lymphadenopathy  VESSELS:  Unremarkable for patient's age  PELVIS REPRODUCTIVE ORGANS:  Unremarkable for patient's age  URINARY BLADDER:  Unremarkable  ABDOMINAL WALL/INGUINAL REGIONS:  Unremarkable   OSSEOUS STRUCTURES:  No acute fracture or destructive osseous lesion       Impression: No acute findings Workstation performed: [id number]

## 2021-07-27 NOTE — PROGRESS NOTES
[facility] - Outpatient Consultation  Sandhya Tadeo 36 y o  female MRN: [id number]  Encounter: [id number]          ASSESSMENT AND PLAN:      1  Epigastric pain  2  Hiatal hernia    Patient presents for an evaluation of epigastric abdominal discomfort x 1 month  She had a CT A/P which showed a small hiatal hernia  She was started on Omeprazole 20mg po daily and is reporting some improvement  Will plan for EGD to investigate for PUD, gastritis, h pylori, etc   Follow up ultrasound ordered by PCP to evaluate the gallbladder  Continue the Omeprazole 20mg po daily course  GERD modifications/dietary modifications reviewed  ______________________________________________________________________    HPI:  Patient is a 36year old female who presents to the office for an evaluation of abdominal pain  Patient reports she has been experiencing epigastric pain over the past month  She went to the ER and had a CT Scan A/P which showed a hiatal hernia  She also reports reflux symptoms over the past months  She denies vomiting  No melena or rectal bleeding  She was started on Omeprazole and is reporting some improvement  She also has made dietary changes and is working on weight loss  She denies frequent NSAIDs  She has never had an EGD  She is on Xarelto for DVTs and follows with Hematology  She has held her Xarelto for other procedures for 48 hours in the past without problems  She will hold the Xarelto 2 days before the EGD  REVIEW OF SYSTEMS:    CONSTITUTIONAL: Denies any fever, chills, rigors, and weight loss  HEENT: No earache or tinnitus  Denies hearing loss or visual disturbances  CARDIOVASCULAR: No chest pain or palpitations  RESPIRATORY: Denies any cough, hemoptysis, shortness of breath or dyspnea on exertion  GASTROINTESTINAL: As noted in the History of Present Illness  GENITOURINARY: No problems with urination  Denies any hematuria or dysuria    NEUROLOGIC: No dizziness or vertigo, denies headaches  MUSCULOSKELETAL: Denies any muscle or joint pain  SKIN: Denies skin rashes or itching  ENDOCRINE: Denies excessive thirst  Denies intolerance to heat or cold  PSYCHOSOCIAL: Denies depression or anxiety  Denies any recent memory loss  Historical Information   Past Medical History:   Diagnosis Date    Diverticulosis     DVT (deep venous thrombosis) (HCC)     Endometriosis     Gastritis     Lymphedema of right lower extremity     Obesity, Class III, BMI 40-49 9 (morbid obesity) (HCC)      Past Surgical History:   Procedure Laterality Date    ABLATION COLPOCLESIS      HYSTERECTOMY  2016    OOPHORECTOMY Bilateral 09/2016    OVARY SURGERY      TUBAL LIGATION       Social History   Social History     Substance and Sexual Activity   Alcohol Use Not Currently    Comment: extremely rare     Social History     Substance and Sexual Activity   Drug Use No     Social History     Tobacco Use   Smoking Status Never Smoker   Smokeless Tobacco Never Used     Family History   Problem Relation Age of Onset    Pancreatic cancer Maternal Grandmother     Clotting disorder Mother     Stroke Maternal Aunt     Diabetes Paternal Grandmother     Thyroid disease Paternal Grandmother     Hypertension Paternal Grandfather     Heart disease Paternal Grandfather        Meds/Allergies       Current Outpatient Medications:     cholecalciferol (VITAMIN D3) 1,000 units tablet    clindamycin (CLINDAGEL) 1 % gel    hydrochlorothiazide (HYDRODIURIL) 12 5 mg tablet    nitrofurantoin (MACROBID) 100 mg capsule    omeprazole (PriLOSEC) 20 mg delayed release capsule    phentermine 37 5 MG capsule    rivaroxaban (XARELTO) 20 mg tablet    sucralfate (CARAFATE) 1 g tablet    No Known Allergies        Objective     Blood pressure 104/82, pulse 91, height 5' 9" (1 753 m), weight (!) 146 kg (322 lb 3 2 oz), not currently breastfeeding  Body mass index is 47 58 kg/m²          PHYSICAL EXAM: General Appearance:   Alert, cooperative, no distress   HEENT:   Normocephalic, atraumatic, anicteric      Neck:  Supple, symmetrical, trachea midline   Lungs:   Clear to auscultation bilaterally; no rales, rhonchi or wheezing; respirations unlabored    Heart[de-identified]   Regular rate and rhythm; no murmur, rub, or gallop  Abdomen:   Soft, non-tender, non-distended; normal bowel sounds; no masses, no organomegaly    Genitalia:   Deferred    Rectal:   Deferred    Extremities:  No cyanosis, clubbing or edema    Pulses:  2+ and symmetric    Skin:  No jaundice, rashes, or lesions    Lymph nodes:  No palpable cervical lymphadenopathy        Lab Results:   No visits with results within 1 Day(s) from this visit  Latest known visit with results is:   Clinical Support on 07/16/2021   Component Date Value    LEUKOCYTE ESTERASE,UA 07/16/2021 -     NITRITE,UA 07/16/2021 -     SL AMB POCT UROBILINOGEN 07/16/2021 0 2     POCT URINE PROTEIN 07/16/2021 15      PH,UA 07/16/2021 5 0     BLOOD,UA 07/16/2021 -     SPECIFIC GRAVITY,UA 07/16/2021 1 010     KETONES,UA 07/16/2021 -     BILIRUBIN,UA 07/16/2021 -     GLUCOSE, UA 07/16/2021 -      COLOR,UA 07/16/2021 yellow     CLARITY,UA 07/16/2021 clear          Radiology Results:   VAS lower limb venous duplex study, unilateral/limited    Result Date: 7/12/2021  Narrative:  [facility] REPORT [facility]INICAL: Indications: Localized edema [R60 0]  Patient presents with left distal posterior thigh tenderness/lump x several days  History of DVT  Currently on Xarelto  Operative History: 2018-09-28 Oophorectomy Risk Factors The patient has history of Obesity and DVT  FINDINGS:  Segment  Right            Left              Impression       Impression       CFV      Normal (Patent)  Normal (Patent)     CONCLUSION:  Impression:  RIGHT LOWER LIMB LIMITED: Evaluation shows no evidence of thrombus in the common femoral vein   Doppler evaluation shows a normal response to augmentation maneuvers  LEFT LOWER LIMB: No evidence of acute or chronic deep vein thrombosis No evidence of superficial thrombophlebitis noted  Doppler evaluation shows a normal response to augmentation maneuvers  Popliteal, posterior tibial and anterior tibial arterial Doppler waveforms are triphasic/biphasic  SIGNATURE: Electronically Signed Demetria Hazel on 2021-07-12 04:24:53 PM    CT abdomen pelvis with contrast    Result Date: 7/4/2021  Narrative: CT ABDOMEN AND PELVIS WITH IV CONTRAST INDICATION:   luq pain  COMPARISON:  None  TECHNIQUE:  CT examination of the abdomen and pelvis was performed  Axial, sagittal, and coronal 2D reformatted images were created from the source data and submitted for interpretation  Radiation dose length product (DLP) for this visit:  1864 mGy-cm   This examination, like all CT scans performed in the [facility], was performed utilizing techniques to minimize radiation dose exposure, including the use of iterative reconstruction and automated exposure control  IV Contrast:  100 mL of iohexol (OMNIPAQUE) Enteric Contrast:  Enteric contrast was not administered  FINDINGS: ABDOMEN LOWER CHEST:  No clinically significant abnormality identified in the visualized lower chest  LIVER/BILIARY TREE:  Unremarkable  GALLBLADDER:  No calcified gallstones  No pericholecystic inflammatory change  SPLEEN:  Unremarkable  PANCREAS:  Unremarkable  ADRENAL GLANDS:  Unremarkable  KIDNEYS/URETERS:  Unremarkable  No hydronephrosis  STOMACH AND BOWEL:  There is colonic diverticulosis without evidence of acute diverticulitis  Small hiatal hernia is seen  APPENDIX:  No findings to suggest appendicitis  ABDOMINOPELVIC CAVITY:  No ascites  No pneumoperitoneum  No lymphadenopathy  VESSELS:  Unremarkable for patient's age  PELVIS REPRODUCTIVE ORGANS:  Unremarkable for patient's age  URINARY BLADDER:  Unremarkable  ABDOMINAL WALL/INGUINAL REGIONS:  Unremarkable   OSSEOUS STRUCTURES:  No acute fracture or destructive osseous lesion       Impression: No acute findings Workstation performed: [id number]

## 2021-08-10 ENCOUNTER — OFFICE VISIT (OUTPATIENT)
Dept: FAMILY MEDICINE CLINIC | Facility: CLINIC | Age: 41
End: 2021-08-10
Payer: COMMERCIAL

## 2021-08-10 VITALS
SYSTOLIC BLOOD PRESSURE: 108 MMHG | TEMPERATURE: 97.3 F | BODY MASS INDEX: 43.4 KG/M2 | WEIGHT: 293 LBS | HEART RATE: 114 BPM | HEIGHT: 69 IN | OXYGEN SATURATION: 96 % | DIASTOLIC BLOOD PRESSURE: 80 MMHG

## 2021-08-10 DIAGNOSIS — Z12.31 SCREENING MAMMOGRAM, ENCOUNTER FOR: Primary | ICD-10-CM

## 2021-08-10 DIAGNOSIS — L70.0 ACNE VULGARIS: ICD-10-CM

## 2021-08-10 DIAGNOSIS — R63.5 WEIGHT GAIN: ICD-10-CM

## 2021-08-10 DIAGNOSIS — L73.9 FOLLICULITIS OF BOTH AXILLAE: ICD-10-CM

## 2021-08-10 PROCEDURE — 99214 OFFICE O/P EST MOD 30 MIN: CPT | Performed by: NURSE PRACTITIONER

## 2021-08-10 PROCEDURE — 1036F TOBACCO NON-USER: CPT | Performed by: NURSE PRACTITIONER

## 2021-08-10 PROCEDURE — 3008F BODY MASS INDEX DOCD: CPT | Performed by: PHYSICIAN ASSISTANT

## 2021-08-10 RX ORDER — PHENTERMINE HYDROCHLORIDE 37.5 MG/1
37.5 CAPSULE ORAL EVERY MORNING
Qty: 30 CAPSULE | Refills: 0 | Status: SHIPPED | OUTPATIENT
Start: 2021-08-10 | End: 2021-09-15 | Stop reason: SDUPTHER

## 2021-08-10 RX ORDER — CLINDAMYCIN PHOSPHATE 10 MG/G
GEL TOPICAL 2 TIMES DAILY
Qty: 30 G | Refills: 0 | Status: SHIPPED | OUTPATIENT
Start: 2021-08-10 | End: 2021-09-02

## 2021-08-10 NOTE — PATIENT INSTRUCTIONS
On Friday if there is no improvement with folliculitis and then I can add an antibiotic  Continue with phentermine continue with diet and exercise   We can always evaluate new medications

## 2021-08-11 NOTE — ASSESSMENT & PLAN NOTE
Use warm compresses  Do not squeeze  Look for signs of infection  Advised to call office if no improvement by Friday

## 2021-08-11 NOTE — PROGRESS NOTES
Assessment/Plan:     Weight gain  Will continue phentermine for another month patient's goal weight is 300 lb  Will discuss other options  Continue diet and exercise  Folliculitis of both axillae  Use warm compresses  Do not squeeze  Look for signs of infection  Advised to call office if no improvement by Friday  Problem List Items Addressed This Visit        Musculoskeletal and Integument    Acne vulgaris    Relevant Medications    clindamycin (CLINDAGEL) 1 % gel    Folliculitis of both axillae     Use warm compresses  Do not squeeze  Look for signs of infection  Advised to call office if no improvement by Friday  Relevant Medications    clindamycin (CLINDAGEL) 1 % gel       Other    Weight gain     Will continue phentermine for another month patient's goal weight is 300 lb  Will discuss other options  Continue diet and exercise  Relevant Medications    phentermine 37 5 MG capsule      Other Visit Diagnoses     Screening mammogram, encounter for    -  Primary    Relevant Orders    Mammo screening bilateral w 3d & cad            Subjective:      Patient ID: Gracia Angel is a 36 y o  female  Patient is here for follow-up on weight loss efforts  Has been taking the phentermine with no side effects  Also does have complaints of folliculitis  Reports that she was in her hot tub and used bromine instead of chlorine and noticed that she had problems in the arms, her  he also has some areas of arms  Denies any pain  Denies any fevers or chills  The following portions of the patient's history were reviewed and updated as appropriate: allergies, current medications, past family history, past medical history, past social history, past surgical history and problem list     Review of Systems   Constitutional: Negative  HENT: Negative  Eyes: Negative  Respiratory: Negative  Cardiovascular: Negative  Gastrointestinal: Negative  Endocrine: Negative  Genitourinary: Negative  Musculoskeletal: Negative  Skin: Positive for rash (Lumps under her arm)  Allergic/Immunologic: Negative  Neurological: Negative  Psychiatric/Behavioral: Negative  Objective:      /80 (BP Location: Left arm, Patient Position: Sitting)   Pulse (!) 114   Temp (!) 97 3 °F (36 3 °C) (Tympanic)   Ht 5' 9" (1 753 m)   Wt (!) 145 kg (318 lb 12 8 oz)   SpO2 96%   BMI 47 08 kg/m²          Physical Exam  Vitals and nursing note reviewed  Constitutional:       Appearance: She is well-developed  HENT:      Head: Normocephalic and atraumatic  Eyes:      Pupils: Pupils are equal, round, and reactive to light  Cardiovascular:      Rate and Rhythm: Normal rate and regular rhythm  Pulses: Normal pulses  Heart sounds: Normal heart sounds  Pulmonary:      Effort: Pulmonary effort is normal       Breath sounds: Normal breath sounds  Abdominal:      General: Bowel sounds are normal       Palpations: Abdomen is soft  Musculoskeletal:         General: Normal range of motion  Cervical back: Normal range of motion  Skin:     General: Skin is warm and dry  Findings: Rash (Folliculitis ) present  Neurological:      General: No focal deficit present  Mental Status: She is alert and oriented to person, place, and time  Psychiatric:         Mood and Affect: Mood normal          Behavior: Behavior normal          Thought Content:  Thought content normal          Judgment: Judgment normal            Labs:    Lab Results   Component Value Date    WBC 8 89 07/04/2021    HGB 13 3 07/04/2021    HCT 42 6 07/04/2021    MCV 87 07/04/2021     07/04/2021     Lab Results   Component Value Date    K 4 1 07/04/2021     07/04/2021    CO2 30 07/04/2021    BUN 10 07/04/2021    CREATININE 0 96 07/04/2021    GLUF 86 12/07/2018    CALCIUM 9 7 07/04/2021    AST 13 07/04/2021    ALT 24 07/04/2021    ALKPHOS 119 (H) 07/04/2021    EGFR 74 07/04/2021 Lab Results   Component Value Date    CALCIUM 9 7 07/04/2021    K 4 1 07/04/2021    CO2 30 07/04/2021     07/04/2021    BUN 10 07/04/2021    CREATININE 0 96 07/04/2021

## 2021-08-11 NOTE — ASSESSMENT & PLAN NOTE
Will continue phentermine for another month patient's goal weight is 300 lb  Will discuss other options  Continue diet and exercise

## 2021-08-17 ENCOUNTER — TELEPHONE (OUTPATIENT)
Dept: GASTROENTEROLOGY | Facility: HOSPITAL | Age: 41
End: 2021-08-17

## 2021-08-18 ENCOUNTER — ANESTHESIA EVENT (OUTPATIENT)
Dept: GASTROENTEROLOGY | Facility: HOSPITAL | Age: 41
End: 2021-08-18

## 2021-08-18 ENCOUNTER — HOSPITAL ENCOUNTER (OUTPATIENT)
Dept: GASTROENTEROLOGY | Facility: HOSPITAL | Age: 41
Setting detail: OUTPATIENT SURGERY
Discharge: HOME | End: 2021-08-18
Attending: INTERNAL MEDICINE | Admitting: INTERNAL MEDICINE
Payer: COMMERCIAL

## 2021-08-18 ENCOUNTER — ANESTHESIA (OUTPATIENT)
Dept: GASTROENTEROLOGY | Facility: HOSPITAL | Age: 41
End: 2021-08-18

## 2021-08-18 VITALS
BODY MASS INDEX: 47.09 KG/M2 | WEIGHT: 293 LBS | HEIGHT: 66 IN | TEMPERATURE: 97.5 F | OXYGEN SATURATION: 98 % | SYSTOLIC BLOOD PRESSURE: 115 MMHG | DIASTOLIC BLOOD PRESSURE: 72 MMHG | HEART RATE: 83 BPM | RESPIRATION RATE: 16 BRPM

## 2021-08-18 DIAGNOSIS — R10.13 EPIGASTRIC PAIN: ICD-10-CM

## 2021-08-18 DIAGNOSIS — K29.70 GASTRITIS, PRESENCE OF BLEEDING UNSPECIFIED, UNSPECIFIED CHRONICITY, UNSPECIFIED GASTRITIS TYPE: ICD-10-CM

## 2021-08-18 PROCEDURE — 43239 EGD BIOPSY SINGLE/MULTIPLE: CPT | Performed by: INTERNAL MEDICINE

## 2021-08-18 PROCEDURE — 88305 TISSUE EXAM BY PATHOLOGIST: CPT | Performed by: PATHOLOGY

## 2021-08-18 RX ORDER — PROPOFOL 10 MG/ML
INJECTION, EMULSION INTRAVENOUS AS NEEDED
Status: DISCONTINUED | OUTPATIENT
Start: 2021-08-18 | End: 2021-08-18

## 2021-08-18 RX ORDER — BACILLUS COAGULANS/INULIN 1B-250 MG
1 CAPSULE ORAL DAILY
COMMUNITY

## 2021-08-18 RX ORDER — SODIUM CHLORIDE, SODIUM LACTATE, POTASSIUM CHLORIDE, CALCIUM CHLORIDE 600; 310; 30; 20 MG/100ML; MG/100ML; MG/100ML; MG/100ML
125 INJECTION, SOLUTION INTRAVENOUS CONTINUOUS
Status: DISCONTINUED | OUTPATIENT
Start: 2021-08-18 | End: 2021-08-22 | Stop reason: HOSPADM

## 2021-08-18 RX ORDER — MULTIVITAMIN
1 CAPSULE ORAL DAILY
COMMUNITY

## 2021-08-18 RX ORDER — UBIDECARENONE 75 MG
CAPSULE ORAL DAILY
COMMUNITY
End: 2021-11-09 | Stop reason: ALTCHOICE

## 2021-08-18 RX ORDER — LIDOCAINE HYDROCHLORIDE 20 MG/ML
INJECTION, SOLUTION EPIDURAL; INFILTRATION; INTRACAUDAL; PERINEURAL AS NEEDED
Status: DISCONTINUED | OUTPATIENT
Start: 2021-08-18 | End: 2021-08-18

## 2021-08-18 RX ADMIN — PROPOFOL 200 MG: 10 INJECTION, EMULSION INTRAVENOUS at 09:05

## 2021-08-18 RX ADMIN — SODIUM CHLORIDE, SODIUM LACTATE, POTASSIUM CHLORIDE, AND CALCIUM CHLORIDE 125 ML/HR: .6; .31; .03; .02 INJECTION, SOLUTION INTRAVENOUS at 08:59

## 2021-08-18 RX ADMIN — PROPOFOL 50 MG: 10 INJECTION, EMULSION INTRAVENOUS at 09:08

## 2021-08-18 RX ADMIN — PROPOFOL 50 MG: 10 INJECTION, EMULSION INTRAVENOUS at 09:10

## 2021-08-18 RX ADMIN — PROPOFOL 30 MG: 10 INJECTION, EMULSION INTRAVENOUS at 09:07

## 2021-08-18 RX ADMIN — LIDOCAINE HYDROCHLORIDE 100 MG: 20 INJECTION, SOLUTION EPIDURAL; INFILTRATION; INTRACAUDAL; PERINEURAL at 09:05

## 2021-08-18 NOTE — INTERVAL H&P NOTE
H&P reviewed  After examining the patient I find no changes in the patients condition since the H&P had been written      Vitals:    08/18/21 0840   BP: 112/72   Pulse: 83   Resp: 16   Temp: (!) 97 4 °F (36 3 °C)   SpO2: 97%

## 2021-08-18 NOTE — ANESTHESIA PREPROCEDURE EVALUATION
Procedure:  EGD    Relevant Problems   No relevant active problems      Endometriosis    Lymphedema of right lower extremity    DVT (deep venous thrombosis) ([facility])    Obesity, Class III, BMI 40-49 9 (morbid obesity) ([facility])    Gastritis    Diverticulosis      Lymphedema of right lower extremity   Morbid obesity with BMI of 50 0-59 9, adult ([facility])   Weight gain   Fungal infection   Acne vulgaris   Abdominal discomfort   Leg edema, left   Lymphedema   Acute superficial gastritis without hemorrhage   Folliculitis of both axillae       Physical Exam    Airway    Mallampati score: III  TM Distance: >3 FB  Neck ROM: full     Dental       Cardiovascular  Cardiovascular exam normal    Pulmonary  Pulmonary exam normal     Other Findings        Anesthesia Plan  ASA Score- 3     Anesthesia Type- IV sedation with anesthesia with ASA Monitors  Additional Monitors:   Airway Plan:           Plan Factors-Exercise tolerance (METS): >4 METS  Chart reviewed  EKG reviewed  Imaging results reviewed  Existing labs reviewed  Patient summary reviewed  Induction- intravenous  Postoperative Plan-     Informed Consent- Anesthetic plan and risks discussed with patient  I personally reviewed this patient with the CRNA  Discussed and agreed on the Anesthesia Plan with the CRNA  Margoth Handy

## 2021-08-25 ENCOUNTER — TELEPHONE (OUTPATIENT)
Dept: GASTROENTEROLOGY | Facility: CLINIC | Age: 41
End: 2021-08-25

## 2021-08-25 NOTE — TELEPHONE ENCOUNTER
Please tell her the biopsies were totally benign, no evidence of infection or anything significant  Have her continue the Prilosec and call me in 2 weeks with a progress report

## 2021-09-02 DIAGNOSIS — L70.0 ACNE VULGARIS: ICD-10-CM

## 2021-09-02 RX ORDER — CLINDAMYCIN PHOSPHATE 10 MG/G
GEL TOPICAL
Qty: 30 G | Refills: 0 | Status: SHIPPED | OUTPATIENT
Start: 2021-09-02

## 2021-09-04 DIAGNOSIS — I89.0 LYMPHEDEMA OF RIGHT LOWER EXTREMITY: ICD-10-CM

## 2021-09-10 RX ORDER — HYDROCHLOROTHIAZIDE 12.5 MG/1
TABLET ORAL
Qty: 30 TABLET | Refills: 0 | Status: SHIPPED | OUTPATIENT
Start: 2021-09-10 | End: 2021-11-13 | Stop reason: SDUPTHER

## 2021-09-15 ENCOUNTER — OFFICE VISIT (OUTPATIENT)
Dept: FAMILY MEDICINE CLINIC | Facility: CLINIC | Age: 41
End: 2021-09-15
Payer: COMMERCIAL

## 2021-09-15 VITALS
RESPIRATION RATE: 18 BRPM | TEMPERATURE: 97.4 F | BODY MASS INDEX: 47.09 KG/M2 | HEART RATE: 116 BPM | DIASTOLIC BLOOD PRESSURE: 68 MMHG | OXYGEN SATURATION: 99 % | WEIGHT: 293 LBS | SYSTOLIC BLOOD PRESSURE: 118 MMHG | HEIGHT: 66 IN

## 2021-09-15 DIAGNOSIS — R63.5 WEIGHT GAIN: ICD-10-CM

## 2021-09-15 DIAGNOSIS — Z00.00 ANNUAL PHYSICAL EXAM: Primary | ICD-10-CM

## 2021-09-15 PROCEDURE — 3008F BODY MASS INDEX DOCD: CPT | Performed by: NURSE PRACTITIONER

## 2021-09-15 PROCEDURE — 99396 PREV VISIT EST AGE 40-64: CPT | Performed by: NURSE PRACTITIONER

## 2021-09-15 PROCEDURE — 3725F SCREEN DEPRESSION PERFORMED: CPT | Performed by: NURSE PRACTITIONER

## 2021-09-15 PROCEDURE — 1036F TOBACCO NON-USER: CPT | Performed by: NURSE PRACTITIONER

## 2021-09-15 RX ORDER — PHENTERMINE HYDROCHLORIDE 37.5 MG/1
37.5 CAPSULE ORAL EVERY MORNING
Qty: 30 CAPSULE | Refills: 0 | Status: SHIPPED | OUTPATIENT
Start: 2021-09-15 | End: 2022-02-14

## 2021-09-15 NOTE — PATIENT INSTRUCTIONS
Wellness Visit for Adults   AMBULATORY CARE:   A wellness visit  is when you see your healthcare provider to get screened for health problems  Your healthcare provider will also give you advice on how to stay healthy  Write down your questions so you remember to ask them  Ask your healthcare provider how often you should have a wellness visit  What happens at a wellness visit:  Your healthcare provider will ask about your health, and your family history of health problems  This includes high blood pressure, heart disease, and cancer  He or she will ask if you have symptoms that concern you, if you smoke, and about your mood  You may also be asked about your intake of medicines, supplements, food, and alcohol  Any of the following may be done:  · Your weight  will be checked  Your height may also be checked so your body mass index (BMI) can be calculated  Your BMI shows if you are at a healthy weight  · Your blood pressure  and heart rate will be checked  Your temperature may also be checked  · Blood and urine tests  may be done  Blood tests may be done to check your cholesterol levels  Abnormal cholesterol levels increase your risk for heart disease and stroke  You may also need a blood or urine test to check for diabetes if you are at increased risk  Urine tests may be done to look for signs of an infection or kidney disease  · A physical exam  includes checking your heartbeat and lungs with a stethoscope  Your healthcare provider may also check your skin to look for sun damage  · Screening tests  may be recommended  A screening test is done to check for diseases that may not cause symptoms  The screening tests you may need depend on your age, gender, family history, and lifestyle habits  For example, colorectal screening may be recommended if you are 48years old or older  Screening tests you need if you are a woman:   · A Pap smear  is used to screen for cervical cancer   Pap smears are usually done every 3 to 5 years depending on your age  You may need them more often if you have had abnormal Pap smear test results in the past  Ask your healthcare provider how often you should have a Pap smear  · A mammogram  is an x-ray of your breasts to screen for breast cancer  Experts recommend mammograms every 2 years starting at age 48 years  You may need a mammogram at age 52 years or younger if you have an increased risk for breast cancer  Talk to your healthcare provider about when you should start having mammograms and how often you need them  Vaccines you may need:   · Get an influenza vaccine  every year  The influenza vaccine protects you from the flu  Several types of viruses cause the flu  The viruses change over time, so new vaccines are made each year  · Get a tetanus-diphtheria (Td) booster vaccine  every 10 years  This vaccine protects you against tetanus and diphtheria  Tetanus is a severe infection that may cause painful muscle spasms and lockjaw  Diphtheria is a severe bacterial infection that causes a thick covering in the back of your mouth and throat  · Get a human papillomavirus (HPV) vaccine  if you are female and aged 23 to 32 or male 23 to 24 and never received it  This vaccine protects you from HPV infection  HPV is the most common infection spread by sexual contact  HPV may also cause vaginal, penile, and anal cancers  · Get a pneumococcal vaccine  if you are aged 72 years or older  The pneumococcal vaccine is an injection given to protect you from pneumococcal disease  Pneumococcal disease is an infection caused by pneumococcal bacteria  The infection may cause pneumonia, meningitis, or an ear infection  · Get a shingles vaccine  if you are 60 or older, even if you have had shingles before  The shingles vaccine is an injection to protect you from the varicella-zoster virus  This is the same virus that causes chickenpox   Shingles is a painful rash that develops in people who had chickenpox or have been exposed to the virus  How to eat healthy:  My Plate is a model for planning healthy meals  It shows the types and amounts of foods that should go on your plate  Fruits and vegetables make up about half of your plate, and grains and protein make up the other half  A serving of dairy is included on the side of your plate  The amount of calories and serving sizes you need depends on your age, gender, weight, and height  Examples of healthy foods are listed below:  · Eat a variety of vegetables  such as dark green, red, and orange vegetables  You can also include canned vegetables low in sodium (salt) and frozen vegetables without added butter or sauces  · Eat a variety of fresh fruits , canned fruit in 100% juice, frozen fruit, and dried fruit  · Include whole grains  At least half of the grains you eat should be whole grains  Examples include whole-wheat bread, wheat pasta, brown rice, and whole-grain cereals such as oatmeal     · Eat a variety of protein foods such as seafood (fish and shellfish), lean meat, and poultry without skin (turkey and chicken)  Examples of lean meats include pork leg, shoulder, or tenderloin, and beef round, sirloin, tenderloin, and extra lean ground beef  Other protein foods include eggs and egg substitutes, beans, peas, soy products, nuts, and seeds  · Choose low-fat dairy products such as skim or 1% milk or low-fat yogurt, cheese, and cottage cheese  · Limit unhealthy fats  such as butter, hard margarine, and shortening  Exercise:  Exercise at least 30 minutes per day on most days of the week  Some examples of exercise include walking, biking, dancing, and swimming  You can also fit in more physical activity by taking the stairs instead of the elevator or parking farther away from stores  Include muscle strengthening activities 2 days each week  Regular exercise provides many health benefits   It helps you manage your weight, and decreases your risk for type 2 diabetes, heart disease, stroke, and high blood pressure  Exercise can also help improve your mood  Ask your healthcare provider about the best exercise plan for you  General health and safety guidelines:   · Do not smoke  Nicotine and other chemicals in cigarettes and cigars can cause lung damage  Ask your healthcare provider for information if you currently smoke and need help to quit  E-cigarettes or smokeless tobacco still contain nicotine  Talk to your healthcare provider before you use these products  · Limit alcohol  A drink of alcohol is 12 ounces of beer, 5 ounces of wine, or 1½ ounces of liquor  · Lose weight, if needed  Being overweight increases your risk of certain health conditions  These include heart disease, high blood pressure, type 2 diabetes, and certain types of cancer  · Protect your skin  Do not sunbathe or use tanning beds  Use sunscreen with a SPF 15 or higher  Apply sunscreen at least 15 minutes before you go outside  Reapply sunscreen every 2 hours  Wear protective clothing, hats, and sunglasses when you are outside  · Drive safely  Always wear your seatbelt  Make sure everyone in your car wears a seatbelt  A seatbelt can save your life if you are in an accident  Do not use your cell phone when you are driving  This could distract you and cause an accident  Pull over if you need to make a call or send a text message  · Practice safe sex  Use latex condoms if are sexually active and have more than one partner  Your healthcare provider may recommend screening tests for sexually transmitted infections (STIs)  · Wear helmets, lifejackets, and protective gear  Always wear a helmet when you ride a bike or motorcycle, go skiing, or play sports that could cause a head injury  Wear protective equipment when you play sports  Wear a lifejacket when you are on a boat or doing water sports      © Copyright [employer] 2021 Information is for End User's use only and may not be sold, redistributed or otherwise used for commercial purposes  All illustrations and images included in CareNotes® are the copyrighted property of A D A M , Inc  or Brynn Duran  The above information is an  only  It is not intended as medical advice for individual conditions or treatments  Talk to your doctor, nurse or pharmacist before following any medical regimen to see if it is safe and effective for you  Weight Management   AMBULATORY CARE:   Why it is important to manage your weight:  Being overweight increases your risk of health conditions such as heart disease, high blood pressure, type 2 diabetes, and certain types of cancer  It can also increase your risk for osteoarthritis, sleep apnea, and other respiratory problems  Aim for a slow, steady weight loss  Even a small amount of weight loss can lower your risk of health problems  How to lose weight safely:  A safe and healthy way to lose weight is to eat fewer calories and get regular exercise  · You can lose up about 1 pound a week by decreasing the number of calories you eat by 500 calories each day  You can decrease calories by eating smaller portion sizes or by cutting out high-calorie foods  Read labels to find out how many calories are in the foods you eat  · You can also burn calories with exercise such as walking, swimming, or biking  You will be more likely to keep weight off if you make these changes part of your lifestyle  Exercise at least 30 minutes per day on most days of the week  You can also fit in more physical activity by taking the stairs instead of the elevator or parking farther away from stores  Ask your healthcare provider about the best exercise plan for you  Healthy meal plan for weight management:  A healthy meal plan includes a variety of foods, contains fewer calories, and helps you stay healthy   A healthy meal plan includes the following:     · Eat whole-grain foods more often  A healthy meal plan should contain fiber  Fiber is the part of grains, fruits, and vegetables that is not broken down by your body  Whole-grain foods are healthy and provide extra fiber in your diet  Some examples of whole-grain foods are whole-wheat breads and pastas, oatmeal, brown rice, and bulgur  · Eat a variety of vegetables every day  Include dark, leafy greens such as spinach, kale, nayana greens, and mustard greens  Eat yellow and orange vegetables such as carrots, sweet potatoes, and winter squash  · Eat a variety of fruits every day  Choose fresh or canned fruit (canned in its own juice or light syrup) instead of juice  Fruit juice has very little or no fiber  · Eat low-fat dairy foods  Drink fat-free (skim) milk or 1% milk  Eat fat-free yogurt and low-fat cottage cheese  Try low-fat cheeses such as mozzarella and other reduced-fat cheeses  · Choose meat and other protein foods that are low in fat  Choose beans or other legumes such as split peas or lentils  Choose fish, skinless poultry (chicken or turkey), or lean cuts of red meat (beef or pork)  Before you cook meat or poultry, cut off any visible fat  · Use less fat and oil  Try baking foods instead of frying them  Add less fat, such as margarine, sour cream, regular salad dressing and mayonnaise to foods  Eat fewer high-fat foods  Some examples of high-fat foods include french fries, doughnuts, ice cream, and cakes  · Eat fewer sweets  Limit foods and drinks that are high in sugar  This includes candy, cookies, regular soda, and sweetened drinks  Ways to decrease calories:   · Eat smaller portions  ? Use a small plate with smaller servings  ? Do not eat second helpings  ? When you eat at a restaurant, ask for a box and place half of your meal in the box before you eat  ? Share an entrée with someone else  · Replace high-calorie snacks with healthy, low-calorie snacks  ? Choose fresh fruit, vegetables, fat-free rice cakes, or air-popped popcorn instead of potato chips, nuts, or chocolate  ? Choose water or calorie-free drinks instead of soda or sweetened drinks  · Do not shop for groceries when you are hungry  You may be more likely to make unhealthy food choices  Take a grocery list of healthy foods and shop after you have eaten  · Eat regular meals  Do not skip meals  Skipping meals can lead to overeating later in the day  This can make it harder for you to lose weight  Eat a healthy snack in place of a meal if you do not have time to eat a regular meal  Talk with a dietitian to help you create a meal plan and schedule that is right for you  Other things to consider as you try to lose weight:   · Be aware of situations that may give you the urge to overeat, such as eating while watching television  Find ways to avoid these situations  For example, read a book, go for a walk, or do crafts  · Meet with a weight loss support group or friends who are also trying to lose weight  This may help you stay motivated to continue working on your weight loss goals  © Copyright [employer] 2021 Information is for End User's use only and may not be sold, redistributed or otherwise used for commercial purposes  All illustrations and images included in CareNotes® are the copyrighted property of [employer]  or Brynn Duran  The above information is an  only  It is not intended as medical advice for individual conditions or treatments  Talk to your doctor, nurse or pharmacist before following any medical regimen to see if it is safe and effective for you  Obesity   AMBULATORY CARE:   Obesity  is when your body mass index (BMI) is greater than 30  Your healthcare provider will use your height and weight to measure your BMI  The risks of obesity include  many health problems, such as injuries or physical disability   You may need tests to check for the following:  · Diabetes    · High blood pressure or high cholesterol    · Heart disease    · Gallbladder or liver disease    · Cancer of the colon, breast, prostate, liver, or kidney    · Sleep apnea    · Arthritis or gout    Seek care immediately if:   · You have a severe headache, confusion, or difficulty speaking  · You have weakness on one side of your body  · You have chest pain, sweating, or shortness of breath  Contact your healthcare provider if:   · You have symptoms of gallbladder or liver disease, such as pain in your upper abdomen  · You have knee or hip pain and discomfort while walking  · You have symptoms of diabetes, such as intense hunger and thirst, and frequent urination  · You have symptoms of sleep apnea, such as snoring or daytime sleepiness  · You have questions or concerns about your condition or care  Treatment for obesity  focuses on helping you lose weight to improve your health  Even a small decrease in BMI can reduce the risk for many health problems  Your healthcare provider will help you set a weight-loss goal   · Lifestyle changes  are the first step in treating obesity  These include making healthy food choices and getting regular physical activity  Your healthcare provider may suggest a weight-loss program that involves coaching, education, and therapy  · Medicine  may help you lose weight when it is used with a healthy diet and physical activity  · Surgery  can help you lose weight if you are very obese and have other health problems  There are several types of weight-loss surgery  Ask your healthcare provider for more information  Be successful losing weight:   · Set small, realistic goals  An example of a small goal is to walk for 20 minutes 5 days a week  Luz goal is to lose 5% of your body weight  · Tell friends, family members, and coworkers about your goals  and ask for their support   Ask a friend to lose weight with you, or join a weight-loss support group  · Identify foods or triggers that may cause you to overeat , and find ways to avoid them  Remove tempting high-calorie foods from your home and workplace  Place a bowl of fresh fruit on your kitchen counter  If stress causes you to eat, then find other ways to cope with stress  · Keep a diary to track what you eat and drink  Also write down how many minutes of physical activity you do each day  Weigh yourself once a week and record it in your diary  Eating changes: You will need to eat 500 to 1,000 fewer calories each day than you currently eat to lose 1 to 2 pounds a week  The following changes will help you cut calories:  · Eat smaller portions  Use small plates, no larger than 9 inches in diameter  Fill your plate half full of fruits and vegetables  Measure your food using measuring cups until you know what a serving size looks like  · Eat 3 meals and 1 or 2 snacks each day  Plan your meals in advance  Trentno Martines and eat at home most of the time  Eat slowly  Do not skip meals  Skipping meals can lead to overeating later in the day  This can make it harder for you to lose weight  Talk with a dietitian to help you make a meal plan and schedule that is right for you  · Eat fruits and vegetables at every meal   They are low in calories and high in fiber, which makes you feel full  Do not add butter, margarine, or cream sauce to vegetables  Use herbs to season steamed vegetables  · Eat less fat and fewer fried foods  Eat more baked or grilled chicken and fish  These protein sources are lower in calories and fat than red meat  Limit fast food  Dress your salads with olive oil and vinegar instead of bottled dressing  · Limit the amount of sugar you eat  Do not drink sugary beverages  Limit alcohol  Activity changes:  Physical activity is good for your body in many ways  It helps you burn calories and build strong muscles   It decreases stress and depression, and improves your mood  It can also help you sleep better  Talk to your healthcare provider before you begin an exercise program   · Exercise for at least 30 minutes 5 days a week  Start slowly  Set aside time each day for physical activity that you enjoy and that is convenient for you  It is best to do both weight training and an activity that increases your heart rate, such as walking, bicycling, or swimming  · Find ways to be more active  Do yard work and housecleaning  Walk up the stairs instead of using elevators  Spend your leisure time going to events that require walking, such as outdoor festivals or fairs  This extra physical activity can help you lose weight and keep it off  Follow up with your healthcare provider as directed: You may need to meet with a dietitian  Write down your questions so you remember to ask them during your visits  © Copyright [employer] 2021 Information is for End User's use only and may not be sold, redistributed or otherwise used for commercial purposes  All illustrations and images included in CareNotes® are the copyrighted property of A D A M , Inc  or [facility] Samuel Parker   The above information is an  only  It is not intended as medical advice for individual conditions or treatments  Talk to your doctor, nurse or pharmacist before following any medical regimen to see if it is safe and effective for you  Cigarette Smoking and Your Health   AMBULATORY CARE:   Risks to your health if you smoke:  Nicotine and other chemicals found in tobacco and e-cigarettes can damage every cell in your body  Even if you are a light smoker, you have an increased risk for cancer, heart disease, and lung disease  If you are pregnant or have diabetes, smoking increases your risk for complications  Nicotine can affect an adolescent's developing brain  This can lead to trouble thinking, learning, or paying attention    Benefits to your health if you stop smoking:   · You decrease respiratory symptoms such as coughing, wheezing, and shortness of breath  · You reduce your risk for cancers of the lung, mouth, throat, kidney, bladder, pancreas, stomach, and cervix  If you already have cancer, you increase the benefits of chemotherapy  You also reduce your risk for cancer returning or a second cancer from developing  · You reduce your risk for heart disease, blood clots, heart attack, and stroke  · You reduce your risk for lung infections, and diseases such as pneumonia, asthma, chronic bronchitis, and emphysema  · Your circulation improves  More oxygen can be delivered to your body  If you have diabetes, you lower your risk for complications, such as kidney, artery, and eye diseases  You also lower your risk for nerve damage  Nerve damage can lead to amputations, poor vision, and blindness  · You improve your body's ability to heal and to fight infections  · An adolescent can help his or her brain and body develop in a healthy way  Talk to your adolescent about all the health risks of nicotine  If you can, start talking about nicotine when your child is younger than 12 years  This may make it easier for him or her not to start using nicotine as a teenager or adult  Explain to him or her that it is best never to start  It can be hard to try to quit later  Benefits to the health of others if you stop smoking:  Tobacco is harmful to nonsmokers who breathe in your secondhand smoke  The following are ways the health of others around you may improve when you stop smoking:  · You lower the risks for lung cancer and heart disease in nonsmoking adults  · If you are pregnant, you lower the risk for miscarriage, early delivery, low birth weight, and stillbirth  You also lower your baby's risk for SIDS, obesity, developmental delay, and neurobehavioral problems, such as ADHD      · If you have children, you lower their risk for ear infections, colds, pneumonia, bronchitis, and asthma  Follow up with your doctor as directed:  Write down your questions so you remember to ask them during your visits  For support and more information:   · American Lung Association  [address]  [place] , [facility] [address]  Phone: [address]  Phone: 4- 730 - [phone]  Web Address: Marcela alamo    · Smokefree  gov  Phone: 6- 661 - 261-9593  Web Address: www smokefree  [address] 2021 Information is for End User's use only and may not be sold, redistributed or otherwise used for commercial purposes  All illustrations and images included in CareNotes® are the copyrighted property of A D A M , Inc  or [address] [place] [address]  The above information is an  only  It is not intended as medical advice for individual conditions or treatments  Talk to your doctor, nurse or pharmacist before following any medical regimen to see if it is safe and effective for you  Cholesterol and Your Health   AMBULATORY CARE:   Cholesterol  is a waxy, fat-like substance  Your body uses cholesterol to make hormones and new cells, and to protect nerves  Cholesterol is made by your body  It also comes from certain foods you eat, such as meat and dairy products  Your healthcare provider can help you set goals for your cholesterol levels  He or she can help you create a plan to meet your goals  Cholesterol level goals: Your cholesterol level goals depend on your risk for heart disease, your age, and your other health conditions  The following are general guidelines:  · Total cholesterol  includes low-density lipoprotein (LDL), high-density lipoprotein (HDL), and triglyceride levels  The total cholesterol level should be lower than 200 mg/dL and is best at about 150 mg/dL  · LDL cholesterol  is called bad cholesterol  because it forms plaque in your arteries  As plaque builds up, your arteries become narrow, and less blood flows through   When plaque decreases blood flow to your heart, you may have chest pain  If plaque completely blocks an artery that brings blood to your heart, you may have a heart attack  Plaque can break off and form blood clots  Blood clots may block arteries in your brain and cause a stroke  The level should be less than 130 mg/dL and is best at about 100 mg/dL  · HDL cholesterol  is called good cholesterol  because it helps remove LDL cholesterol from your arteries  It does this by attaching to LDL cholesterol and carrying it to your liver  Your liver breaks down LDL cholesterol so your body can get rid of it  High levels of HDL cholesterol can help prevent a heart attack and stroke  Low levels of HDL cholesterol can increase your risk for heart disease, heart attack, and stroke  The level should be 60 mg/dL or higher  · Triglycerides  are a type of fat that store energy from foods you eat  High levels of triglycerides also cause plaque buildup  This can increase your risk for a heart attack or stroke  If your triglyceride level is high, your LDL cholesterol level may also be high  The level should be less than 150 mg/dL  Any of the following can increase your risk for high cholesterol:   · Smoking cigarettes    · Being overweight or obese, or not getting enough exercise    · Drinking large amounts of alcohol    · A medical condition such as hypertension (high blood pressure) or diabetes    · Certain genes passed from your parents to you    · Age older than 65 years    What you need to know about having your cholesterol levels checked: Adults 21to 39years of age should have their cholesterol levels checked every 4 to 6 years  Adults 45 years or older should have their cholesterol checked every 1 to 2 years  You may need your cholesterol checked more often, or at a younger age, if you have risk factors for heart disease  You may also need to have your cholesterol checked more often if you have other health conditions, such as diabetes   Blood tests are used to check cholesterol levels  Blood tests measure your levels of triglycerides, LDL cholesterol, and HDL cholesterol  How healthy fats affect your cholesterol levels:  Healthy fats, also called unsaturated fats, help lower LDL cholesterol and triglyceride levels  Healthy fats include the following:  · Monounsaturated fats  are found in foods such as olive oil, canola oil, avocado, nuts, and olives  · Polyunsaturated fats,  such as omega 3 fats, are found in fish, such as salmon, trout, and tuna  They can also be found in plant foods such as flaxseed, walnuts, and soybeans  How unhealthy fats affect your cholesterol levels:  Unhealthy fats increase LDL cholesterol and triglyceride levels  They are found in foods high in cholesterol, saturated fat, and trans fat:  · Cholesterol  is found in eggs, dairy, and meat  · Saturated fat  is found in butter, cheese, ice cream, whole milk, and coconut oil  Saturated fat is also found in meat, such as sausage, hot dogs, and bologna  · Trans fat  is found in liquid oils and is used in fried and baked foods  Foods that contain trans fats include chips, crackers, muffins, sweet rolls, microwave popcorn, and cookies  Treatment  for high cholesterol will also decrease your risk of heart disease, heart attack, and stroke  Treatment may include any of the following:  · Lifestyle changes  may include food, exercise, weight loss, and quitting smoking  You may also need to decrease the amount of alcohol you drink  Your healthcare provider will want you to start with lifestyle changes  Other treatment may be added if lifestyle changes are not enough  Your healthcare provider may recommend you work with a team to manage hyperlipidemia  The team may include medical experts such as a dietitian, an exercise or physical therapist, and a behavior therapist  Your family members may be included in helping you create lifestyle changes      · Medicines  may be given to lower your LDL cholesterol, triglyceride levels, or total cholesterol level  You may need medicines to lower your cholesterol if any of the following is true:    ? You have a history of stroke, TIA, unstable angina, or a heart attack  ? Your LDL cholesterol level is 190 mg/dL or higher  ? You are age 36 to 76 years, have diabetes or heart disease risk factors, and your LDL cholesterol is 70 mg/dL or higher  · Supplements  include fish oil, red yeast rice, and garlic  Fish oil may help lower your triglyceride and LDL cholesterol levels  It may also increase your HDL cholesterol level  Red yeast rice may help decrease your total cholesterol level and LDL cholesterol level  Garlic may help lower your total cholesterol level  Do not take any supplements without talking to your healthcare provider  Food changes you can make to lower your cholesterol levels:  A dietitian can help you create a healthy eating plan  He or she can show you how to read food labels and choose foods low in saturated fat, trans fats, and cholesterol  · Decrease the total amount of fat you eat  Choose lean meats, fat-free or 1% fat milk, and low-fat dairy products, such as yogurt and cheese  Try to limit or avoid red meats  Limit or do not eat fried foods or baked goods, such as cookies  · Replace unhealthy fats with healthy fats  Cook foods in olive oil or canola oil  Choose soft margarines that are low in saturated fat and trans fat  Seeds, nuts, and avocados are other examples of healthy fats  · Eat foods with omega-3 fats  Examples include salmon, tuna, mackerel, walnuts, and flaxseed  Eat fish 2 times per week  Pregnant women should not eat fish that have high levels of mercury, such as shark, swordfish, and nicole mackerel  · Increase the amount of high-fiber foods you eat  High-fiber foods can help lower your LDL cholesterol  Aim to get between 20 and 30 grams of fiber each day  Fruits and vegetables are high in fiber   Eat at least 5 servings each day  Other high-fiber foods are whole-grain or whole-wheat breads, pastas, or cereals, and brown rice  Eat 3 ounces of whole-grain foods each day  Increase fiber slowly  You may have abdominal discomfort, bloating, and gas if you add fiber to your diet too quickly  · Eat healthy protein foods  Examples include low-fat dairy products, skinless chicken and turkey, fish, and nuts  · Limit foods and drinks that are high in sugar  Your dietitian or healthcare provider can help you create daily limits for high-sugar foods and drinks  The limit may be lower if you have diabetes or another health condition  Limits can also help you lose weight if needed  Lifestyle changes you can make to lower your cholesterol levels:   · Maintain a healthy weight  Ask your healthcare provider what a healthy weight is for you  Ask him or her to help you create a weight loss plan if needed  Weight loss can decrease your total cholesterol and triglyceride levels  Weight loss may also help keep your blood pressure at a healthy level  · Be physically active throughout the day  Physical activity, such as exercise, can help lower your total cholesterol level and maintain a healthy weight  Physical activity can also help increase your HDL cholesterol level  Work with your healthcare provider to create an program that is right for you  Get at least 30 to 40 minutes of moderate physical activity most days of the week  Examples of exercise include brisk walking, swimming, or biking  Also include strength training at least 2 times each week  Your healthcare providers can help you create a physical activity plan  · Do not smoke  Nicotine and other chemicals in cigarettes and cigars can raise your cholesterol levels  Ask your healthcare provider for information if you currently smoke and need help to quit  E-cigarettes or smokeless tobacco still contain nicotine   Talk to your healthcare provider before you use these products  · Limit or do not drink alcohol  Alcohol can increase your triglyceride levels  Ask your healthcare provider before you drink alcohol  Ask how much is okay for you to drink in 24 hours or 1 week  Follow up with your doctor as directed:  Write down your questions so you remember to ask them during your visits  © Copyright [address] 2021 Information is for End User's use only and may not be sold, redistributed or otherwise used for commercial purposes  All illustrations and images included in CareNotes® are the copyrighted property of A D [employer] M , [employer]  or [address] [place], [place] [address]elieser   The above information is an  only  It is not intended as medical advice for individual conditions or treatments  Talk to your doctor, nurse or pharmacist before following any medical regimen to see if it is safe and effective for you

## 2021-09-15 NOTE — PROGRESS NOTES
[address]    NAME: Asya Andino  AGE: 36 y o  SEX: female  : 1980     DATE: 9/15/2021     Assessment and Plan:     Problem List Items Addressed This Visit        Other    Weight gain    Relevant Medications    phentermine 37 5 MG capsule    Annual physical exam - Primary       Assessment completed  Patient is up-to-date with blood work  Will schedule mammogram   Discussed weight loss  Will continue with phentermine for another month  Goal is to get under 300 pounds  Discussed managing stress  Immunizations and preventive care screenings were discussed with patient today  Appropriate education was printed on patient's after visit summary  Counseling:  Alcohol/drug use: discussed moderation in alcohol intake, the recommendations for healthy alcohol use, and avoidance of illicit drug use  Dental Health: discussed importance of regular tooth brushing, flossing, and dental visits  Injury prevention: discussed safety/seat belts, safety helmets, smoke detectors, carbon dioxide detectors, and smoking near bedding or upholstery  Sexual health: discussed sexually transmitted diseases, partner selection, use of condoms, avoidance of unintended pregnancy, and contraceptive alternatives  · Exercise: the importance of regular exercise/physical activity was discussed  Recommend exercise 3-5 times per week for at least 30 minutes  Depression Screening and Follow-up Plan:   Patient was screened for depression during today's encounter  They screened negative with a PHQ-2 score of 0  No follow-ups on file  Chief Complaint:     Chief Complaint   Patient presents with    Follow-up      History of Present Illness:     Adult Annual Physical   Patient here for a comprehensive physical exam  The patient reports problems - abdominal distension      Diet and Physical Activity  · Diet/Nutrition: well balanced diet  · Exercise: no formal exercise  Depression Screening  PHQ-9 Depression Screening    PHQ-9:   Frequency of the following problems over the past two weeks:      Little interest or pleasure in doing things: 0 - not at all  Feeling down, depressed, or hopeless: 0 - not at all  PHQ-2 Score: 0       General Health  · Sleep: sleeps well  · Hearing: normal - none   · Vision: no vision problems  · Dental: regular dental visits  /GYN Health  · Patient is: hysterectomy  · Last menstrual period: 2016  · Contraceptive method:  None needed  ·      Review of Systems:     Review of Systems   Constitutional: Negative  HENT: Negative  Eyes: Negative  Respiratory: Negative  Cardiovascular: Positive for leg swelling  Gastrointestinal: Positive for abdominal distention  Endocrine: Negative  Genitourinary: Negative  Musculoskeletal: Negative  Skin: Negative  Allergic/Immunologic: Negative  Neurological: Negative  Psychiatric/Behavioral: Negative         Past Medical History:     Past Medical History:   Diagnosis Date    Diverticulosis     [facility]     DVT [facility]deep [facility] thrombosis) [facility]     Endometriosis     Gastritis     Lymphedema of right lower extremity     Obesity, Class III, BMI 40-49 9 (morbid obesity) ([facility])       Past Surgical History:     Past Surgical History:   Procedure Laterality Date    ABLATION COLPOCLESIS      HYSTERECTOMY  2016    OOPHORECTOMY Bilateral 09/2016    OVARY SURGERY      TUBAL LIGATION        Social History:     Social History     Socioeconomic History    Marital status: /Civil Union     Spouse name: None    Number of children: None    Years of education: None    Highest education level: None   Occupational History    None   Tobacco Use    Smoking status: Never Smoker    Smokeless tobacco: Never Used   Vaping Use    Vaping Use: Never used   Substance and Sexual Activity    Alcohol use: Not Currently     Comment: extremely rare once a year    Drug use: No    Sexual activity: None   Other Topics Concern    None   Social History Narrative    None     Social Determinants of Health     Financial Resource Strain:     Difficulty of Paying Living Expenses:    Food Insecurity:     Worried About Running Out of Food in the Last Year:     Ran Out of Food in the Last Year:    Transportation Needs:     Lack of Transportation (Medical):      Lack of Transportation (Non-Medical):    Physical Activity:     Days of Exercise per Week:     Minutes of Exercise per Session:    Stress:     Feeling of Stress :    Social Connections:     Frequency of Communication with Friends and Family:     Frequency of Social Gatherings with Friends and Family:     Attends [employer] Services:     Active Member of Clubs or Organizations:     Attends Club or Organization Meetings:     Marital Status:    Intimate Partner Violence:     Fear of Current or Ex-Partner:     Emotionally Abused:     Physically Abused:     Sexually Abused:       Family History:     Family History   Problem Relation Age of Onset    Pancreatic cancer Maternal Grandmother     Clotting disorder Mother     Stroke Maternal Aunt     Diabetes Paternal Grandmother     Thyroid disease Paternal Grandmother     Hypertension Paternal Grandfather     Heart disease Paternal Grandfather       Current Medications:     Current Outpatient Medications   Medication Sig Dispense Refill    Bacillus Coagulans-Inulin (Probiotic) 1-250 BILLION-MG CAPS Take 1 capsule by mouth daily      Calcium Carbonate-Vit D-Min (Calcium 600 + Minerals) 600-200 MG-UNIT TABS Take 1 tablet by mouth daily      cholecalciferol (VITAMIN D3) 1,000 units tablet Take 1,000 Units by mouth daily      clindamycin (CLINDAGEL) 1 % gel APPLY TO AFFECTED AREA TWICE A DAY 30 g 0    Cranberry 1000 MG CAPS Take 1 capsule by mouth daily      cyanocobalamin (VITAMIN B-12) 100 mcg tablet Take by mouth daily      FLAXSEED, LINSEED, PO Take 1 capsule by mouth daily      hydrochlorothiazide (HYDRODIURIL) 12 5 mg tablet MAY TAKE 1 PILL EVERY 24 HOURS AS NEEDED FOR EDEMA IN LEGS 30 tablet 0    Multiple Vitamin (multivitamin) capsule Take 1 capsule by mouth daily      phentermine 37 5 MG capsule Take 1 capsule (37 5 mg total) by mouth every morning 30 capsule 0    rivaroxaban (XARELTO) 20 mg tablet Take 1 pill daily 90 tablet 2    omeprazole (PriLOSEC) 20 mg delayed release capsule Take 1 capsule (20 mg total) by mouth daily 30 capsule 1     No current facility-administered medications for this visit  Allergies:     No Known Allergies   Physical Exam:     /68   Pulse (!) 116   Temp (!) 97 4 °F (36 3 °C) (Tympanic)   Resp 18   Ht 5' 6" (1 676 m)   Wt (!) 141 kg (311 lb)   SpO2 99%   BMI 50 20 kg/m²     Physical Exam  Constitutional:       General: She is not in acute distress  Appearance: She is obese  She is not ill-appearing  HENT:      Head: Normocephalic and atraumatic  Nose: Nose normal       Mouth/Throat:      Mouth: Mucous membranes are moist    Eyes:      Pupils: Pupils are equal, round, and reactive to light  Cardiovascular:      Rate and Rhythm: Normal rate and regular rhythm  Pulses: Normal pulses  Pulmonary:      Effort: Pulmonary effort is normal  No respiratory distress  Breath sounds: Normal breath sounds  Chest:      Chest wall: No tenderness  Abdominal:      General: Abdomen is flat  Bowel sounds are normal  There is no distension  Palpations: There is no mass  Tenderness: There is no abdominal tenderness  Musculoskeletal:         General: Normal range of motion  Cervical back: Normal range of motion and neck supple  Skin:     General: Skin is warm and dry  Neurological:      General: No focal deficit present  Mental Status: She is alert and oriented to person, place, and time  Psychiatric:         Mood and Affect: Mood normal          Behavior: Behavior normal          Thought Content:  Thought content normal          Judgment: Judgment normal           Lupis Jaeger, [address] [facility]

## 2021-09-15 NOTE — ASSESSMENT & PLAN NOTE
Assessment completed  Patient is up-to-date with blood work  Will schedule mammogram   Discussed weight loss  Will continue with phentermine for another month  Goal is to get under 300 pounds  Discussed managing stress

## 2021-10-16 DIAGNOSIS — I82.4Z1 ACUTE DEEP VEIN THROMBOSIS (DVT) OF DISTAL END OF RIGHT LOWER EXTREMITY (HCC): ICD-10-CM

## 2021-11-03 ENCOUNTER — OFFICE VISIT (OUTPATIENT)
Dept: FAMILY MEDICINE CLINIC | Facility: CLINIC | Age: 41
End: 2021-11-03
Payer: COMMERCIAL

## 2021-11-03 VITALS
BODY MASS INDEX: 47.09 KG/M2 | SYSTOLIC BLOOD PRESSURE: 110 MMHG | OXYGEN SATURATION: 99 % | HEART RATE: 98 BPM | RESPIRATION RATE: 16 BRPM | TEMPERATURE: 97 F | WEIGHT: 293 LBS | DIASTOLIC BLOOD PRESSURE: 80 MMHG | HEIGHT: 66 IN

## 2021-11-03 DIAGNOSIS — T14.8XXA BRUISING: Primary | ICD-10-CM

## 2021-11-03 DIAGNOSIS — R09.89 LYMPH NODE SYMPTOM: ICD-10-CM

## 2021-11-03 PROCEDURE — 99213 OFFICE O/P EST LOW 20 MIN: CPT | Performed by: NURSE PRACTITIONER

## 2021-11-03 PROCEDURE — 1036F TOBACCO NON-USER: CPT | Performed by: NURSE PRACTITIONER

## 2021-11-03 PROCEDURE — 3008F BODY MASS INDEX DOCD: CPT | Performed by: NURSE PRACTITIONER

## 2021-11-08 ENCOUNTER — CLINICAL SUPPORT (OUTPATIENT)
Dept: FAMILY MEDICINE CLINIC | Facility: CLINIC | Age: 41
End: 2021-11-08

## 2021-11-08 ENCOUNTER — TELEPHONE (OUTPATIENT)
Dept: FAMILY MEDICINE CLINIC | Facility: CLINIC | Age: 41
End: 2021-11-08

## 2021-11-08 DIAGNOSIS — E53.8 B12 DEFICIENCY: Primary | ICD-10-CM

## 2021-11-09 DIAGNOSIS — E53.9 VITAMIN B DEFICIENCY: Primary | ICD-10-CM

## 2021-11-09 PROCEDURE — 96372 THER/PROPH/DIAG INJ SC/IM: CPT

## 2021-11-09 RX ORDER — CYANOCOBALAMIN 1000 UG/ML
1000 INJECTION INTRAMUSCULAR; SUBCUTANEOUS
Status: SHIPPED | OUTPATIENT
Start: 2021-11-09

## 2021-11-09 RX ADMIN — CYANOCOBALAMIN 1000 MCG: 1000 INJECTION INTRAMUSCULAR; SUBCUTANEOUS at 13:19

## 2021-11-11 PROBLEM — T14.8XXA BRUISING: Status: ACTIVE | Noted: 2021-11-11

## 2021-11-11 PROBLEM — R09.89 LYMPH NODE SYMPTOM: Status: ACTIVE | Noted: 2021-11-11

## 2021-11-13 DIAGNOSIS — I89.0 LYMPHEDEMA OF RIGHT LOWER EXTREMITY: ICD-10-CM

## 2021-11-15 ENCOUNTER — TELEPHONE (OUTPATIENT)
Dept: FAMILY MEDICINE CLINIC | Facility: CLINIC | Age: 41
End: 2021-11-15

## 2021-11-15 ENCOUNTER — CLINICAL SUPPORT (OUTPATIENT)
Dept: FAMILY MEDICINE CLINIC | Facility: CLINIC | Age: 41
End: 2021-11-15
Payer: COMMERCIAL

## 2021-11-15 DIAGNOSIS — E53.9 VITAMIN B DEFICIENCY: Primary | ICD-10-CM

## 2021-11-15 RX ORDER — HYDROCHLOROTHIAZIDE 12.5 MG/1
TABLET ORAL
Qty: 30 TABLET | Refills: 0 | Status: SHIPPED | OUTPATIENT
Start: 2021-11-15 | End: 2022-01-05

## 2021-11-15 RX ADMIN — CYANOCOBALAMIN 1000 MCG: 1000 INJECTION INTRAMUSCULAR; SUBCUTANEOUS at 13:25

## 2021-11-22 ENCOUNTER — CLINICAL SUPPORT (OUTPATIENT)
Dept: FAMILY MEDICINE CLINIC | Facility: CLINIC | Age: 41
End: 2021-11-22

## 2021-11-22 DIAGNOSIS — Z23 IMMUNIZATION DUE: Primary | ICD-10-CM

## 2021-12-01 ENCOUNTER — CLINICAL SUPPORT (OUTPATIENT)
Dept: FAMILY MEDICINE CLINIC | Facility: CLINIC | Age: 41
End: 2021-12-01
Payer: COMMERCIAL

## 2021-12-01 DIAGNOSIS — E53.8 B12 DEFICIENCY: Primary | ICD-10-CM

## 2021-12-01 RX ADMIN — CYANOCOBALAMIN 1000 MCG: 1000 INJECTION INTRAMUSCULAR; SUBCUTANEOUS at 11:22

## 2022-01-05 DIAGNOSIS — I89.0 LYMPHEDEMA OF RIGHT LOWER EXTREMITY: ICD-10-CM

## 2022-01-05 RX ORDER — HYDROCHLOROTHIAZIDE 12.5 MG/1
TABLET ORAL
Qty: 30 TABLET | Refills: 0 | Status: SHIPPED | OUTPATIENT
Start: 2022-01-05 | End: 2022-06-30

## 2022-01-13 ENCOUNTER — APPOINTMENT (OUTPATIENT)
Dept: LAB | Facility: HOSPITAL | Age: 42
End: 2022-01-13
Payer: COMMERCIAL

## 2022-01-13 ENCOUNTER — CLINICAL SUPPORT (OUTPATIENT)
Dept: FAMILY MEDICINE CLINIC | Facility: CLINIC | Age: 42
End: 2022-01-13
Payer: COMMERCIAL

## 2022-01-13 DIAGNOSIS — E53.8 B12 DEFICIENCY: Primary | ICD-10-CM

## 2022-01-13 DIAGNOSIS — E53.8 B12 DEFICIENCY: ICD-10-CM

## 2022-01-13 DIAGNOSIS — T14.8XXA BRUISING: ICD-10-CM

## 2022-01-13 LAB
BASOPHILS # BLD AUTO: 0.04 THOUSANDS/ΜL (ref 0–0.1)
BASOPHILS NFR BLD AUTO: 1 % (ref 0–1)
EOSINOPHIL # BLD AUTO: 0.18 THOUSAND/ΜL (ref 0–0.61)
EOSINOPHIL NFR BLD AUTO: 3 % (ref 0–6)
ERYTHROCYTE [DISTWIDTH] IN BLOOD BY AUTOMATED COUNT: 13.9 % (ref 11.6–15.1)
HCT VFR BLD AUTO: 45.1 % (ref 34.8–46.1)
HGB BLD-MCNC: 14.1 G/DL (ref 11.5–15.4)
IMM GRANULOCYTES # BLD AUTO: 0.02 THOUSAND/UL (ref 0–0.2)
IMM GRANULOCYTES NFR BLD AUTO: 0 % (ref 0–2)
INR PPP: 1.75 (ref 0.84–1.19)
LYMPHOCYTES # BLD AUTO: 1.6 THOUSANDS/ΜL (ref 0.6–4.47)
LYMPHOCYTES NFR BLD AUTO: 24 % (ref 14–44)
MCH RBC QN AUTO: 27.4 PG (ref 26.8–34.3)
MCHC RBC AUTO-ENTMCNC: 31.3 G/DL (ref 31.4–37.4)
MCV RBC AUTO: 88 FL (ref 82–98)
MONOCYTES # BLD AUTO: 0.33 THOUSAND/ΜL (ref 0.17–1.22)
MONOCYTES NFR BLD AUTO: 5 % (ref 4–12)
NEUTROPHILS # BLD AUTO: 4.4 THOUSANDS/ΜL (ref 1.85–7.62)
NEUTS SEG NFR BLD AUTO: 67 % (ref 43–75)
NRBC BLD AUTO-RTO: 0 /100 WBCS
PLATELET # BLD AUTO: 368 THOUSANDS/UL (ref 149–390)
PMV BLD AUTO: 9.8 FL (ref 8.9–12.7)
PROTHROMBIN TIME: 19.6 SECONDS (ref 11.6–14.5)
RBC # BLD AUTO: 5.15 MILLION/UL (ref 3.81–5.12)
VIT B12 SERPL-MCNC: 1320 PG/ML (ref 100–900)
WBC # BLD AUTO: 6.57 THOUSAND/UL (ref 4.31–10.16)

## 2022-01-13 PROCEDURE — 36415 COLL VENOUS BLD VENIPUNCTURE: CPT

## 2022-01-13 PROCEDURE — 82607 VITAMIN B-12: CPT

## 2022-01-13 PROCEDURE — 96372 THER/PROPH/DIAG INJ SC/IM: CPT

## 2022-01-13 PROCEDURE — 85610 PROTHROMBIN TIME: CPT

## 2022-01-13 PROCEDURE — 85025 COMPLETE CBC W/AUTO DIFF WBC: CPT

## 2022-01-13 RX ADMIN — CYANOCOBALAMIN 1000 MCG: 1000 INJECTION INTRAMUSCULAR; SUBCUTANEOUS at 13:24

## 2022-01-14 ENCOUNTER — TELEPHONE (OUTPATIENT)
Dept: FAMILY MEDICINE CLINIC | Facility: CLINIC | Age: 42
End: 2022-01-14

## 2022-02-08 ENCOUNTER — TELEPHONE (OUTPATIENT)
Dept: HEMATOLOGY ONCOLOGY | Facility: CLINIC | Age: 42
End: 2022-02-08

## 2022-02-08 NOTE — TELEPHONE ENCOUNTER
Scheduling Appointment     Who Is Calling to Schedule Patient    Doctor Dr Alfonzo Irwin   Location [address] [facility]   Date and Time 02/16 at 1:40pm         Reason for scheduling appointment Follow up   Patient verbalized understanding   Yes

## 2022-02-14 ENCOUNTER — CLINICAL SUPPORT (OUTPATIENT)
Dept: FAMILY MEDICINE CLINIC | Facility: CLINIC | Age: 42
End: 2022-02-14
Payer: COMMERCIAL

## 2022-02-14 ENCOUNTER — TELEPHONE (OUTPATIENT)
Dept: HEMATOLOGY ONCOLOGY | Facility: CLINIC | Age: 42
End: 2022-02-14

## 2022-02-14 ENCOUNTER — APPOINTMENT (OUTPATIENT)
Dept: LAB | Facility: HOSPITAL | Age: 42
End: 2022-02-14
Payer: COMMERCIAL

## 2022-02-14 DIAGNOSIS — Z79.01 CURRENT USE OF LONG TERM ANTICOAGULATION: ICD-10-CM

## 2022-02-14 DIAGNOSIS — Z86.718 HISTORY OF DVT (DEEP VEIN THROMBOSIS): ICD-10-CM

## 2022-02-14 DIAGNOSIS — E53.8 B12 DEFICIENCY: Primary | ICD-10-CM

## 2022-02-14 DIAGNOSIS — Z79.01 CURRENT USE OF LONG TERM ANTICOAGULATION: Primary | ICD-10-CM

## 2022-02-14 LAB
ALBUMIN SERPL BCP-MCNC: 3.8 G/DL (ref 3.5–5)
ALP SERPL-CCNC: 111 U/L (ref 46–116)
ALT SERPL W P-5'-P-CCNC: 23 U/L (ref 12–78)
ANION GAP SERPL CALCULATED.3IONS-SCNC: 6 MMOL/L (ref 4–13)
AST SERPL W P-5'-P-CCNC: 11 U/L (ref 5–45)
BASOPHILS # BLD AUTO: 0.06 THOUSANDS/ΜL (ref 0–0.1)
BASOPHILS NFR BLD AUTO: 1 % (ref 0–1)
BILIRUB SERPL-MCNC: 0.28 MG/DL (ref 0.2–1)
BUN SERPL-MCNC: 14 MG/DL (ref 5–25)
CALCIUM SERPL-MCNC: 9.2 MG/DL (ref 8.3–10.1)
CHLORIDE SERPL-SCNC: 100 MMOL/L (ref 100–108)
CO2 SERPL-SCNC: 33 MMOL/L (ref 21–32)
CREAT SERPL-MCNC: 0.76 MG/DL (ref 0.6–1.3)
EOSINOPHIL # BLD AUTO: 0.2 THOUSAND/ΜL (ref 0–0.61)
EOSINOPHIL NFR BLD AUTO: 4 % (ref 0–6)
ERYTHROCYTE [DISTWIDTH] IN BLOOD BY AUTOMATED COUNT: 14.5 % (ref 11.6–15.1)
GFR SERPL CREATININE-BSD FRML MDRD: 97 ML/MIN/1.73SQ M
GLUCOSE SERPL-MCNC: 84 MG/DL (ref 65–140)
HCT VFR BLD AUTO: 43.8 % (ref 34.8–46.1)
HGB BLD-MCNC: 13.4 G/DL (ref 11.5–15.4)
IMM GRANULOCYTES # BLD AUTO: 0.01 THOUSAND/UL (ref 0–0.2)
IMM GRANULOCYTES NFR BLD AUTO: 0 % (ref 0–2)
LYMPHOCYTES # BLD AUTO: 1.4 THOUSANDS/ΜL (ref 0.6–4.47)
LYMPHOCYTES NFR BLD AUTO: 25 % (ref 14–44)
MCH RBC QN AUTO: 27.2 PG (ref 26.8–34.3)
MCHC RBC AUTO-ENTMCNC: 30.6 G/DL (ref 31.4–37.4)
MCV RBC AUTO: 89 FL (ref 82–98)
MONOCYTES # BLD AUTO: 0.36 THOUSAND/ΜL (ref 0.17–1.22)
MONOCYTES NFR BLD AUTO: 7 % (ref 4–12)
NEUTROPHILS # BLD AUTO: 3.55 THOUSANDS/ΜL (ref 1.85–7.62)
NEUTS SEG NFR BLD AUTO: 63 % (ref 43–75)
NRBC BLD AUTO-RTO: 0 /100 WBCS
PLATELET # BLD AUTO: 320 THOUSANDS/UL (ref 149–390)
PMV BLD AUTO: 10 FL (ref 8.9–12.7)
POTASSIUM SERPL-SCNC: 3.9 MMOL/L (ref 3.5–5.3)
PROT SERPL-MCNC: 7.7 G/DL (ref 6.4–8.2)
RBC # BLD AUTO: 4.93 MILLION/UL (ref 3.81–5.12)
SODIUM SERPL-SCNC: 139 MMOL/L (ref 136–145)
WBC # BLD AUTO: 5.58 THOUSAND/UL (ref 4.31–10.16)

## 2022-02-14 PROCEDURE — 36415 COLL VENOUS BLD VENIPUNCTURE: CPT

## 2022-02-14 PROCEDURE — 96372 THER/PROPH/DIAG INJ SC/IM: CPT

## 2022-02-14 PROCEDURE — 86147 CARDIOLIPIN ANTIBODY EA IG: CPT

## 2022-02-14 PROCEDURE — 85025 COMPLETE CBC W/AUTO DIFF WBC: CPT

## 2022-02-14 PROCEDURE — 80053 COMPREHEN METABOLIC PANEL: CPT

## 2022-02-14 RX ADMIN — CYANOCOBALAMIN 1000 MCG: 1000 INJECTION INTRAMUSCULAR; SUBCUTANEOUS at 13:24

## 2022-02-14 NOTE — TELEPHONE ENCOUNTER
LVM to patient in regards to lab orders that were placed in the system for her to have completed prior to her appointment on 2/16/22 at 1:40pm  Informed the patient the lab orders are in the system so she can go to any [facility]'s lab to have her blood work drawn  Informed patient if she can not have her blood work completed prior to her appointment she can give the office a call back to reschedule at [phone]

## 2022-02-15 LAB
CARDIOLIPIN IGA SER IA-ACNC: 2.4
CARDIOLIPIN IGG SER IA-ACNC: 1.6
CARDIOLIPIN IGM SER IA-ACNC: 2.4

## 2022-02-16 ENCOUNTER — OFFICE VISIT (OUTPATIENT)
Dept: HEMATOLOGY ONCOLOGY | Facility: CLINIC | Age: 42
End: 2022-02-16
Payer: COMMERCIAL

## 2022-02-16 VITALS
WEIGHT: 293 LBS | TEMPERATURE: 97.8 F | BODY MASS INDEX: 47.09 KG/M2 | HEIGHT: 66 IN | DIASTOLIC BLOOD PRESSURE: 60 MMHG | HEART RATE: 73 BPM | RESPIRATION RATE: 18 BRPM | SYSTOLIC BLOOD PRESSURE: 108 MMHG | OXYGEN SATURATION: 98 %

## 2022-02-16 DIAGNOSIS — Z79.01 CURRENT USE OF LONG TERM ANTICOAGULATION: Primary | ICD-10-CM

## 2022-02-16 DIAGNOSIS — Z86.718 HISTORY OF DVT (DEEP VEIN THROMBOSIS): ICD-10-CM

## 2022-02-16 PROCEDURE — 1036F TOBACCO NON-USER: CPT | Performed by: INTERNAL MEDICINE

## 2022-02-16 PROCEDURE — 99213 OFFICE O/P EST LOW 20 MIN: CPT | Performed by: INTERNAL MEDICINE

## 2022-02-16 PROCEDURE — 3008F BODY MASS INDEX DOCD: CPT | Performed by: INTERNAL MEDICINE

## 2022-02-16 NOTE — PROGRESS NOTES
Hematology/Oncology Outpatient Follow-up  Madhuri Johnson 39 y o  female 1980 [id number]    Date:  2/16/2022    Assessment and Plan:  1  Current use of long term anticoagulation  The patient seems to be concerned about the potential GI bleeding or excessive bleeding from any sites due to trauma while on the current dose of Xarelto 20 mg once a day  She asked if the dose of the Xarelto could be dropped to 10 mg once a day to avoid excessive bleeding  I did emphasize that the patient needs to continue the Xarelto indefinitely unless there is absolute contraindication  We will give her a script for Xarelto for 10 mg once a day which needs to be refilled by her PCP  - rivaroxaban (Xarelto) 10 mg tablet; Take 1 tablet (10 mg total) by mouth daily  Dispense: 30 tablet; Refill: 5    2  History of DVT (deep vein thrombosis)  Antiphospholipid antibody workup came back negative  She had a history of multiple episode of the clotting events  She continues to have high risk for clotting events while off of anticoagulation  She was encouraged to continue on oral anticoagulation indefinitely unless absolute contraindication exists  - rivaroxaban (Xarelto) 10 mg tablet; Take 1 tablet (10 mg total) by mouth daily  Dispense: 30 tablet; Refill: 5      HPI:  This is a 79-year-old female with multiple comorbid conditions including morbid obesity, lymphedema of the right lower extremity, endometriosis status post hysterectomy and bilateral salpingo-oophorectomy   She also had a right ankle fracture in 2007 followed by a deep vein thrombosis of the right lower extremity below the knee   She then had a 2nd right lower extremity deep vein thrombosis 2 months after her abdominal surgery in 2018    She has a history of 2 pregnancies without any history of miscarriages      The patient was seen by Dr Keshawn Bojorquez from our Hematology team around December 2018 who recommended lifelong anticoagulation   The patient has been taking Xarelto 20 mg once a day since November 3, 2018 after she was found to have her 2nd episode of right lower extremity DVT which was in the posterior tibial veins and the peroneal veins  The patient also stated that she has a significant family history of clotting events of unknown etiology              Interval history:  The patient came in for a follow-up visit after lengthy interruption  She was supposed to get back to us around the beginning of 2021  She stated that she continues to take the Xarelto 20 mg once a day  However, she is complaining about some occasional hemorrhoid type of bleeding per rectum with some gum bleeding  She was wondering if the dose of the Xarelto could be decreased to 10 mg once a day to avoid increased risk of bleeding  Her blood work on 02/14/2022 showed creatinine of 0 7 with normal calcium liver enzymes  Her CBC was normal with hemoglobin of 13 4  Anti cardiolipin antibody titer was within normal range this time  ROS: Review of Systems   Constitutional: Positive for fatigue  Negative for chills and fever  HENT: Negative for ear pain and sore throat  Eyes: Negative for pain and visual disturbance  Respiratory: Negative for cough and shortness of breath  Cardiovascular: Negative for chest pain and palpitations  Gastrointestinal: Positive for abdominal pain  Negative for vomiting  Genitourinary: Positive for dysuria  Negative for hematuria  Musculoskeletal: Negative for arthralgias and back pain  Skin: Positive for rash  Negative for color change  Neurological: Negative for seizures and syncope  Psychiatric/Behavioral: Positive for sleep disturbance  All other systems reviewed and are negative        Past Medical History:   Diagnosis Date    Diverticulosis     DVT (deep venous thrombosis) (HCC)     DVT (deep venous thrombosis) (HCC)     Endometriosis     Gastritis     Lymphedema of right lower extremity     Obesity, Class III, BMI 40-49 9 (morbid obesity) [facility])        Past Surgical History:   Procedure Laterality Date    ABLATION COLPOCLESIS      HYSTERECTOMY  2016    OOPHORECTOMY Bilateral 09/2016    OVARY SURGERY      TUBAL LIGATION         Social History     Socioeconomic History    Marital status: /Civil Union     Spouse name: None    Number of children: None    Years of education: None    Highest education level: None   Occupational History    None   Tobacco Use    Smoking status: Never Smoker    Smokeless tobacco: Never Used   Vaping Use    Vaping Use: Never used   Substance and Sexual Activity    Alcohol use: Not Currently     Comment: extremely rare once a year    Drug use: No    Sexual activity: None   Other Topics Concern    None   Social History Narrative    None     Social Determinants of Health     Financial Resource Strain: Not on file   Food Insecurity: Not on file   Transportation Needs: Not on file   Physical Activity: Not on file   Stress: Not on file   Social Connections: Not on file   Intimate Partner Violence: Not on file   Housing Stability: Not on file       Family History   Problem Relation Age of Onset    Pancreatic cancer Maternal Grandmother     Clotting disorder Mother     Stroke Maternal Aunt     Diabetes Paternal Grandmother     Thyroid disease Paternal Grandmother     Hypertension Paternal Grandfather     Heart disease Paternal Grandfather        No Known Allergies      Current Outpatient Medications:     Bacillus Coagulans-Inulin (Probiotic) 1-250 BILLION-MG CAPS, Take 1 capsule by mouth daily, Disp: , Rfl:     Calcium Carbonate-Vit D-Min (Calcium 600 + Minerals) 600-200 MG-UNIT TABS, Take 1 tablet by mouth daily, Disp: , Rfl:     cholecalciferol (VITAMIN D3) 1,000 units tablet, Take 1,000 Units by mouth daily, Disp: , Rfl:     clindamycin (CLINDAGEL) 1 % gel, APPLY TO AFFECTED AREA TWICE A DAY, Disp: 30 g, Rfl: 0    Cranberry 1000 MG CAPS, Take 1 capsule by mouth daily, Disp: , Rfl:     FLAXSEED, LINSEED, PO, Take 1 capsule by mouth daily, Disp: , Rfl:     hydrochlorothiazide (HYDRODIURIL) 12 5 mg tablet, MAY TAKE 1 PILL EVERY 24 HOURS AS NEEDED FOR EDEMA IN LEGS, Disp: 30 tablet, Rfl: 0    Multiple Vitamin (multivitamin) capsule, Take 1 capsule by mouth daily, Disp: , Rfl:     rivaroxaban (Xarelto) 20 mg tablet, TAKE 1 TABLET BY MOUTH EVERY DAY, Disp: 30 tablet, Rfl: 8    rivaroxaban (Xarelto) 10 mg tablet, Take 1 tablet (10 mg total) by mouth daily, Disp: 30 tablet, Rfl: 5    Current Facility-Administered Medications:     cyanocobalamin injection 1,000 mcg, 1,000 mcg, Intramuscular, Q30 Days, Lupis Mil, EYAD, 1,000 mcg at 02/14/22 1324      Physical Exam:  /60 (BP Location: Left arm, Patient Position: Sitting, Cuff Size: Large)   Pulse 73   Temp 97 8 °F (36 6 °C) (Tympanic)   Resp 18   Ht 5' 6" (1 676 m)   Wt (!) 147 kg (323 lb)   SpO2 98%   BMI 52 13 kg/m²     Physical Exam  Constitutional:       General: She is not in acute distress  Appearance: She is well-developed  She is obese  She is not diaphoretic  HENT:      Head: Normocephalic and atraumatic  Eyes:      General: No scleral icterus  Right eye: No discharge  Left eye: No discharge  Conjunctiva/sclera: Conjunctivae normal       Pupils: Pupils are equal, round, and reactive to light  Neck:      Thyroid: No thyromegaly  Vascular: No JVD  Trachea: No tracheal deviation  Cardiovascular:      Rate and Rhythm: Normal rate and regular rhythm  Heart sounds: Normal heart sounds  No murmur heard  No friction rub  Pulmonary:      Effort: Pulmonary effort is normal  No respiratory distress  Breath sounds: Normal breath sounds  No stridor  No wheezing or rales  Chest:      Chest wall: No tenderness  Abdominal:      General: There is no distension  Palpations: Abdomen is soft  There is no hepatomegaly or splenomegaly  Tenderness: There is abdominal tenderness   There is no guarding or rebound  Comments: Morbidly obese abdomen   Musculoskeletal:         General: No tenderness or deformity  Normal range of motion  Cervical back: Normal range of motion and neck supple  Lymphadenopathy:      Cervical: No cervical adenopathy  Skin:     General: Skin is warm and dry  Coloration: Skin is not pale  Findings: No erythema or rash  Neurological:      Mental Status: She is alert and oriented to person, place, and time  Cranial Nerves: No cranial nerve deficit  Coordination: Coordination normal       Deep Tendon Reflexes: Reflexes are normal and symmetric  Psychiatric:         Behavior: Behavior normal          Thought Content: Thought content normal          Judgment: Judgment normal            Labs:  Lab Results   Component Value Date    WBC 5 58 02/14/2022    HGB 13 4 02/14/2022    HCT 43 8 02/14/2022    MCV 89 02/14/2022     02/14/2022     Lab Results   Component Value Date    K 3 9 02/14/2022     02/14/2022    CO2 33 (H) 02/14/2022    BUN 14 02/14/2022    CREATININE 0 76 02/14/2022    GLUF 86 12/07/2018    CALCIUM 9 2 02/14/2022    AST 11 02/14/2022    ALT 23 02/14/2022    ALKPHOS 111 02/14/2022    EGFR 97 02/14/2022       Patient voiced understanding and agreement in the above discussion  Aware to contact our office with questions/symptoms in the interim

## 2022-03-14 ENCOUNTER — CLINICAL SUPPORT (OUTPATIENT)
Dept: FAMILY MEDICINE CLINIC | Facility: CLINIC | Age: 42
End: 2022-03-14
Payer: COMMERCIAL

## 2022-03-14 DIAGNOSIS — E53.8 B12 DEFICIENCY: Primary | ICD-10-CM

## 2022-03-14 PROCEDURE — 96372 THER/PROPH/DIAG INJ SC/IM: CPT

## 2022-03-14 RX ADMIN — CYANOCOBALAMIN 1000 MCG: 1000 INJECTION INTRAMUSCULAR; SUBCUTANEOUS at 13:26

## 2022-03-24 ENCOUNTER — OFFICE VISIT (OUTPATIENT)
Dept: FAMILY MEDICINE CLINIC | Facility: CLINIC | Age: 42
End: 2022-03-24
Payer: COMMERCIAL

## 2022-03-24 VITALS
SYSTOLIC BLOOD PRESSURE: 110 MMHG | OXYGEN SATURATION: 99 % | BODY MASS INDEX: 47.09 KG/M2 | WEIGHT: 293 LBS | HEART RATE: 87 BPM | HEIGHT: 66 IN | DIASTOLIC BLOOD PRESSURE: 70 MMHG | TEMPERATURE: 97.5 F

## 2022-03-24 DIAGNOSIS — J06.9 URI, ACUTE: Primary | ICD-10-CM

## 2022-03-24 PROCEDURE — 1036F TOBACCO NON-USER: CPT | Performed by: NURSE PRACTITIONER

## 2022-03-24 PROCEDURE — 99213 OFFICE O/P EST LOW 20 MIN: CPT | Performed by: NURSE PRACTITIONER

## 2022-03-24 PROCEDURE — 3008F BODY MASS INDEX DOCD: CPT | Performed by: NURSE PRACTITIONER

## 2022-03-24 RX ORDER — FLUTICASONE PROPIONATE 50 MCG
1 SPRAY, SUSPENSION (ML) NASAL DAILY
Qty: 11.1 ML | Refills: 3 | Status: SHIPPED | OUTPATIENT
Start: 2022-03-24 | End: 2022-04-21

## 2022-03-24 RX ORDER — AZITHROMYCIN 250 MG/1
TABLET, FILM COATED ORAL
Qty: 6 TABLET | Refills: 0 | Status: SHIPPED | OUTPATIENT
Start: 2022-03-24 | End: 2022-03-29

## 2022-03-24 NOTE — ASSESSMENT & PLAN NOTE
Patient has upper respiratory symptoms  Discussed viral versus bacterial   Ongoing symptoms for more than 5 days  Will treat with Zithromax and Flonase  Increase fluid hydration  Steam to help decongest   Tylenol for fever

## 2022-03-24 NOTE — PROGRESS NOTES
Assessment/Plan:  BMI Counseling: Body mass index is 53 26 kg/m²  The BMI is above normal  Nutrition recommendations include decreasing portion sizes, encouraging healthy choices of fruits and vegetables, decreasing fast food intake, consuming healthier snacks, limiting drinks that contain sugar, moderation in carbohydrate intake, increasing intake of lean protein, reducing intake of saturated and trans fat and reducing intake of cholesterol  Exercise recommendations include exercising 3-5 times per week and strength training exercises  No pharmacotherapy was ordered  Rationale for BMI follow-up plan is due to patient being overweight or obese  URI, acute  Patient has upper respiratory symptoms  Discussed viral versus bacterial   Ongoing symptoms for more than 5 days  Will treat with Zithromax and Flonase  Increase fluid hydration  Steam to help decongest   Tylenol for fever  Problem List Items Addressed This Visit        Respiratory    URI, acute - Primary     Patient has upper respiratory symptoms  Discussed viral versus bacterial   Ongoing symptoms for more than 5 days  Will treat with Zithromax and Flonase  Increase fluid hydration  Steam to help decongest   Tylenol for fever  Relevant Medications    azithromycin (Zithromax) 250 mg tablet    fluticasone (FLONASE) 50 mcg/act nasal spray            Subjective:      Patient ID: Jeffery Montes is a 39 y o  female  Is here with complaints of ear pain, trouble swallowing, congestion  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, cleaning  Setting getting symptoms after that  Denies any fevers  Sore Throat   This is a new problem  The current episode started in the past 7 days  The problem has been unchanged  The pain is worse on the left side  There has been no fever  The fever has been present for less than 1 day  The pain is at a severity of 5/10   Associated symptoms include abdominal pain, congestion, coughing, ear pain, headaches, stridor, swollen glands and trouble swallowing  Pertinent negatives include no diarrhea, drooling, ear discharge, hoarse voice, plugged ear sensation, neck pain, shortness of breath or vomiting  She has had no exposure to strep or mono  The following portions of the patient's history were reviewed and updated as appropriate: allergies, current medications, past family history, past medical history, past social history, past surgical history and problem list     Review of Systems   HENT: Positive for congestion, ear pain, sore throat and trouble swallowing  Negative for drooling, ear discharge and hoarse voice  Respiratory: Positive for cough and stridor  Negative for shortness of breath  Gastrointestinal: Positive for abdominal pain  Negative for diarrhea and vomiting  Musculoskeletal: Negative for neck pain  Neurological: Positive for headaches  Objective:      /70   Pulse 87   Temp 97 5 °F (36 4 °C)   Ht 5' 6" (1 676 m)   Wt (!) 150 kg (330 lb)   SpO2 99%   BMI 53 26 kg/m²          Physical Exam  Vitals and nursing note reviewed  Constitutional:       Appearance: She is obese  HENT:      Head: Normocephalic and atraumatic  Right Ear: External ear normal  Tenderness present  Left Ear: External ear normal  Tenderness present  Nose: Congestion present  Mouth/Throat:      Pharynx: Posterior oropharyngeal erythema present  Tonsils: No tonsillar exudate or tonsillar abscesses  Eyes:      Pupils: Pupils are equal, round, and reactive to light  Cardiovascular:      Rate and Rhythm: Normal rate and regular rhythm  Heart sounds: Normal heart sounds  Pulmonary:      Effort: Pulmonary effort is normal    Abdominal:      General: Bowel sounds are normal       Palpations: Abdomen is soft  Musculoskeletal:         General: Normal range of motion  Cervical back: Normal range of motion  Skin:     General: Skin is warm and dry     Neurological: Mental Status: She is alert and oriented to person, place, and time     Psychiatric:         Mood and Affect: Mood normal          Behavior: Behavior normal

## 2022-03-24 NOTE — PATIENT INSTRUCTIONS
Take  2 Zithromax pills today than 1 for the next 4 days use Flonase daily continue with a lot of fluid tea with honey will help    Upper Respiratory Infection   WHAT YOU NEED TO KNOW:   An upper respiratory infection is also called a cold  It can affect your nose, throat, ears, and sinuses  Cold symptoms are usually worst for the first 3 to 5 days  Most people get better in 7 to 14 days  You may continue to cough for 2 to 3 weeks  Colds are caused by viruses and do not get better with antibiotics  DISCHARGE INSTRUCTIONS:   Call your local emergency number (911 in the [address] [place] [address],[address]) if:   · You have chest pain or trouble breathing  Return to the emergency department if:   · You have a fever over 102ºF (39ºC)  Call your doctor if:   · You have a low fever  · Your sore throat gets worse or you see white or yellow spots in your throat  · Your symptoms get worse after 3 to 5 days or are not better in 14 days  · You have a rash anywhere on your skin  · You have large, tender lumps in your neck  · You have thick, green, or yellow drainage from your nose  · You cough up thick yellow, green, or bloody mucus  · You have a bad earache  · You have questions or concerns about your condition or care  Medicines: You may need any of the following:  · Decongestants  help reduce nasal congestion and help you breathe more easily  If you take decongestant pills, they may make you feel restless or cause problems with your sleep  Do not use decongestant sprays for more than a few days  · Cough suppressants  help reduce coughing  Ask your healthcare provider which type of cough medicine is best for you  · NSAIDs , such as ibuprofen, help decrease swelling, pain, and fever  NSAIDs can cause stomach bleeding or kidney problems in certain people  If you take blood thinner medicine, always ask your healthcare provider if NSAIDs are safe for you   Always read the medicine label and follow directions  · Acetaminophen  decreases pain and fever  It is available without a doctor's order  Ask how much to take and how often to take it  Follow directions  Read the labels of all other medicines you are using to see if they also contain acetaminophen, or ask your doctor or pharmacist  Acetaminophen can cause liver damage if not taken correctly  Do not use more than 4 grams (4,000 milligrams) total of acetaminophen in one day  · Take your medicine as directed  Contact your healthcare provider if you think your medicine is not helping or if you have side effects  Tell him or her if you are allergic to any medicine  Keep a list of the medicines, vitamins, and herbs you take  Include the amounts, and when and why you take them  Bring the list or the pill bottles to follow-up visits  Carry your medicine list with you in case of an emergency  Self-care:   · Rest as much as possible  Slowly start to do more each day  · Drink more liquids as directed  Liquids will help thin and loosen mucus so you can cough it up  Liquids will also help prevent dehydration  Liquids that help prevent dehydration include water, fruit juice, and broth  Do not drink liquids that contain caffeine  Caffeine can increase your risk for dehydration  Ask your healthcare provider how much liquid to drink each day  · Soothe a sore throat  Gargle with warm salt water  Make salt water by dissolving ¼ teaspoon salt in 1 cup warm water  You may also suck on hard candy or throat lozenges  You may use a sore throat spray  · Use a humidifier or vaporizer  Use a cool mist humidifier or a vaporizer to increase air moisture in your home  This may make it easier for you to breathe and help decrease your cough  · Use saline nasal drops as directed  These help relieve congestion  · Apply petroleum-based jelly around the outside of your nostrils  This can decrease irritation from blowing your nose  · Do not smoke    Nicotine and other chemicals in cigarettes and cigars can make your symptoms worse  They can also cause infections such as bronchitis or pneumonia  Ask your healthcare provider for information if you currently smoke and need help to quit  E-cigarettes or smokeless tobacco still contain nicotine  Talk to your healthcare provider before you use these products  Prevent a cold:   · Wash your hands often  Use soap and water every time you wash your hands  Rub your soapy hands together, lacing your fingers  Use the fingers of one hand to scrub under the nails of the other hand  Wash for at least 20 seconds  Rinse with warm, running water for several seconds  Then dry your hands  Use germ-killing gel if soap and water are not available  Do not touch your eyes or mouth without washing your hands first          · Cover a sneeze or cough  Use a tissue that covers your mouth and nose  Put the used tissue in the trash right away  Use the bend of your arm if a tissue is not available  Wash your hands well with soap and water or use a hand   Do not stand close to anyone who is sneezing or coughing  · Try to stay away from others while you are sick  This is especially important during the first 2 to 3 days when the virus is more easily spread  Wait until a fever, cough, or other symptoms are gone before you return to work or other regular activities  · Do not share items while you are sick  This includes food, drinks, eating utensils, and dishes  Follow up with your doctor as directed:  Write down your questions so you remember to ask them during your visits  © Copyright [employer] 2022 Information is for End User's use only and may not be sold, redistributed or otherwise used for commercial purposes  All illustrations and images included in CareNotes® are the copyrighted property of [employer]  or Brynn Duran  The above information is an  only   It is not intended as medical advice for individual conditions or treatments  Talk to your doctor, nurse or pharmacist before following any medical regimen to see if it is safe and effective for you

## 2022-04-01 ENCOUNTER — OFFICE VISIT (OUTPATIENT)
Dept: OBGYN CLINIC | Facility: MEDICAL CENTER | Age: 42
End: 2022-04-01
Payer: COMMERCIAL

## 2022-04-01 VITALS
BODY MASS INDEX: 47.09 KG/M2 | DIASTOLIC BLOOD PRESSURE: 82 MMHG | HEIGHT: 66 IN | WEIGHT: 293 LBS | SYSTOLIC BLOOD PRESSURE: 124 MMHG

## 2022-04-01 DIAGNOSIS — R10.2 PELVIC PAIN: Primary | ICD-10-CM

## 2022-04-01 PROCEDURE — 99203 OFFICE O/P NEW LOW 30 MIN: CPT | Performed by: STUDENT IN AN ORGANIZED HEALTH CARE EDUCATION/TRAINING PROGRAM

## 2022-04-01 NOTE — PROGRESS NOTES
Assessment/Plan:     40 yo  - pelvic pain  H/o endometriosis  Will try pelvic floor PT - given pamphlet today  Pelvic US  If no relief recommend f/u with GI for possible colonoscopy  Subjective:     Patient ID: Alber Sierra is a 39 y o  female  40 yo  presents for left sided pelvic pain  She has a h/o endometriosis and is s/p hysterectomy  Her pain continued following so she was taken back for BSO  She was on estrogen replacement but had VTE so stopped  She has minimal vasomotor symptoms  She reports resection of multiple areas of endometriosis including vaginally  She now has pelvic pain, worse with intercourse  She has had a change in bowel movements with occasional constipation  Last colonoscopy was 2018  CT abd/pelvic in 2021 was normal        Review of Systems   All other systems reviewed and are negative  Objective:     Physical Exam  Vitals reviewed  Exam conducted with a chaperone present  Pulmonary:      Effort: Pulmonary effort is normal    Genitourinary:     Labia:         Right: No rash  Left: No rash  Vagina: No erythema or bleeding  Adnexa:         Right: Tenderness present  Left: Tenderness present  Neurological:      Mental Status: She is alert and oriented to person, place, and time     Psychiatric:         Behavior: Behavior normal

## 2022-04-08 ENCOUNTER — HOSPITAL ENCOUNTER (OUTPATIENT)
Dept: ULTRASOUND IMAGING | Facility: CLINIC | Age: 42
Discharge: HOME | End: 2022-04-08
Payer: COMMERCIAL

## 2022-04-08 DIAGNOSIS — R10.2 PELVIC PAIN: ICD-10-CM

## 2022-04-08 PROCEDURE — 76830 TRANSVAGINAL US NON-OB: CPT

## 2022-04-08 PROCEDURE — 76856 US EXAM PELVIC COMPLETE: CPT

## 2022-04-18 ENCOUNTER — CLINICAL SUPPORT (OUTPATIENT)
Dept: FAMILY MEDICINE CLINIC | Facility: CLINIC | Age: 42
End: 2022-04-18
Payer: COMMERCIAL

## 2022-04-18 DIAGNOSIS — E53.8 B12 DEFICIENCY: Primary | ICD-10-CM

## 2022-04-18 PROCEDURE — 96372 THER/PROPH/DIAG INJ SC/IM: CPT

## 2022-04-18 RX ORDER — CYANOCOBALAMIN 1000 UG/ML
1000 INJECTION INTRAMUSCULAR; SUBCUTANEOUS
Status: SHIPPED | OUTPATIENT
Start: 2022-04-18

## 2022-04-18 RX ADMIN — CYANOCOBALAMIN 1000 MCG: 1000 INJECTION INTRAMUSCULAR; SUBCUTANEOUS at 13:14

## 2022-04-20 ENCOUNTER — TELEPHONE (OUTPATIENT)
Dept: OBGYN CLINIC | Facility: CLINIC | Age: 42
End: 2022-04-20

## 2022-04-20 NOTE — TELEPHONE ENCOUNTER
----- Message from Rupinder Becker MD sent at 4/19/2022 10:13 PM EDT -----  Please notify - normal pelvic US  Recommend f/u with GI for further work up of symptoms

## 2022-04-21 ENCOUNTER — OFFICE VISIT (OUTPATIENT)
Dept: FAMILY MEDICINE CLINIC | Facility: CLINIC | Age: 42
End: 2022-04-21
Payer: COMMERCIAL

## 2022-04-21 ENCOUNTER — HOSPITAL ENCOUNTER (OUTPATIENT)
Dept: RADIOLOGY | Facility: HOSPITAL | Age: 42
Discharge: HOME | End: 2022-04-21
Payer: COMMERCIAL

## 2022-04-21 VITALS
BODY MASS INDEX: 47.09 KG/M2 | DIASTOLIC BLOOD PRESSURE: 80 MMHG | SYSTOLIC BLOOD PRESSURE: 120 MMHG | RESPIRATION RATE: 18 BRPM | HEART RATE: 89 BPM | WEIGHT: 293 LBS | OXYGEN SATURATION: 96 % | HEIGHT: 66 IN | TEMPERATURE: 97.8 F

## 2022-04-21 DIAGNOSIS — R21 RASH: ICD-10-CM

## 2022-04-21 DIAGNOSIS — R06.2 WHEEZES: Primary | ICD-10-CM

## 2022-04-21 DIAGNOSIS — R05.9 COUGH: ICD-10-CM

## 2022-04-21 DIAGNOSIS — R06.2 WHEEZES: ICD-10-CM

## 2022-04-21 PROCEDURE — 3008F BODY MASS INDEX DOCD: CPT | Performed by: NURSE PRACTITIONER

## 2022-04-21 PROCEDURE — 71046 X-RAY EXAM CHEST 2 VIEWS: CPT

## 2022-04-21 PROCEDURE — 1036F TOBACCO NON-USER: CPT | Performed by: NURSE PRACTITIONER

## 2022-04-21 PROCEDURE — 99214 OFFICE O/P EST MOD 30 MIN: CPT | Performed by: NURSE PRACTITIONER

## 2022-04-21 RX ORDER — DEXTROMETHORPHAN HYDROBROMIDE AND PROMETHAZINE HYDROCHLORIDE 15; 6.25 MG/5ML; MG/5ML
5 SOLUTION ORAL 4 TIMES DAILY PRN
Qty: 118 ML | Refills: 0 | Status: SHIPPED | OUTPATIENT
Start: 2022-04-21

## 2022-04-21 NOTE — ASSESSMENT & PLAN NOTE
Patient has a rash on her right leg  Uses lymphedema pumps  Denies itchiness, redness, swelling  Patient to keep area clean and dry  May use a cotton barrier with the lymphedema pump  Keep area moisturize  Indication for antifungal cream at this time or medication for contact dermatitis  If no improvement will refer to Dermatology

## 2022-04-21 NOTE — PATIENT INSTRUCTIONS
Get xray done, take cough medicine  By a cotton barrier with the lymphedema pumps monitor for continued rashPneumonia   WHAT YOU NEED TO KNOW:   Pneumonia is an infection in your lungs caused by bacteria, viruses, fungi, or parasites  You can become infected if you come in contact with someone who is sick  You can get pneumonia if you recently had surgery or needed a ventilator to help you breathe  Pneumonia can also be caused by accidentally inhaling saliva or small pieces of food  Pneumonia may cause mild symptoms, or it can be severe and life-threatening  DISCHARGE INSTRUCTIONS:   Return to the emergency department if:   · You cough up blood  · Your heart beats more than 100 beats in 1 minute  · You are very tired, confused, and cannot think clearly  · You have chest pain or trouble breathing  · Your lips or fingernails turn gray or blue  Call your doctor if:   · Your symptoms are the same or get worse 48 hours after you start antibiotics  · Your fever is not below 99°F (37 2°C) 48 hours after you start antibiotics  · You have a fever higher than 101°F (38 3°C)  · You cannot eat, or you have loss of appetite, nausea, or are vomiting  · You have questions or concerns about your condition or care  Medicines: You may need any of the following:  · Antibiotics  treat pneumonia caused by bacteria  · Acetaminophen  decreases pain and fever  It is available without a doctor's order  Ask how much to take and how often to take it  Follow directions  Read the labels of all other medicines you are using to see if they also contain acetaminophen, or ask your doctor or pharmacist  Acetaminophen can cause liver damage if not taken correctly  Do not use more than 4 grams (4,000 milligrams) total of acetaminophen in one day  · NSAIDs , such as ibuprofen, help decrease swelling, pain, and fever  This medicine is available with or without a doctor's order   NSAIDs can cause stomach bleeding or kidney problems in certain people  If you take blood thinner medicine, always ask your healthcare provider if NSAIDs are safe for you  Always read the medicine label and follow directions  · Take your medicine as directed  Contact your healthcare provider if you think your medicine is not helping or if you have side effects  Tell him or her if you are allergic to any medicine  Keep a list of the medicines, vitamins, and herbs you take  Include the amounts, and when and why you take them  Bring the list or the pill bottles to follow-up visits  Carry your medicine list with you in case of an emergency  Follow up with your doctor as directed: You will need to return for more tests  Write down your questions so you remember to ask them during your visits  Manage your symptoms:   · Rest as needed  Rest often throughout the day  Alternate times of activity with times of rest     · Drink liquids as directed  Ask how much liquid to drink each day and which liquids are best for you  Liquids help thin your mucus, which may make it easier for you to cough it up  · Do not smoke  Avoid secondhand smoke  Smoking increases your risk for pneumonia  Smoking also makes it harder for you to get better after you have had pneumonia  Ask your healthcare provider for information if you need help to quit smoking  · Limit alcohol  Women should limit alcohol to 1 drink a day  Men should limit alcohol to 2 drinks a day  A drink of alcohol is 12 ounces of beer, 5 ounces of wine, or 1½ ounces of liquor  · Use a cool mist humidifier  A humidifier will help increase air moisture in your home  This may make it easier for you to breathe and help decrease your cough  · Keep your head elevated  You may be able to breathe better if you lie down with the head of your bed up  Prevent pneumonia:   · Wash your hands often  Use soap and water every time you wash your hands   Rub your soapy hands together, lacing your fingers  Use the fingers of one hand to scrub under the nails of the other hand  Wash for at least 20 seconds  Rinse with warm, running water for several seconds  Then dry your hands with a clean towel or paper towel  Use hand  that contains alcohol if soap and water are not available  Do not touch your eyes, nose, or mouth without washing your hands first          · Cover a sneeze or cough  Use a tissue that covers your mouth and nose  Throw the tissue away in a trash can right away  Use the bend of your arm if a tissue is not available  Wash your hands well with soap and water or use a hand   Do not stand close to anyone who is sneezing or coughing  · Stay away from others until you are well  Do not go to work or other activities  Wait until your symptoms are gone or your healthcare provider says it is okay to return  · Ask about vaccines you may need  You may need a vaccine to help prevent pneumonia  Get an influenza (flu) vaccine every year as soon as recommended, usually in September or October  Flu viruses change, so it is important to get a yearly flu vaccine  © Copyright [employer] 2022 Information is for End User's use only and may not be sold, redistributed or otherwise used for commercial purposes  All illustrations and images included in CareNotes® are the copyrighted property of [employer]  or Brynn Duran  The above information is an  only  It is not intended as medical advice for individual conditions or treatments  Talk to your doctor, nurse or pharmacist before following any medical regimen to see if it is safe and effective for you

## 2022-04-21 NOTE — ASSESSMENT & PLAN NOTE
Patient has ongoing symptoms of wheezes and cough  Has been treated with 2 sets of antibiotics and prednisone  Discussed allergies  Will get chest x-ray to evaluate for pneumonia  Use steam to help decongest use air purifier    Increase fluid hydration Tylenol for pain

## 2022-04-21 NOTE — PROGRESS NOTES
Assessment/Plan:     Wheezes  Patient has ongoing symptoms of wheezes and cough  Has been treated with 2 sets of antibiotics and prednisone  Discussed allergies  Will get chest x-ray to evaluate for pneumonia  Use steam to help decongest use air purifier  Increase fluid hydration Tylenol for pain    Rash  Patient has a rash on her right leg  Uses lymphedema pumps  Denies itchiness, redness, swelling  Patient to keep area clean and dry  May use a cotton barrier with the lymphedema pump  Keep area moisturize  Indication for antifungal cream at this time or medication for contact dermatitis  If no improvement will refer to Dermatology  Problem List Items Addressed This Visit        Musculoskeletal and Integument    Rash     Patient has a rash on her right leg  Uses lymphedema pumps  Denies itchiness, redness, swelling  Patient to keep area clean and dry  May use a cotton barrier with the lymphedema pump  Keep area moisturize  Indication for antifungal cream at this time or medication for contact dermatitis  If no improvement will refer to Dermatology  Other    Wheezes - Primary     Patient has ongoing symptoms of wheezes and cough  Has been treated with 2 sets of antibiotics and prednisone  Discussed allergies  Will get chest x-ray to evaluate for pneumonia  Use steam to help decongest use air purifier  Increase fluid hydration Tylenol for pain         Relevant Orders    XR chest pa & lateral (Completed)    Cough    Relevant Medications    Promethazine-DM (PHENERGAN-DM) 6 25-15 mg/5 mL oral syrup    Other Relevant Orders    XR chest pa & lateral (Completed)            Subjective:      Patient ID: Deric Diaz is a 39 y o  female  Patient is here with continued complaints of upper respiratory symptoms  Has been treated with antibiotics x2 also prednisone  Reports cough, chest congestion some shortness of breath  Denies any body aches  Denies any fevers    Reports having wheezes at night    Cough  This is a recurrent problem  The current episode started 1 to 4 weeks ago  The problem has been gradually worsening  The problem occurs every few minutes  The cough is productive of sputum  Associated symptoms include chest pain, chills, ear congestion, ear pain, heartburn, myalgias, nasal congestion, postnasal drip, a rash, a sore throat, shortness of breath and wheezing  Pertinent negatives include no fever, headaches, hemoptysis, rhinorrhea, sweats or weight loss  The symptoms are aggravated by lying down  Risk factors for lung disease include animal exposure  The following portions of the patient's history were reviewed and updated as appropriate: allergies, current medications, past family history, past medical history, past social history, past surgical history and problem list     Review of Systems   Constitutional: Positive for chills  Negative for fever and weight loss  HENT: Positive for ear pain, postnasal drip and sore throat  Negative for rhinorrhea  Respiratory: Positive for cough, shortness of breath and wheezing  Negative for hemoptysis  Cardiovascular: Positive for chest pain  Gastrointestinal: Positive for heartburn  Musculoskeletal: Positive for myalgias  Skin: Positive for rash  Neurological: Negative for headaches  Objective:      /80   Pulse 89   Temp 97 8 °F (36 6 °C)   Resp 18   Ht 5' 6" (1 676 m)   Wt (!) 151 kg (332 lb 12 8 oz)   SpO2 96%   BMI 53 72 kg/m²          Physical Exam  Vitals and nursing note reviewed  Constitutional:       Appearance: She is well-developed  She is obese  HENT:      Head: Normocephalic and atraumatic  Right Ear: External ear normal       Left Ear: External ear normal       Mouth/Throat:      Mouth: Mucous membranes are moist    Eyes:      Pupils: Pupils are equal, round, and reactive to light  Cardiovascular:      Rate and Rhythm: Normal rate and regular rhythm        Heart sounds: Normal heart sounds  Pulmonary:      Effort: Pulmonary effort is normal       Breath sounds: No wheezing  Chest:      Chest wall: Tenderness present  Abdominal:      General: Bowel sounds are normal       Palpations: Abdomen is soft  Musculoskeletal:         General: Normal range of motion  Cervical back: Normal range of motion  Skin:     General: Skin is warm and dry  Findings: Rash present  Neurological:      Mental Status: She is alert and oriented to person, place, and time  Psychiatric:         Mood and Affect: Mood normal          Thought Content:  Thought content normal          Judgment: Judgment normal

## 2022-05-12 ENCOUNTER — OFFICE VISIT (OUTPATIENT)
Dept: GASTROENTEROLOGY | Facility: CLINIC | Age: 42
End: 2022-05-12
Payer: COMMERCIAL

## 2022-05-12 VITALS
SYSTOLIC BLOOD PRESSURE: 110 MMHG | HEIGHT: 66 IN | BODY MASS INDEX: 47.09 KG/M2 | HEART RATE: 68 BPM | WEIGHT: 293 LBS | OXYGEN SATURATION: 98 % | DIASTOLIC BLOOD PRESSURE: 60 MMHG

## 2022-05-12 DIAGNOSIS — K29.70 GASTRITIS, PRESENCE OF BLEEDING UNSPECIFIED, UNSPECIFIED CHRONICITY, UNSPECIFIED GASTRITIS TYPE: Primary | ICD-10-CM

## 2022-05-12 DIAGNOSIS — R14.0 BLOATING: ICD-10-CM

## 2022-05-12 DIAGNOSIS — R10.9 ABDOMINAL PAIN, UNSPECIFIED ABDOMINAL LOCATION: ICD-10-CM

## 2022-05-12 PROCEDURE — 1036F TOBACCO NON-USER: CPT | Performed by: PHYSICIAN ASSISTANT

## 2022-05-12 PROCEDURE — 3008F BODY MASS INDEX DOCD: CPT | Performed by: PHYSICIAN ASSISTANT

## 2022-05-12 PROCEDURE — 99214 OFFICE O/P EST MOD 30 MIN: CPT | Performed by: PHYSICIAN ASSISTANT

## 2022-05-12 RX ORDER — OMEPRAZOLE 20 MG/1
20 CAPSULE, DELAYED RELEASE ORAL DAILY
Qty: 30 CAPSULE | Refills: 1 | Status: SHIPPED | OUTPATIENT
Start: 2022-05-12

## 2022-05-12 NOTE — H&P (VIEW-ONLY)
[facility] - Outpatient Follow-up Note  Shelly Kim 39 y o  female MRN: [id number]  Encounter: [id number]          ASSESSMENT AND PLAN:      1  Abdominal pain  2  Bloating  3  Gastritis    Patient reports a return of abdominal discomfort and bloating x months  She had an EGD in August of 2021 which showed mild bulbar erythema and gastric polyps; biopsies of the stomach were benign and negative for h pylori and biopsies of the duodenum were negative for celiac disease  She previously improved with an Omeprazole course  She also had a colonoscopy in February of 2018 which was normal   She is on a probiotic and has tried a low FODMAP diet without relief  Will plan for CT Scan A/P and ultrasound to investigate  Will check a hydrogen breath test for SIBO  Will begin an Omeprazole 20mg po daily course x 6-8 weeks  Further recommendations pending results of the above testing     ______________________________________________________________________    SUBJECTIVE:  Patient is a pleasant 39year old female who presents to the office for follow up  She reports a return of abdominal discomfort and bloating x months  She had an EGD in August of 2021 which showed mild bulbar erythema and gastric polyps; biopsies of the stomach were benign and negative for h pylori and biopsies of the duodenum were negative for celiac disease  She previously improved with an Omeprazole course  She also had a colonoscopy in February of 2018 which was normal   She reports of more LUQ discomfort with radiation to the epigastric region  She reports of bloating and gas  She reports overall her bowel movements are regular with only occasional irregularities  She is on a probiotic and has tried a low FODMAP diet without relief  She reports intermittently taking some of the Omeprazole recently  She does not consume much dairy  She has a history of endometriosis and also recently followed up with her PCP  She wishes to avoid a repeat colonoscopy  No family history of esophageal, stomach, or colon cancer  No unintentional weight loss  REVIEW OF SYSTEMS IS OTHERWISE NEGATIVE        Historical Information   Past Medical History:   Diagnosis Date    Diverticulosis     DVT (deep venous thrombosis) (HCC)     DVT (deep venous thrombosis) (HCC)     Endometriosis     Gastritis     Lymphedema of right lower extremity     Obesity, Class III, BMI 40-49 9 (morbid obesity) ([address] )      Past Surgical History:   Procedure Laterality Date    ABLATION COLPOCLESIS      HYSTERECTOMY  2016    OOPHORECTOMY Bilateral 09/2016    OVARY SURGERY      TUBAL LIGATION       Social History   Social History     Substance and Sexual Activity   Alcohol Use Not Currently    Comment: extremely rare once a year     Social History     Substance and Sexual Activity   Drug Use No     Social History     Tobacco Use   Smoking Status Never Smoker   Smokeless Tobacco Never Used     Family History   Problem Relation Age of Onset    Pancreatic cancer Maternal Grandmother     Clotting disorder Mother     Stroke Maternal Aunt     Diabetes Paternal Grandmother     Thyroid disease Paternal Grandmother     Hypertension Paternal Grandfather     Heart disease Paternal Grandfather        Meds/Allergies       Current Outpatient Medications:     Bacillus Coagulans-Inulin (Probiotic) 1-250 BILLION-MG CAPS    Calcium Carbonate-Vit D-Min (Calcium 600 + Minerals) 600-200 MG-UNIT TABS    cholecalciferol (VITAMIN D3) 1,000 units tablet    clindamycin (CLINDAGEL) 1 % gel    Cranberry 1000 MG CAPS    FLAXSEED, LINSEED, PO    hydrochlorothiazide (HYDRODIURIL) 12 5 mg tablet    Multiple Vitamin (multivitamin) capsule    omeprazole (PriLOSEC) 20 mg delayed release capsule    predniSONE 20 mg tablet    Promethazine-DM (PHENERGAN-DM) 6 25-15 mg/5 mL oral syrup    rivaroxaban (Xarelto) 10 mg tablet    Current Facility-Administered Medications:    cyanocobalamin injection 1,000 mcg, 1,000 mcg, Intramuscular, Q30 Days, 1,000 mcg at 03/14/22 1326    cyanocobalamin injection 1,000 mcg, 1,000 mcg, Intramuscular, Q30 Days, 1,000 mcg at 04/18/22 1314    No Known Allergies        Objective     Blood pressure 110/60, pulse 68, height 5' 6" (1 676 m), weight (!) 151 kg (333 lb), SpO2 98 %, not currently breastfeeding  Body mass index is 53 75 kg/m²  PHYSICAL EXAM:      General Appearance:   Alert, cooperative, no distress   HEENT:   Normocephalic, atraumatic, anicteric      Neck:  Supple, symmetrical, trachea midline   Lungs:   Clear to auscultation bilaterally; no rales, rhonchi or wheezing; respirations unlabored    Heart[de-identified]   Regular rate and rhythm; no murmur, rub, or gallop  Abdomen:   Soft, non-tender, non-distended; normal bowel sounds; no masses, no organomegaly    Genitalia:   Deferred    Rectal:   Deferred    Extremities:  No cyanosis, clubbing or edema    Pulses:  2+ and symmetric    Skin:  No jaundice, rashes, or lesions    Lymph nodes:  No palpable cervical lymphadenopathy        Lab Results:   No visits with results within 1 Day(s) from this visit     Latest known visit with results is:   Appointment on 02/14/2022   Component Date Value    WBC 02/14/2022 5 58     RBC 02/14/2022 4 93     Hemoglobin 02/14/2022 13 4     Hematocrit 02/14/2022 43 8     MCV 02/14/2022 89     MCH 02/14/2022 27 2     MCHC 02/14/2022 30 6 (A)    RDW 02/14/2022 14 5     MPV 02/14/2022 10 0     Platelets 99/56/5118 320     nRBC 02/14/2022 0     Neutrophils Relative 02/14/2022 63     Immat GRANS % 02/14/2022 0     Lymphocytes Relative 02/14/2022 25     Monocytes Relative 02/14/2022 7     Eosinophils Relative 02/14/2022 4     Basophils Relative 02/14/2022 1     Neutrophils Absolute 02/14/2022 3 55     Immature Grans Absolute 02/14/2022 0 01     Lymphocytes Absolute 02/14/2022 1 40     Monocytes Absolute 02/14/2022 0 36     Eosinophils Absolute 02/14/2022 0 20     Basophils Absolute 02/14/2022 0 06     Sodium 02/14/2022 139     Potassium 02/14/2022 3 9     Chloride 02/14/2022 100     CO2 02/14/2022 33 (A)    ANION GAP 02/14/2022 6     BUN 02/14/2022 14     Creatinine 02/14/2022 0 76     Glucose 02/14/2022 84     Calcium 02/14/2022 9 2     AST 02/14/2022 11     ALT 02/14/2022 23     Alkaline Phosphatase 02/14/2022 111     Total Protein 02/14/2022 7 7     Albumin 02/14/2022 3 8     Total Bilirubin 02/14/2022 0 28     eGFR 02/14/2022 97     ANTICARDIOLIPIN IGG ANTI* 02/14/2022 1 6     ANTICARDIOLIPIN IGA ANTI* 02/14/2022 2 4     ANTICARDIOLIPIN IGM ANTI* 02/14/2022 2 4          Radiology Results:   XR chest pa & lateral    Result Date: 4/21/2022  Narrative: CHEST INDICATION:   R06 2: Wheezing R05 9: Cough, unspecified  COMPARISON:  2/8/2020 EXAM PERFORMED/VIEWS:  XR CHEST PA & LATERAL FINDINGS: Cardiomediastinal silhouette appears unremarkable  The lungs are clear  No pneumothorax or pleural effusion  Osseous structures appear within normal limits for patient age  Impression: No acute cardiopulmonary disease   Workstation performed: [id number]

## 2022-05-12 NOTE — PROGRESS NOTES
[facility] - Outpatient Follow-up Note  Carrie Olivia 39 y o  female MRN: [id number]  Encounter: [id number]          ASSESSMENT AND PLAN:      1  Abdominal pain  2  Bloating  3  Gastritis    Patient reports a return of abdominal discomfort and bloating x months  She had an EGD in August of 2021 which showed mild bulbar erythema and gastric polyps; biopsies of the stomach were benign and negative for h pylori and biopsies of the duodenum were negative for celiac disease  She previously improved with an Omeprazole course  She also had a colonoscopy in February of 2018 which was normal   She is on a probiotic and has tried a low FODMAP diet without relief  Will plan for CT Scan A/P and ultrasound to investigate  Will check a hydrogen breath test for SIBO  Will begin an Omeprazole 20mg po daily course x 6-8 weeks  Further recommendations pending results of the above testing     ______________________________________________________________________    SUBJECTIVE:  Patient is a pleasant 39year old female who presents to the office for follow up  She reports a return of abdominal discomfort and bloating x months  She had an EGD in August of 2021 which showed mild bulbar erythema and gastric polyps; biopsies of the stomach were benign and negative for h pylori and biopsies of the duodenum were negative for celiac disease  She previously improved with an Omeprazole course  She also had a colonoscopy in February of 2018 which was normal   She reports of more LUQ discomfort with radiation to the epigastric region  She reports of bloating and gas  She reports overall her bowel movements are regular with only occasional irregularities  She is on a probiotic and has tried a low FODMAP diet without relief  She reports intermittently taking some of the Omeprazole recently  She does not consume much dairy  She has a history of endometriosis and also recently followed up with her PCP  She wishes to avoid a repeat colonoscopy  No family history of esophageal, stomach, or colon cancer  No unintentional weight loss  REVIEW OF SYSTEMS IS OTHERWISE NEGATIVE        Historical Information   Past Medical History:   Diagnosis Date    Diverticulosis     DVT (deep venous thrombosis) (HCC)     DVT (deep venous thrombosis) (HCC)     Endometriosis     Gastritis     Lymphedema of right lower extremity     Obesity, Class III, BMI 40-49 9 (morbid obesity) ([address] )      Past Surgical History:   Procedure Laterality Date    ABLATION COLPOCLESIS      HYSTERECTOMY  2016    OOPHORECTOMY Bilateral 09/2016    OVARY SURGERY      TUBAL LIGATION       Social History   Social History     Substance and Sexual Activity   Alcohol Use Not Currently    Comment: extremely rare once a year     Social History     Substance and Sexual Activity   Drug Use No     Social History     Tobacco Use   Smoking Status Never Smoker   Smokeless Tobacco Never Used     Family History   Problem Relation Age of Onset    Pancreatic cancer Maternal Grandmother     Clotting disorder Mother     Stroke Maternal Aunt     Diabetes Paternal Grandmother     Thyroid disease Paternal Grandmother     Hypertension Paternal Grandfather     Heart disease Paternal Grandfather        Meds/Allergies       Current Outpatient Medications:     Bacillus Coagulans-Inulin (Probiotic) 1-250 BILLION-MG CAPS    Calcium Carbonate-Vit D-Min (Calcium 600 + Minerals) 600-200 MG-UNIT TABS    cholecalciferol (VITAMIN D3) 1,000 units tablet    clindamycin (CLINDAGEL) 1 % gel    Cranberry 1000 MG CAPS    FLAXSEED, LINSEED, PO    hydrochlorothiazide (HYDRODIURIL) 12 5 mg tablet    Multiple Vitamin (multivitamin) capsule    omeprazole (PriLOSEC) 20 mg delayed release capsule    predniSONE 20 mg tablet    Promethazine-DM (PHENERGAN-DM) 6 25-15 mg/5 mL oral syrup    rivaroxaban (Xarelto) 10 mg tablet    Current Facility-Administered Medications:    cyanocobalamin injection 1,000 mcg, 1,000 mcg, Intramuscular, Q30 Days, 1,000 mcg at 03/14/22 1326    cyanocobalamin injection 1,000 mcg, 1,000 mcg, Intramuscular, Q30 Days, 1,000 mcg at 04/18/22 1314    No Known Allergies        Objective     Blood pressure 110/60, pulse 68, height 5' 6" (1 676 m), weight (!) 151 kg (333 lb), SpO2 98 %, not currently breastfeeding  Body mass index is 53 75 kg/m²  PHYSICAL EXAM:      General Appearance:   Alert, cooperative, no distress   HEENT:   Normocephalic, atraumatic, anicteric      Neck:  Supple, symmetrical, trachea midline   Lungs:   Clear to auscultation bilaterally; no rales, rhonchi or wheezing; respirations unlabored    Heart[de-identified]   Regular rate and rhythm; no murmur, rub, or gallop  Abdomen:   Soft, non-tender, non-distended; normal bowel sounds; no masses, no organomegaly    Genitalia:   Deferred    Rectal:   Deferred    Extremities:  No cyanosis, clubbing or edema    Pulses:  2+ and symmetric    Skin:  No jaundice, rashes, or lesions    Lymph nodes:  No palpable cervical lymphadenopathy        Lab Results:   No visits with results within 1 Day(s) from this visit     Latest known visit with results is:   Appointment on 02/14/2022   Component Date Value    WBC 02/14/2022 5 58     RBC 02/14/2022 4 93     Hemoglobin 02/14/2022 13 4     Hematocrit 02/14/2022 43 8     MCV 02/14/2022 89     MCH 02/14/2022 27 2     MCHC 02/14/2022 30 6 (A)    RDW 02/14/2022 14 5     MPV 02/14/2022 10 0     Platelets 20/08/9773 320     nRBC 02/14/2022 0     Neutrophils Relative 02/14/2022 63     Immat GRANS % 02/14/2022 0     Lymphocytes Relative 02/14/2022 25     Monocytes Relative 02/14/2022 7     Eosinophils Relative 02/14/2022 4     Basophils Relative 02/14/2022 1     Neutrophils Absolute 02/14/2022 3 55     Immature Grans Absolute 02/14/2022 0 01     Lymphocytes Absolute 02/14/2022 1 40     Monocytes Absolute 02/14/2022 0 36     Eosinophils Absolute 02/14/2022 0 20     Basophils Absolute 02/14/2022 0 06     Sodium 02/14/2022 139     Potassium 02/14/2022 3 9     Chloride 02/14/2022 100     CO2 02/14/2022 33 (A)    ANION GAP 02/14/2022 6     BUN 02/14/2022 14     Creatinine 02/14/2022 0 76     Glucose 02/14/2022 84     Calcium 02/14/2022 9 2     AST 02/14/2022 11     ALT 02/14/2022 23     Alkaline Phosphatase 02/14/2022 111     Total Protein 02/14/2022 7 7     Albumin 02/14/2022 3 8     Total Bilirubin 02/14/2022 0 28     eGFR 02/14/2022 97     ANTICARDIOLIPIN IGG ANTI* 02/14/2022 1 6     ANTICARDIOLIPIN IGA ANTI* 02/14/2022 2 4     ANTICARDIOLIPIN IGM ANTI* 02/14/2022 2 4          Radiology Results:   XR chest pa & lateral    Result Date: 4/21/2022  Narrative: CHEST INDICATION:   R06 2: Wheezing R05 9: Cough, unspecified  COMPARISON:  2/8/2020 EXAM PERFORMED/VIEWS:  XR CHEST PA & LATERAL FINDINGS: Cardiomediastinal silhouette appears unremarkable  The lungs are clear  No pneumothorax or pleural effusion  Osseous structures appear within normal limits for patient age  Impression: No acute cardiopulmonary disease   Workstation performed: [id number]

## 2022-05-17 ENCOUNTER — HOSPITAL ENCOUNTER (OUTPATIENT)
Dept: ULTRASOUND IMAGING | Facility: CLINIC | Age: 42
Discharge: HOME | End: 2022-05-17
Payer: COMMERCIAL

## 2022-05-17 DIAGNOSIS — K29.70 GASTRITIS, PRESENCE OF BLEEDING UNSPECIFIED, UNSPECIFIED CHRONICITY, UNSPECIFIED GASTRITIS TYPE: ICD-10-CM

## 2022-05-17 PROCEDURE — 76700 US EXAM ABDOM COMPLETE: CPT

## 2022-05-18 ENCOUNTER — TELEPHONE (OUTPATIENT)
Dept: GASTROENTEROLOGY | Facility: CLINIC | Age: 42
End: 2022-05-18

## 2022-05-18 DIAGNOSIS — K82.4 GALLBLADDER POLYP: Primary | ICD-10-CM

## 2022-05-18 NOTE — TELEPHONE ENCOUNTER
----- Message from Ruba Becker PA-C sent at 5/18/2022  3:05 PM EDT -----  Please inform patient that the ultrasound showed a fatty liver (weight loss with diet and exercise recommended for treatment of the fatty liver) and a 9mm gallbladder polyp (otherwise the gallbladder was normal)  Recommend a repeat ultrasound in 6 months for the gallbladder polyp - order placed in system

## 2022-05-19 ENCOUNTER — HOSPITAL ENCOUNTER (OUTPATIENT)
Dept: CT IMAGING | Facility: CLINIC | Age: 42
Discharge: HOME | End: 2022-05-19
Payer: COMMERCIAL

## 2022-05-19 ENCOUNTER — CLINICAL SUPPORT (OUTPATIENT)
Dept: FAMILY MEDICINE CLINIC | Facility: CLINIC | Age: 42
End: 2022-05-19
Payer: COMMERCIAL

## 2022-05-19 DIAGNOSIS — K29.70 GASTRITIS, PRESENCE OF BLEEDING UNSPECIFIED, UNSPECIFIED CHRONICITY, UNSPECIFIED GASTRITIS TYPE: ICD-10-CM

## 2022-05-19 DIAGNOSIS — E53.8 B12 DEFICIENCY: Primary | ICD-10-CM

## 2022-05-19 PROCEDURE — G1004 CDSM NDSC: HCPCS

## 2022-05-19 PROCEDURE — 96372 THER/PROPH/DIAG INJ SC/IM: CPT

## 2022-05-19 PROCEDURE — 74176 CT ABD & PELVIS W/O CONTRAST: CPT

## 2022-05-19 RX ADMIN — CYANOCOBALAMIN 1000 MCG: 1000 INJECTION INTRAMUSCULAR; SUBCUTANEOUS at 10:37

## 2022-05-24 ENCOUNTER — PREP FOR PROCEDURE (OUTPATIENT)
Dept: GASTROENTEROLOGY | Facility: CLINIC | Age: 42
End: 2022-05-24

## 2022-05-24 DIAGNOSIS — R93.5 ABNORMAL CT OF THE ABDOMEN: Primary | ICD-10-CM

## 2022-05-26 ENCOUNTER — HOSPITAL ENCOUNTER (OUTPATIENT)
Dept: GASTROENTEROLOGY | Facility: HOSPITAL | Age: 42
Setting detail: OUTPATIENT SURGERY
Discharge: HOME | End: 2022-05-26
Attending: INTERNAL MEDICINE
Payer: COMMERCIAL

## 2022-05-26 ENCOUNTER — ANESTHESIA (OUTPATIENT)
Dept: GASTROENTEROLOGY | Facility: HOSPITAL | Age: 42
End: 2022-05-26

## 2022-05-26 ENCOUNTER — ANESTHESIA EVENT (OUTPATIENT)
Dept: GASTROENTEROLOGY | Facility: HOSPITAL | Age: 42
End: 2022-05-26

## 2022-05-26 VITALS
DIASTOLIC BLOOD PRESSURE: 61 MMHG | HEART RATE: 65 BPM | BODY MASS INDEX: 47.09 KG/M2 | SYSTOLIC BLOOD PRESSURE: 143 MMHG | WEIGHT: 293 LBS | HEIGHT: 66 IN | RESPIRATION RATE: 20 BRPM | OXYGEN SATURATION: 96 % | TEMPERATURE: 97.5 F

## 2022-05-26 DIAGNOSIS — R93.5 ABNORMAL CT OF THE ABDOMEN: ICD-10-CM

## 2022-05-26 PROCEDURE — 88342 IMHCHEM/IMCYTCHM 1ST ANTB: CPT | Performed by: PATHOLOGY

## 2022-05-26 PROCEDURE — 88305 TISSUE EXAM BY PATHOLOGIST: CPT | Performed by: PATHOLOGY

## 2022-05-26 PROCEDURE — 43239 EGD BIOPSY SINGLE/MULTIPLE: CPT | Performed by: INTERNAL MEDICINE

## 2022-05-26 RX ORDER — SODIUM CHLORIDE, SODIUM LACTATE, POTASSIUM CHLORIDE, CALCIUM CHLORIDE 600; 310; 30; 20 MG/100ML; MG/100ML; MG/100ML; MG/100ML
125 INJECTION, SOLUTION INTRAVENOUS CONTINUOUS
Status: CANCELLED | OUTPATIENT
Start: 2022-05-26

## 2022-05-26 RX ORDER — PROPOFOL 10 MG/ML
INJECTION, EMULSION INTRAVENOUS AS NEEDED
Status: DISCONTINUED | OUTPATIENT
Start: 2022-05-26 | End: 2022-05-26

## 2022-05-26 RX ORDER — KETAMINE HCL IN NACL, ISO-OSM 100MG/10ML
SYRINGE (ML) INJECTION AS NEEDED
Status: DISCONTINUED | OUTPATIENT
Start: 2022-05-26 | End: 2022-05-26

## 2022-05-26 RX ORDER — SODIUM CHLORIDE, SODIUM LACTATE, POTASSIUM CHLORIDE, CALCIUM CHLORIDE 600; 310; 30; 20 MG/100ML; MG/100ML; MG/100ML; MG/100ML
125 INJECTION, SOLUTION INTRAVENOUS CONTINUOUS
Status: DISCONTINUED | OUTPATIENT
Start: 2022-05-26 | End: 2022-05-30 | Stop reason: HOSPADM

## 2022-05-26 RX ORDER — LIDOCAINE HYDROCHLORIDE 20 MG/ML
INJECTION, SOLUTION EPIDURAL; INFILTRATION; INTRACAUDAL; PERINEURAL AS NEEDED
Status: DISCONTINUED | OUTPATIENT
Start: 2022-05-26 | End: 2022-05-26

## 2022-05-26 RX ADMIN — LIDOCAINE HYDROCHLORIDE 100 MG: 20 INJECTION, SOLUTION EPIDURAL; INFILTRATION; INTRACAUDAL at 08:43

## 2022-05-26 RX ADMIN — SODIUM CHLORIDE, SODIUM LACTATE, POTASSIUM CHLORIDE, AND CALCIUM CHLORIDE 125 ML/HR: .6; .31; .03; .02 INJECTION, SOLUTION INTRAVENOUS at 08:11

## 2022-05-26 RX ADMIN — PROPOFOL 150 MG: 10 INJECTION, EMULSION INTRAVENOUS at 08:43

## 2022-05-26 RX ADMIN — Medication 20 MG: at 08:43

## 2022-05-26 NOTE — INTERVAL H&P NOTE
H&P reviewed  After examining the patient I find no changes in the patients condition since the H&P had been written      Vitals:    05/26/22 0801   BP: 125/71   Pulse: 76   Resp: 19   Temp: 97 6 °F (36 4 °C)   SpO2: 96%

## 2022-05-26 NOTE — ANESTHESIA POSTPROCEDURE EVALUATION
Post-Op Assessment Note    CV Status:  Stable    Pain management: adequate     Mental Status:  Alert and awake   Hydration Status:  Euvolemic   PONV Controlled:  Controlled   Airway Patency:  Patent and adequate      Post Op Vitals Reviewed: Yes      Staff: CRNA         No complications documented      BP   125/72   Temp      Pulse 72   Resp 20   SpO2 98

## 2022-05-26 NOTE — ANESTHESIA PREPROCEDURE EVALUATION
Procedure:  EGD       Lymphedema of right lower extremity   Morbid obesity with BMI of 50 0-59 9, adult (HCC)   Weight gain   Fungal infection   Acne vulgaris   Abdominal discomfort   Leg edema, left   Lymphedema   Acute superficial gastritis without hemorrhage   Folliculitis of both axillae   Annual physical exam   Bruising   Lymph node symptom   URI, recovered                 Physical Exam    Airway    Mallampati score: III  TM Distance: >3 FB  Neck ROM: full     Dental       Cardiovascular  Cardiovascular exam normal    Pulmonary  Pulmonary exam normal     Other Findings        Anesthesia Plan  ASA Score- 3     Anesthesia Type- IV sedation with anesthesia with ASA Monitors  Additional Monitors:   Airway Plan:           Plan Factors-    Chart reviewed  Existing labs reviewed  Patient summary reviewed  Patient is not a current smoker  Induction- intravenous  Postoperative Plan-     Informed Consent- Anesthetic plan and risks discussed with patient  I personally reviewed this patient with the CRNA  Discussed and agreed on the Anesthesia Plan with the CRNA  Gorge Sen 1  Abdominal pain  2  Bloating  3  Gastritis     Patient reports a return of abdominal discomfort and bloating x months  She had an EGD in August of 2021 which showed mild bulbar erythema and gastric polyps; biopsies of the stomach were benign and negative for h pylori and biopsies of the duodenum were negative for celiac disease  She previously improved with an Omeprazole course  She also had a colonoscopy in February of 2018 which was normal   She is on a probiotic and has tried a low FODMAP diet without relief      Will plan for CT Scan A/P and ultrasound to investigate  Will check a hydrogen breath test for SIBO  Will begin an Omeprazole 20mg po daily course x 6-8 weeks    Further recommendations pending results of the above testing      ______________________________________________________________________     SUBJECTIVE: Patient is a pleasant 39year old female who presents to the office for follow up  She reports a return of abdominal discomfort and bloating x months  She had an EGD in August of 2021 which showed mild bulbar erythema and gastric polyps; biopsies of the stomach were benign and negative for h pylori and biopsies of the duodenum were negative for celiac disease  She previously improved with an Omeprazole course  She also had a colonoscopy in February of 2018 which was normal   She reports of more LUQ discomfort with radiation to the epigastric region  She reports of bloating and gas  She reports overall her bowel movements are regular with only occasional irregularities  She is on a probiotic and has tried a low FODMAP diet without relief  She reports intermittently taking some of the Omeprazole recently  She does not consume much dairy  She has a history of endometriosis and also recently followed up with her PCP  She wishes to avoid a repeat colonoscopy  No family history of esophageal, stomach, or colon cancer    No unintentional weight loss

## 2022-06-02 ENCOUNTER — ANNUAL EXAM (OUTPATIENT)
Dept: OBGYN CLINIC | Facility: MEDICAL CENTER | Age: 42
End: 2022-06-02
Payer: COMMERCIAL

## 2022-06-02 VITALS
BODY MASS INDEX: 45.99 KG/M2 | HEIGHT: 67 IN | SYSTOLIC BLOOD PRESSURE: 110 MMHG | DIASTOLIC BLOOD PRESSURE: 80 MMHG | WEIGHT: 293 LBS

## 2022-06-02 DIAGNOSIS — Z01.419 ENCOUNTER FOR ANNUAL ROUTINE GYNECOLOGICAL EXAMINATION: Primary | ICD-10-CM

## 2022-06-02 DIAGNOSIS — Z12.31 ENCOUNTER FOR SCREENING MAMMOGRAM FOR MALIGNANT NEOPLASM OF BREAST: ICD-10-CM

## 2022-06-02 DIAGNOSIS — N63.0 BREAST MASS IN FEMALE: ICD-10-CM

## 2022-06-02 PROCEDURE — 99396 PREV VISIT EST AGE 40-64: CPT | Performed by: STUDENT IN AN ORGANIZED HEALTH CARE EDUCATION/TRAINING PROGRAM

## 2022-06-02 PROCEDURE — 3008F BODY MASS INDEX DOCD: CPT | Performed by: STUDENT IN AN ORGANIZED HEALTH CARE EDUCATION/TRAINING PROGRAM

## 2022-06-02 PROCEDURE — 1036F TOBACCO NON-USER: CPT | Performed by: STUDENT IN AN ORGANIZED HEALTH CARE EDUCATION/TRAINING PROGRAM

## 2022-06-02 NOTE — PROGRESS NOTES
Assessment/Plan:    38 yo  - annual exam    F/u in 1 year or prn     Problem List Items Addressed This Visit    None     Visit Diagnoses     Encounter for annual routine gynecological examination    -  Primary  Pap not indicated  Encouraged to try pelvic floor PT - may try on her own as insurance is changing    Encounter for screening mammogram for malignant neoplasm of breast        Relevant Orders    Mammo screening bilateral w 3d & cad    Breast mass in female        Relevant Orders    US breast bilateral limited (diagnostic)            Subjective:      Patient ID: Lena Hu is a 39 y o  female  This is a 39 y o  [id number] with No LMP recorded  Patient has had a hysterectomy  Update: Continues to have left sided pelvic pain, especially w/ intercourse  She is s/p hysterectomy w/ BSO  Since last visit had CT scan which showed stomach wall thickening  She then had EGD and biopsies are still pending  At last visit was referred to pelvic floor PT, did not complete due to insurance concerns  Kori Arevalo has significant lymphedema and has therapy monthly  Sexually active: yes, w/ paina s above  STD testing: NA    Screening:  Last pap smear:  neg/neg  Last mammo: 18-R breast mass, diagnostic done on 18-benign, repeated u/s on 19-repeat in 6 months  Colonoscopy: 18 - repeat in 10 years    Family history:   Breast cancer: NA  Ovarian cancer: NA  Colon cancer: NA    Body mass index is 53 16 kg/m²  Exercise: yes, walking  Diet: started vegan diet yesterday  Has previously been evaluated by weight management  She's tried medications  The following portions of the patient's history were reviewed and updated as appropriate: allergies, current medications, past family history, past medical history, past social history, past surgical history and problem list     Review of Systems   Constitutional: Negative  HENT: Negative  Eyes: Negative  Respiratory: Negative  Cardiovascular: Negative  Gastrointestinal: Negative  Endocrine: Negative  Genitourinary: Positive for pelvic pain  Negative for dyspareunia, dysuria, frequency, menstrual problem, vaginal discharge and vaginal pain  Musculoskeletal: Negative  Skin: Negative  Allergic/Immunologic: Negative  Neurological: Negative  Hematological: Negative  Psychiatric/Behavioral: Negative  Objective:      /80 (BP Location: Left arm, Patient Position: Sitting, Cuff Size: Large)   Ht 5' 6 5" (1 689 m)   Wt (!) 152 kg (334 lb 6 4 oz)   BMI 53 16 kg/m²          Physical Exam  Vitals reviewed  Cardiovascular:      Rate and Rhythm: Normal rate  Pulmonary:      Effort: Pulmonary effort is normal    Chest:   Breasts: Breasts are symmetrical       Right: No mass, nipple discharge, skin change or tenderness  Left: No mass, nipple discharge, skin change or tenderness  Abdominal:      Palpations: Abdomen is soft  Genitourinary:     Labia:         Right: No rash  Left: No rash  Vagina: Normal  No signs of injury  Adnexa:         Right: No mass, tenderness or fullness  Left: Tenderness present  No mass or fullness  Musculoskeletal:      Cervical back: Normal range of motion  Skin:     General: Skin is warm and dry  Neurological:      Mental Status: She is alert and oriented to person, place, and time     Psychiatric:         Behavior: Behavior normal

## 2022-06-30 DIAGNOSIS — I89.0 LYMPHEDEMA OF RIGHT LOWER EXTREMITY: ICD-10-CM

## 2022-06-30 RX ORDER — HYDROCHLOROTHIAZIDE 12.5 MG/1
TABLET ORAL
Qty: 30 TABLET | Refills: 0 | Status: SHIPPED | OUTPATIENT
Start: 2022-06-30 | End: 2022-08-04

## 2022-07-12 ENCOUNTER — CLINICAL SUPPORT (OUTPATIENT)
Dept: FAMILY MEDICINE CLINIC | Facility: CLINIC | Age: 42
End: 2022-07-12
Payer: COMMERCIAL

## 2022-07-12 DIAGNOSIS — E53.8 B12 DEFICIENCY: Primary | ICD-10-CM

## 2022-07-12 PROCEDURE — 96372 THER/PROPH/DIAG INJ SC/IM: CPT

## 2022-07-12 RX ADMIN — CYANOCOBALAMIN 1000 MCG: 1000 INJECTION, SOLUTION INTRAMUSCULAR; SUBCUTANEOUS at 15:56

## 2022-07-29 DIAGNOSIS — Z86.718 HISTORY OF DVT (DEEP VEIN THROMBOSIS): ICD-10-CM

## 2022-07-29 DIAGNOSIS — Z79.01 CURRENT USE OF LONG TERM ANTICOAGULATION: ICD-10-CM

## 2022-08-04 DIAGNOSIS — I89.0 LYMPHEDEMA OF RIGHT LOWER EXTREMITY: ICD-10-CM

## 2022-08-04 RX ORDER — HYDROCHLOROTHIAZIDE 12.5 MG/1
TABLET ORAL
Qty: 30 TABLET | Refills: 0 | Status: SHIPPED | OUTPATIENT
Start: 2022-08-04

## 2022-08-10 ENCOUNTER — TELEPHONE (OUTPATIENT)
Dept: FAMILY MEDICINE CLINIC | Facility: CLINIC | Age: 42
End: 2022-08-10

## 2022-08-10 NOTE — TELEPHONE ENCOUNTER
Pt was called back, she stated she only has headache when laying down  other then that she feels fine  I explained to her she is on Blood thinners  Headaches get worst she is to go straight to ER  She answered she do not need ER at moment  She made mychart appt for Monday   I was able to place her for tomorrow at 4pm

## 2022-08-11 ENCOUNTER — APPOINTMENT (EMERGENCY)
Dept: CT IMAGING | Facility: HOSPITAL | Age: 42
End: 2022-08-11
Payer: COMMERCIAL

## 2022-08-11 ENCOUNTER — OFFICE VISIT (OUTPATIENT)
Dept: FAMILY MEDICINE CLINIC | Facility: CLINIC | Age: 42
End: 2022-08-11
Payer: COMMERCIAL

## 2022-08-11 ENCOUNTER — HOSPITAL ENCOUNTER (EMERGENCY)
Facility: HOSPITAL | Age: 42
Discharge: HOME | End: 2022-08-11
Attending: EMERGENCY MEDICINE
Payer: COMMERCIAL

## 2022-08-11 VITALS
HEIGHT: 66 IN | HEART RATE: 80 BPM | RESPIRATION RATE: 18 BRPM | TEMPERATURE: 98 F | DIASTOLIC BLOOD PRESSURE: 74 MMHG | SYSTOLIC BLOOD PRESSURE: 122 MMHG | BODY MASS INDEX: 47.09 KG/M2 | WEIGHT: 293 LBS | OXYGEN SATURATION: 99 %

## 2022-08-11 VITALS
DIASTOLIC BLOOD PRESSURE: 78 MMHG | WEIGHT: 293 LBS | BODY MASS INDEX: 45.99 KG/M2 | HEIGHT: 67 IN | TEMPERATURE: 97.5 F | SYSTOLIC BLOOD PRESSURE: 108 MMHG | HEART RATE: 95 BPM | OXYGEN SATURATION: 96 %

## 2022-08-11 DIAGNOSIS — D17.24 LIPOMA OF LEFT LOWER EXTREMITY: ICD-10-CM

## 2022-08-11 DIAGNOSIS — S09.90XA INJURY OF HEAD, INITIAL ENCOUNTER: Primary | ICD-10-CM

## 2022-08-11 DIAGNOSIS — S09.90XA CLOSED HEAD INJURY, INITIAL ENCOUNTER: Primary | ICD-10-CM

## 2022-08-11 PROCEDURE — 99214 OFFICE O/P EST MOD 30 MIN: CPT | Performed by: NURSE PRACTITIONER

## 2022-08-11 PROCEDURE — 70450 CT HEAD/BRAIN W/O DYE: CPT

## 2022-08-11 PROCEDURE — G1004 CDSM NDSC: HCPCS

## 2022-08-11 PROCEDURE — 99284 EMERGENCY DEPT VISIT MOD MDM: CPT | Performed by: EMERGENCY MEDICINE

## 2022-08-11 PROCEDURE — 3725F SCREEN DEPRESSION PERFORMED: CPT | Performed by: NURSE PRACTITIONER

## 2022-08-11 PROCEDURE — 99284 EMERGENCY DEPT VISIT MOD MDM: CPT

## 2022-08-11 NOTE — ASSESSMENT & PLAN NOTE
Patient had a head injury  Hit her head on the nightstand  Since then has been experiencing headaches  Does have ongoing headache since this morning  Pupils are sluggish  Has some difficulty with ambulation  Will get stat CT scan  Discussed possibility of a concussion  Continue Tylenol ice pack continue to maintain safety    Patient is advised to go to the ER if symptoms worsen, education provided

## 2022-08-11 NOTE — PATIENT INSTRUCTIONS
Concussion   WHAT YOU NEED TO KNOW:   A concussion is a mild brain injury  It is usually caused by a bump or blow to the head from a fall, a motor vehicle crash, or a sports injury  Sometimes being shaken forcefully may cause a concussion  DISCHARGE INSTRUCTIONS:   Have someone call 911 for any of the following:   Someone tries to wake you and cannot do so  You have a seizure, increasing confusion, or a change in personality  Your speech becomes slurred, or you have new vision problems  Return to the emergency department if:   You have sudden and new vision problems  You have a severe headache that does not go away  You have arm or leg weakness, numbness, or new problems with coordination  You have blood or clear fluid coming out of the ears or nose  Contact your healthcare provider if:   You have nausea or are vomiting  You feel more sleepy than usual     Your symptoms get worse  Your symptoms last longer than 6 weeks after the injury  You have questions or concerns about your condition or care  Medicines: You may need any of the following:  Acetaminophen  decreases pain and fever  It is available without a doctor's order  Ask how much to take and how often to take it  Follow directions  Read the labels of all other medicines you are using to see if they also contain acetaminophen, or ask your doctor or pharmacist  Acetaminophen can cause liver damage if not taken correctly  Do not use more than 4 grams (4,000 milligrams) total of acetaminophen in one day  NSAIDs  help decrease swelling and pain or fever  This medicine is available with or without a doctor's order  NSAIDs can cause stomach bleeding or kidney problems in certain people  If you take blood thinner medicine, always ask your healthcare provider if NSAIDs are safe for you  Always read the medicine label and follow directions  Take your medicine as directed    Contact your healthcare provider if you think your medicine is not helping or if you have side effects  Tell him or her if you are allergic to any medicine  Keep a list of the medicines, vitamins, and herbs you take  Include the amounts, and when and why you take them  Bring the list or the pill bottles to follow-up visits  Carry your medicine list with you in case of an emergency  Self-care:  Concussion symptoms usually go away within about 10 days, but they may last longer  The following may be recommended to manage your symptoms:  Rest from physical and mental activities as directed  Mental activities are those that require thinking, concentration, and attention  You will need to rest until your symptoms are gone  Rest will allow you to recover from your concussion  Ask your healthcare provider when you can return to work and other daily activities  Have someone stay with you for the first 24 hours after your injury  Your healthcare provider should be contacted if your symptoms get worse, or you develop new symptoms  Do not participate in sports and physical activities until your healthcare provider says it is okay  They could make your symptoms worse or lead to another concussion  Your healthcare provider will tell you when it is okay for you to return to sports or physical activities  Ask for more information about sports concussions  Prevent another concussion:   Wear protective sports equipment that fits properly  Helmets help decrease your risk for a serious brain injury  Talk to your healthcare provider about ways you can decrease your risk for a concussion if you play sports  Wear your seatbelt every time you travel  This helps to decrease your risk for a head injury if you are in a car accident  Follow up with your doctor as directed:  Write down your questions so you remember to ask them during your visits     © Copyright [employer] 2022 Information is for End User's use only and may not be sold, redistributed or otherwise used for commercial purposes  All illustrations and images included in CareNotes® are the copyrighted property of [employer]  or Brynn Dur[employer]  The above information is an  only  It is not intended as medical advice for individual conditions or treatments  Talk to your doctor, nurse or pharmacist before following any medical regimen to see if it is safe and effective for you

## 2022-08-11 NOTE — ED PROVIDER NOTES
History  Chief Complaint   Patient presents with    Head Injury w/unknown LOC     Hit head on dresser on Friday 08/05  Unknown if she lost consciousness  Takes blood thinner  Saw PCP to day because she is experiencing headaches everyday since  History provided by:  Patient  Head Injury w/unknown LOC  Location:  Generalized  Time since incident:  6 days  Mechanism of injury: direct blow    Pain details:     Quality:  Aching    Radiates to: Face    Severity:  Moderate    Duration:  6 days    Timing:  Constant    Progression:  Unchanged  Chronicity:  New  Relieved by:  None tried  Worsened by:  Nothing  Ineffective treatments:  None tried  Associated symptoms: no disorientation, no double vision, no headaches, no nausea, no neck pain, no numbness and no vomiting        Prior to Admission Medications   Prescriptions Last Dose Informant Patient Reported? Taking?    Bacillus Coagulans-Inulin (Probiotic) 1-250 BILLION-MG CAPS  Self Yes No   Sig: Take 1 capsule by mouth daily   Calcium Carbonate-Vit D-Min (Calcium 600 + Minerals) 600-200 MG-UNIT TABS  Self Yes No   Sig: Take 1 tablet by mouth daily   Cranberry 1000 MG CAPS  Self Yes No   Sig: Take 1 capsule by mouth daily   FLAXSEED, LINSEED, PO  Self Yes No   Sig: Take 1 capsule by mouth daily   Multiple Vitamin (multivitamin) capsule  Self Yes No   Sig: Take 1 capsule by mouth daily   Promethazine-DM (PHENERGAN-DM) 6 25-15 mg/5 mL oral syrup   No No   Sig: Take 5 mL by mouth 4 (four) times a day as needed for cough   Patient not taking: No sig reported   cholecalciferol (VITAMIN D3) 1,000 units tablet  Self Yes No   Sig: Take 1,000 Units by mouth daily   clindamycin (CLINDAGEL) 1 % gel  Self No No   Sig: APPLY TO AFFECTED AREA TWICE A DAY   hydrochlorothiazide (HYDRODIURIL) 12 5 mg tablet  Self No No   Sig: MAY TAKE 1 PILL EVERY 24 HOURS AS NEEDED FOR EDEMA IN LEGS   omeprazole (PriLOSEC) 20 mg delayed release capsule  Self No No   Sig: Take 1 capsule (20 mg total) by mouth in the morning  predniSONE 20 mg tablet   No No   Sig: Take 1 tablet (20 mg total) by mouth daily   Patient not taking: No sig reported   rivaroxaban (Xarelto) 10 mg tablet  Self No No   Sig: Take 1 tablet (10 mg total) by mouth daily      Facility-Administered Medications Last Administration Doses Remaining   cyanocobalamin injection 1,000 mcg 7/12/2022  3:56 PM    cyanocobalamin injection 1,000 mcg 5/19/2022 10:37 AM           Past Medical History:   Diagnosis Date    Diverticulosis     [facility]     [facility]     Endometriosis     Gastritis     Hereditary lymphedema of legs     Lymphedema of right lower extremity     Obesity, Class III, BMI 40-49 9 (morbid obesity) ([facility] )        Past Surgical History:   Procedure Laterality Date    ABLATION COLPOCLESIS      ENDOMETRIAL ABLATION  2014    HYSTERECTOMY  2016    OOPHORECTOMY Bilateral 09/2016    OVARY SURGERY      TUBAL LIGATION         Family History   Problem Relation Age of Onset    Clotting disorder Mother     Deep vein thrombosis Mother         Birth Control 1987    Pancreatic cancer Maternal Grandmother     Cancer Maternal Grandmother         Pancreatic    Diabetes Paternal Grandmother     Thyroid disease Paternal Grandmother     Hypertension Paternal Grandfather     Heart disease Paternal Grandfather     Stroke Maternal Aunt     Breast cancer Neg Hx     Colon cancer Neg Hx     Ovarian cancer Neg Hx     Cervical cancer Neg Hx     Uterine cancer Neg Hx      I have reviewed and agree with the history as documented      E-Cigarette/Vaping    E-Cigarette Use Never User      E-Cigarette/Vaping Substances    Nicotine No     THC No     CBD No     Flavoring No     Other No     Unknown No      Social History     Tobacco Use    Smoking status: Never Smoker    Smokeless tobacco: Never Used   Vaping Use    Vaping Use: Never used   Substance Use Topics    Alcohol use: Not Currently     Comment: extremely rare once a year    Drug use: No       Review of Systems   Constitutional: Negative for activity change, chills, diaphoresis and fever  HENT: Negative for congestion, sinus pressure and sore throat  Eyes: Negative for double vision, pain and visual disturbance  Respiratory: Negative for cough, chest tightness, shortness of breath, wheezing and stridor  Cardiovascular: Negative for chest pain and palpitations  Gastrointestinal: Negative for abdominal distention, abdominal pain, constipation, diarrhea, nausea and vomiting  Genitourinary: Negative for dysuria and frequency  Musculoskeletal: Negative for neck pain and neck stiffness  Skin: Negative for rash  Neurological: Negative for dizziness, speech difficulty, light-headedness, numbness and headaches  Physical Exam  Physical Exam  Vitals reviewed  Constitutional:       General: She is not in acute distress  Appearance: She is well-developed  She is not diaphoretic  HENT:      Head: Normocephalic and atraumatic  Right Ear: External ear normal       Left Ear: External ear normal       Nose: Nose normal    Eyes:      General:         Right eye: No discharge  Left eye: No discharge  Pupils: Pupils are equal, round, and reactive to light  Neck:      Trachea: No tracheal deviation  Cardiovascular:      Rate and Rhythm: Normal rate and regular rhythm  Heart sounds: Normal heart sounds  No murmur heard  Pulmonary:      Effort: Pulmonary effort is normal  No respiratory distress  Breath sounds: Normal breath sounds  No stridor  Abdominal:      General: There is no distension  Palpations: Abdomen is soft  Tenderness: There is no abdominal tenderness  There is no guarding or rebound  Musculoskeletal:         General: Normal range of motion  Cervical back: Normal range of motion and neck supple  Skin:     General: Skin is warm and dry        Coloration: Skin is not pale  Findings: No erythema  Neurological:      General: No focal deficit present  Mental Status: She is alert and oriented to person, place, and time  Vital Signs  ED Triage Vitals [08/11/22 1721]   Temperature Pulse Respirations Blood Pressure SpO2   98 °F (36 7 °C) 84 16 133/76 99 %      Temp Source Heart Rate Source Patient Position - Orthostatic VS BP Location FiO2 (%)   Oral Monitor Sitting Left arm --      Pain Score       5           Vitals:    08/11/22 1721 08/11/22 1816   BP: 133/76 122/74   Pulse: 84 80   Patient Position - Orthostatic VS: Sitting          Visual Acuity  Visual Acuity    Flowsheet Row Most Recent Value   L Pupil Size (mm) 3   R Pupil Size (mm) 3          ED Medications  Medications - No data to display    Diagnostic Studies  Results Reviewed     None                 CT head without contrast   Final Result by Awilda Parker MD (08/11 1834)      No intracranial hemorrhage or calvarial fracture  Workstation performed: [id number]                    Procedures  Procedures         ED Course                               SBIRT 22yo+    Flowsheet Row Most Recent Value   SBIRT (23 yo +)    In order to provide better care to our patients, we are screening all of our patients for alcohol and drug use  Would it be okay to ask you these screening questions? No Filed at: 08/11/2022 1755                    MDM  Number of Diagnoses or Management Options  Closed head injury, initial encounter: new and requires workup  Diagnosis management comments:       Initial ED assessment:  59-year-old female, on Xarelto, presents after a head injury  , this occurred 6 days ago has had intermittent headaches    Initial DDx includes but is not limited to:   Closed head injury, intracranial hemorrhage    Initial ED plan:   CT head      Final ED summary/disposition:   After evaluation and workup in the emergency department,  CT unremarkable, patient discharged Amount and/or Complexity of Data Reviewed  Tests in the radiology section of CPT®: ordered and reviewed  Review and summarize past medical records: yes  Independent visualization of images, tracings, or specimens: yes        Disposition  Final diagnoses:   Closed head injury, initial encounter     Time reflects when diagnosis was documented in both MDM as applicable and the Disposition within this note     Time User Action Codes Description Comment    8/11/2022  5:57 PM Fred Ignacio [[id number]] Closed head injury, initial encounter       ED Disposition     ED Disposition   Discharge    Condition   Stable    Date/Time   Thu Aug 11, 2022  6:45 PM    Comment   Emy Nice Sphar discharge to home/self care  Follow-up Information    None         Patient's Medications   Discharge Prescriptions    No medications on file       No discharge procedures on file      PDMP Review       Value Time User    PDMP Reviewed  Yes 9/15/2021  2:26 PM Rosalind Cleary          ED Provider  Electronically Signed by           Vidal Shankar DO  08/11/22 1696

## 2022-08-11 NOTE — ASSESSMENT & PLAN NOTE
Patient has lipoma on left eye  Reports very uncomfortable at times, it is pressing on her nerves and she is getting spasms in her knees  Discussed management  Discussed possible surgical intervention  Patient reports she will consider    Discussed management of pain

## 2022-08-11 NOTE — PROGRESS NOTES
Assessment/Plan:    Depression Screening and Follow-up Plan: Patient was screened for depression during today's encounter  They screened negative with a PHQ-2 score of 0  Head injury  Patient had a head injury  Hit her head on the nightstand  Since then has been experiencing headaches  Does have ongoing headache since this morning  Pupils are sluggish  Has some difficulty with ambulation  Will get stat CT scan  Discussed possibility of a concussion  Continue Tylenol ice pack continue to maintain safety  Patient is advised to go to the ER if symptoms worsen, education provided    Lipoma of left lower extremity  Patient has lipoma on left eye  Reports very uncomfortable at times, it is pressing on her nerves and she is getting spasms in her knees  Discussed management  Discussed possible surgical intervention  Patient reports she will consider  Discussed management of pain         Problem List Items Addressed This Visit        Other    Head injury - Primary     Patient had a head injury  Hit her head on the nightstand  Since then has been experiencing headaches  Does have ongoing headache since this morning  Pupils are sluggish  Has some difficulty with ambulation  Will get stat CT scan  Discussed possibility of a concussion  Continue Tylenol ice pack continue to maintain safety  Patient is advised to go to the ER if symptoms worsen, education provided         Relevant Orders    CT head wo contrast    Lipoma of left lower extremity     Patient has lipoma on left eye  Reports very uncomfortable at times, it is pressing on her nerves and she is getting spasms in her knees  Discussed management  Discussed possible surgical intervention  Patient reports she will consider  Discussed management of pain                 Subjective:      Patient ID: Brody Jimenez is a 39 y o  female  Patient is being seen with complaints of headache, dizziness    Patient reports hitting her head on a nightstand about 3 days ago  Denies any blurry vision  Patient is on blood thinners for history of blood clots  Also has discomfort behind her left leg, has a lipoma has been pressing on nurse, has knee pain  The following portions of the patient's history were reviewed and updated as appropriate: allergies, current medications, past family history, past medical history, past social history, past surgical history and problem list     Review of Systems   Constitutional: Negative  HENT: Negative  Eyes: Positive for photophobia and visual disturbance  Respiratory: Negative  Cardiovascular: Negative  Gastrointestinal: Negative  Endocrine: Negative  Genitourinary: Negative  Musculoskeletal: Positive for arthralgias  Allergic/Immunologic: Negative  Neurological: Positive for dizziness and headaches  Psychiatric/Behavioral: Negative  Objective:      /78 (BP Location: Left arm, Patient Position: Sitting, Cuff Size: Large)   Pulse 95   Temp 97 5 °F (36 4 °C) (Temporal)   Ht 5' 6 5" (1 689 m)   Wt (!) 154 kg (340 lb)   SpO2 96%   BMI 54 06 kg/m²          Physical Exam  Vitals and nursing note reviewed  Constitutional:       Appearance: She is well-developed  HENT:      Head: Normocephalic and atraumatic  Right Ear: External ear normal       Left Ear: External ear normal    Eyes:      Pupils: Pupils are equal, round, and reactive to light  Right eye: Pupil is sluggish  Left eye: Pupil is sluggish  Cardiovascular:      Rate and Rhythm: Normal rate and regular rhythm  Pulmonary:      Effort: Pulmonary effort is normal    Abdominal:      General: Bowel sounds are normal       Palpations: Abdomen is soft  Musculoskeletal:         General: Normal range of motion  Cervical back: Normal range of motion  Skin:     General: Skin is warm and dry  Neurological:      Mental Status: She is alert and oriented to person, place, and time        GCS: GCS eye subscore is 4  GCS verbal subscore is 5  GCS motor subscore is 5  Motor: Motor function is intact  Coordination: Romberg sign negative  Coordination abnormal  Impaired rapid alternating movements        Gait: Gait abnormal

## 2022-08-19 ENCOUNTER — TELEPHONE (OUTPATIENT)
Dept: FAMILY MEDICINE CLINIC | Facility: CLINIC | Age: 42
End: 2022-08-19

## 2022-08-19 NOTE — TELEPHONE ENCOUNTER
Jasmeet Webster she is seeing Kayley Rene at the [facility]   She would have to go there for her b-12 injection

## 2022-08-19 NOTE — TELEPHONE ENCOUNTER
Pt returned call back - pt notified  Pt agrees she saw Christi Koroma on 08/11/2022 - pt agrees to call Lupis's office for an appt

## 2022-08-19 NOTE — TELEPHONE ENCOUNTER
Patient called wanting to know if she can come in today around 3:45-4:15pm to get her B12 injection  Please let me know if this is ok for me to schedule

## 2022-08-24 ENCOUNTER — CLINICAL SUPPORT (OUTPATIENT)
Dept: FAMILY MEDICINE CLINIC | Facility: CLINIC | Age: 42
End: 2022-08-24
Payer: COMMERCIAL

## 2022-08-24 DIAGNOSIS — E53.9 VITAMIN B DEFICIENCY: Primary | ICD-10-CM

## 2022-08-24 RX ADMIN — CYANOCOBALAMIN 1000 MCG: 1000 INJECTION, SOLUTION INTRAMUSCULAR; SUBCUTANEOUS at 14:13

## 2022-09-29 ENCOUNTER — TELEMEDICINE (OUTPATIENT)
Dept: FAMILY MEDICINE CLINIC | Facility: CLINIC | Age: 42
End: 2022-09-29
Payer: COMMERCIAL

## 2022-09-29 DIAGNOSIS — J02.0 STREP PHARYNGITIS: Primary | ICD-10-CM

## 2022-09-29 DIAGNOSIS — R05.9 COUGH: ICD-10-CM

## 2022-09-29 DIAGNOSIS — J02.9 SORE THROAT: ICD-10-CM

## 2022-09-29 PROCEDURE — 99213 OFFICE O/P EST LOW 20 MIN: CPT | Performed by: NURSE PRACTITIONER

## 2022-09-29 RX ORDER — BENZONATATE 100 MG/1
100 CAPSULE ORAL 3 TIMES DAILY PRN
Qty: 20 CAPSULE | Refills: 0 | Status: SHIPPED | OUTPATIENT
Start: 2022-09-29

## 2022-09-29 RX ORDER — LIDOCAINE HYDROCHLORIDE 20 MG/ML
15 SOLUTION OROPHARYNGEAL 4 TIMES DAILY PRN
Qty: 100 ML | Refills: 0 | Status: SHIPPED | OUTPATIENT
Start: 2022-09-29

## 2022-09-29 RX ORDER — AMOXICILLIN 875 MG/1
875 TABLET, COATED ORAL 2 TIMES DAILY
Qty: 14 TABLET | Refills: 0 | Status: SHIPPED | OUTPATIENT
Start: 2022-09-29 | End: 2022-10-06

## 2022-09-29 NOTE — PROGRESS NOTES
Virtual Regular Visit    Verification of patient location:    Patient is located in the following state in which I hold an active license [place]      Assessment/Plan:    Problem List Items Addressed This Visit        Digestive    Strep pharyngitis - Primary     Patient is being treated based on symptoms  Unable to do physical assessment  Reports that she does have white spots at the back of her throat  Ongoing for more than 3 days has lymphadenopathy  Has difficulty swallowing  Denies any fevers or chills  Amoxicillin therapy  Continue gargling with salt water, continue lozenges  Increase warm fluid hydration  Relevant Medications    amoxicillin (AMOXIL) 875 mg tablet       Other    Cough     Tessalon Perles for cough  May continue with lozenges  Steam to help decongest   Warm fluids  Relevant Medications    benzonatate (TESSALON PERLES) 100 mg capsule      Other Visit Diagnoses     Sore throat        Relevant Medications    Lidocaine Viscous HCl (XYLOCAINE) 2 % mucosal solution               Reason for visit is   Chief Complaint   Patient presents with    Virtual Regular Visit        Encounter provider EYAD Taylor    Provider located at [address]      Recent Visits  No visits were found meeting these conditions  Showing recent visits within past 7 days and meeting all other requirements  Today's Visits  Date Type Provider Dept   09/29/22 Telemedicine Joan Acosta, [id number]   Showing today's visits and meeting all other requirements  Future Appointments  No visits were found meeting these conditions  Showing future appointments within next 150 days and meeting all other requirements       The patient was identified by name and date of birth   Lonnie Carnes was informed that this is a telemedicine visit and that the visit is being conducted through [facility] and patient was informed this is a secure, HIPAA-complaint platform  She agrees to proceed     My office door was closed  No one else was in the room  She acknowledged consent and understanding of privacy and security of the video platform  The patient has agreed to participate and understands they can discontinue the visit at any time  Patient is aware this is a billable service  Srikanth Robles is a 43 y o  female    Patient is being seen with complaints of sore throat, lymphadenopathy  Denies any fevers or chills pain ongoing for more than 3 days    Sore Throat   This is a recurrent problem  The current episode started yesterday  The problem has been gradually worsening  The pain is worse on the left side  There has been no fever  The pain is at a severity of 7/10  Associated symptoms include abdominal pain, congestion, coughing, neck pain, swollen glands and trouble swallowing  Pertinent negatives include no diarrhea, drooling, ear discharge, ear pain, headaches, hoarse voice, plugged ear sensation, shortness of breath, stridor or vomiting  She has had no exposure to strep or mono          Past Medical History:   Diagnosis Date    Diverticulosis     DVT (deep venous thrombosis) (HCC)     DVT (deep venous thrombosis) (HCC)     Endometriosis     Gastritis     Hereditary lymphedema of legs     Lymphedema of right lower extremity     Obesity, Class III, BMI 40-49 9 (morbid obesity) [facility]        Past Surgical History:   Procedure Laterality Date    ABLATION COLPOCLESIS      ENDOMETRIAL ABLATION  2014    HYSTERECTOMY  2016    OOPHORECTOMY Bilateral 09/2016    OVARY SURGERY      TUBAL LIGATION         Current Outpatient Medications   Medication Sig Dispense Refill    amoxicillin (AMOXIL) 875 mg tablet Take 1 tablet (875 mg total) by mouth 2 (two) times a day for 7 days 14 tablet 0    benzonatate (TESSALON PERLES) 100 mg capsule Take 1 capsule (100 mg total) by mouth 3 (three) times a day as needed for cough 20 capsule 0    Lidocaine Viscous HCl (XYLOCAINE) 2 % mucosal solution Swish and spit 15 mL 4 (four) times a day as needed for mouth pain or discomfort 100 mL 0    Bacillus Coagulans-Inulin (Probiotic) 1-250 BILLION-MG CAPS Take 1 capsule by mouth daily      Calcium Carbonate-Vit D-Min (Calcium 600 + Minerals) 600-200 MG-UNIT TABS Take 1 tablet by mouth daily      cholecalciferol (VITAMIN D3) 1,000 units tablet Take 1,000 Units by mouth daily      clindamycin (CLINDAGEL) 1 % gel APPLY TO AFFECTED AREA TWICE A DAY 30 g 0    Cranberry 1000 MG CAPS Take 1 capsule by mouth daily      FLAXSEED, LINSEED, PO Take 1 capsule by mouth daily      hydrochlorothiazide (HYDRODIURIL) 12 5 mg tablet MAY TAKE 1 PILL EVERY 24 HOURS AS NEEDED FOR EDEMA IN LEGS 30 tablet 0    Multiple Vitamin (multivitamin) capsule Take 1 capsule by mouth daily      omeprazole (PriLOSEC) 20 mg delayed release capsule Take 1 capsule (20 mg total) by mouth in the morning  30 capsule 1    predniSONE 20 mg tablet Take 1 tablet (20 mg total) by mouth daily (Patient not taking: No sig reported) 5 tablet 0    Promethazine-DM (PHENERGAN-DM) 6 25-15 mg/5 mL oral syrup Take 5 mL by mouth 4 (four) times a day as needed for cough (Patient not taking: No sig reported) 118 mL 0    rivaroxaban (Xarelto) 10 mg tablet Take 1 tablet (10 mg total) by mouth daily 30 tablet 5     Current Facility-Administered Medications   Medication Dose Route Frequency Provider Last Rate Last Admin    cyanocobalamin injection 1,000 mcg  1,000 mcg Intramuscular Q30 Days LupisEYAD Raymundo   1,000 mcg at 07/12/22 1556    cyanocobalamin injection 1,000 mcg  1,000 mcg Intramuscular Q30 Days Lupis EYAD Jaeger   1,000 mcg at 08/24/22 1413        No Known Allergies    Review of Systems   HENT: Positive for congestion, sore throat and trouble swallowing  Negative for drooling, ear discharge, ear pain and hoarse voice  Respiratory: Positive for cough   Negative for shortness of breath and stridor  Gastrointestinal: Positive for abdominal pain  Negative for diarrhea and vomiting  Musculoskeletal: Positive for neck pain  Neurological: Negative for headaches  Video Exam    There were no vitals filed for this visit  Physical Exam  Constitutional:       Appearance: She is well-developed  HENT:      Head: Normocephalic and atraumatic  Nose: Congestion present  Pulmonary:      Effort: Pulmonary effort is normal    Musculoskeletal:         General: Normal range of motion  Cervical back: Normal range of motion and neck supple  Skin:     General: Skin is dry  Neurological:      Mental Status: She is alert and oriented to person, place, and time  Psychiatric:         Behavior: Behavior normal          Thought Content:  Thought content normal          Judgment: Judgment normal           I spent 15 minutes directly with the patient during this visit

## 2022-09-29 NOTE — ASSESSMENT & PLAN NOTE
Patient is being treated based on symptoms  Unable to do physical assessment  Reports that she does have white spots at the back of her throat  Ongoing for more than 3 days has lymphadenopathy  Has difficulty swallowing  Denies any fevers or chills  Amoxicillin therapy  Continue gargling with salt water, continue lozenges  Increase warm fluid hydration

## 2022-10-03 DIAGNOSIS — J02.0 STREP PHARYNGITIS: Primary | ICD-10-CM

## 2022-10-03 RX ORDER — AZITHROMYCIN 200 MG/5ML
POWDER, FOR SUSPENSION ORAL
Qty: 37.7 ML | Refills: 0 | Status: SHIPPED | OUTPATIENT
Start: 2022-10-03 | End: 2022-10-08

## 2022-10-05 ENCOUNTER — TELEPHONE (OUTPATIENT)
Dept: FAMILY MEDICINE CLINIC | Facility: CLINIC | Age: 42
End: 2022-10-05

## 2022-10-05 ENCOUNTER — APPOINTMENT (OUTPATIENT)
Dept: RADIOLOGY | Facility: HOSPITAL | Age: 42
End: 2022-10-05
Payer: COMMERCIAL

## 2022-10-05 ENCOUNTER — HOSPITAL ENCOUNTER (EMERGENCY)
Facility: HOSPITAL | Age: 42
Discharge: HOME | End: 2022-10-05
Attending: EMERGENCY MEDICINE
Payer: COMMERCIAL

## 2022-10-05 ENCOUNTER — APPOINTMENT (EMERGENCY)
Dept: CT IMAGING | Facility: HOSPITAL | Age: 42
End: 2022-10-05
Payer: COMMERCIAL

## 2022-10-05 VITALS
OXYGEN SATURATION: 98 % | SYSTOLIC BLOOD PRESSURE: 110 MMHG | RESPIRATION RATE: 17 BRPM | DIASTOLIC BLOOD PRESSURE: 77 MMHG | TEMPERATURE: 97.1 F | HEART RATE: 75 BPM

## 2022-10-05 DIAGNOSIS — R05.1 ACUTE COUGH: ICD-10-CM

## 2022-10-05 DIAGNOSIS — R51.9 ACUTE NONINTRACTABLE HEADACHE, UNSPECIFIED HEADACHE TYPE: Primary | ICD-10-CM

## 2022-10-05 DIAGNOSIS — R68.89 FLU-LIKE SYMPTOMS: ICD-10-CM

## 2022-10-05 DIAGNOSIS — J02.9 PHARYNGITIS, UNSPECIFIED ETIOLOGY: ICD-10-CM

## 2022-10-05 LAB
ALBUMIN SERPL BCP-MCNC: 3.6 G/DL (ref 3.5–5)
ALP SERPL-CCNC: 119 U/L (ref 46–116)
ALT SERPL W P-5'-P-CCNC: 27 U/L (ref 12–78)
ANION GAP SERPL CALCULATED.3IONS-SCNC: 6 MMOL/L (ref 4–13)
AST SERPL W P-5'-P-CCNC: 22 U/L (ref 5–45)
BASOPHILS # BLD AUTO: 0.05 THOUSANDS/ΜL (ref 0–0.1)
BASOPHILS NFR BLD AUTO: 1 % (ref 0–1)
BILIRUB SERPL-MCNC: 0.5 MG/DL (ref 0.2–1)
BUN SERPL-MCNC: 8 MG/DL (ref 5–25)
CALCIUM SERPL-MCNC: 9.3 MG/DL (ref 8.3–10.1)
CHLORIDE SERPL-SCNC: 103 MMOL/L (ref 96–108)
CO2 SERPL-SCNC: 31 MMOL/L (ref 21–32)
CREAT SERPL-MCNC: 0.87 MG/DL (ref 0.6–1.3)
EOSINOPHIL # BLD AUTO: 0.45 THOUSAND/ΜL (ref 0–0.61)
EOSINOPHIL NFR BLD AUTO: 6 % (ref 0–6)
ERYTHROCYTE [DISTWIDTH] IN BLOOD BY AUTOMATED COUNT: 14.3 % (ref 11.6–15.1)
FLUAV RNA RESP QL NAA+PROBE: NEGATIVE
FLUBV RNA RESP QL NAA+PROBE: NEGATIVE
GFR SERPL CREATININE-BSD FRML MDRD: 82 ML/MIN/1.73SQ M
GLUCOSE SERPL-MCNC: 84 MG/DL (ref 65–140)
HCT VFR BLD AUTO: 42.8 % (ref 34.8–46.1)
HGB BLD-MCNC: 13.2 G/DL (ref 11.5–15.4)
IMM GRANULOCYTES # BLD AUTO: 0.03 THOUSAND/UL (ref 0–0.2)
IMM GRANULOCYTES NFR BLD AUTO: 0 % (ref 0–2)
LYMPHOCYTES # BLD AUTO: 1.73 THOUSANDS/ΜL (ref 0.6–4.47)
LYMPHOCYTES NFR BLD AUTO: 21 % (ref 14–44)
MCH RBC QN AUTO: 27.8 PG (ref 26.8–34.3)
MCHC RBC AUTO-ENTMCNC: 30.8 G/DL (ref 31.4–37.4)
MCV RBC AUTO: 90 FL (ref 82–98)
MONOCYTES # BLD AUTO: 0.51 THOUSAND/ΜL (ref 0.17–1.22)
MONOCYTES NFR BLD AUTO: 6 % (ref 4–12)
NEUTROPHILS # BLD AUTO: 5.48 THOUSANDS/ΜL (ref 1.85–7.62)
NEUTS SEG NFR BLD AUTO: 66 % (ref 43–75)
NRBC BLD AUTO-RTO: 0 /100 WBCS
PLATELET # BLD AUTO: 359 THOUSANDS/UL (ref 149–390)
PMV BLD AUTO: 9.5 FL (ref 8.9–12.7)
POTASSIUM SERPL-SCNC: 3.8 MMOL/L (ref 3.5–5.3)
PROT SERPL-MCNC: 7.9 G/DL (ref 6.4–8.4)
RBC # BLD AUTO: 4.75 MILLION/UL (ref 3.81–5.12)
RSV RNA RESP QL NAA+PROBE: NEGATIVE
SARS-COV-2 RNA RESP QL NAA+PROBE: NEGATIVE
SODIUM SERPL-SCNC: 140 MMOL/L (ref 135–147)
WBC # BLD AUTO: 8.25 THOUSAND/UL (ref 4.31–10.16)

## 2022-10-05 PROCEDURE — 86308 HETEROPHILE ANTIBODY SCREEN: CPT | Performed by: EMERGENCY MEDICINE

## 2022-10-05 PROCEDURE — 99284 EMERGENCY DEPT VISIT MOD MDM: CPT | Performed by: EMERGENCY MEDICINE

## 2022-10-05 PROCEDURE — 36415 COLL VENOUS BLD VENIPUNCTURE: CPT | Performed by: EMERGENCY MEDICINE

## 2022-10-05 PROCEDURE — 70450 CT HEAD/BRAIN W/O DYE: CPT

## 2022-10-05 PROCEDURE — 96361 HYDRATE IV INFUSION ADD-ON: CPT

## 2022-10-05 PROCEDURE — 80053 COMPREHEN METABOLIC PANEL: CPT | Performed by: EMERGENCY MEDICINE

## 2022-10-05 PROCEDURE — 99284 EMERGENCY DEPT VISIT MOD MDM: CPT

## 2022-10-05 PROCEDURE — 0241U HB NFCT DS VIR RESP RNA 4 TRGT: CPT | Performed by: EMERGENCY MEDICINE

## 2022-10-05 PROCEDURE — 96374 THER/PROPH/DIAG INJ IV PUSH: CPT

## 2022-10-05 PROCEDURE — G1004 CDSM NDSC: HCPCS

## 2022-10-05 PROCEDURE — 85025 COMPLETE CBC W/AUTO DIFF WBC: CPT | Performed by: EMERGENCY MEDICINE

## 2022-10-05 PROCEDURE — 71045 X-RAY EXAM CHEST 1 VIEW: CPT

## 2022-10-05 RX ORDER — ALBUTEROL SULFATE 90 UG/1
2 AEROSOL, METERED RESPIRATORY (INHALATION) ONCE
Status: COMPLETED | OUTPATIENT
Start: 2022-10-05 | End: 2022-10-05

## 2022-10-05 RX ORDER — KETOROLAC TROMETHAMINE 30 MG/ML
15 INJECTION, SOLUTION INTRAMUSCULAR; INTRAVENOUS ONCE
Status: COMPLETED | OUTPATIENT
Start: 2022-10-05 | End: 2022-10-05

## 2022-10-05 RX ADMIN — SODIUM CHLORIDE 1000 ML: 0.9 INJECTION, SOLUTION INTRAVENOUS at 14:19

## 2022-10-05 RX ADMIN — DEXAMETHASONE SODIUM PHOSPHATE 10 MG: 10 INJECTION, SOLUTION INTRAMUSCULAR; INTRAVENOUS at 14:17

## 2022-10-05 RX ADMIN — KETOROLAC TROMETHAMINE 15 MG: 30 INJECTION, SOLUTION INTRAMUSCULAR at 14:17

## 2022-10-05 RX ADMIN — ALBUTEROL SULFATE 2 PUFF: 90 AEROSOL, METERED RESPIRATORY (INHALATION) at 15:29

## 2022-10-05 NOTE — ED PROVIDER NOTES
Pt Name: Wendi Siegel  MRN: [id number]  [place] 1980  Age/Sex: 43 y o  female  Date of evaluation: 10/5/2022  PCP: Alan Traore, [address] [place] [address]    Chief Complaint   Patient presents with    Sore Throat     Patient was diagnosed with strep throat last week and given antibiotic  Patient is not feeling any better since she started taking the antibiotics  Patient was seen here about a month ago for head injury on blood thinners and had a CT scan  Patient states"she hit her head again a week later (approx 3 weeks ago) and is having pain when she coughs and was not rechecked after the second head injury  Patient's head injury occurred when she bent over to pick something up and hit her head on a chair  HPI    43 y o  female presenting with multiple complaints  Patient states she has had a sore throat cough and runny nose over the past 2 weeks, states she was diagnosed with strep throat over the phone by her primary care doctor and treated with amoxicillin, followed by Zithromax after she did not tolerate the amoxicillin due to an upset stomach  She has been taking the Zithromax for 3 days without improvement of the sore throat  Patient also complains of a headache  She states that she hit her head 1 month ago, underwent a CT scan at that point, which was negative, she hit her head again 1 week later but did not seek care at that time  No loss of consciousness, numbness, weakness, changes in speech or vision  Patient does complain of a headache over the past 4 days which was gradual in onset, in the left side of head, radiating throughout the head, worse with lights or noise and better at rest   She does complain of cough and runny nose as well but denies fevers, shortness of breath, nausea, vomiting, chest pain, other symptoms      HPI      Past Medical and Surgical History    Past Medical History:   Diagnosis Date    Diverticulosis     DVT (deep venous thrombosis) (HCC)     DVT (deep venous thrombosis) (HCC)     Endometriosis     Gastritis     Hereditary lymphedema of legs     Lymphedema of right lower extremity     Obesity, Class III, BMI 40-49 9 (morbid obesity) ([facility] [address] )        Past Surgical History:   Procedure Laterality Date    ABLATION COLPOCLESIS      ENDOMETRIAL ABLATION  2014    HYSTERECTOMY  2016    OOPHORECTOMY Bilateral 09/2016    OVARY SURGERY      TUBAL LIGATION         Family History   Problem Relation Age of Onset    Clotting disorder Mother     Deep vein thrombosis Mother         Birth Control 1987    Pancreatic cancer Maternal Grandmother     Cancer Maternal Grandmother         Pancreatic    Diabetes Paternal Grandmother     Thyroid disease Paternal Grandmother     Hypertension Paternal Grandfather     Heart disease Paternal Grandfather     Stroke Maternal Aunt     Breast cancer Neg Hx     Colon cancer Neg Hx     Ovarian cancer Neg Hx     Cervical cancer Neg Hx     Uterine cancer Neg Hx        Social History     Tobacco Use    Smoking status: Never Smoker    Smokeless tobacco: Never Used   Vaping Use    Vaping Use: Never used   Substance Use Topics    Alcohol use: Not Currently     Comment: extremely rare once a year    Drug use: No           Allergies    No Known Allergies    Home Medications    Prior to Admission medications    Medication Sig Start Date End Date Taking? Authorizing Provider   amoxicillin (AMOXIL) 875 mg tablet Take 1 tablet (875 mg total) by mouth 2 (two) times a day for 7 days 9/29/22 10/6/22  EYAD Dao   azithromycin (ZITHROMAX) 200 mg/5 mL suspension Take 12 5 mL (500 mg total) by mouth daily for 1 day, THEN 6 3 mL (250 mg total) daily for 4 days   10/3/22 10/8/22  EYAD Dao   Bacillus Coagulans-Inulin (Probiotic) 1-250 BILLION-MG CAPS Take 1 capsule by mouth daily    Historical Provider, MD   benzonatate (TESSALON PERLES) 100 mg capsule Take 1 capsule (100 mg total) by mouth 3 (three) times a day as needed for cough 9/29/22   EYAD Dowling   Calcium Carbonate-Vit D-Min (Calcium 600 + Minerals) 600-200 MG-UNIT TABS Take 1 tablet by mouth daily    Historical Provider, MD   cholecalciferol (VITAMIN D3) 1,000 units tablet Take 1,000 Units by mouth daily    Historical Provider, MD   clindamycin (CLINDAGEL) 1 % gel APPLY TO AFFECTED AREA TWICE A DAY 9/2/21   EYAD Ball   Cranberry 1000 MG CAPS Take 1 capsule by mouth daily    Historical Provider, MD   FLAXSEED, LINSEED, PO Take 1 capsule by mouth daily    Historical Provider, MD   hydrochlorothiazide (HYDRODIURIL) 12 5 mg tablet MAY TAKE 1 PILL EVERY 24 HOURS AS NEEDED FOR EDEMA IN LEGS 8/4/22   EYAD Dowling   Lidocaine Viscous HCl (XYLOCAINE) 2 % mucosal solution Swish and spit 15 mL 4 (four) times a day as needed for mouth pain or discomfort 9/29/22   EYAD Dowling   Multiple Vitamin (multivitamin) capsule Take 1 capsule by mouth daily    Historical Provider, MD   omeprazole (PriLOSEC) 20 mg delayed release capsule Take 1 capsule (20 mg total) by mouth in the morning  5/12/22   Yasir Sullivan PA-C   predniSONE 20 mg tablet Take 1 tablet (20 mg total) by mouth daily  Patient not taking: No sig reported 4/21/22   EYAD Dowling   Promethazine-DM [facility]) 6 25-15 mg/5 mL oral syrup Take 5 mL by mouth 4 (four) times a day as needed for cough  Patient not taking: No sig reported 4/21/22   EYAD Dowling   rivaroxaban (Xarelto) 10 mg tablet Take 1 tablet (10 mg total) by mouth daily 7/29/22   EYAD Dowling           Review of Systems    Review of Systems   Constitutional: Positive for fatigue  Negative for activity change, chills and fever  HENT: Positive for congestion  Negative for drooling and facial swelling  Eyes: Negative for pain, discharge and visual disturbance  Respiratory: Positive for cough  Negative for apnea, chest tightness, shortness of breath and wheezing  Cardiovascular: Negative for chest pain and leg swelling  Gastrointestinal: Negative for abdominal pain, constipation, diarrhea, nausea and vomiting  Genitourinary: Negative for difficulty urinating, dysuria and urgency  Musculoskeletal: Negative for arthralgias, back pain and gait problem  Skin: Negative for color change and rash  Neurological: Positive for headaches  Negative for dizziness, speech difficulty and weakness  Psychiatric/Behavioral: Negative for agitation, behavioral problems and confusion  All other systems reviewed and negative  Physical Exam      ED Triage Vitals   Temperature Pulse Respirations Blood Pressure SpO2   10/05/22 1222 10/05/22 1222 10/05/22 1222 10/05/22 1222 10/05/22 1222   (!) 97 1 °F (36 2 °C) 89 17 125/60 95 %      Temp Source Heart Rate Source Patient Position - Orthostatic VS BP Location FiO2 (%)   10/05/22 1222 10/05/22 1222 10/05/22 1222 10/05/22 1222 --   Temporal Monitor Sitting Left arm       Pain Score       10/05/22 1417       9               Physical Exam  Vitals and nursing note reviewed  Constitutional:       General: She is not in acute distress  Appearance: She is well-developed  She is not ill-appearing, toxic-appearing or diaphoretic  HENT:      Head: Normocephalic and atraumatic  Right Ear: External ear normal       Left Ear: External ear normal       Nose: Congestion present  Mouth/Throat:      Mouth: Mucous membranes are moist       Pharynx: Oropharynx is clear  Posterior oropharyngeal erythema present  No oropharyngeal exudate  Comments: Posterior oropharynx erythematous, no significant swelling, no exudate  No bulging of the soft palate, no uvular deviation  Phonating normally, handling secretions  Eyes:      Conjunctiva/sclera: Conjunctivae normal       Pupils: Pupils are equal, round, and reactive to light  Cardiovascular:      Rate and Rhythm: Normal rate and regular rhythm  Pulses: Normal pulses  Heart sounds: Normal heart sounds  No murmur heard  No friction rub  No gallop  Pulmonary:      Effort: Pulmonary effort is normal  No respiratory distress  Breath sounds: Normal breath sounds  No wheezing or rales  Abdominal:      General: There is no distension  Palpations: Abdomen is soft  Tenderness: There is no abdominal tenderness  There is no guarding or rebound  Musculoskeletal:         General: No deformity  Normal range of motion  Cervical back: Normal range of motion and neck supple  No rigidity  Skin:     General: Skin is warm and dry  Capillary Refill: Capillary refill takes less than 2 seconds  Findings: No erythema or rash  Neurological:      Mental Status: She is alert and oriented to person, place, and time  Cranial Nerves: No cranial nerve deficit  Motor: No weakness  Coordination: Coordination normal       Gait: Gait normal    Psychiatric:         Behavior: Behavior normal          Thought Content: Thought content normal          Judgment: Judgment normal               Diagnostic Results      Labs:    Results Reviewed     Procedure Component Value Units Date/Time    FLU/RSV/COVID - if FLU/RSV clinically relevant [[id number]]  (Normal) Collected: 10/05/22 1412    Lab Status: Final result Specimen: Nares from Nose Updated: 10/05/22 1501     SARS-CoV-2 Negative     INFLUENZA A PCR Negative     INFLUENZA B PCR Negative     RSV PCR Negative    Narrative:      FOR PEDIATRIC PATIENTS - copy/paste COVID Guidelines URL to browser: [email or web address]    SARS-CoV-2 assay is a Nucleic Acid Amplification assay intended for the  qualitative detection of nucleic acid from SARS-CoV-2 in nasopharyngeal  swabs  Results are for the presumptive identification of SARS-CoV-2 RNA  Positive results are indicative of infection with SARS-CoV-2, the virus  causing COVID-19, but do not rule out bacterial infection or co-infection  with other viruses   Laboratories within the [place] and its  territories are required to report all positive results to the appropriate  public health authorities  Negative results do not preclude SARS-CoV-2  infection and should not be used as the sole basis for treatment or other  patient management decisions  Negative results must be combined with  clinical observations, patient history, and epidemiological information  This test has not been FDA cleared or approved  This test has been authorized by FDA under an Emergency Use Authorization  (EUA)  This test is only authorized for the duration of time the  declaration that circumstances exist justifying the authorization of the  emergency use of an in vitro diagnostic tests for detection of SARS-CoV-2  virus and/or diagnosis of COVID-19 infection under section 564(b)(1) of  the Act, 21 U  S C  732DRI-3(R)(3), unless the authorization is terminated  or revoked sooner  The test has been validated but independent review by FDA  and CLIA is pending  Test performed using [employer] GeneXpert: This RT-PCR assay targets N2,  a region unique to SARS-CoV-2  A conserved region in the E-gene was chosen  for pan-Sarbecovirus detection which includes SARS-CoV-2  According to CMS-2020-01-R, this platform meets the definition of high-throughput technology      Comprehensive metabolic panel [[id number]]  (Abnormal) Collected: 10/05/22 1412    Lab Status: Final result Specimen: Blood from Arm, Left Updated: 10/05/22 1447     Sodium 140 mmol/L      Potassium 3 8 mmol/L      Chloride 103 mmol/L      CO2 31 mmol/L      ANION GAP 6 mmol/L      BUN 8 mg/dL      Creatinine 0 87 mg/dL      Glucose 84 mg/dL      Calcium 9 3 mg/dL      AST 22 U/L      ALT 27 U/L      Alkaline Phosphatase 119 U/L      Total Protein 7 9 g/dL      Albumin 3 6 g/dL      Total Bilirubin 0 50 mg/dL      eGFR 82 ml/min/1 73sq m     Narrative:      [place] guidelines for Chronic Kidney Disease (CKD):    Stage 1 with normal or high GFR (GFR > 90 mL/min/1 73 square meters)    Stage 2 Mild CKD (GFR = 60-89 mL/min/1 73 square meters)    Stage 3A Moderate CKD (GFR = 45-59 mL/min/1 73 square meters)    Stage 3B Moderate CKD (GFR = 30-44 mL/min/1 73 square meters)    Stage 4 Severe CKD (GFR = 15-29 mL/min/1 73 square meters)    Stage 5 End Stage CKD (GFR <15 mL/min/1 73 square meters)  Note: GFR calculation is accurate only with a steady state creatinine    CBC and differential [[id number]]  (Abnormal) Collected: 10/05/22 1412    Lab Status: Final result Specimen: Blood from Arm, Left Updated: 10/05/22 1424     WBC 8 25 Thousand/uL      RBC 4 75 Million/uL      Hemoglobin 13 2 g/dL      Hematocrit 42 8 %      MCV 90 fL      MCH 27 8 pg      MCHC 30 8 g/dL      RDW 14 3 %      MPV 9 5 fL      Platelets 295 Thousands/uL      nRBC 0 /100 WBCs      Neutrophils Relative 66 %      Immat GRANS % 0 %      Lymphocytes Relative 21 %      Monocytes Relative 6 %      Eosinophils Relative 6 %      Basophils Relative 1 %      Neutrophils Absolute 5 48 Thousands/µL      Immature Grans Absolute 0 03 Thousand/uL      Lymphocytes Absolute 1 73 Thousands/µL      Monocytes Absolute 0 51 Thousand/µL      Eosinophils Absolute 0 45 Thousand/µL      Basophils Absolute 0 05 Thousands/µL     Mononucleosis screen [[id number]] Collected: 10/05/22 1412    Lab Status: In process Specimen: Blood from Arm, Left Updated: 10/05/22 1422          All labs reviewed and utilized in the medical decision making process    Radiology:    CT head without contrast   Final Result      No acute intracranial abnormality  Workstation performed: [id number]         XR chest 1 view portable   Final Result      No acute cardiopulmonary disease        Findings are stable            Workstation performed: [id number]             All radiology studies independently viewed by me and interpreted by the radiologist     Procedure    Procedures        ED Course of Care and Re-Assessments      Given IV fluids Toradol Decadron and albuterol with resolution of headache, patient states sore throat is unaffected  Medications   sodium chloride 0 9 % bolus 1,000 mL (0 mL Intravenous Stopped 10/5/22 1535)   ketorolac (TORADOL) injection 15 mg (15 mg Intravenous Given 10/5/22 1417)   dexamethasone oral liquid 10 mg 1 mL (10 mg Oral Given 10/5/22 1417)   albuterol (PROVENTIL HFA,VENTOLIN HFA) inhaler 2 puff (2 puffs Inhalation Given 10/5/22 1529)           FINAL IMPRESSION    Final diagnoses:   Acute cough   Pharyngitis, unspecified etiology   Acute nonintractable headache, unspecified headache type   Flu-like symptoms         DISPOSITION/PLAN    Presentation as above with acute headache, cough, pharyngitis, and generalized flu-like symptoms  Vital signs reassuring, examination likewise reassuring with nonfocal neurologic exam and clear lungs  Overall, felt most consistent with viral upper respiratory infection, suspect non typed flu or parainfluenza virus versus other viral cause  Low suspicion for sepsis, meningitis, encephalitis, bacterial pneumonia, pneumothorax, PTA, RPA, epiglottitis, intracranial hemorrhage, other acute life threat  Treated symptomatically, discharged strict return precautions, follow up primary care doctor    Time reflects when diagnosis was documented in both MDM as applicable and the Disposition within this note     Time User Action Codes Description Comment    10/5/2022  3:17 PM Bill Dirk Add [R05 1] Acute cough     10/5/2022  3:17 PM Mihai Just T Add [J02 9] Pharyngitis, unspecified etiology     10/5/2022  3:18 PM Mihai Just T Add [R51 9] Acute nonintractable headache, unspecified headache type     10/5/2022  3:18 PM Bill Dirk Modify [R05 1] Acute cough     10/5/2022  3:18 PM Mihai Just T Modify [R51 9] Acute nonintractable headache, unspecified headache type     10/5/2022  3:19 PM Mihai Just T Add [R68 89] Flu-like symptoms       ED Disposition     ED Disposition   Discharge    Condition   Stable    Date/Time   Wed Oct 5, 2022  3:17 PM    Comment   Vikram Andino discharge to home/self care  Follow-up Information     Follow up With Specialties Details Why Contact Info Additional [address] [facility] Emergency Department Emergency Medicine Go to  If symptoms worsen [address] [facility] [address]  [address] [facility] Emergency Department, [address] [facility] 146 Medicine Call in 1 day To discuss this visit and schedule close outpatient follow-up  [address]  [phone]               PATIENT REFERRED TO:    [facility] Emergency Department  [address] [facility] [address] [phone]  Go to   If symptoms worsen    [facility], [address] [facility]  [phone]    Call in 1 day  To discuss this visit and schedule close outpatient follow-up  DISCHARGE MEDICATIONS:    Discharge Medication List as of 10/5/2022  3:19 PM      CONTINUE these medications which have NOT CHANGED    Details   amoxicillin (AMOXIL) 875 mg tablet Take 1 tablet (875 mg total) by mouth 2 (two) times a day for 7 days, Starting Thu 9/29/2022, Until Thu 10/6/2022, Normal      azithromycin (ZITHROMAX) 200 mg/5 mL suspension Multiple Dosages:Starting Mon 10/3/2022, Until Mon 10/3/2022 at 2359, THEN Starting Tue 10/4/2022, Until Fri 10/7/2022 at 2359Take 12 5 mL (500 mg total) by mouth daily for 1 day, THEN 6 3 mL (250 mg total) daily for 4 days  , Normal      Bacillus Coagulans-Inulin (Probiotic) 1-250 BILLION-MG CAPS Take 1 capsule by mouth daily, Historical Med      benzonatate (TESSALON PERLES) 100 mg capsule Take 1 capsule (100 mg total) by mouth 3 (three) times a day as needed for cough, Starting Thu 9/29/2022, Normal      Calcium Carbonate-Vit D-Min (Calcium 600 + Minerals) 600-200 MG-UNIT TABS Take 1 tablet by mouth daily, Historical Med      cholecalciferol (VITAMIN D3) 1,000 units tablet Take 1,000 Units by mouth daily, Historical Med      clindamycin (CLINDAGEL) 1 % gel APPLY TO AFFECTED AREA TWICE A DAY, Normal      Cranberry 1000 MG CAPS Take 1 capsule by mouth daily, Historical Med      FLAXSEED, LINSEED, PO Take 1 capsule by mouth daily, Historical Med      hydrochlorothiazide (HYDRODIURIL) 12 5 mg tablet MAY TAKE 1 PILL EVERY 24 HOURS AS NEEDED FOR EDEMA IN LEGS, Normal      Lidocaine Viscous HCl (XYLOCAINE) 2 % mucosal solution Swish and spit 15 mL 4 (four) times a day as needed for mouth pain or discomfort, Starting u 9/29/2022, Normal      Multiple Vitamin (multivitamin) capsule Take 1 capsule by mouth daily, Historical Med      omeprazole (PriLOSEC) 20 mg delayed release capsule Take 1 capsule (20 mg total) by mouth in the morning , Starting u 5/12/2022, Normal      predniSONE 20 mg tablet Take 1 tablet (20 mg total) by mouth daily, Starting Thu 4/21/2022, Normal      Promethazine-DM (PHENERGAN-DM) 6 25-15 mg/5 mL oral syrup Take 5 mL by mouth 4 (four) times a day as needed for cough, Starting u 4/21/2022, Normal      rivaroxaban (Xarelto) 10 mg tablet Take 1 tablet (10 mg total) by mouth daily, Starting Fri 7/29/2022, Normal             No discharge procedures on file  Emelyn Daly MD    Portions of the record may have been created with voice recognition software  Occasional wrong word or "sound alike" substitutions may have occurred due to the inherent limitations of voice recognition software    Please read the chart carefully and recognize, using context, where substitutions have occurred     Emelyn Daly MD  10/05/22 8752

## 2022-10-05 NOTE — TELEPHONE ENCOUNTER
Patient was made aware to go the ER as per Dr Lisset Scott for further evaluation since she has fallen twice and hit her head  Patient verbalized understanding and  will take her

## 2022-10-06 LAB — HETEROPH AB SER QL: NEGATIVE

## 2022-10-11 DIAGNOSIS — Z86.718 HISTORY OF DVT (DEEP VEIN THROMBOSIS): ICD-10-CM

## 2022-10-11 DIAGNOSIS — Z79.01 CURRENT USE OF LONG TERM ANTICOAGULATION: ICD-10-CM

## 2022-10-11 PROBLEM — R05.9 COUGH: Status: RESOLVED | Noted: 2022-04-21 | Resolved: 2022-10-11

## 2022-10-11 PROBLEM — J06.9 URI, ACUTE: Status: RESOLVED | Noted: 2022-03-24 | Resolved: 2022-10-11

## 2022-10-25 DIAGNOSIS — Z79.01 CURRENT USE OF LONG TERM ANTICOAGULATION: ICD-10-CM

## 2022-10-25 DIAGNOSIS — Z86.718 HISTORY OF DVT (DEEP VEIN THROMBOSIS): ICD-10-CM

## 2022-11-07 ENCOUNTER — OFFICE VISIT (OUTPATIENT)
Dept: FAMILY MEDICINE CLINIC | Facility: CLINIC | Age: 42
End: 2022-11-07

## 2022-11-07 VITALS
DIASTOLIC BLOOD PRESSURE: 72 MMHG | WEIGHT: 293 LBS | HEIGHT: 66 IN | BODY MASS INDEX: 47.09 KG/M2 | TEMPERATURE: 97.8 F | SYSTOLIC BLOOD PRESSURE: 126 MMHG | HEART RATE: 114 BPM | OXYGEN SATURATION: 94 % | RESPIRATION RATE: 18 BRPM

## 2022-11-07 DIAGNOSIS — E66.01 MORBID OBESITY WITH BMI OF 50.0-59.9, ADULT (HCC): Primary | ICD-10-CM

## 2022-11-07 DIAGNOSIS — I89.0 LYMPHEDEMA: ICD-10-CM

## 2022-11-07 DIAGNOSIS — R06.2 WHEEZES: ICD-10-CM

## 2022-11-07 RX ORDER — PHENTERMINE HYDROCHLORIDE 30 MG/1
30 CAPSULE ORAL EVERY MORNING
Qty: 30 CAPSULE | Refills: 0 | Status: SHIPPED | OUTPATIENT
Start: 2022-11-07 | End: 2022-11-14 | Stop reason: SDUPTHER

## 2022-11-07 RX ADMIN — CYANOCOBALAMIN 1000 MCG: 1000 INJECTION, SOLUTION INTRAMUSCULAR; SUBCUTANEOUS at 16:14

## 2022-11-07 NOTE — ASSESSMENT & PLAN NOTE
Continues to have problems with weight  Unable to exercise due to pain in ankles and knees  At success with phentermine in the past   Will restart phentermine  Also discussed injectable medication, Contrave  Discussed low car a diet    Resistance exercise

## 2022-11-07 NOTE — PROGRESS NOTES
Name: Yaa Sutton      : 1980      MRN: [id number]  Encounter Provider: EYAD Solis  Encounter Date: 2022   Encounter department: [address]     1  Morbid obesity with BMI of 50 0-59 9, adult [facility])  Assessment & Plan:  Continues to have problems with weight  Unable to exercise due to pain in ankles and knees  At success with phentermine in the past   Will restart phentermine  Also discussed injectable medication, Contrave  Discussed low car a diet  Resistance exercise    Orders:  -     phentermine 30 MG capsule; Take 1 capsule (30 mg total) by mouth every morning    2  Wheezes  Assessment & Plan:  Wheezes have resolved  Patient had a viral illness ongoing for more than a month  Continue with lung exercises  Steam to help decongest      3  Lymphedema  Assessment & Plan:  Patient has lymphedema to both legs also has in abdomen  Does go from ascites  Needs to get you lymphedema pump  Recommended to get pump for legs and torso  Patient has severe activity limitations because of the lymphedema  Education and encouragement provided          Depression Screening and Follow-up Plan: Patient was screened for depression during today's encounter  They screened negative with a PHQ-2 score of 0  Subjective      Patient is here for follow-up for lymphedema, continues to have bilateral lower leg edema, abdominal edema  Is waiting to get her new lymphedema pump  Continues to have pain at ankles, swelling, numbness at times  Patient would also like to discuss weight loss medication unable to exercise due to knee and ankle pain    Review of Systems   Respiratory: Positive for cough (Resolving)  Cardiovascular: Positive for leg swelling  Gastrointestinal: Positive for abdominal distention         Current Outpatient Medications on File Prior to Visit   Medication Sig   • Bacillus Coagulans-Inulin (Probiotic) 1-250 BILLION-MG CAPS Take 1 capsule by mouth daily   • Calcium Carbonate-Vit D-Min (Calcium 600 + Minerals) 600-200 MG-UNIT TABS Take 1 tablet by mouth daily   • cholecalciferol (VITAMIN D3) 1,000 units tablet Take 1,000 Units by mouth daily   • clindamycin (CLINDAGEL) 1 % gel APPLY TO AFFECTED AREA TWICE A DAY   • Cranberry 1000 MG CAPS Take 1 capsule by mouth daily   • FLAXSEED, LINSEED, PO Take 1 capsule by mouth daily   • hydrochlorothiazide (HYDRODIURIL) 12 5 mg tablet MAY TAKE 1 PILL EVERY 24 HOURS AS NEEDED FOR EDEMA IN LEGS   • Multiple Vitamin (multivitamin) capsule Take 1 capsule by mouth daily   • omeprazole (PriLOSEC) 20 mg delayed release capsule Take 1 capsule (20 mg total) by mouth in the morning  • rivaroxaban (Xarelto) 10 mg tablet Take 1 tablet (10 mg total) by mouth daily   • [DISCONTINUED] benzonatate (TESSALON PERLES) 100 mg capsule Take 1 capsule (100 mg total) by mouth 3 (three) times a day as needed for cough   • [DISCONTINUED] Lidocaine Viscous HCl (XYLOCAINE) 2 % mucosal solution Swish and spit 15 mL 4 (four) times a day as needed for mouth pain or discomfort   • [DISCONTINUED] predniSONE 20 mg tablet Take 1 tablet (20 mg total) by mouth daily (Patient not taking: No sig reported)   • [DISCONTINUED] Promethazine-DM (PHENERGAN-DM) 6 25-15 mg/5 mL oral syrup Take 5 mL by mouth 4 (four) times a day as needed for cough (Patient not taking: No sig reported)       Objective     /72   Pulse (!) 114   Temp 97 8 °F (36 6 °C) (Temporal)   Resp 18   Ht 5' 6" (1 676 m)   Wt (!) 154 kg (339 lb)   SpO2 94%   BMI 54 72 kg/m²     Physical Exam  Vitals and nursing note reviewed  Constitutional:       Appearance: She is well-developed  She is obese  HENT:      Head: Normocephalic and atraumatic  Cardiovascular:      Rate and Rhythm: Normal rate and regular rhythm  Pulses: Normal pulses  Heart sounds: Normal heart sounds     Pulmonary:      Effort: Pulmonary effort is normal       Breath sounds: Normal breath sounds  Musculoskeletal:         General: Swelling (+3) and tenderness present  Normal range of motion  Cervical back: Normal range of motion  Right lower leg: Edema present  Left lower leg: Edema present  Skin:     General: Skin is warm and dry  Neurological:      Mental Status: She is alert and oriented to person, place, and time  Psychiatric:         Mood and Affect: Mood normal          Thought Content: Thought content normal          Judgment: Judgment normal        EYAD Ball  BMI Counseling: Body mass index is 54 72 kg/m²  The BMI is above normal  Nutrition recommendations include reducing portion sizes, decreasing overall calorie intake, 3-5 servings of fruits/vegetables daily, reducing fast food intake, consuming healthier snacks, decreasing soda and/or juice intake, moderation in carbohydrate intake, increasing intake of lean protein, reducing intake of saturated fat and trans fat and reducing intake of cholesterol  Exercise recommendations include strength training exercises

## 2022-11-07 NOTE — ASSESSMENT & PLAN NOTE
Patient has lymphedema to both legs also has in abdomen  Does go from ascites  Needs to get you lymphedema pump  Recommended to get pump for legs and torso  Patient has severe activity limitations because of the lymphedema    Education and encouragement provided

## 2022-11-07 NOTE — PATIENT INSTRUCTIONS
Obesity   AMBULATORY CARE:   Obesity  means your body mass index (BMI) is greater than 30  Your healthcare provider will use your height and weight to measure your BMI  The risks of obesity include  many health problems, including injuries or physical disability  · Diabetes (high blood sugar level)    · High blood pressure or high cholesterol    · Heart disease    · Stroke    · Gallbladder or liver disease    · Cancer of the colon, breast, prostate, liver, or kidney    · Sleep apnea    · Arthritis or gout    Screening  is done to check for health conditions before you have signs or symptoms  If you are 28to 79years old, your blood sugar level may be checked every 3 years for signs of prediabetes or diabetes  Your healthcare provider will check your blood pressure at each visit  High blood pressure can lead to a stroke or other problems  Your provider may check for signs of heart disease, cancer, or other health problems  Seek care immediately if:   · You have a severe headache, confusion, or difficulty speaking  · You have weakness on one side of your body  · You have chest pain, sweating, or shortness of breath  Call your doctor if:   · You have symptoms of gallbladder or liver disease, such as pain in your upper abdomen  · You have knee or hip pain and discomfort while walking  · You have symptoms of diabetes, such as intense hunger and thirst, and frequent urination  · You have symptoms of sleep apnea, such as snoring or daytime sleepiness  · You have questions or concerns about your condition or care  Treatment for obesity  focuses on helping you lose weight to improve your health  Even a small decrease in BMI can reduce the risk for many health problems  Your healthcare provider will help you set a weight-loss goal   · Lifestyle changes  are the first step in treating obesity  These include making healthy food choices and getting regular physical activity   Your healthcare provider may suggest a weight-loss program that involves coaching, education, and therapy  · Medicine  may help you lose weight when it is used with a healthy foods and physical activity  · Surgery  can help you lose weight if you are very obese and have other health problems  There are several types of weight-loss surgery  Ask your healthcare provider for more information  Tips for safe weight loss:   · Set small, realistic goals  An example of a small goal is to walk for 20 minutes 5 days a week  Anther goal is to lose 5% of your body weight  · Tell friends, family members, and coworkers about your goals  and ask for their support  Ask a friend to lose weight with you, or join a weight-loss support group  · Identify foods or triggers that may cause you to overeat , and find ways to avoid them  Remove tempting high-calorie foods from your home and workplace  Place a bowl of fresh fruit on your kitchen counter  If stress causes you to eat, then find other ways to cope with stress  A counselor or therapist may be able to help you  · Keep a diary to track what you eat and drink  Also write down how many minutes of physical activity you do each day  Weigh yourself once a week and record it in your diary  Eating changes: You will need to eat 500 to 1,000 fewer calories each day than you currently eat to lose 1 to 2 pounds a week  The following changes will help you cut calories:  · Eat smaller portions  Use small plates, no larger than 9 inches in diameter  Fill your plate half full of fruits and vegetables  Measure your food using measuring cups until you know what a serving size looks like  · Eat 3 meals and 1 or 2 snacks each day  Plan your meals in advance  [place] Hern[place]dez and eat at home most of the time  Eat slowly  Do not skip meals  Skipping meals can lead to overeating later in the day  This can make it harder for you to lose weight   Talk with a dietitian to help you make a meal plan and schedule that is right for you  · Eat fruits and vegetables at every meal   They are low in calories and high in fiber, which makes you feel full  Do not add butter, margarine, or cream sauce to vegetables  Use herbs to season steamed vegetables  · Eat less fat and fewer fried foods  Eat more baked or grilled chicken and fish  These protein sources are lower in calories and fat than red meat  Limit fast food  Dress your salads with olive oil and vinegar instead of bottled dressing  · Limit the amount of sugar you eat  Do not drink sugary beverages  Limit alcohol  Activity changes:  Physical activity is good for your body in many ways  It helps you burn calories and build strong muscles  It decreases stress and depression, and improves your mood  It can also help you sleep better  Talk to your healthcare provider before you begin an exercise program   · Exercise for at least 30 minutes 5 days a week  Start slowly  Set aside time each day for physical activity that you enjoy and that is convenient for you  It is best to do both weight training and an activity that increases your heart rate, such as walking, bicycling, or swimming  · Find ways to be more active  Do yard work and housecleaning  Walk up the stairs instead of using elevators  Spend your leisure time going to events that require walking, such as outdoor festivals or fairs  This extra physical activity can help you lose weight and keep it off  Follow up with your doctor as directed: You may need to meet with a dietitian  Write down your questions so you remember to ask them during your visits  © Copyright [employer] 2022 Information is for End User's use only and may not be sold, redistributed or otherwise used for commercial purposes  All illustrations and images included in CareNotes® are the copyrighted property of A [employer] M [employer] Inc  or Brynn Parker   The above information is an  only   It is not intended as medical advice for individual conditions or treatments  Talk to your doctor, nurse or pharmacist before following any medical regimen to see if it is safe and effective for you  Weight Management   AMBULATORY CARE:   Why it is important to manage your weight:  Being overweight increases your risk of health conditions such as heart disease, high blood pressure, type 2 diabetes, and certain types of cancer  It can also increase your risk for osteoarthritis, sleep apnea, and other respiratory problems  Aim for a slow, steady weight loss  Even a small amount of weight loss can lower your risk of health problems  Risks of being overweight:  Extra weight can cause many health problems, including the following:  · Diabetes (high blood sugar level)    · High blood pressure or high cholesterol    · Heart disease    · Stroke    · Gallbladder or liver disease    · Cancer of the colon, breast, prostate, liver, or kidney    · Sleep apnea    · Arthritis or gout    Screening  is done to check for health conditions before you have signs or symptoms  If you are 28to 79years old, your blood sugar level may be checked every 3 years for signs of prediabetes or diabetes  Your healthcare provider will check your blood pressure at each visit  High blood pressure can lead to a stroke or other problems  Your provider may check for signs of heart disease, cancer, or other health problems  How to lose weight safely:  A safe and healthy way to lose weight is to eat fewer calories and get regular exercise  · You can lose up about 1 pound a week by decreasing the number of calories you eat by 500 calories each day  You can decrease calories by eating smaller portion sizes or by cutting out high-calorie foods  Read labels to find out how many calories are in the foods you eat  · You can also burn calories with exercise such as walking, swimming, or biking   You will be more likely to keep weight off if you make these changes part of your lifestyle  Exercise at least 30 minutes per day on most days of the week  You can also fit in more physical activity by taking the stairs instead of the elevator or parking farther away from stores  Ask your healthcare provider about the best exercise plan for you  Healthy meal plan for weight management:  A healthy meal plan includes a variety of foods, contains fewer calories, and helps you stay healthy  A healthy meal plan includes the following:     · Eat whole-grain foods more often  A healthy meal plan should contain fiber  Fiber is the part of grains, fruits, and vegetables that is not broken down by your body  Whole-grain foods are healthy and provide extra fiber in your diet  Some examples of whole-grain foods are whole-wheat breads and pastas, oatmeal, brown rice, and bulgur  · Eat a variety of vegetables every day  Include dark, leafy greens such as spinach, kale, nayana greens, and mustard greens  Eat yellow and orange vegetables such as carrots, sweet potatoes, and winter squash  · Eat a variety of fruits every day  Choose fresh or canned fruit (canned in its own juice or light syrup) instead of juice  Fruit juice has very little or no fiber  · Eat low-fat dairy foods  Drink fat-free (skim) milk or 1% milk  Eat fat-free yogurt and low-fat cottage cheese  Try low-fat cheeses such as mozzarella and other reduced-fat cheeses  · Choose meat and other protein foods that are low in fat  Choose beans or other legumes such as split peas or lentils  Choose fish, skinless poultry (chicken or turkey), or lean cuts of red meat (beef or pork)  Before you cook meat or poultry, cut off any visible fat  · Use less fat and oil  Try baking foods instead of frying them  Add less fat, such as margarine, sour cream, regular salad dressing and mayonnaise to foods  Eat fewer high-fat foods   Some examples of high-fat foods include french fries, doughnuts, ice cream, and cakes     · Eat fewer sweets  Limit foods and drinks that are high in sugar  This includes candy, cookies, regular soda, and sweetened drinks  Ways to decrease calories:   · Eat smaller portions  ? Use a small plate with smaller servings  ? Do not eat second helpings  ? When you eat at a restaurant, ask for a box and place half of your meal in the box before you eat  ? Share an entrée with someone else  · Replace high-calorie snacks with healthy, low-calorie snacks  ? Choose fresh fruit, vegetables, fat-free rice cakes, or air-popped popcorn instead of potato chips, nuts, or chocolate  ? Choose water or calorie-free drinks instead of soda or sweetened drinks  · Do not shop for groceries when you are hungry  You may be more likely to make unhealthy food choices  Take a grocery list of healthy foods and shop after you have eaten  · Eat regular meals  Do not skip meals  Skipping meals can lead to overeating later in the day  This can make it harder for you to lose weight  Eat a healthy snack in place of a meal if you do not have time to eat a regular meal  Talk with a dietitian to help you create a meal plan and schedule that is right for you  Other things to consider as you try to lose weight:   · Be aware of situations that may give you the urge to overeat, such as eating while watching television  Find ways to avoid these situations  For example, read a book, go for a walk, or do crafts  · Meet with a weight loss support group or friends who are also trying to lose weight  This may help you stay motivated to continue working on your weight loss goals  © Copyright [employer] 2022 Information is for End User's use only and may not be sold, redistributed or otherwise used for commercial purposes  All illustrations and images included in CareNotes® are the copyrighted property of A [employer] A M , Inc  or Brynn Duran  The above information is an  only   It is not intended as medical advice for individual conditions or treatments  Talk to your doctor, nurse or pharmacist before following any medical regimen to see if it is safe and effective for you  Low Fat Diet   AMBULATORY CARE:   A low-fat diet  is an eating plan that is low in total fat, unhealthy fat, and cholesterol  You may need to follow a low-fat diet if you have trouble digesting or absorbing fat  You may also need to follow this diet if you have high cholesterol  You can also lower your cholesterol by increasing the amount of fiber in your diet  Soluble fiber is a type of fiber that helps to decrease cholesterol levels  Different types of fat in food:   · Limit unhealthy fats  A diet that is high in cholesterol, saturated fat, and trans fat may cause unhealthy cholesterol levels  Unhealthy cholesterol levels increase your risk of heart disease  ? Cholesterol:  Limit intake of cholesterol to less than 200 mg per day  Cholesterol is found in meat, eggs, and dairy  ? Saturated fat:  Limit saturated fat to less than 7% of your total daily calories  Ask your dietitian how many calories you need each day  Saturated fat is found in butter, cheese, ice cream, whole milk, and palm oil  Saturated fat is also found in meat, such as beef, pork, chicken skin, and processed meats  Processed meats include sausage, hot dogs, and bologna  ? Trans fat:  Avoid trans fat as much as possible  Trans fat is used in fried and baked foods  Foods that say trans fat free on the label may still have up to 0 5 grams of trans fat per serving  · Include healthy fats  Replace foods that are high in saturated and trans fat with foods high in healthy fats  This may help to decrease high cholesterol levels  ? Monounsaturated fats: These are found in avocados, nuts, and vegetable oils, such as olive, canola, and sunflower oil  ? Polyunsaturated fats: These can be found in vegetable oils, such as soybean or corn oil  Omega-3 fats can help to decrease the risk of heart disease  Omega-3 fats are found in fish, such as salmon, herring, trout, and tuna  Omega-3 fats can also be found in plant foods, such as walnuts, flaxseed, soybeans, and canola oil  Foods to limit or avoid:   · Grains:      ? Snacks that are made with partially hydrogenated oils, such as chips, regular crackers, and butter-flavored popcorn    ? High-fat baked goods, such as biscuits, croissants, doughnuts, pies, cookies, and pastries    · Dairy:      ? Whole milk, 2% milk, and yogurt and ice cream made with whole milk    ? Half and half creamer, heavy cream, and whipping cream    ? Cheese, cream cheese, and sour cream    · Meats and proteins:      ? High-fat cuts of meat (T-bone steak, regular hamburger, and ribs)    ? Fried meat, poultry (turkey and chicken), and fish    ? Poultry (chicken and turkey) with skin    ? Cold cuts (salami or bologna), hot dogs, hallman, and sausage    ? Whole eggs and egg yolks    · Vegetables and fruits with added fat:      ? Fried vegetables or vegetables in butter or high-fat sauces, such as cream or cheese sauces    ? Fried fruit or fruit served with butter or cream    · Fats:      ? Butter, stick margarine, and shortening    ? Coconut, palm oil, and palm kernel oil    Foods to include:   · Grains:      ? Whole-grain breads, cereals, pasta, and brown rice    ? Low-fat crackers and pretzels    · Vegetables and fruits:      ? Fresh, frozen, or canned vegetables (no salt or low-sodium)    ? Fresh, frozen, dried, or canned fruit (canned in light syrup or fruit juice)    ? Avocado    · Low-fat dairy products:      ? Nonfat (skim) or 1% milk    ? Nonfat or low-fat cheese, yogurt, and cottage cheese    · Meats and proteins:      ? Chicken or turkey with no skin    ? Baked or broiled fish    ? Lean beef and pork (loin, round, extra lean hamburger)    ? Beans and peas, unsalted nuts, soy products    ?  Egg whites and substitutes    ? Seeds and nuts    · Fats:      ? Unsaturated oil, such as canola, olive, peanut, soybean, or sunflower oil    ? Soft or liquid margarine and vegetable oil spread    ? Low-fat salad dressing    Other ways to decrease fat:   · Read food labels before you buy foods  Choose foods that have less than 30% of calories from fat  Choose low-fat or fat-free dairy products  Remember that fat free does not mean calorie free  These foods still contain calories, and too many calories can lead to weight gain  · Trim fat from meat and avoid fried food  Trim all visible fat from meat before you cook it  Remove the skin from poultry  Do not guidry meat, fish, or poultry  Bake, roast, boil, or broil these foods instead  Avoid fried foods  Eat a baked potato instead of [place] fries  Steam vegetables instead of sautéing them in butter  · Add less fat to foods  Use imitation hallman bits on salads and baked potatoes instead of regular hallman bits  Use fat-free or low-fat salad dressings instead of regular dressings  Use low-fat or nonfat butter-flavored topping instead of regular butter or margarine on popcorn and other foods  Ways to decrease fat in recipes:  Replace high-fat ingredients with low-fat or nonfat ones  This may cause baked goods to be drier than usual  You may need to use nonfat cooking spray on pans to prevent food from sticking  You also may need to change the amount of other ingredients, such as water, in the recipe  Try the following:  · Use low-fat or light margarine instead of regular margarine or shortening  · Use lean ground turkey breast or chicken, or lean ground beef (less than 5% fat) instead of hamburger  · Add 1 teaspoon of canola oil to 8 ounces of skim milk instead of using cream or half and half  · Use grated zucchini, carrots, or apples in breads instead of coconut      · Use blenderized, low-fat cottage cheese, plain tofu, or low-fat ricotta cheese instead of cream cheese  · Use 1 egg white and 1 teaspoon of canola oil, or use ¼ cup (2 ounces) of fat-free egg substitute instead of a whole egg  · Replace half of the oil that is called for in a recipe with applesauce when you bake  Use 3 tablespoons of cocoa powder and 1 tablespoon of canola oil instead of a square of baking chocolate  How to increase fiber:  Eat enough high-fiber foods to get 20 to 30 grams of fiber every day  Slowly increase your fiber intake to avoid stomach cramps, gas, and other problems  · Eat 3 ounces of whole-grain foods each day  An ounce is about 1 slice of bread  Eat whole-grain breads, such as whole-wheat bread  Whole wheat, whole-wheat flour, or other whole grains should be listed as the first ingredient on the food label  Replace white flour with whole-grain flour or use half of each in recipes  Whole-grain flour is heavier than white flour, so you may have to add more yeast or baking powder  · Eat a high-fiber cereal for breakfast   Oatmeal is a good source of soluble fiber  Look for cereals that have bran or fiber in the name  Choose whole-grain products, such as brown rice, barley, and whole-wheat pasta  · Eat more beans, peas, and lentils  For example, add beans to soups or salads  Eat at least 5 cups of fruits and vegetables each day  Eat fruits and vegetables with the peel because the peel is high in fiber  © Copyright [employer] 2022 Information is for End User's use only and may not be sold, redistributed or otherwise used for commercial purposes  All illustrations and images included in CareNotes® are the copyrighted property of [employer]  or [address] [place], [place] [address]elieser   The above information is an  only  It is not intended as medical advice for individual conditions or treatments  Talk to your doctor, nurse or pharmacist before following any medical regimen to see if it is safe and effective for you      Heart Healthy Diet   AMBULATORY CARE:   A heart healthy diet  is an eating plan low in unhealthy fats and sodium (salt)  The plan is high in healthy fats and fiber  A heart healthy diet helps improve your cholesterol levels and lowers your risk for heart disease and stroke  A dietitian will teach you how to read and understand food labels  Heart healthy diet guidelines to follow:   · Choose foods that contain healthy fats  ? Unsaturated fats  include monounsaturated and polyunsaturated fats  Unsaturated fat is found in foods such as soybean, canola, olive, corn, and safflower oils  It is also found in soft tub margarine that is made with liquid vegetable oil  ? Omega-3 fat  is found in certain fish, such as salmon, tuna, and trout, and in walnuts and flaxseed  Eat fish high in omega-3 fats at least 2 times a week  · Get 20 to 30 grams of fiber each day  Fruits, vegetables, whole-grain foods, and legumes (cooked beans) are good sources of fiber  · Limit or do not have unhealthy fats  ? Cholesterol  is found in animal foods, such as eggs and lobster, and in dairy products made from whole milk  Limit cholesterol to less than 200 mg each day  ? Saturated fat  is found in meats, such as hallman and hamburger  It is also found in chicken or turkey skin, whole milk, and butter  Limit saturated fat to less than 7% of your total daily calories  ? Trans fat  is found in packaged foods, such as potato chips and cookies  It is also in hard margarine, some fried foods, and shortening  Do not eat foods that contain trans fats  · Limit sodium as directed  You may be told to limit sodium to 2,000 to 2,300 mg each day  Choose low-sodium or no-salt-added foods  Add little or no salt to food you prepare  Use herbs and spices in place of salt  Include the following in your heart healthy plan:  Ask your dietitian or healthcare provider how many servings to have from each of the following food groups:  · Grains:      ?  Whole-wheat breads, cereals, and pastas, and brown rice    ? Low-fat, low-sodium crackers and chips    · Vegetables:      ? Broccoli, green beans, green peas, and spinach    ? Collards, kale, and lima beans    ? Carrots, sweet potatoes, tomatoes, and peppers    ? Canned vegetables with no salt added    · Fruits:      ? Bananas, peaches, pears, and pineapple    ? Grapes, raisins, and dates    ? Oranges, tangerines, grapefruit, orange juice, and grapefruit juice    ? Apricots, mangoes, melons, and papaya    ? Raspberries and strawberries    ? Canned fruit with no added sugar    · Low-fat dairy:      ? Nonfat (skim) milk, 1% milk, and low-fat almond, cashew, or soy milks fortified with calcium    ? Low-fat cheese, regular or frozen yogurt, and cottage cheese    · Meats and proteins:      ? Lean cuts of beef and pork (loin, leg, round), skinless chicken and turkey    ? Legumes, soy products, egg whites, or nuts    Limit or do not include the following in your heart healthy plan:   · Unhealthy fats and oils:      ? Whole or 2% milk, cream cheese, sour cream, or cheese    ? High-fat cuts of beef (T-bone steaks, ribs), chicken or turkey with skin, and organ meats such as liver    ? Butter, stick margarine, shortening, and cooking oils such as coconut or palm oil    · Foods and liquids high in sodium:      ? Packaged foods, such as frozen dinners, cookies, macaroni and cheese, and cereals with more than 300 mg of sodium per serving    ? Vegetables with added sodium, such as instant potatoes, vegetables with added sauces, or regular canned vegetables    ? Cured or smoked meats, such as hot dogs, hallman, and sausage    ? High-sodium ketchup, barbecue sauce, salad dressing, pickles, olives, soy sauce, or miso    · Foods and liquids high in sugar:      ? Candy, cake, cookies, pies, or doughnuts    ? Soft drinks (soda), sports drinks, or sweetened tea    ?  Canned or dry mixes for cakes, soups, sauces, or gravies    Other healthy heart guidelines: · Do not smoke  Nicotine and other chemicals in cigarettes and cigars can cause lung and heart damage  Ask your healthcare provider for information if you currently smoke and need help to quit  E-cigarettes or smokeless tobacco still contain nicotine  Talk to your healthcare provider before you use these products  · Limit or do not drink alcohol as directed  Alcohol can damage your heart and raise your blood pressure  Your healthcare provider may give you specific daily and weekly limits  The general recommended limit is 1 drink a day for women 21 or older and for men 72 or older  Do not have more than 3 drinks in a day or 7 in a week  The recommended limit is 2 drinks a day for men 24to 59years of age  Do not have more than 4 drinks in a day or 14 in a week  A drink of alcohol is 12 ounces of beer, 5 ounces of wine, or 1½ ounces of liquor  · Exercise regularly  Exercise can help you maintain a healthy weight and improve your blood pressure and cholesterol levels  Regular exercise can also decrease your risk for heart problems  Ask your healthcare provider about the best exercise plan for you  Do not start an exercise program without asking your healthcare provider  Follow up with your doctor or cardiologist as directed:  Write down your questions so you remember to ask them during your visits  © [employer] 2022 Information is for End User's use only and may not be sold, redistributed or otherwise used for commercial purposes  All illustrations and images included in CareNotes® are the copyrighted property of A D A M [employer]  or Brynn Parker   The above information is an  only  It is not intended as medical advice for individual conditions or treatments  Talk to your doctor, nurse or pharmacist before following any medical regimen to see if it is safe and effective for you

## 2022-11-07 NOTE — ASSESSMENT & PLAN NOTE
Wheezes have resolved  Patient had a viral illness ongoing for more than a month  Continue with lung exercises    Steam to help decongest

## 2022-11-14 DIAGNOSIS — E66.01 MORBID OBESITY WITH BMI OF 50.0-59.9, ADULT (HCC): ICD-10-CM

## 2022-11-14 RX ORDER — PHENTERMINE HYDROCHLORIDE 30 MG/1
30 CAPSULE ORAL EVERY MORNING
Qty: 30 CAPSULE | Refills: 0 | Status: SHIPPED | OUTPATIENT
Start: 2022-11-14 | End: 2022-11-15 | Stop reason: DRUGHIGH

## 2022-11-15 DIAGNOSIS — E66.01 MORBID OBESITY WITH BMI OF 50.0-59.9, ADULT (HCC): Primary | ICD-10-CM

## 2022-11-15 RX ORDER — PHENTERMINE HYDROCHLORIDE 37.5 MG/1
37.5 TABLET ORAL DAILY
Qty: 30 TABLET | Refills: 0 | Status: SHIPPED | OUTPATIENT
Start: 2022-11-15 | End: 2022-12-05 | Stop reason: ALTCHOICE

## 2022-12-05 ENCOUNTER — OFFICE VISIT (OUTPATIENT)
Dept: FAMILY MEDICINE CLINIC | Facility: CLINIC | Age: 42
End: 2022-12-05

## 2022-12-05 VITALS
DIASTOLIC BLOOD PRESSURE: 70 MMHG | OXYGEN SATURATION: 96 % | BODY MASS INDEX: 47.09 KG/M2 | TEMPERATURE: 97.5 F | WEIGHT: 293 LBS | HEIGHT: 66 IN | SYSTOLIC BLOOD PRESSURE: 100 MMHG | HEART RATE: 112 BPM | RESPIRATION RATE: 18 BRPM

## 2022-12-05 DIAGNOSIS — E66.01 MORBID OBESITY WITH BMI OF 50.0-59.9, ADULT (HCC): ICD-10-CM

## 2022-12-05 RX ORDER — PHENTERMINE HYDROCHLORIDE 37.5 MG/1
37.5 CAPSULE ORAL EVERY MORNING
Qty: 30 CAPSULE | Refills: 0 | Status: SHIPPED | OUTPATIENT
Start: 2022-12-05

## 2022-12-05 RX ADMIN — CYANOCOBALAMIN 1000 MCG: 1000 INJECTION, SOLUTION INTRAMUSCULAR; SUBCUTANEOUS at 16:24

## 2022-12-05 NOTE — ASSESSMENT & PLAN NOTE
Continue with phentermine, good food choices, increase fluid hydration   No side effects reported from the medication

## 2022-12-05 NOTE — PROGRESS NOTES
Name: Ab Tesfaye      : 1980      MRN: [id number]  Encounter Provider: EYAD Gonzalez  Encounter Date: 2022   Encounter department: [place] [address]  Morbid obesity with BMI of 50 0-59 9, adult [facility])  Assessment & Plan:  Continue with phentermine, good food choices, increase fluid hydration  No side effects reported from the medication    Orders:  -     phentermine 37 5 MG capsule; Take 1 capsule (37 5 mg total) by mouth every morning        Depression Screening and Follow-up Plan: Patient was screened for depression during today's encounter  They screened negative with a PHQ-2 score of 0  Subjective      Pt is here to follow up on weight loss efforts  Has been taking phentermine for 2 weeks  Denies any side effects from the medication    Review of Systems   Constitutional: Negative  HENT: Negative  Eyes: Negative  Respiratory: Negative  Cardiovascular: Negative  Gastrointestinal: Negative  Endocrine: Negative  Genitourinary: Negative  Musculoskeletal: Negative  Allergic/Immunologic: Negative  Neurological: Negative  Psychiatric/Behavioral: Negative          Current Outpatient Medications on File Prior to Visit   Medication Sig   • Bacillus Coagulans-Inulin (Probiotic) 1-250 BILLION-MG CAPS Take 1 capsule by mouth daily   • Calcium Carbonate-Vit D-Min (Calcium 600 + Minerals) 600-200 MG-UNIT TABS Take 1 tablet by mouth daily   • cholecalciferol (VITAMIN D3) 1,000 units tablet Take 1,000 Units by mouth daily   • clindamycin (CLINDAGEL) 1 % gel APPLY TO AFFECTED AREA TWICE A DAY   • Cranberry 1000 MG CAPS Take 1 capsule by mouth daily   • FLAXSEED, LINSEED, PO Take 1 capsule by mouth daily   • hydrochlorothiazide (HYDRODIURIL) 12 5 mg tablet MAY TAKE 1 PILL EVERY 24 HOURS AS NEEDED FOR EDEMA IN LEGS   • Multiple Vitamin (multivitamin) capsule Take 1 capsule by mouth daily   • omeprazole (PriLOSEC) 20 mg delayed release capsule Take 1 capsule (20 mg total) by mouth in the morning  • rivaroxaban (Xarelto) 10 mg tablet Take 1 tablet (10 mg total) by mouth daily   • [DISCONTINUED] phentermine (ADIPEX-P) 37 5 MG tablet Take 1 tablet (37 5 mg total) by mouth in the morning       Objective     /70   Pulse (!) 112   Temp 97 5 °F (36 4 °C) (Temporal)   Resp 18   Ht 5' 6" (1 676 m)   Wt (!) 153 kg (336 lb 9 6 oz)   SpO2 96%   BMI 54 33 kg/m²     Physical Exam  Constitutional:       Appearance: She is obese  She is not ill-appearing  HENT:      Head: Normocephalic and atraumatic  Cardiovascular:      Rate and Rhythm: Normal rate and regular rhythm  Pulses: Normal pulses  Heart sounds: Normal heart sounds  Pulmonary:      Effort: Pulmonary effort is normal       Breath sounds: Normal breath sounds  Musculoskeletal:      Cervical back: Normal range of motion  Neurological:      General: No focal deficit present  Mental Status: She is alert and oriented to person, place, and time  Psychiatric:         Mood and Affect: Mood normal          Behavior: Behavior normal          Thought Content:  Thought content normal          Judgment: Judgment normal        EYAD Alexis

## 2023-01-11 ENCOUNTER — OFFICE VISIT (OUTPATIENT)
Dept: FAMILY MEDICINE CLINIC | Facility: CLINIC | Age: 43
End: 2023-01-11

## 2023-01-11 VITALS
TEMPERATURE: 97.5 F | WEIGHT: 293 LBS | HEART RATE: 121 BPM | SYSTOLIC BLOOD PRESSURE: 110 MMHG | DIASTOLIC BLOOD PRESSURE: 70 MMHG | BODY MASS INDEX: 47.09 KG/M2 | OXYGEN SATURATION: 97 % | HEIGHT: 66 IN | RESPIRATION RATE: 20 BRPM

## 2023-01-11 DIAGNOSIS — E53.8 LOW VITAMIN B12 LEVEL: Primary | ICD-10-CM

## 2023-01-11 DIAGNOSIS — E66.01 MORBID OBESITY WITH BMI OF 50.0-59.9, ADULT (HCC): ICD-10-CM

## 2023-01-11 RX ORDER — PHENTERMINE HYDROCHLORIDE 37.5 MG/1
37.5 CAPSULE ORAL EVERY MORNING
Qty: 30 CAPSULE | Refills: 0 | Status: SHIPPED | OUTPATIENT
Start: 2023-01-11

## 2023-01-11 RX ORDER — CYANOCOBALAMIN 1000 UG/ML
1000 INJECTION, SOLUTION INTRAMUSCULAR; SUBCUTANEOUS
Status: SHIPPED | OUTPATIENT
Start: 2023-01-11

## 2023-01-11 RX ADMIN — CYANOCOBALAMIN 1000 MCG: 1000 INJECTION, SOLUTION INTRAMUSCULAR; SUBCUTANEOUS at 14:03

## 2023-01-11 NOTE — ASSESSMENT & PLAN NOTE
We will continue with phentermine  Discussed the possibility of adding Mounjaro  Need to get accurate information regarding interaction with her Xarelto  Continue diet and exercise

## 2023-01-11 NOTE — PROGRESS NOTES
Name: Diane Triana      : 1980      MRN: [id number]  Encounter Provider: EYAD Mena  Encounter Date: 2023   Encounter department: [place] [address]  Low vitamin B12 level  -     cyanocobalamin injection 1,000 mcg    2  Morbid obesity with BMI of 50 0-59 9, adult [facility])  Assessment & Plan: We will continue with phentermine  Discussed the possibility of adding Mounjaro  Need to get accurate information regarding interaction with her Xarelto  Continue diet and exercise  Orders:  -     phentermine 37 5 MG capsule; Take 1 capsule (37 5 mg total) by mouth every morning           Subjective      Patient is here for follow-up on weight management efforts  Has been taking phentermine  No side effects noted from the medication  Review of Systems   Constitutional: Negative  HENT: Negative  Eyes: Negative  Respiratory: Negative  Cardiovascular: Negative  Gastrointestinal: Negative  Endocrine: Negative  Genitourinary: Negative  Musculoskeletal: Negative  Allergic/Immunologic: Negative  Neurological: Negative  Psychiatric/Behavioral: Negative          Current Outpatient Medications on File Prior to Visit   Medication Sig   • Bacillus Coagulans-Inulin (Probiotic) 1-250 BILLION-MG CAPS Take 1 capsule by mouth daily   • Calcium Carbonate-Vit D-Min (Calcium 600 + Minerals) 600-200 MG-UNIT TABS Take 1 tablet by mouth daily   • cholecalciferol (VITAMIN D3) 1,000 units tablet Take 1,000 Units by mouth daily   • clindamycin (CLINDAGEL) 1 % gel APPLY TO AFFECTED AREA TWICE A DAY   • Cranberry 1000 MG CAPS Take 1 capsule by mouth daily   • FLAXSEED, LINSEED, PO Take 1 capsule by mouth daily   • hydrochlorothiazide (HYDRODIURIL) 12 5 mg tablet MAY TAKE 1 PILL EVERY 24 HOURS AS NEEDED FOR EDEMA IN LEGS   • Multiple Vitamin (multivitamin) capsule Take 1 capsule by mouth daily   • omeprazole (PriLOSEC) 20 mg delayed release capsule Take 1 capsule (20 mg total) by mouth in the morning  • rivaroxaban (Xarelto) 10 mg tablet Take 1 tablet (10 mg total) by mouth daily   • [DISCONTINUED] phentermine 37 5 MG capsule Take 1 capsule (37 5 mg total) by mouth every morning       Objective     /70   Pulse (!) 121   Temp 97 5 °F (36 4 °C) (Temporal)   Resp 20   Ht 5' 6" (1 676 m)   Wt (!) 153 kg (337 lb 3 2 oz)   SpO2 97%   BMI 54 43 kg/m²     Physical Exam  Vitals and nursing note reviewed  Constitutional:       Appearance: She is well-developed  She is obese  HENT:      Head: Normocephalic and atraumatic  Cardiovascular:      Rate and Rhythm: Normal rate and regular rhythm  Pulses: Normal pulses  Heart sounds: Normal heart sounds  Pulmonary:      Effort: Pulmonary effort is normal       Breath sounds: Normal breath sounds  Musculoskeletal:         General: Normal range of motion  Cervical back: Normal range of motion  Skin:     General: Skin is warm and dry  Neurological:      General: No focal deficit present  Mental Status: She is alert and oriented to person, place, and time  Psychiatric:         Mood and Affect: Mood normal          Thought Content:  Thought content normal          Judgment: Judgment normal        Jorge L Comp, CRNP

## 2023-02-13 ENCOUNTER — OFFICE VISIT (OUTPATIENT)
Dept: FAMILY MEDICINE CLINIC | Facility: CLINIC | Age: 43
End: 2023-02-13

## 2023-02-13 VITALS
HEART RATE: 113 BPM | BODY MASS INDEX: 47.09 KG/M2 | SYSTOLIC BLOOD PRESSURE: 112 MMHG | DIASTOLIC BLOOD PRESSURE: 84 MMHG | RESPIRATION RATE: 18 BRPM | TEMPERATURE: 97.9 F | OXYGEN SATURATION: 95 % | HEIGHT: 66 IN | WEIGHT: 293 LBS

## 2023-02-13 DIAGNOSIS — E66.01 MORBID OBESITY WITH BMI OF 50.0-59.9, ADULT (HCC): Primary | ICD-10-CM

## 2023-02-13 RX ORDER — PHENTERMINE HYDROCHLORIDE 37.5 MG/1
37.5 CAPSULE ORAL EVERY MORNING
Qty: 30 CAPSULE | Refills: 0 | Status: SHIPPED | OUTPATIENT
Start: 2023-02-13

## 2023-02-13 RX ADMIN — CYANOCOBALAMIN 1000 MCG: 1000 INJECTION, SOLUTION INTRAMUSCULAR; SUBCUTANEOUS at 13:50

## 2023-02-13 NOTE — PROGRESS NOTES
Name: Marie Pires      : 1980      MRN: [id number]  Encounter Provider: EYAD Fabian  Encounter Date: 2023   Encounter department: [place] [address]  Morbid obesity with BMI of 50 0-59 9, adult [facility])  Assessment & Plan:  Patient has been taking phentermine for more than 4 months  Has not seen any improvement in weight loss  Patient also reports that she has tried nutrition, has used the shakes and nutrition bars also did not have any positive results with the diet plan  Refill phentermine for 1 more month  Referral made to bariatric/weight management  Did discussed options of injectables but patient is on Xarelto unable to determine interactions with this medication  Continue with making good food choices, increase exercise activity as tolerated    Orders:  -     Ambulatory Referral to Weight Management; Future  -     phentermine 37 5 MG capsule; Take 1 capsule (37 5 mg total) by mouth every morning           Subjective      Is here to follow-up on weight loss efforts, has been taking phentermine  Has not been therapeutic  States that she has been eating better trying to exercise  Patient has also gone to see nutrition had tried shakes and nutrition bars, with little results  Review of Systems   Constitutional: Negative  HENT: Negative  Eyes: Negative  Respiratory: Negative  Cardiovascular: Negative  Gastrointestinal: Negative  Endocrine: Negative  Genitourinary: Negative  Musculoskeletal: Negative  Allergic/Immunologic: Negative  Neurological: Negative  Psychiatric/Behavioral: Negative          Current Outpatient Medications on File Prior to Visit   Medication Sig   • Bacillus Coagulans-Inulin (Probiotic) 1-250 BILLION-MG CAPS Take 1 capsule by mouth daily   • Calcium Carbonate-Vit D-Min (Calcium 600 + Minerals) 600-200 MG-UNIT TABS Take 1 tablet by mouth daily   • cholecalciferol (VITAMIN D3) 1,000 units tablet Take 1,000 Units by mouth daily   • clindamycin (CLINDAGEL) 1 % gel APPLY TO AFFECTED AREA TWICE A DAY   • Cranberry 1000 MG CAPS Take 1 capsule by mouth daily   • FLAXSEED, LINSEED, PO Take 1 capsule by mouth daily   • hydrochlorothiazide (HYDRODIURIL) 12 5 mg tablet Take 1 tablet (12 5 mg total) by mouth daily   • Multiple Vitamin (multivitamin) capsule Take 1 capsule by mouth daily   • omeprazole (PriLOSEC) 20 mg delayed release capsule Take 1 capsule (20 mg total) by mouth in the morning  • rivaroxaban (Xarelto) 10 mg tablet Take 1 tablet (10 mg total) by mouth daily   • [DISCONTINUED] phentermine 37 5 MG capsule Take 1 capsule (37 5 mg total) by mouth every morning       Objective     /84   Pulse (!) 113   Temp 97 9 °F (36 6 °C) (Temporal)   Resp 18   Ht 5' 6" (1 676 m)   Wt (!) 153 kg (337 lb)   SpO2 95%   BMI 54 39 kg/m²     Physical Exam  Vitals and nursing note reviewed  Constitutional:       Appearance: She is well-developed  She is obese  HENT:      Head: Normocephalic and atraumatic  Cardiovascular:      Rate and Rhythm: Normal rate and regular rhythm  Pulses: Normal pulses  Heart sounds: Normal heart sounds  Pulmonary:      Effort: Pulmonary effort is normal       Breath sounds: Normal breath sounds  Musculoskeletal:         General: Normal range of motion  Cervical back: Normal range of motion  Skin:     General: Skin is warm and dry  Neurological:      General: No focal deficit present  Mental Status: She is alert and oriented to person, place, and time  Psychiatric:         Mood and Affect: Mood normal          Behavior: Behavior normal          Thought Content:  Thought content normal          Judgment: Judgment normal        EYAD Escalante

## 2023-02-13 NOTE — PATIENT INSTRUCTIONS
Follow up with [facility]  Maintain health diet  Mediterranean Diet   AMBULATORY CARE:   A Mediterranean diet  is a meal plan that includes foods that are commonly eaten in countries that border [place]  This meal plan may provide several health benefits  These include losing or maintaining weight, and decreasing blood pressure, blood sugar, and cholesterol levels  It may also help protect against certain health conditions such as heart disease, cancer, type 2 diabetes, and Alzheimer disease  Work with a dietitian to develop a meal plan that is right for you  Foods to include in the [facility] diet:   Include fruits and vegetables in each meal   Eat a variety of fresh fruits and vegetables  Choose whole grains every day  These foods include whole-grain breads, pastas, and cereals  It also includes brown rice, quinoa, and millet  Use unsaturated fats instead of saturated fats  Cook with olive or canola oil  Limit saturated fats, such as butter, margarine, and shortening  Saturated fat is an unhealthy fat that can increase your cholesterol levels  Choose plant foods, poultry, and fish as your main sources of protein  Eat plant-based foods that provide protein,  such as lentils, beans, chickpeas, nuts, and seeds  Choose mostly plant-based foods in place of meat on most days of the week  Eat protein foods high in omega-3 fats  Fish high in omega-3 fats include salmon, trout, and tuna  Include these types of fish 1 or 2 times each week  Limit fish high in mercury, such as shark, swordfish, tilefish, and nicole mackerel  Omega-3 fats are also found in walnuts and flaxseed  Choose poultry (chicken or turkey)  without skin instead of red meat  Red meat is high in saturated fat  Limit eggs and high-fat meats, such as hallman, sausage, and hot dogs  Choose low-fat dairy foods  such as nonfat or 1% milk, or low-fat almond, cashew, or soy milk   Other examples include low-fat cheese, Patient advised that she should have refills and she should check with her pharmacy. yogurt, and cottage cheese  Limit sweets  Limit your intake of high-sugar foods, such as soda, desserts, and candy  Talk to your healthcare provider about alcohol  Studies have shown that moderate intake of wine may reduce the risk of heart disease  A moderate amount of wine is 1 serving for women and men 65 years and older each day  Two servings is recommended for men 24to 59years of age each day  A serving of wine is 5 ounces  Other things you need to know if you follow the Mediterranean diet:   Include foods high in iron and vitamin C   Plant-based foods that are high in iron include spinach, beans, tofu, and artichoke  Eat a serving of vitamin C with any iron-rich food to help your body absorb more iron  Examples include oranges, strawberries, cantaloupe, broccoli, and yellow peppers  Get regular physical activity  The Mediterranean diet will have the most benefit if you get regular physical activity  Get 30 minutes of physical activity at least 5 days a week  Choose physical activities that increase your heart rate  Examples include walking, hiking, swimming, and riding a bike  Ask your healthcare provider about the best exercise plan for you  © Copyright [employer] 2022 Information is for End User's use only and may not be sold, redistributed or otherwise used for commercial purposes  All illustrations and images included in CareNotes® are the copyrighted property of [employer]  or Brynn Duran  The above information is an  only  It is not intended as medical advice for individual conditions or treatments  Talk to your doctor, nurse or pharmacist before following any medical regimen to see if it is safe and effective for you

## 2023-02-13 NOTE — ASSESSMENT & PLAN NOTE
Patient has been taking phentermine for more than 4 months  Has not seen any improvement in weight loss  Patient also reports that she has tried nutrition, has used the shakes and nutrition bars also did not have any positive results with the diet plan  Refill phentermine for 1 more month  Referral made to bariatric/weight management  Did discussed options of injectables but patient is on Xarelto unable to determine interactions with this medication    Continue with making good food choices, increase exercise activity as tolerated

## 2023-02-16 DIAGNOSIS — Z79.01 CURRENT USE OF LONG TERM ANTICOAGULATION: ICD-10-CM

## 2023-02-16 DIAGNOSIS — Z86.718 HISTORY OF DVT (DEEP VEIN THROMBOSIS): ICD-10-CM

## 2023-03-15 DIAGNOSIS — Z86.718 HISTORY OF DVT (DEEP VEIN THROMBOSIS): ICD-10-CM

## 2023-03-15 DIAGNOSIS — Z79.01 CURRENT USE OF LONG TERM ANTICOAGULATION: ICD-10-CM

## 2023-03-15 RX ORDER — RIVAROXABAN 10 MG/1
TABLET, FILM COATED ORAL
Qty: 30 TABLET | Refills: 0 | Status: SHIPPED | OUTPATIENT
Start: 2023-03-15

## 2023-03-16 ENCOUNTER — OFFICE VISIT (OUTPATIENT)
Dept: FAMILY MEDICINE CLINIC | Facility: CLINIC | Age: 43
End: 2023-03-16
Payer: COMMERCIAL

## 2023-03-16 DIAGNOSIS — E53.8 B12 DEFICIENCY: Primary | ICD-10-CM

## 2023-03-16 RX ADMIN — CYANOCOBALAMIN 1000 MCG: 1000 INJECTION, SOLUTION INTRAMUSCULAR; SUBCUTANEOUS at 13:21

## 2023-05-02 ENCOUNTER — OFFICE VISIT (OUTPATIENT)
Dept: FAMILY MEDICINE CLINIC | Facility: CLINIC | Age: 43
End: 2023-05-02

## 2023-05-02 ENCOUNTER — LAB (OUTPATIENT)
Dept: LAB | Facility: CLINIC | Age: 43
End: 2023-05-02

## 2023-05-02 VITALS
HEART RATE: 129 BPM | RESPIRATION RATE: 16 BRPM | OXYGEN SATURATION: 95 % | DIASTOLIC BLOOD PRESSURE: 80 MMHG | TEMPERATURE: 97.3 F | SYSTOLIC BLOOD PRESSURE: 100 MMHG | WEIGHT: 293 LBS | HEIGHT: 66 IN | BODY MASS INDEX: 47.09 KG/M2

## 2023-05-02 DIAGNOSIS — T14.8XXA WOUND DISCHARGE: ICD-10-CM

## 2023-05-02 DIAGNOSIS — R10.84 GENERALIZED ABDOMINAL PAIN: Primary | ICD-10-CM

## 2023-05-02 LAB
BASOPHILS # BLD AUTO: 0.06 THOUSANDS/ΜL (ref 0–0.1)
BASOPHILS NFR BLD AUTO: 1 % (ref 0–1)
EOSINOPHIL # BLD AUTO: 0.13 THOUSAND/ΜL (ref 0–0.61)
EOSINOPHIL NFR BLD AUTO: 2 % (ref 0–6)
ERYTHROCYTE [DISTWIDTH] IN BLOOD BY AUTOMATED COUNT: 15 % (ref 11.6–15.1)
HCT VFR BLD AUTO: 41.7 % (ref 34.8–46.1)
HGB BLD-MCNC: 13.4 G/DL (ref 11.5–15.4)
IMM GRANULOCYTES # BLD AUTO: 0.02 THOUSAND/UL (ref 0–0.2)
IMM GRANULOCYTES NFR BLD AUTO: 0 % (ref 0–2)
LYMPHOCYTES # BLD AUTO: 2.77 THOUSANDS/ΜL (ref 0.6–4.47)
LYMPHOCYTES NFR BLD AUTO: 47 % (ref 14–44)
MCH RBC QN AUTO: 28 PG (ref 26.8–34.3)
MCHC RBC AUTO-ENTMCNC: 32.1 G/DL (ref 31.4–37.4)
MCV RBC AUTO: 87 FL (ref 82–98)
MONOCYTES # BLD AUTO: 0.38 THOUSAND/ΜL (ref 0.17–1.22)
MONOCYTES NFR BLD AUTO: 6 % (ref 4–12)
NEUTROPHILS # BLD AUTO: 2.68 THOUSANDS/ΜL (ref 1.85–7.62)
NEUTS SEG NFR BLD AUTO: 44 % (ref 43–75)
NRBC BLD AUTO-RTO: 0 /100 WBCS
PLATELET # BLD AUTO: 261 THOUSANDS/UL (ref 149–390)
PMV BLD AUTO: 10.8 FL (ref 8.9–12.7)
RBC # BLD AUTO: 4.78 MILLION/UL (ref 3.81–5.12)
WBC # BLD AUTO: 6.04 THOUSAND/UL (ref 4.31–10.16)

## 2023-05-02 NOTE — ASSESSMENT & PLAN NOTE
Erythema noted at umbilicus  Unable to get wound sample  No drainage at this time  Blood work done  Keep area clean and dry

## 2023-05-02 NOTE — PROGRESS NOTES
Name: Chelsea Frost      : 1980      MRN: [id number]  Encounter Provider: EYAD Sung  Encounter Date: 2023   Encounter department: [address] [facility]     1  Generalized abdominal pain  Assessment & Plan:  Patient has abdominal tenderness, umbilical area left quadrant  Does have a history of additions also lymphadenopathy  Ultrasound ordered  Continue with massages  Orders:  -     US abdomen complete; Future; Expected date: 2023    2  Wound discharge  Assessment & Plan:  Erythema noted at umbilicus  Unable to get wound sample  No drainage at this time  Blood work done  Keep area clean and dry  Orders:  -     CBC and differential; Future  -     Comprehensive metabolic panel; Future        Depression Screening and Follow-up Plan: Patient was screened for depression during today's encounter  They screened negative with a PHQ-2 score of 2  Subjective      Patient is here with complaints of umbilical tenderness, purulent drainage, blood  Started a few days ago  Patient does also have abdominal tenderness, distention, increase lymphatic drainage  Review of Systems   Constitutional: Negative  HENT: Negative  Eyes: Negative  Respiratory: Negative  Cardiovascular: Negative  Gastrointestinal: Negative  Endocrine: Negative  Genitourinary: Negative  Musculoskeletal: Negative  Skin: Positive for wound (Umbilical, red and yellow drainage)  Allergic/Immunologic: Negative  Neurological: Negative  Psychiatric/Behavioral: Positive for dysphoric mood (Duration of anxiety and stresses)         Current Outpatient Medications on File Prior to Visit   Medication Sig    Bacillus Coagulans-Inulin (Probiotic) 1-250 BILLION-MG CAPS Take 1 capsule by mouth daily    Calcium Carbonate-Vit D-Min (Calcium 600 + Minerals) 600-200 MG-UNIT TABS Take 1 tablet by mouth daily    cholecalciferol (VITAMIN D3) 1,000 units tablet Take "1,000 Units by mouth daily    Cranberry 1000 MG CAPS Take 1 capsule by mouth daily    FLAXSEED, LINSEED, PO Take 1 capsule by mouth daily    hydrochlorothiazide (HYDRODIURIL) 12 5 mg tablet Take 1 tablet (12 5 mg total) by mouth daily    Multiple Vitamin (multivitamin) capsule Take 1 capsule by mouth daily    Xarelto 10 MG tablet TAKE 1 TABLET BY MOUTH EVERY DAY    clindamycin (CLINDAGEL) 1 % gel APPLY TO AFFECTED AREA TWICE A DAY    omeprazole (PriLOSEC) 20 mg delayed release capsule Take 1 capsule (20 mg total) by mouth in the morning   phentermine 37 5 MG capsule Take 1 capsule (37 5 mg total) by mouth every morning       Objective     /80   Pulse (!) 129   Temp (!) 97 3 °F (36 3 °C) (Temporal)   Resp 16   Ht 5' 6\" (1 676 m)   Wt (!) 155 kg (340 lb 12 8 oz)   SpO2 95%   BMI 55 01 kg/m²     Physical Exam  Vitals and nursing note reviewed  Constitutional:       Appearance: She is well-developed  She is obese  HENT:      Head: Normocephalic and atraumatic  Cardiovascular:      Rate and Rhythm: Normal rate and regular rhythm  Pulses: Normal pulses  Heart sounds: Normal heart sounds  Pulmonary:      Effort: Pulmonary effort is normal       Breath sounds: Normal breath sounds  Musculoskeletal:         General: Normal range of motion  Cervical back: Normal range of motion  Skin:     General: Skin is warm and dry  Findings: Erythema (Umbilicus) present  Neurological:      General: No focal deficit present  Mental Status: She is alert and oriented to person, place, and time  Psychiatric:         Mood and Affect: Mood normal          Behavior: Behavior normal          Thought Content:  Thought content normal          Judgment: Judgment normal        EYAD [place]  "

## 2023-05-02 NOTE — ASSESSMENT & PLAN NOTE
Patient has abdominal tenderness, umbilical area left quadrant  Does have a history of additions also lymphadenopathy  Ultrasound ordered  Continue with massages  Repeat Xray report does not make commentary about abnormal lesion finding.  Dr. Max did respond to message as well about referring to pain management, she states she thinks that would be fine.  Referral to PT and pain management placed.  Patient verbalized understanding.    Dante Nicholas PA-C  6/18/19

## 2023-05-03 LAB
ALBUMIN SERPL BCP-MCNC: 3.7 G/DL (ref 3.5–5)
ALP SERPL-CCNC: 107 U/L (ref 46–116)
ALT SERPL W P-5'-P-CCNC: 61 U/L (ref 12–78)
ANION GAP SERPL CALCULATED.3IONS-SCNC: 3 MMOL/L (ref 4–13)
AST SERPL W P-5'-P-CCNC: 43 U/L (ref 5–45)
BILIRUB SERPL-MCNC: 0.56 MG/DL (ref 0.2–1)
BUN SERPL-MCNC: 11 MG/DL (ref 5–25)
CALCIUM SERPL-MCNC: 9.5 MG/DL (ref 8.3–10.1)
CHLORIDE SERPL-SCNC: 106 MMOL/L (ref 96–108)
CO2 SERPL-SCNC: 28 MMOL/L (ref 21–32)
CREAT SERPL-MCNC: 0.88 MG/DL (ref 0.6–1.3)
GFR SERPL CREATININE-BSD FRML MDRD: 81 ML/MIN/1.73SQ M
GLUCOSE P FAST SERPL-MCNC: 85 MG/DL (ref 65–99)
POTASSIUM SERPL-SCNC: 4.4 MMOL/L (ref 3.5–5.3)
PROT SERPL-MCNC: 7.4 G/DL (ref 6.4–8.4)
SODIUM SERPL-SCNC: 137 MMOL/L (ref 135–147)

## 2023-05-09 ENCOUNTER — HOSPITAL ENCOUNTER (OUTPATIENT)
Dept: ULTRASOUND IMAGING | Facility: CLINIC | Age: 43
Discharge: HOME | End: 2023-05-09

## 2023-05-09 DIAGNOSIS — R10.84 GENERALIZED ABDOMINAL PAIN: ICD-10-CM

## 2023-05-11 DIAGNOSIS — I89.0 LYMPHEDEMA OF RIGHT LOWER EXTREMITY: ICD-10-CM

## 2023-05-11 RX ORDER — HYDROCHLOROTHIAZIDE 12.5 MG/1
TABLET ORAL
Qty: 90 TABLET | Refills: 0 | Status: SHIPPED | OUTPATIENT
Start: 2023-05-11

## 2023-05-16 ENCOUNTER — TELEPHONE (OUTPATIENT)
Dept: FAMILY MEDICINE CLINIC | Facility: CLINIC | Age: 43
End: 2023-05-16

## 2023-05-16 NOTE — TELEPHONE ENCOUNTER
----- Message from Kris 2 sent at 5/16/2023  9:13 AM EDT -----  Normal US of umbilical area  Fatty liver disease noted  Maintain heart healthy diet

## 2023-05-18 DIAGNOSIS — R19.8 UMBILICUS DISCHARGE: Primary | ICD-10-CM

## 2023-05-24 DIAGNOSIS — Z79.01 CURRENT USE OF LONG TERM ANTICOAGULATION: ICD-10-CM

## 2023-05-24 DIAGNOSIS — Z86.718 HISTORY OF DVT (DEEP VEIN THROMBOSIS): ICD-10-CM

## 2023-05-25 ENCOUNTER — TELEPHONE (OUTPATIENT)
Dept: SURGERY | Facility: CLINIC | Age: 43
End: 2023-05-25

## 2023-05-26 ENCOUNTER — CONSULT (OUTPATIENT)
Dept: SURGERY | Facility: CLINIC | Age: 43
End: 2023-05-26

## 2023-05-26 VITALS
HEART RATE: 89 BPM | DIASTOLIC BLOOD PRESSURE: 72 MMHG | TEMPERATURE: 98.4 F | OXYGEN SATURATION: 99 % | BODY MASS INDEX: 47.09 KG/M2 | RESPIRATION RATE: 16 BRPM | HEIGHT: 66 IN | SYSTOLIC BLOOD PRESSURE: 120 MMHG | WEIGHT: 293 LBS

## 2023-05-26 DIAGNOSIS — R19.8 UMBILICUS DISCHARGE: Primary | ICD-10-CM

## 2023-05-26 RX ORDER — NYSTATIN 100000 [USP'U]/G
POWDER TOPICAL 3 TIMES DAILY PRN
Qty: 15 G | Refills: 0 | Status: SHIPPED | OUTPATIENT
Start: 2023-05-26

## 2023-05-26 NOTE — PROGRESS NOTES
Assessment/Plan:  51-year-old female with drainage from her umbilicus  -Patient presents for evaluation due to drainage from her umbilicus and abdominal pain  -Has longstanding history of abdominal pain secondary to endometriosis, had multiple abdominal procedures for this  -Notes the umbilical drainage has been going on for roughly 1 month, noticed it worse after doing work outside and significant sweating  -Describes drainage as clear yellow and sometimes with blood  -On exam no drainage noted, no obvious cyst or granuloma present in umbilicus  -Ultrasound of the abdomen from 5/9/2023 report reviewed  -CBC and CMP from 5/2/2023 reviewed  -CT scan of the abdomen and pelvis from 5/19/2022 report and images reviewed  -Primary care physician note from 5/2/2023 reviewed  -Possibility of a cyst versus granuloma versus endometrial tissue of the umbilicus, also possibility of a bacterial versus fungal infection  -Continue to keep the area clean and dry  -Will prescribe nystatin powder to see if this would help dry the area out and keep it dry along with treating a possible fungal infection  -Recommend the patient follow-up with her Gynecologist in regards to endometriosis and the abdominal pain she is feeling, imaging did not show any obvious cause  -If patient were to have recurrent infections of the umbilicus can consider excision of the entire umbilicus and tissue, at this time patient would like to avoid any type of surgical intervention and hopefully treat this nonoperatively  -Follow-up in office as needed     1  Umbilicus discharge  -     Ambulatory Referral to General Surgery  -     nystatin (MYCOSTATIN) powder; Apply topically 3 (three) times a day as needed (umbilical discharge/redness)               Subjective:      Patient ID: Kenyatta Simpson is a 43 y o  female  Triage Notes:    Patient is a 51-year-old female who presents the office for evaluation due to umbilical drainage and abdominal pain    Patient notes the umbilical drainage for roughly 1 month  Notices a clear yellow type fluid along with some blood  Did note a lot of redness after working outside and significantly sweating  She has never had this before  She also has intermittent left lower quadrant abdominal pain  She has a history of endometriosis requiring multiple abdominal surgeries  States the abdominal pain feels like the endometriosis she had before  The following portions of the patient's history were reviewed and updated as appropriate:   She  has a past medical history of Diverticulosis, DVT (deep venous thrombosis) ([facility] ), DVT (deep venous thrombosis) ([facility] ), Endometriosis, Gastritis, Hereditary lymphedema of legs, Lymphedema of right lower extremity, and Obesity, Class III, BMI 40-49 9 (morbid obesity) ([facility] )  She   Patient Active Problem List    Diagnosis Date Noted   • Umbilicus discharge 82/44/4925   • Wound discharge 05/02/2023   • Generalized abdominal pain 05/02/2023   • Strep pharyngitis 09/29/2022   • Head injury 08/11/2022   • Lipoma of left lower extremity 08/11/2022   • Wheezes 04/21/2022   • Rash 04/21/2022   • Bruising 11/11/2021   • Lymph node symptom 11/11/2021   • Annual physical exam 91/93/1485   • Folliculitis of both axillae 08/10/2021   • Acute superficial gastritis without hemorrhage 07/14/2021   • Abdominal discomfort 07/01/2021   • Leg edema, left 07/01/2021   • Lymphedema 07/01/2021   • Acne vulgaris 05/28/2021   • Fungal infection 04/30/2021   • Weight gain 04/01/2021   • Morbid obesity with BMI of 50 0-59 9, adult ([facility] ) 05/14/2019   • Lymphedema of right lower extremity 02/06/2019     She  has a past surgical history that includes Ovary surgery; Ablation colpoclesis; Hysterectomy (2016); Oophorectomy (Bilateral, 09/2016); Tubal ligation; Endometrial ablation (2014); and [place] tooth extraction    Her family history includes Cancer in her maternal grandmother; Clotting disorder in her mother; Deep vein thrombosis in her mother; Diabetes in her paternal grandmother; Heart disease in her paternal grandfather; Hypertension in her paternal grandfather; Pancreatic cancer in her maternal grandmother; Stroke in her maternal aunt; Thyroid disease in her paternal grandmother  She  reports that she has never smoked  She has never used smokeless tobacco  She reports that she does not currently use alcohol  She reports that she does not use drugs  Current Outpatient Medications on File Prior to Visit   Medication Sig   • Bacillus Coagulans-Inulin (Probiotic) 1-250 BILLION-MG CAPS Take 1 capsule by mouth daily   • Calcium Carbonate-Vit D-Min (Calcium 600 + Minerals) 600-200 MG-UNIT TABS Take 1 tablet by mouth daily   • cholecalciferol (VITAMIN D3) 1,000 units tablet Take 1,000 Units by mouth daily   • Cranberry 1000 MG CAPS Take 1 capsule by mouth daily   • FLAXSEED, LINSEED, PO Take 1 capsule by mouth daily   • hydrochlorothiazide (HYDRODIURIL) 12 5 mg tablet TAKE 1 TABLET BY MOUTH EVERY DAY   • Multiple Vitamin (multivitamin) capsule Take 1 capsule by mouth daily   • rivaroxaban (Xarelto) 10 mg tablet Take 1 tablet (10 mg total) by mouth daily   • clindamycin (CLINDAGEL) 1 % gel APPLY TO AFFECTED AREA TWICE A DAY   • omeprazole (PriLOSEC) 20 mg delayed release capsule Take 1 capsule (20 mg total) by mouth in the morning  • phentermine 37 5 MG capsule Take 1 capsule (37 5 mg total) by mouth every morning     Current Facility-Administered Medications on File Prior to Visit   Medication   • cyanocobalamin injection 1,000 mcg   • cyanocobalamin injection 1,000 mcg   • cyanocobalamin injection 1,000 mcg     She has No Known Allergies       Review of Systems   Constitutional: Negative for chills and fever  HENT: Negative for congestion, hearing loss, rhinorrhea and sore throat  Eyes: Negative for pain and discharge  Respiratory: Negative for cough, chest tightness and shortness of breath  Cardiovascular: Positive for leg swelling  "Negative for chest pain and palpitations  Gastrointestinal: Positive for abdominal pain  Negative for constipation, diarrhea, nausea and vomiting  Endocrine: Negative for cold intolerance and heat intolerance  Genitourinary: Negative for difficulty urinating and dysuria  Skin: Negative for color change and rash  Positive umbilical redness   Allergic/Immunologic: Negative for environmental allergies and food allergies  Neurological: Negative for seizures and headaches  Hematological: Negative for adenopathy  Does not bruise/bleed easily  Psychiatric/Behavioral: Negative for confusion and hallucinations  Objective:      /72 (BP Location: Left arm, Patient Position: Sitting, Cuff Size: Large)   Pulse 89   Temp 98 4 °F (36 9 °C)   Resp 16   Ht 5' 6\" (1 676 m)   Wt (!) 153 kg (337 lb 3 2 oz)   SpO2 99%   BMI 54 43 kg/m²     Below is the patient's most recent value for Albumin, ALT, AST, BUN, Calcium, Chloride, Cholesterol, CO2, Creatinine, GFR, Glucose, HDL, Hematocrit, Hemoglobin, Hemoglobin A1C, LDL, Magnesium, Phosphorus, Platelets, Potassium, PSA, Sodium, Triglycerides, and WBC  Lab Results   Component Value Date    ALT 61 05/02/2023    AST 43 05/02/2023    BUN 11 05/02/2023    CALCIUM 9 5 05/02/2023     05/02/2023    CO2 28 05/02/2023    CREATININE 0 88 05/02/2023    HCT 41 7 05/02/2023    HDL 57 12/07/2018    HGB 13 4 05/02/2023    HGBA1C 5 5 12/07/2018    K 4 4 05/02/2023    MG 1 9 07/17/2019     05/02/2023    TRIG 121 12/07/2018    WBC 6 04 05/02/2023     Note: for a comprehensive list of the patient's lab results, access the Results Review activity  Physical Exam  Constitutional:       Appearance: Normal appearance  She is obese  HENT:      Head: Normocephalic and atraumatic  Nose: Nose normal    Eyes:      General: No scleral icterus       Conjunctiva/sclera: Conjunctivae normal    Cardiovascular:      Rate and Rhythm: Normal rate and regular " rhythm  Heart sounds: Normal heart sounds  Pulmonary:      Effort: Pulmonary effort is normal       Breath sounds: Normal breath sounds  Abdominal:      General: There is no distension  Palpations: Abdomen is soft  Tenderness: There is no abdominal tenderness  Musculoskeletal:         General: No signs of injury  Skin:     General: Skin is warm  Coloration: Skin is not jaundiced  Comments: no drainage noted, no obvious cyst or granuloma present in umbilicus, no signs of infection   Neurological:      General: No focal deficit present  Mental Status: She is alert and oriented to person, place, and time     Psychiatric:         Mood and Affect: Mood normal          Behavior: Behavior normal

## 2023-06-21 ENCOUNTER — CLINICAL SUPPORT (OUTPATIENT)
Dept: FAMILY MEDICINE CLINIC | Facility: CLINIC | Age: 43
End: 2023-06-21
Payer: COMMERCIAL

## 2023-06-21 DIAGNOSIS — E53.9 VITAMIN B DEFICIENCY: Primary | ICD-10-CM

## 2023-06-21 PROCEDURE — 96372 THER/PROPH/DIAG INJ SC/IM: CPT

## 2023-06-21 RX ADMIN — CYANOCOBALAMIN 1000 MCG: 1000 INJECTION, SOLUTION INTRAMUSCULAR; SUBCUTANEOUS at 14:46

## 2023-06-23 DIAGNOSIS — Z79.01 CURRENT USE OF LONG TERM ANTICOAGULATION: ICD-10-CM

## 2023-06-23 DIAGNOSIS — Z86.718 HISTORY OF DVT (DEEP VEIN THROMBOSIS): ICD-10-CM

## 2023-06-29 ENCOUNTER — CONSULT (OUTPATIENT)
Dept: BARIATRICS | Facility: CLINIC | Age: 43
End: 2023-06-29
Payer: COMMERCIAL

## 2023-06-29 VITALS
SYSTOLIC BLOOD PRESSURE: 112 MMHG | DIASTOLIC BLOOD PRESSURE: 76 MMHG | BODY MASS INDEX: 47.09 KG/M2 | HEIGHT: 66 IN | HEART RATE: 89 BPM | RESPIRATION RATE: 18 BRPM | OXYGEN SATURATION: 97 % | WEIGHT: 293 LBS

## 2023-06-29 DIAGNOSIS — Z13.1 SCREENING FOR DIABETES MELLITUS: ICD-10-CM

## 2023-06-29 DIAGNOSIS — E66.01 MORBID OBESITY WITH BMI OF 50.0-59.9, ADULT (HCC): Primary | ICD-10-CM

## 2023-06-29 DIAGNOSIS — Z78.0 MENOPAUSE: ICD-10-CM

## 2023-06-29 DIAGNOSIS — E88.81 INSULIN RESISTANCE: ICD-10-CM

## 2023-06-29 DIAGNOSIS — M25.50 JOINT PAIN: ICD-10-CM

## 2023-06-29 DIAGNOSIS — G47.30 SLEEP APNEA: ICD-10-CM

## 2023-06-29 PROCEDURE — 99204 OFFICE O/P NEW MOD 45 MIN: CPT | Performed by: FAMILY MEDICINE

## 2023-06-29 NOTE — PROGRESS NOTES
Assessment/Plan:  Slick Fagan was seen today for consult  Diagnoses and all orders for this visit:    Morbid obesity with BMI of 50 0-59 9, adult ([facility] )  Encouraged to think about bariatric surgery  -     Ambulatory Referral to Weight Management  -     TSH w/Reflex; Future  -     Insulin, fasting; Future  -     HEMOGLOBIN A1C W/ EAG ESTIMATION; Future  -     Magnesium; Future  -     Uric acid; Future  -     ECG 12 lead; Future  -     Ambulatory referral to Sleep Medicine; Future    Insulin resistance  -     Insulin, fasting; Future    Screening for diabetes mellitus  -     HEMOGLOBIN A1C W/ EAG ESTIMATION; Future  Menopause  Has hot flashes tolerable  Cannot have BCP de to Hx of DVT  Joint pain  -     Magnesium; Future  -     Uric acid; Future    Sleep apnea  -     ECG 12 lead; Future  -     Ambulatory referral to Sleep Medicine; Future         Obesity:   Weight not at goal and patient tried more than 6 months to lose weight and was not able to achieve a meaningful weight loss above 5%  - Discussed options of [facility]-Intensive Lifestyle Intervention Program, Very Low Calorie Diet-VLCD, Conservative Program, Cedric-En-Y Gastric Bypass and Vertical Sleeve Gastrectomy and the role of weight loss medications  - Patient is interested in pursuing Very Low Calorie Diet-VLCD  - Initial weight loss goal of 5-10% weight loss for improved health  - Weight loss can improve patient's co-morbid conditions and/or prevent weight-related complications  Patient denies personal and family history of  pancreatitis, thyroid cancer, MEN-2 tumors  Denies any hx of glaucoma, seizures, kidney stones   Denies Hx of CAD, PAD, palpitations, arrhythmia  Denies uncontrolled anxiety or depression, suicidal behavior or thinking , insomnia or sleep disturbance  Return in 3mo    Subjective:   Chief Complaint   Patient presents with   • Consult     Initial Consultation with Reji Garza  SB =  2/8    Waist: 59 inches       Patient ID: Slick Fagan Sherita Gentile  is a 43 y o  female with excess weight/obesity here to pursue weight management  Previous notes and records have been reviewed  HPI  Wt Readings from Last 20 Encounters:   06/29/23 (!) 154 kg (339 lb)   05/26/23 (!) 153 kg (337 lb 3 2 oz)   05/02/23 (!) 155 kg (340 lb 12 8 oz)   02/13/23 (!) 153 kg (337 lb)   01/11/23 (!) 153 kg (337 lb 3 2 oz)   12/05/22 (!) 153 kg (336 lb 9 6 oz)   11/07/22 (!) 154 kg (339 lb)   08/11/22 (!) 154 kg (340 lb)   08/11/22 (!) 154 kg (340 lb)   06/02/22 (!) 152 kg (334 lb 6 4 oz)   05/26/22 (!) 151 kg (334 lb)   05/12/22 (!) 151 kg (333 lb)   04/21/22 (!) 151 kg (332 lb 12 8 oz)   04/01/22 (!) 149 kg (329 lb)   03/24/22 (!) 150 kg (330 lb)   02/16/22 (!) 147 kg (323 lb)   11/03/21 (!) 141 kg (311 lb 12 8 oz)   09/15/21 (!) 141 kg (311 lb)   08/18/21 (!) 143 kg (315 lb 11 2 oz)   08/10/21 (!) 145 kg (318 lb 12 8 oz)     Obesity/Excess Weight:Body mass index is 54 72 kg/m²  Severity: Severe  Onset: For the last 16 years   Modifiers: Diet and Exercise, Commercial Weight Loss Programs-ie  Weight Watchers, Yevonne Ruse, Nutrisystem, etc  and Prescription Weight Loss Medications  Contributing factors:  Insufficient Physical Activity and Stress/Emotional Eating  Associated symptoms: body image issues, decreased self esteem, decreased mobility and inability to do certain activities  Menopause since age 45 due to endometriosis  Occupation:business owner , 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  Sleep:stops breathing        Past Medical History:   Diagnosis Date   • Diverticulosis    • DVT (deep venous thrombosis) ([facility] )    • DVT (deep venous thrombosis) ([facility] )    • Endometriosis    • Gastritis    • Hereditary lymphedema of legs    • Lymphedema of right lower extremity    • Obesity, Class III, BMI 40-49 9 (morbid obesity) ([facility] )      Past Surgical History:   Procedure Laterality Date   • ABLATION COLPOCLESIS     • ENDOMETRIAL ABLATION  2014   • HYSTERECTOMY  2016   • OOPHORECTOMY Bilateral "09/2016   • OVARY SURGERY     • TUBAL LIGATION     • WISDOM TOOTH EXTRACTION       The following portions of the patient's history were reviewed and updated as appropriate: allergies, current medications, past family history, past medical history, past social history, past surgical history, and problem list     ROS:  Review of Systems   Constitutional: Negative for activity change  Fatigue  HENT: Negative for trouble swallowing  Respiratory: Negative for shortness of breath  Cardiovascular: Negative for chest pain, edema  Gastrointestinal: Negative for abdominal pain, nausea and vomiting, acid reflux, constipation/diarrhea  Musculoskeletal: Negative for gait problem and myalgias  Psychiatric/Behavioral: Negative for behavioral problems including anxiety /depression  Objective:  /76 (BP Location: Left arm, Patient Position: Sitting, Cuff Size: Adult)   Pulse 89   Resp 18   Ht 5' 6\" (1 676 m)   Wt (!) 154 kg (339 lb)   SpO2 97%   BMI 54 72 kg/m²   Constitutional: Well-developed, well-nourished and Obese Body mass index is 54 72 kg/m²  Edith Broach Alert, cooperative  Constitutional: Well-developed, well- nourished    HEENT: No conjunctival pallor or jaundice  Pulmonary: No increased work of breathing or signs of respiratory distress  CV: Well-perfused, Normal rate edema on both legs    GI: Abdomen obese, Non-distended  Neuro: Oriented to person, place and time  Normal Speech  Psych: Normal affect and mood  Normal thought process, no delusions   Labs and Imaging  Recent labs and imaging have been personally reviewed    Lab Results   Component Value Date    WBC 6 04 05/02/2023    HGB 13 4 05/02/2023    HCT 41 7 05/02/2023    MCV 87 05/02/2023     05/02/2023     Lab Results   Component Value Date    SODIUM 137 05/02/2023    K 4 4 05/02/2023     05/02/2023    CO2 28 05/02/2023    AGAP 3 (L) 05/02/2023    BUN 11 05/02/2023    CREATININE 0 88 05/02/2023    GLUC 84 10/05/2022    GLUF 85 05/02/2023    " CALCIUM 9 5 05/02/2023    AST 43 05/02/2023    ALT 61 05/02/2023    ALKPHOS 107 05/02/2023    TP 7 4 05/02/2023    TBILI 0 56 05/02/2023    EGFR 81 05/02/2023     Lab Results   Component Value Date    HGBA1C 5 5 12/07/2018     Lab Results   Component Value Date    LNB8NVQEMBNZ 2 651 12/07/2018     Lab Results   Component Value Date    CHOLESTEROL 155 12/07/2018     Lab Results   Component Value Date    HDL 57 12/07/2018     Lab Results   Component Value Date    TRIG 121 12/07/2018     Lab Results   Component Value Date    LDLCALC 74 12/07/2018

## 2023-07-01 ENCOUNTER — OFFICE VISIT (OUTPATIENT)
Dept: LAB | Facility: HOSPITAL | Age: 43
End: 2023-07-01
Payer: COMMERCIAL

## 2023-07-01 ENCOUNTER — HOSPITAL ENCOUNTER (EMERGENCY)
Facility: HOSPITAL | Age: 43
Discharge: HOME | End: 2023-07-01
Attending: EMERGENCY MEDICINE
Payer: COMMERCIAL

## 2023-07-01 ENCOUNTER — APPOINTMENT (EMERGENCY)
Dept: CT IMAGING | Facility: HOSPITAL | Age: 43
End: 2023-07-01
Payer: COMMERCIAL

## 2023-07-01 ENCOUNTER — APPOINTMENT (OUTPATIENT)
Dept: LAB | Facility: HOSPITAL | Age: 43
End: 2023-07-01
Payer: COMMERCIAL

## 2023-07-01 VITALS
DIASTOLIC BLOOD PRESSURE: 63 MMHG | SYSTOLIC BLOOD PRESSURE: 106 MMHG | OXYGEN SATURATION: 96 % | HEART RATE: 70 BPM | TEMPERATURE: 97.9 F | RESPIRATION RATE: 18 BRPM

## 2023-07-01 DIAGNOSIS — Z13.1 SCREENING FOR DIABETES MELLITUS: ICD-10-CM

## 2023-07-01 DIAGNOSIS — E66.01 MORBID OBESITY WITH BMI OF 50.0-59.9, ADULT (HCC): ICD-10-CM

## 2023-07-01 DIAGNOSIS — M25.50 JOINT PAIN: ICD-10-CM

## 2023-07-01 DIAGNOSIS — G43.909 MIGRAINE WITHOUT STATUS MIGRAINOSUS, NOT INTRACTABLE, UNSPECIFIED MIGRAINE TYPE: ICD-10-CM

## 2023-07-01 DIAGNOSIS — E88.81 INSULIN RESISTANCE: ICD-10-CM

## 2023-07-01 DIAGNOSIS — G47.30 SLEEP APNEA: ICD-10-CM

## 2023-07-01 DIAGNOSIS — R11.0 NAUSEA: Primary | ICD-10-CM

## 2023-07-01 LAB
ALBUMIN SERPL BCP-MCNC: 4.3 G/DL (ref 3.5–5)
ALP SERPL-CCNC: 78 U/L (ref 34–104)
ALT SERPL W P-5'-P-CCNC: 30 U/L (ref 7–52)
ANION GAP SERPL CALCULATED.3IONS-SCNC: 8 MMOL/L
AST SERPL W P-5'-P-CCNC: 21 U/L (ref 13–39)
ATRIAL RATE: 69 BPM
BACTERIA UR QL AUTO: ABNORMAL /HPF
BASOPHILS # BLD AUTO: 0.05 THOUSANDS/ÂΜL (ref 0–0.1)
BASOPHILS NFR BLD AUTO: 1 % (ref 0–1)
BILIRUB SERPL-MCNC: 0.46 MG/DL (ref 0.2–1)
BILIRUB UR QL STRIP: NEGATIVE
BUN SERPL-MCNC: 12 MG/DL (ref 5–25)
CALCIUM SERPL-MCNC: 9.7 MG/DL (ref 8.4–10.2)
CHLORIDE SERPL-SCNC: 103 MMOL/L (ref 96–108)
CLARITY UR: CLEAR
CO2 SERPL-SCNC: 27 MMOL/L (ref 21–32)
COLOR UR: ABNORMAL
CREAT SERPL-MCNC: 0.77 MG/DL (ref 0.6–1.3)
EOSINOPHIL # BLD AUTO: 0.35 THOUSAND/ÂΜL (ref 0–0.61)
EOSINOPHIL NFR BLD AUTO: 5 % (ref 0–6)
ERYTHROCYTE [DISTWIDTH] IN BLOOD BY AUTOMATED COUNT: 14.1 % (ref 11.6–15.1)
GFR SERPL CREATININE-BSD FRML MDRD: 95 ML/MIN/1.73SQ M
GLUCOSE SERPL-MCNC: 101 MG/DL (ref 65–140)
GLUCOSE SERPL-MCNC: 95 MG/DL (ref 65–140)
GLUCOSE UR STRIP-MCNC: NEGATIVE MG/DL
HCG SERPL QL: NEGATIVE
HCT VFR BLD AUTO: 42.3 % (ref 34.8–46.1)
HGB BLD-MCNC: 13.3 G/DL (ref 11.5–15.4)
HGB UR QL STRIP.AUTO: NEGATIVE
IMM GRANULOCYTES # BLD AUTO: 0.01 THOUSAND/UL (ref 0–0.2)
IMM GRANULOCYTES NFR BLD AUTO: 0 % (ref 0–2)
KETONES UR STRIP-MCNC: NEGATIVE MG/DL
LEUKOCYTE ESTERASE UR QL STRIP: ABNORMAL
LYMPHOCYTES # BLD AUTO: 2.72 THOUSANDS/ÂΜL (ref 0.6–4.47)
LYMPHOCYTES NFR BLD AUTO: 36 % (ref 14–44)
MAGNESIUM SERPL-MCNC: 1.9 MG/DL (ref 1.9–2.7)
MAGNESIUM SERPL-MCNC: 2 MG/DL (ref 1.9–2.7)
MCH RBC QN AUTO: 27.2 PG (ref 26.8–34.3)
MCHC RBC AUTO-ENTMCNC: 31.4 G/DL (ref 31.4–37.4)
MCV RBC AUTO: 87 FL (ref 82–98)
MONOCYTES # BLD AUTO: 0.46 THOUSAND/ÂΜL (ref 0.17–1.22)
MONOCYTES NFR BLD AUTO: 6 % (ref 4–12)
NEUTROPHILS # BLD AUTO: 3.97 THOUSANDS/ÂΜL (ref 1.85–7.62)
NEUTS SEG NFR BLD AUTO: 52 % (ref 43–75)
NITRITE UR QL STRIP: NEGATIVE
NON-SQ EPI CELLS URNS QL MICRO: ABNORMAL /HPF
NRBC BLD AUTO-RTO: 0 /100 WBCS
P AXIS: 53 DEGREES
PH UR STRIP.AUTO: 8 [PH]
PLATELET # BLD AUTO: 299 THOUSANDS/UL (ref 149–390)
PMV BLD AUTO: 10.1 FL (ref 8.9–12.7)
POTASSIUM SERPL-SCNC: 3.5 MMOL/L (ref 3.5–5.3)
PR INTERVAL: 148 MS
PROT SERPL-MCNC: 7.7 G/DL (ref 6.4–8.4)
PROT UR STRIP-MCNC: NEGATIVE MG/DL
QRS AXIS: 1 DEGREES
QRSD INTERVAL: 92 MS
QT INTERVAL: 406 MS
QTC INTERVAL: 435 MS
RBC # BLD AUTO: 4.89 MILLION/UL (ref 3.81–5.12)
RBC #/AREA URNS AUTO: ABNORMAL /HPF
SODIUM SERPL-SCNC: 138 MMOL/L (ref 135–147)
SP GR UR STRIP.AUTO: 1.02 (ref 1–1.03)
T WAVE AXIS: 40 DEGREES
TSH SERPL DL<=0.05 MIU/L-ACNC: 1.8 UIU/ML (ref 0.45–4.5)
TSH SERPL DL<=0.05 MIU/L-ACNC: 2.51 UIU/ML (ref 0.45–4.5)
URATE SERPL-MCNC: 6.8 MG/DL (ref 2–7.5)
UROBILINOGEN UR STRIP-ACNC: <2 MG/DL
VENTRICULAR RATE: 69 BPM
WBC # BLD AUTO: 7.56 THOUSAND/UL (ref 4.31–10.16)
WBC #/AREA URNS AUTO: ABNORMAL /HPF

## 2023-07-01 PROCEDURE — 83036 HEMOGLOBIN GLYCOSYLATED A1C: CPT

## 2023-07-01 PROCEDURE — 93005 ELECTROCARDIOGRAM TRACING: CPT

## 2023-07-01 PROCEDURE — 84443 ASSAY THYROID STIM HORMONE: CPT

## 2023-07-01 PROCEDURE — G1004 CDSM NDSC: HCPCS

## 2023-07-01 PROCEDURE — 96374 THER/PROPH/DIAG INJ IV PUSH: CPT

## 2023-07-01 PROCEDURE — 80053 COMPREHEN METABOLIC PANEL: CPT | Performed by: PHYSICIAN ASSISTANT

## 2023-07-01 PROCEDURE — 83525 ASSAY OF INSULIN: CPT

## 2023-07-01 PROCEDURE — 84703 CHORIONIC GONADOTROPIN ASSAY: CPT | Performed by: EMERGENCY MEDICINE

## 2023-07-01 PROCEDURE — 82948 REAGENT STRIP/BLOOD GLUCOSE: CPT

## 2023-07-01 PROCEDURE — 85025 COMPLETE CBC W/AUTO DIFF WBC: CPT | Performed by: PHYSICIAN ASSISTANT

## 2023-07-01 PROCEDURE — 70450 CT HEAD/BRAIN W/O DYE: CPT

## 2023-07-01 PROCEDURE — 81001 URINALYSIS AUTO W/SCOPE: CPT | Performed by: PHYSICIAN ASSISTANT

## 2023-07-01 PROCEDURE — 84550 ASSAY OF BLOOD/URIC ACID: CPT

## 2023-07-01 PROCEDURE — 96375 TX/PRO/DX INJ NEW DRUG ADDON: CPT

## 2023-07-01 PROCEDURE — 83735 ASSAY OF MAGNESIUM: CPT

## 2023-07-01 PROCEDURE — 84443 ASSAY THYROID STIM HORMONE: CPT | Performed by: PHYSICIAN ASSISTANT

## 2023-07-01 PROCEDURE — 99284 EMERGENCY DEPT VISIT MOD MDM: CPT

## 2023-07-01 PROCEDURE — 96361 HYDRATE IV INFUSION ADD-ON: CPT

## 2023-07-01 PROCEDURE — 36415 COLL VENOUS BLD VENIPUNCTURE: CPT

## 2023-07-01 PROCEDURE — 83735 ASSAY OF MAGNESIUM: CPT | Performed by: PHYSICIAN ASSISTANT

## 2023-07-01 RX ORDER — KETOROLAC TROMETHAMINE 30 MG/ML
30 INJECTION, SOLUTION INTRAMUSCULAR; INTRAVENOUS ONCE
Status: COMPLETED | OUTPATIENT
Start: 2023-07-01 | End: 2023-07-01

## 2023-07-01 RX ORDER — METOCLOPRAMIDE HYDROCHLORIDE 5 MG/ML
10 INJECTION INTRAMUSCULAR; INTRAVENOUS ONCE
Status: COMPLETED | OUTPATIENT
Start: 2023-07-01 | End: 2023-07-01

## 2023-07-01 RX ORDER — ONDANSETRON 2 MG/ML
4 INJECTION INTRAMUSCULAR; INTRAVENOUS ONCE
Status: COMPLETED | OUTPATIENT
Start: 2023-07-01 | End: 2023-07-01

## 2023-07-01 RX ORDER — DEXAMETHASONE SODIUM PHOSPHATE 10 MG/ML
8 INJECTION, SOLUTION INTRAMUSCULAR; INTRAVENOUS ONCE
Status: COMPLETED | OUTPATIENT
Start: 2023-07-01 | End: 2023-07-01

## 2023-07-01 RX ADMIN — KETOROLAC TROMETHAMINE 30 MG: 30 INJECTION, SOLUTION INTRAMUSCULAR at 20:43

## 2023-07-01 RX ADMIN — ONDANSETRON 4 MG: 2 INJECTION INTRAMUSCULAR; INTRAVENOUS at 21:38

## 2023-07-01 RX ADMIN — SODIUM CHLORIDE 1000 ML: 0.9 INJECTION, SOLUTION INTRAVENOUS at 20:43

## 2023-07-01 RX ADMIN — METOCLOPRAMIDE 10 MG: 5 INJECTION, SOLUTION INTRAMUSCULAR; INTRAVENOUS at 20:43

## 2023-07-01 RX ADMIN — DEXAMETHASONE SODIUM PHOSPHATE 8 MG: 10 INJECTION, SOLUTION INTRAMUSCULAR; INTRAVENOUS at 20:43

## 2023-07-02 LAB
EST. AVERAGE GLUCOSE BLD GHB EST-MCNC: 103 MG/DL
HBA1C MFR BLD: 5.2 %
INSULIN SERPL-ACNC: 11.86 UIU/ML (ref 1.9–23)

## 2023-07-02 NOTE — DISCHARGE INSTRUCTIONS
Please continue taking your prescribed home medications as instructed. Take Tylenol as needed if headache reoccurs. Return to the ED for any new or worsening of symptoms. Call first thing next week for follow-up with your primary care physician.

## 2023-07-02 NOTE — ED PROVIDER NOTES
History  Chief Complaint   Patient presents with   • Nausea     Pt arrived via ems with c/o nausea and feeling confused. Patient is a 43years old female with a past medical history of GERD and morbid obesity who presented to the ED today for evaluation of a diffuse headache which she describes as an "migraine headache' that began today. Patient describes the headache as " severe headache" with associated nausea. Admits to taking 200 mg of Tylenol at 2 PM today. Patient further stated that she has had intermittent periods of confusion for the past 2 weeks which she describes as mixing words. Admits called EMS and was brought to the ED for evaluation. Denies any fever, blurry vision, neck pain, abdominal pain, chest pain, shortness of breath or any other complaint on review of systems. Nausea  The primary symptoms include nausea. Primary symptoms do not include fever, abdominal pain, vomiting, dysuria, arthralgias or rash. The illness does not include chills or back pain. Prior to Admission Medications   Prescriptions Last Dose Informant Patient Reported? Taking?    Bacillus Coagulans-Inulin (Probiotic) 1-250 BILLION-MG CAPS  Self Yes No   Sig: Take 1 capsule by mouth daily   Calcium Carbonate-Vit D-Min (Calcium 600 + Minerals) 600-200 MG-UNIT TABS  Self Yes No   Sig: Take 1 tablet by mouth daily   Cranberry 1000 MG CAPS  Self Yes No   Sig: Take 1 capsule by mouth daily   FLAXSEED, LINSEED, PO  Self Yes No   Sig: Take 1 capsule by mouth daily   Multiple Vitamin (multivitamin) capsule  Self Yes No   Sig: Take 1 capsule by mouth daily   cholecalciferol (VITAMIN D3) 1,000 units tablet  Self Yes No   Sig: Take 1,000 Units by mouth daily   hydrochlorothiazide (HYDRODIURIL) 12.5 mg tablet  Self No No   Sig: TAKE 1 TABLET BY MOUTH EVERY DAY   rivaroxaban (Xarelto) 10 mg tablet   No No   Sig: Take 1 tablet (10 mg total) by mouth daily      Facility-Administered Medications Last Administration Doses Remaining   cyanocobalamin injection 1,000 mcg 6/21/2023  2:46 PM    cyanocobalamin injection 1,000 mcg 3/16/2023  1:21 PM    cyanocobalamin injection 1,000 mcg 4/13/2023  1:24 PM           Past Medical History:   Diagnosis Date   • Diverticulosis    • DVT (deep venous thrombosis) (HCC)    • DVT (deep venous thrombosis) (HCC)    • Endometriosis    • Gastritis    • Hereditary lymphedema of legs    • Lymphedema of right lower extremity    • Obesity, Class III, BMI 40-49.9 (morbid obesity) ([address])        Past Surgical History:   Procedure Laterality Date   • ABLATION COLPOCLESIS     • ENDOMETRIAL ABLATION  2014   • HYSTERECTOMY  2016   • OOPHORECTOMY Bilateral 09/2016   • OVARY SURGERY     • TUBAL LIGATION     • WISDOM TOOTH EXTRACTION         Family History   Problem Relation Age of Onset   • Clotting disorder Mother    • Deep vein thrombosis Mother         Birth Control 1987   • Pancreatic cancer Maternal Grandmother    • Cancer Maternal Grandmother         Pancreatic   • Diabetes Paternal Grandmother    • Thyroid disease Paternal Grandmother    • Hypertension Paternal Grandfather    • Heart disease Paternal Grandfather    • Stroke Maternal Aunt    • Breast cancer Neg Hx    • Colon cancer Neg Hx    • Ovarian cancer Neg Hx    • Cervical cancer Neg Hx    • Uterine cancer Neg Hx      I have reviewed and agree with the history as documented. E-Cigarette/Vaping   • E-Cigarette Use Never User      E-Cigarette/Vaping Substances   • Nicotine No    • THC No    • CBD No    • Flavoring No    • Other No    • Unknown No      Social History     Tobacco Use   • Smoking status: Never   • Smokeless tobacco: Never   Vaping Use   • Vaping Use: Never used   Substance Use Topics   • Alcohol use: Not Currently     Comment: extremely rare once a year   • Drug use: No       Review of Systems   Constitutional: Negative for chills and fever. HENT: Negative for ear pain and sore throat. Eyes: Negative for pain and visual disturbance. Respiratory: Negative for cough and shortness of breath. Cardiovascular: Negative for chest pain and palpitations. Gastrointestinal: Positive for nausea. Negative for abdominal pain and vomiting. Genitourinary: Negative for dysuria and hematuria. Musculoskeletal: Negative for arthralgias and back pain. Skin: Negative for color change and rash. Neurological: Positive for headaches. Negative for seizures and syncope. Psychiatric/Behavioral: Positive for confusion. The patient is not nervous/anxious. All other systems reviewed and are negative. Physical Exam  Physical Exam  Vitals and nursing note reviewed. Constitutional:       General: She is not in acute distress. Appearance: She is well-developed. HENT:      Head: Normocephalic and atraumatic. Eyes:      Conjunctiva/sclera: Conjunctivae normal.   Cardiovascular:      Rate and Rhythm: Normal rate and regular rhythm. Heart sounds: No murmur heard. Pulmonary:      Effort: Pulmonary effort is normal. No respiratory distress. Breath sounds: Normal breath sounds. Abdominal:      Palpations: Abdomen is soft. Tenderness: There is no abdominal tenderness. Musculoskeletal:         General: No swelling. Cervical back: Neck supple. Skin:     General: Skin is warm and dry. Capillary Refill: Capillary refill takes less than 2 seconds. Neurological:      Mental Status: She is alert and oriented to person, place, and time. GCS: GCS eye subscore is 4. GCS verbal subscore is 5. GCS motor subscore is 6. Cranial Nerves: Cranial nerves 2-12 are intact. Sensory: Sensation is intact. Motor: Motor function is intact. Coordination: Coordination is intact. Gait: Gait is intact. Psychiatric:         Attention and Perception: She does not perceive auditory or visual hallucinations. Mood and Affect: Mood normal.         Thought Content:  Thought content does not include homicidal or suicidal ideation. Thought content does not include homicidal or suicidal plan.          Vital Signs  ED Triage Vitals [07/01/23 2004]   Temperature Pulse Respirations Blood Pressure SpO2   97.9 °F (36.6 °C) 80 18 130/77 99 %      Temp Source Heart Rate Source Patient Position - Orthostatic VS BP Location FiO2 (%)   Oral Monitor Lying Right arm --      Pain Score       --           Vitals:    07/01/23 2030 07/01/23 2100 07/01/23 2215 07/01/23 2300   BP: 121/72 119/64 113/72 106/63   Pulse: 72 84 73 70   Patient Position - Orthostatic VS:             Visual Acuity      ED Medications  Medications   sodium chloride 0.9 % bolus 1,000 mL (0 mL Intravenous Stopped 7/1/23 2342)   ketorolac (TORADOL) injection 30 mg (30 mg Intravenous Given 7/1/23 2043)   dexamethasone (PF) (DECADRON) injection 8 mg (8 mg Intravenous Given 7/1/23 2043)   metoclopramide (REGLAN) injection 10 mg (10 mg Intravenous Given 7/1/23 2043)   ondansetron (ZOFRAN) injection 4 mg (4 mg Intravenous Given 7/1/23 2138)       Diagnostic Studies  Results Reviewed     Procedure Component Value Units Date/Time    Urine Microscopic [[id number]]  (Abnormal) Collected: 07/01/23 2314    Lab Status: Final result Specimen: Urine, Clean Catch Updated: 07/01/23 2321     RBC, UA None Seen /hpf      WBC, UA 2-4 /hpf      Epithelial Cells Occasional /hpf      Bacteria, UA None Seen /hpf     UA (URINE) with reflex to Scope [[id number]]  (Abnormal) Collected: 07/01/23 2314    Lab Status: Final result Specimen: Urine, Clean Catch Updated: 07/01/23 2319     Color, UA Light Yellow     Clarity, UA Clear     Specific Gravity, UA 1.018     pH, UA 8.0     Leukocytes, UA Trace     Nitrite, UA Negative     Protein, UA Negative mg/dl      Glucose, UA Negative mg/dl      Ketones, UA Negative mg/dl      Urobilinogen, UA <2.0 mg/dl      Bilirubin, UA Negative     Occult Blood, UA Negative    TSH, 3rd generation with Free T4 reflex [[id number]]  (Normal) Collected: 07/01/23 2042    Lab Status: Final result Specimen: Blood from Arm, Left Updated: 07/01/23 2211     TSH 3RD GENERATON 2.513 uIU/mL     hCG, qualitative pregnancy [[id number]]  (Normal) Collected: 07/01/23 2042    Lab Status: Final result Specimen: Blood from Arm, Left Updated: 07/01/23 2211     Preg, Serum Negative    Comprehensive metabolic panel [[id number]] Collected: 07/01/23 2042    Lab Status: Final result Specimen: Blood from Arm, Left Updated: 07/01/23 2112     Sodium 138 mmol/L      Potassium 3.5 mmol/L      Chloride 103 mmol/L      CO2 27 mmol/L      ANION GAP 8 mmol/L      BUN 12 mg/dL      Creatinine 0.77 mg/dL      Glucose 95 mg/dL      Calcium 9.7 mg/dL      AST 21 U/L      ALT 30 U/L      Alkaline Phosphatase 78 U/L      Total Protein 7.7 g/dL      Albumin 4.3 g/dL      Total Bilirubin 0.46 mg/dL      eGFR 95 ml/min/1.73sq m     Narrative:      [place] guidelines for Chronic Kidney Disease (CKD):   •  Stage 1 with normal or high GFR (GFR > 90 mL/min/1.73 square meters)  •  Stage 2 Mild CKD (GFR = 60-89 mL/min/1.73 square meters)  •  Stage 3A Moderate CKD (GFR = 45-59 mL/min/1.73 square meters)  •  Stage 3B Moderate CKD (GFR = 30-44 mL/min/1.73 square meters)  •  Stage 4 Severe CKD (GFR = 15-29 mL/min/1.73 square meters)  •  Stage 5 End Stage CKD (GFR <15 mL/min/1.73 square meters)  Note: GFR calculation is accurate only with a steady state creatinine    Magnesium [[id number]]  (Normal) Collected: 07/01/23 2042    Lab Status: Final result Specimen: Blood from Arm, Left Updated: 07/01/23 2112     Magnesium 2.0 mg/dL     CBC and differential [[id number]] Collected: 07/01/23 2042    Lab Status: Final result Specimen: Blood from Arm, Left Updated: 07/01/23 2053     WBC 7.56 Thousand/uL      RBC 4.89 Million/uL      Hemoglobin 13.3 g/dL      Hematocrit 42.3 %      MCV 87 fL      MCH 27.2 pg      MCHC 31.4 g/dL      RDW 14.1 %      MPV 10.1 fL      Platelets 962 Thousands/uL      nRBC 0 /100 WBCs Neutrophils Relative 52 %      Immat GRANS % 0 %      Lymphocytes Relative 36 %      Monocytes Relative 6 %      Eosinophils Relative 5 %      Basophils Relative 1 %      Neutrophils Absolute 3.97 Thousands/µL      Immature Grans Absolute 0.01 Thousand/uL      Lymphocytes Absolute 2.72 Thousands/µL      Monocytes Absolute 0.46 Thousand/µL      Eosinophils Absolute 0.35 Thousand/µL      Basophils Absolute 0.05 Thousands/µL     Fingerstick Glucose (POCT) [[id number]]  (Normal) Collected: 07/01/23 2007    Lab Status: Final result Updated: 07/01/23 2009     POC Glucose 101 mg/dl                  CT head without contrast   Final Result by Mickey Cifuentes MD (07/01 2223)      No acute intracranial abnormality. Workstation performed: [id number]                    Procedures  Procedures         ED Course  ED Course as of 07/02/23 0030   Sat Jul 01, 2023 2225 CT head without contrast  IMPRESSION:     No acute intracranial abnormality.      2332 Patient reevaluated and admits feeling much better with improvement of her nausea and headache. Patient's NIH score continues to be 0. Patient instructed to continue supportive care and to follow-up with primary care physician. SBIRT 22yo+    Flowsheet Row Most Recent Value   Initial Alcohol Screen: US AUDIT-C     1. How often do you have a drink containing alcohol? 0 Filed at: 07/01/2023 2003   2. How many drinks containing alcohol do you have on a typical day you are drinking? 0 Filed at: 07/01/2023 2003   3b. FEMALE Any Age, or MALE 65+: How often do you have 4 or more drinks on one occassion? 0 Filed at: 07/01/2023 2003   Audit-C Score 0 Filed at: 07/01/2023 2003   KWABENA: How many times in the past year have you. .. Used an illegal drug or used a prescription medication for non-medical reasons?  Never Filed at: 07/01/2023 2003            Medical Decision Making  Patient presents to the ED for evaluation of a headache that began today with associated nausea. Admits intermittent periods of confusion for the past 2 weeks which she describes as" mixing words". On exam, patient's NIH score was 0. No focal neurological findings appreciated on extensive neurological exam.  Patient otherwise alert and oriented x3 with a GCS score 15. After discussing with patient, a CT scan of the head and basic blood work was ordered. Patient was treated symptomatically with Decadron, Reglan and Toradol pending diagnostic work-up. Diagnostic work-up came back notable for no acute findings. Patient reevaluated and admits feeling much better. Was informed of all her diagnostic work-up and the need for outpatient follow-up with primary care physician. Instructed to continue supportive care and to return to the ED for any new or worsening of symptoms. Patient ambulatory upon discharge. Amount and/or Complexity of Data Reviewed  Labs: ordered. Radiology: ordered. Decision-making details documented in ED Course. Risk  Prescription drug management. Disposition  Final diagnoses:   Nausea   Migraine without status migrainosus, not intractable, unspecified migraine type     Time reflects when diagnosis was documented in both MDM as applicable and the Disposition within this note     Time User Action Codes Description Comment    7/1/2023 11:33 PM Atjoana Objamari Add [R11.0] Nausea     7/1/2023 11:33 PM Atjoana Obioma Add [G43.909] Migraine without status migrainosus, not intractable, unspecified migraine type       ED Disposition     ED Disposition   Discharge    Condition   Stable    Date/Time   Sat Jul 1, 2023 11:33 PM    Comment   Marilyn Andino discharge to home/self care.                Follow-up Information     Follow up With Specialties Details Why [address], CRNP Nurse Practitioner, Family Medicine Call in 2 days  [address]  [phone]            Discharge Medication List as of 7/1/2023 11:35 PM      CONTINUE these medications which have NOT CHANGED    Details   Bacillus Coagulans-Inulin (Probiotic) 1-250 BILLION-MG CAPS Take 1 capsule by mouth daily, Historical Med      Calcium Carbonate-Vit D-Min (Calcium 600 + Minerals) 600-200 MG-UNIT TABS Take 1 tablet by mouth daily, Historical Med      cholecalciferol (VITAMIN D3) 1,000 units tablet Take 1,000 Units by mouth daily, Historical Med      Cranberry 1000 MG CAPS Take 1 capsule by mouth daily, Historical Med      FLAXSEED, LINSEED, PO Take 1 capsule by mouth daily, Historical Med      hydrochlorothiazide (HYDRODIURIL) 12.5 mg tablet TAKE 1 TABLET BY MOUTH EVERY DAY, Normal      Multiple Vitamin (multivitamin) capsule Take 1 capsule by mouth daily, Historical Med      rivaroxaban (Xarelto) 10 mg tablet Take 1 tablet (10 mg total) by mouth daily, Starting Fri 6/23/2023, Normal             No discharge procedures on file.     PDMP Review       Value Time User    PDMP Reviewed  Yes 2/13/2023  1:59 PM Senthil White, [address] [place] [address]          ED Provider  Electronically Signed by           Augusto Crow PA-C  07/02/23 0030

## 2023-07-03 ENCOUNTER — VBI (OUTPATIENT)
Dept: ADMINISTRATIVE | Facility: OTHER | Age: 43
End: 2023-07-03

## 2023-07-03 NOTE — TELEPHONE ENCOUNTER
ED Visit Information     Ed visit date: 7/1/23  Diagnosis Description: Nausea  In Network? Yes [id number]  Discharge status: Home  Discharged with meds ? No  Number of ED visits to date: 1  ED Severity:n/a     Outreach Information    Outreach successful: No 3  Date letter mailed:7/6/23  Date Finalized: 7/6/23    Care Coordination    Follow up appointment with pcp: no no f/u scheduled  Transportation issues ?  NA    Value Base Outreach    Emergent necessity warranted by diagnosis:  Yes  [facility] PCP:  Yes  Transportation:  Friend/Family Transport  Called PCP first?:  No  Feels able to call PCP for urgent problems ?:  Yes  Understands what emergencies can be handled by PCP ?:  Yes  Ever any problems getting appointment with PCP for minor emergency/urgency problems?:  No  Practice Contacted Patient ?:  No  Pt had ED follow up with pcp/staff ?:  No    Seen for follow-up out of network ?:  No  Urgent care Education?:  Yes

## 2023-07-03 NOTE — LETTER
[facility]  [address] [place] [address]    Date: 07/07/23  [facility]  [address] [address]    Dear Canelo Strickland: Thank you for choosing [facility] emergency department for care. Your primary care provider wants to make sure that your ongoing medical care is being addressed. If you require follow up care as a result of your emergency department visit, there are a few things the practice would like you to know. As part of the network's continuing commitment to caring for our patients, we have added more same day appointments and have extended office hours to meet your medical needs. After hours, on-call physicians are available via your primary care provider's main office line. We encourage you to contact our office prior to seeking treatment to discuss your symptoms with the medical staff. Together, we can determine the correct course of action. A majority of non-emergent conditions such as: common cold, flu-like symptoms, fevers, strains/sprains, dislocations, minor burns, cuts and animal bites can be treated at [facility] facilities. Diagnostic testing is available at some sites. Of course, if you are experiencing a life threatening medical emergency call 911 or proceed directly to the nearest emergency room. Your nearest [facility] facility is conveniently located at:    [address]  [phone]  SKIP THE WAIT  Conveniently offered at most [facility] your spot online at [email or web address] or on the [address] [place] [address] Drive    Sincerely,    [facility]  No information on file.

## 2023-07-04 LAB
ATRIAL RATE: 69 BPM
P AXIS: 53 DEGREES
PR INTERVAL: 148 MS
QRS AXIS: 1 DEGREES
QRSD INTERVAL: 92 MS
QT INTERVAL: 406 MS
QTC INTERVAL: 435 MS
T WAVE AXIS: 40 DEGREES
VENTRICULAR RATE: 69 BPM

## 2023-07-06 LAB
ATRIAL RATE: 72 BPM
P AXIS: 63 DEGREES
PR INTERVAL: 152 MS
QRS AXIS: 43 DEGREES
QRSD INTERVAL: 86 MS
QT INTERVAL: 384 MS
QTC INTERVAL: 420 MS
T WAVE AXIS: 61 DEGREES
VENTRICULAR RATE: 72 BPM

## 2023-07-06 PROCEDURE — 93010 ELECTROCARDIOGRAM REPORT: CPT | Performed by: INTERNAL MEDICINE

## 2023-07-21 ENCOUNTER — OFFICE VISIT (OUTPATIENT)
Dept: FAMILY MEDICINE CLINIC | Facility: CLINIC | Age: 43
End: 2023-07-21
Payer: COMMERCIAL

## 2023-07-21 VITALS
DIASTOLIC BLOOD PRESSURE: 60 MMHG | SYSTOLIC BLOOD PRESSURE: 116 MMHG | BODY MASS INDEX: 47.09 KG/M2 | HEIGHT: 66 IN | HEART RATE: 99 BPM | RESPIRATION RATE: 16 BRPM | TEMPERATURE: 98 F | WEIGHT: 293 LBS | OXYGEN SATURATION: 95 %

## 2023-07-21 DIAGNOSIS — G43.819 OTHER MIGRAINE WITHOUT STATUS MIGRAINOSUS, INTRACTABLE: ICD-10-CM

## 2023-07-21 DIAGNOSIS — Z00.01 ENCOUNTER FOR WELL ADULT EXAM WITH ABNORMAL FINDINGS: Primary | ICD-10-CM

## 2023-07-21 PROCEDURE — 99396 PREV VISIT EST AGE 40-64: CPT

## 2023-07-21 RX ADMIN — CYANOCOBALAMIN 1000 MCG: 1000 INJECTION, SOLUTION INTRAMUSCULAR; SUBCUTANEOUS at 15:49

## 2023-07-21 NOTE — PROGRESS NOTES
ADULT ANNUAL [id number]  [address] PRIMARY CARE    NAME: Darius Palmer  AGE: 43 y.o. SEX: female  : 1980     DATE: 2023     Assessment and Plan:     Problem List Items Addressed This Visit        Other    Encounter for well adult exam with abnormal findings - Primary     Annual wellness completed. Reviewed. Patient had an episode of having headaches, dizziness, speech difficulty. Was seen in the emergency room. Not had an incident since. Referral made to neurology for further work-up. Discussed self-care. Up-to-date with mammogram.  Continue good nutrition hydration. Increase exercise activity as tolerated. Other Visit Diagnoses     Other migraine without status migrainosus, intractable        Relevant Orders    Ambulatory Referral to Neurology          Immunizations and preventive care screenings were discussed with patient today. Appropriate education was printed on patient's after visit summary. Counseling:  Alcohol/drug use: discussed moderation in alcohol intake, the recommendations for healthy alcohol use, and avoidance of illicit drug use. Dental Health: discussed importance of regular tooth brushing, flossing, and dental visits. Injury prevention: discussed safety/seat belts, safety helmets, smoke detectors, carbon dioxide detectors, and smoking near bedding or upholstery. Sexual health: discussed sexually transmitted diseases, partner selection, use of condoms, avoidance of unintended pregnancy, and contraceptive alternatives. Exercise: the importance of regular exercise/physical activity was discussed. Recommend exercise 3-5 times per week for at least 30 minutes. No follow-ups on file.      Chief Complaint:     Chief Complaint   Patient presents with   • Follow-up   • B12 Injection   • Physical Exam      History of Present Illness:     Adult Annual Physical   Patient here for a comprehensive physical exam. The patient reports problems - headache , dizziness, . Diet and Physical Activity  Diet/Nutrition: well balanced diet. Exercise: walking. Depression Screening  PHQ-2/9 Depression Screening         General Health  Sleep: sleeps well. Hearing: normal - bilateral.  Vision: most recent eye exam <1 year ago. Dental: no dental visits for >1 year and brushes teeth twice daily. /GYN Health  Patient is: 1 hysterectomy  Last menstrual period: 2015  Contraceptive method: none needed. Review of Systems:     Review of Systems   Constitutional: Negative. HENT: Negative. Eyes: Negative. Respiratory: Negative. Cardiovascular: Negative. Gastrointestinal: Negative. Endocrine: Negative. Genitourinary: Negative. Musculoskeletal: Negative. Skin: Negative. Allergic/Immunologic: Negative. Neurological: Positive for dizziness, speech difficulty, light-headedness and headaches. Psychiatric/Behavioral: Negative.        Past Medical History:     Past Medical History:   Diagnosis Date   • Diverticulosis    • DVT (deep venous thrombosis) ([address])    • DVT (deep venous thrombosis) ([facility])    • Endometriosis    • Gastritis    • Headache(784.0)    • Hereditary lymphedema of legs    • Lymphedema of right lower extremity    • Obesity, Class III, BMI 40-49.9 (morbid obesity) ([address])       Past Surgical History:     Past Surgical History:   Procedure Laterality Date   • ABLATION COLPOCLESIS     • ENDOMETRIAL ABLATION  2014   • HYSTERECTOMY  2016   • OOPHORECTOMY Bilateral 09/2016   • OVARY SURGERY     • TUBAL LIGATION     • WISDOM TOOTH EXTRACTION        Social History:     Social History     Socioeconomic History   • Marital status: /Civil Union     Spouse name: None   • Number of children: None   • Years of education: None   • Highest education level: None   Occupational History   • None   Tobacco Use   • Smoking status: Never   • Smokeless tobacco: Never   Vaping Use   • Vaping Use: Never used   Substance and Sexual Activity   • Alcohol use: Not Currently     Comment: extremely rare once a year   • Drug use: No   • Sexual activity: Yes     Partners: Male     Birth control/protection: Post-menopausal     Comment: pain    Other Topics Concern   • None   Social History Narrative   • None     Social Determinants of Health     Financial Resource Strain: Not on file   Food Insecurity: Not on file   Transportation Needs: Not on file   Physical Activity: Not on file   Stress: Not on file   Social Connections: Not on file   Intimate Partner Violence: Not on file   Housing Stability: Not on file      Family History:     Family History   Problem Relation Age of Onset   • Clotting disorder Mother    • Deep vein thrombosis Mother         Birth Control 1987   • Pancreatic cancer Maternal Grandmother    • Cancer Maternal Grandmother         Pancreatic   • Diabetes Paternal Grandmother    • Thyroid disease Paternal Grandmother    • Hypertension Paternal Grandfather    • Heart disease Paternal Grandfather    • Stroke Maternal Aunt    • Breast cancer Neg Hx    • Colon cancer Neg Hx    • Ovarian cancer Neg Hx    • Cervical cancer Neg Hx    • Uterine cancer Neg Hx       Current Medications:     Current Outpatient Medications   Medication Sig Dispense Refill   • Bacillus Coagulans-Inulin (Probiotic) 1-250 BILLION-MG CAPS Take 1 capsule by mouth daily     • Calcium Carbonate-Vit D-Min (Calcium 600 + Minerals) 600-200 MG-UNIT TABS Take 1 tablet by mouth daily     • cholecalciferol (VITAMIN D3) 1,000 units tablet Take 1,000 Units by mouth daily     • Cranberry 1000 MG CAPS Take 1 capsule by mouth daily     • FLAXSEED, LINSEED, PO Take 1 capsule by mouth daily     • hydrochlorothiazide (HYDRODIURIL) 12.5 mg tablet TAKE 1 TABLET BY MOUTH EVERY DAY 90 tablet 0   • Multiple Vitamin (multivitamin) capsule Take 1 capsule by mouth daily     • rivaroxaban (Xarelto) 10 mg tablet Take 1 tablet (10 mg total) by mouth daily 90 tablet 3 Current Facility-Administered Medications   Medication Dose Route Frequency Provider Last Rate Last Admin   • cyanocobalamin injection 1,000 mcg  1,000 mcg Intramuscular Q30 Days Lupis Mil, CRNP   1,000 mcg at 07/21/23 1549   • cyanocobalamin injection 1,000 mcg  1,000 mcg Intramuscular Q30 Days Lupis Mil, CRNP   1,000 mcg at 03/16/23 1321   • cyanocobalamin injection 1,000 mcg  1,000 mcg Intramuscular Q30 Days Lupis Mil, CRNP   1,000 mcg at 04/13/23 1324      Allergies:     No Known Allergies   Physical Exam:     /60   Pulse 99   Temp 98 °F (36.7 °C) (Temporal)   Resp 16   Ht 5' 6" (1.676 m)   Wt (!) 153 kg (338 lb 3.2 oz)   SpO2 95%   BMI 54.59 kg/m²     Physical Exam  Constitutional:       General: She is not in acute distress. Appearance: Normal appearance. She is obese. She is not ill-appearing. HENT:      Head: Normocephalic and atraumatic. Eyes:      Extraocular Movements: Extraocular movements intact. Pupils: Pupils are equal, round, and reactive to light. Cardiovascular:      Rate and Rhythm: Normal rate and regular rhythm. Pulses: Normal pulses. Pulmonary:      Effort: Pulmonary effort is normal. No respiratory distress. Breath sounds: Normal breath sounds. Chest:      Chest wall: No tenderness. Abdominal:      General: Abdomen is flat. Bowel sounds are normal. There is no distension. Palpations: There is no mass. Tenderness: There is no abdominal tenderness. Musculoskeletal:         General: Normal range of motion. Cervical back: Normal range of motion and neck supple. Skin:     General: Skin is warm and dry. Neurological:      General: No focal deficit present. Mental Status: She is alert and oriented to person, place, and time. Psychiatric:         Mood and Affect: Mood normal.         Behavior: Behavior normal.         Thought Content:  Thought content normal.         Judgment: Judgment normal.          Yuridia York[address] [facility]

## 2023-07-25 PROBLEM — Z00.01 ENCOUNTER FOR WELL ADULT EXAM WITH ABNORMAL FINDINGS: Status: ACTIVE | Noted: 2021-09-15

## 2023-07-25 NOTE — ASSESSMENT & PLAN NOTE
Annual wellness completed. Reviewed. Patient had an episode of having headaches, dizziness, speech difficulty. Was seen in the emergency room. Not had an incident since. Referral made to neurology for further work-up. Discussed self-care. Up-to-date with mammogram.  Continue good nutrition hydration. Increase exercise activity as tolerated.

## 2023-08-25 ENCOUNTER — CLINICAL SUPPORT (OUTPATIENT)
Dept: FAMILY MEDICINE CLINIC | Facility: CLINIC | Age: 43
End: 2023-08-25
Payer: COMMERCIAL

## 2023-08-25 DIAGNOSIS — E53.8 VITAMIN B12 DEFICIENCY: ICD-10-CM

## 2023-08-25 PROCEDURE — 96372 THER/PROPH/DIAG INJ SC/IM: CPT

## 2023-08-25 RX ADMIN — CYANOCOBALAMIN 1000 MCG: 1000 INJECTION, SOLUTION INTRAMUSCULAR; SUBCUTANEOUS at 15:25

## 2023-09-27 ENCOUNTER — HOSPITAL ENCOUNTER (OUTPATIENT)
Dept: MAMMOGRAPHY | Facility: CLINIC | Age: 43
Discharge: HOME | End: 2023-09-27
Payer: COMMERCIAL

## 2023-09-27 ENCOUNTER — CLINICAL SUPPORT (OUTPATIENT)
Dept: FAMILY MEDICINE CLINIC | Facility: CLINIC | Age: 43
End: 2023-09-27
Payer: COMMERCIAL

## 2023-09-27 VITALS — BODY MASS INDEX: 47.09 KG/M2 | HEIGHT: 66 IN | WEIGHT: 293 LBS

## 2023-09-27 DIAGNOSIS — Z12.31 VISIT FOR SCREENING MAMMOGRAM: ICD-10-CM

## 2023-09-27 DIAGNOSIS — D51.9 ANEMIA DUE TO VITAMIN B12 DEFICIENCY, UNSPECIFIED B12 DEFICIENCY TYPE: ICD-10-CM

## 2023-09-27 PROCEDURE — 77063 BREAST TOMOSYNTHESIS BI: CPT

## 2023-09-27 PROCEDURE — 96372 THER/PROPH/DIAG INJ SC/IM: CPT | Performed by: STUDENT IN AN ORGANIZED HEALTH CARE EDUCATION/TRAINING PROGRAM

## 2023-09-27 PROCEDURE — 77067 SCR MAMMO BI INCL CAD: CPT

## 2023-09-27 RX ADMIN — CYANOCOBALAMIN 1000 MCG: 1000 INJECTION, SOLUTION INTRAMUSCULAR; SUBCUTANEOUS at 13:10

## 2023-10-27 ENCOUNTER — CLINICAL SUPPORT (OUTPATIENT)
Dept: FAMILY MEDICINE CLINIC | Facility: CLINIC | Age: 43
End: 2023-10-27
Payer: COMMERCIAL

## 2023-10-27 DIAGNOSIS — E53.8 VITAMIN B12 DEFICIENCY: Primary | ICD-10-CM

## 2023-10-27 PROCEDURE — 96372 THER/PROPH/DIAG INJ SC/IM: CPT

## 2023-10-27 RX ADMIN — CYANOCOBALAMIN 1000 MCG: 1000 INJECTION, SOLUTION INTRAMUSCULAR; SUBCUTANEOUS at 15:37

## 2023-11-27 ENCOUNTER — CLINICAL SUPPORT (OUTPATIENT)
Dept: FAMILY MEDICINE CLINIC | Facility: CLINIC | Age: 43
End: 2023-11-27
Payer: COMMERCIAL

## 2023-11-27 DIAGNOSIS — R79.89 LOW VITAMIN B12 LEVEL: Primary | ICD-10-CM

## 2023-11-27 PROCEDURE — 96372 THER/PROPH/DIAG INJ SC/IM: CPT

## 2023-11-27 RX ADMIN — CYANOCOBALAMIN 1000 MCG: 1000 INJECTION, SOLUTION INTRAMUSCULAR; SUBCUTANEOUS at 17:27

## 2023-11-28 ENCOUNTER — CONSULT (OUTPATIENT)
Dept: NEUROLOGY | Facility: CLINIC | Age: 43
End: 2023-11-28
Payer: COMMERCIAL

## 2023-11-28 VITALS
DIASTOLIC BLOOD PRESSURE: 80 MMHG | BODY MASS INDEX: 47.09 KG/M2 | HEART RATE: 70 BPM | WEIGHT: 293 LBS | SYSTOLIC BLOOD PRESSURE: 110 MMHG | HEIGHT: 66 IN

## 2023-11-28 DIAGNOSIS — G44.209 TTH (TENSION-TYPE HEADACHE): ICD-10-CM

## 2023-11-28 DIAGNOSIS — G43.819 OTHER MIGRAINE WITHOUT STATUS MIGRAINOSUS, INTRACTABLE: ICD-10-CM

## 2023-11-28 DIAGNOSIS — G43.109 MIGRAINE WITH AURA AND WITHOUT STATUS MIGRAINOSUS, NOT INTRACTABLE: Primary | ICD-10-CM

## 2023-11-28 PROCEDURE — 99205 OFFICE O/P NEW HI 60 MIN: CPT | Performed by: STUDENT IN AN ORGANIZED HEALTH CARE EDUCATION/TRAINING PROGRAM

## 2023-11-28 RX ORDER — RIZATRIPTAN BENZOATE 10 MG/1
10 TABLET ORAL AS NEEDED
Qty: 9 TABLET | Refills: 3 | Status: SHIPPED | OUTPATIENT
Start: 2023-11-28

## 2023-11-28 NOTE — PATIENT INSTRUCTIONS
Patient instructions      Additional Testing:   Neurodiagnostic workup: MRI Brain ordered     Headache/migraine treatment:   Abortive medications (for immediate treatment of a headache): It is ok to take ibuprofen, acetaminophen or naproxen (Advil, Tylenol,  Aleve, Excedrin) if they help your headaches you should limit these to No more than 3 times a week to avoid medication overuse/rebound headaches. For your more moderate to severe migraines take this medication early  Maxalt (rizatriptan) 10mg tabs - take one at the onset of headache. May repeat one time after 1-2 hours if pain has not resolved. (Max 2 a day and 9 a month)      - some people may have some side effects from this medication, most typically do not. Common side effects include making you feel tired, palpitations, tingling or tightness of the face or chest.  Most people report the side effects are nothing compared to their migraines and do not mind these. If you have intolerable side effects we will stop. Over the counter preventive supplements for headaches/migraines (if you try, try for 3 months straight)  (to take every day to help prevent headaches - not to take at the time of headache): There are combo pills online of these - none of which regulated by FDA and double check dosing - take appropriate dose only once a day- preventa migraine, migravent, mind ease, migrelief   [x] Magnesium 400mg daily (If any diarrhea or upset stomach, decrease dose  as tolerated)  [x] Riboflavin (Vitamin B2) 400mg daily - try online   (FYI B2 may make your urine bright/neon yellow)  AND/OR  [] Herbal medication: Petasites/Butterbur 150 mg daily - try online  (When choosing your Butterbur online or in the store, beware that there are some poor preps containing pyrrolizidine alkaloids (PAs) that can be harmful to the liver.  Therefore, do not use butterbur products that are not labeled as PA-free.)     Sleep and headache prevention:   [] Melatonin - you may take 3 mg nightly for sleep. You should take this 1 hour prior to bedtime consistently every night for it to work. It works by gradually helping to adjust your sleep time over days to weeks, rather than immediately making you feel sleepy. Lifestyle Recommendations:  [x] SLEEP - Maintain a regular sleep schedule: Adults need at least 7-8 hours of uninterrupted a night. Maintain good sleep hygiene:  Going to bed and waking up at consistent times, avoiding excessive daytime naps, avoiding caffeinated beverages in the evening, avoid excessive stimulation in the evening and generally using bed primarily for sleeping. One hour before bedtime would recommend turning lights down lower, decreasing your activity (may read quietly, listen to music at a low volume). When you get into bed, should eliminate all technology (no texting, emailing, playing with your phone, iPad or tablet in bed). [x] HYDRATION - Maintain good hydration. Drink  2L of fluid a day (4 typical small water bottles)  [x] DIET - Maintain good nutrition. In particular don't skip meals and try and eat healthy balanced meals regularly. [x] TRIGGERS - Look for other triggers and avoid them: Limit caffeine to 1-2 cups a day or less. Avoid dietary triggers that you have noticed bring on your headaches (this could include aged cheese, peanuts, MSG, aspartame and nitrates). [x] EXERCISE - physical exercise as we all know is good for you in many ways, and not only is good for your heart, but also is beneficial for your mental health, cognitive health and  chronic pain/headaches. I would encourage at the least 5 days of physical exercise weekly for at least 30 minutes. Education and Follow-up  [x] Please call with any questions or concerns. Of course if any new concerning symptoms go to the emergency department.   [x] Follow up 4-5 months

## 2023-11-28 NOTE — PROGRESS NOTES
[facility] Neurology Consult  PATIENT:  Jose Luis Bello  MRN:  [id number]  :  1980  DATE OF SERVICE:  2023  REFERRED BY: EYAD Dan  PMD: EYAD Dan    Assessment/Plan:     Jose Luis Bello is a very pleasant 37 y.o. female, history of protein S deficiency on Xarelto, who presents today as a new patient for headaches x1 year. Migraines with visual aura  TTH   Workup:  She is currently experiencing 5-6 migraine days per month and close to daily TTH. She does endorse snoring and daytime fatigue. Recommended sleep study to r/o REGINA. Will obtain MRI brain/MRV for evaluation of new onset headaches in the setting of her history of protein S deficiency    Preventative:  - we discussed headache hygiene and lifestyle factors that may improve headaches, including sleep hygiene, proper hydration, limiting caffeine intake  - she would like to defer any prescription preventative medications at this time. Discussed possible OTC supplements for headache prevention, including magnesium and riboflavin    Abortive:  - discussed not taking over-the-counter or prescription pain medications more than 3 days per week to prevent medication overuse/rebound headache  - start maxalt 10 mg PRN at onset of migraines    CC:   headaches    History of Present Illness:     37 y.o. female, history of protein S deficiency on Xarelto, who presents today as a new patient for headaches x1 year. Pt relates that her headaches started after she hit the right side of her head on 2 separate occasions. Patient states that she was evaluated by the emergency department and had a negative CT scan and workup. Patient states that since the head injuries, she has had daily right sided headaches. She describes the headaches as pressure-like. She states that approximately 5 to 6 days out of the month, she gets a different headache which feels more like a throbbing sensation.   She states that back in July her headache was associated with word-finding difficulties and trouble speaking. She states that she went to the ED and was evaluated and had negative workup. She states that when she has the throbbing headaches, she will sometimes see light spots, experience photophobia, nausea and vomiting. She notes that she takes some Tylenol and goes in a dark room and has relief with this. She states that she is having some relief with massages as well. She notes that she has high stress levels. Patient reports that she is worried about taking other medications or having other interventions due to her being on Xarelto. She notes that she has never tried any abortive or preventative medications for migraines. She notes that she has never experienced migraines prior to hitting her head. She notes that she used to get some headaches in her teens around her menstrual cycle, but this is different. She denies family history of migraines. She notes that she sleeps approximately 6 to 7 hours per night. Her  is present here and states that she snores. She states that she had never had a sleep study, and does experience daytime sleepiness. She reports that she last saw her optometrist last year. Patient reports that she is concerned about financial costs of medications and procedures. No other complaints noted by patient. Headaches started at what age? 42  How often do the headaches occur?   - as of 11/28/2023: 2x per week  What time of the day do the headaches start? No particular time of day   How long do the headaches last? Several hours  Are you ever headache free? Yes    Aura? with aura visual aura      Where is your headache located and pain quality? Throbbing, pressure-like. Typically located to the R side of her head. What is the intensity of pain?  Average: 7/10,  Associated symptoms:   [x] Nausea       [] Vomiting        [x] Diarrhea  [x] Stiff or sore neck   [x] Problems with concentration  [x] Photophobia     [x]Phonophobia [x] Osmophobia  [] Blurred vision   [x] Prefer quiet, dark room  [x] Light-headed or dizzy     [] Tinnitus   [] Hands or feet tingle or feel numb/paresthesias      [] Ptosis      [] Facial droop  [x] Lacrimation  [x] Nasal congestion/rhinorrhea        Things that make the headache worse? No specific movements  Headache triggers:  stress, missing meals, coughing, fatigue, poor sleep    Have you seen someone else for headaches or pain? No  Have you had trigger point injection performed and how often? No  Have you had Botox injection performed and how often? No   Have you had epidural injections or transforaminal injections performed? No  Have you ever had any Brain imaging? yes CTH    What medications do you take or have you taken for your headaches? ABORTIVE:    OTC medications have been ineffective     PREVENTIVE:   none    LIFESTYLE  Sleep   - averages: Sleep - 6-7 hrs. Will sometimes have witnessed wheezes/apneas?   confirms that she snores    Past Medical History:     Past Medical History:   Diagnosis Date    Diverticulosis     DVT (deep venous thrombosis) ([facility])     DVT (deep venous thrombosis) ([facility])     Endometriosis     Gastritis     Headache(784.0)     Hereditary lymphedema of legs     Lymphedema of right lower extremity     Obesity, Class III, BMI 40-49.9 (morbid obesity) ([address] )        Patient Active Problem List   Diagnosis    Lymphedema of right lower extremity    Morbid obesity with BMI of 50.0-59.9, adult ([facility])    Weight gain    Fungal infection    Acne vulgaris    Abdominal discomfort    Leg edema, left    Lymphedema    Acute superficial gastritis without hemorrhage    Folliculitis of both axillae    Encounter for well adult exam with abnormal findings    Bruising    Lymph node symptom    Wheezes    Rash    Head injury    Lipoma of left lower extremity    Strep pharyngitis    Wound discharge    Generalized abdominal pain    Umbilicus discharge    Menopause       Medications:      Current Outpatient Medications   Medication Sig Dispense Refill    Bacillus Coagulans-Inulin (Probiotic) 1-250 BILLION-MG CAPS Take 1 capsule by mouth daily      Calcium Carbonate-Vit D-Min (Calcium 600 + Minerals) 600-200 MG-UNIT TABS Take 1 tablet by mouth daily      cholecalciferol (VITAMIN D3) 1,000 units tablet Take 1,000 Units by mouth daily      Cranberry 1000 MG CAPS Take 1 capsule by mouth daily      FLAXSEED, LINSEED, PO Take 1 capsule by mouth daily      hydrochlorothiazide (HYDRODIURIL) 12.5 mg tablet TAKE 1 TABLET BY MOUTH EVERY DAY 90 tablet 0    Multiple Vitamin (multivitamin) capsule Take 1 capsule by mouth daily      rivaroxaban (Xarelto) 10 mg tablet Take 1 tablet (10 mg total) by mouth daily 90 tablet 3     Current Facility-Administered Medications   Medication Dose Route Frequency Provider Last Rate Last Admin    cyanocobalamin injection 1,000 mcg  1,000 mcg Intramuscular Q30 Days Lupis Mil, CRNP   1,000 mcg at 11/27/23 1727    cyanocobalamin injection 1,000 mcg  1,000 mcg Intramuscular Q30 Days Lupis Mil, CRNP   1,000 mcg at 03/16/23 1321    cyanocobalamin injection 1,000 mcg  1,000 mcg Intramuscular Q30 Days Lupis Mil, CRNP   1,000 mcg at 09/27/23 1310        Allergies:    No Known Allergies    Family History:     Family History   Problem Relation Age of Onset    Clotting disorder Mother     Deep vein thrombosis Mother         Birth Control 1987    Pancreatic cancer Maternal Grandmother     Cancer Maternal Grandmother         Pancreatic    Diabetes Paternal Grandmother     Thyroid disease Paternal Grandmother     Hypertension Paternal Grandfather     Heart disease Paternal Grandfather     Stroke Maternal Aunt     Breast cancer Neg Hx     Colon cancer Neg Hx     Ovarian cancer Neg Hx     Cervical cancer Neg Hx     Uterine cancer Neg Hx        Social History:       Social History     Socioeconomic History    Marital status: /Civil Union     Spouse name: Not on file    Number of children: Not on file    Years of education: Not on file    Highest education level: Not on file   Occupational History    Not on file   Tobacco Use    Smoking status: Never    Smokeless tobacco: Never   Vaping Use    Vaping Use: Never used   Substance and Sexual Activity    Alcohol use: Not Currently     Comment: extremely rare once a year    Drug use: No    Sexual activity: Yes     Partners: Male     Birth control/protection: Post-menopausal     Comment: pain    Other Topics Concern    Not on file   Social History Narrative    Not on file     Social Determinants of Health     Financial Resource Strain: Not on file   Food Insecurity: Not on file   Transportation Needs: Not on file   Physical Activity: Not on file   Stress: Not on file   Social Connections: Not on file   Intimate Partner Violence: Not on file   Housing Stability: Not on file         Objective:   /80 (BP Location: Right arm, Patient Position: Sitting, Cuff Size: Large)   Pulse 70   Ht 5' 6" (1.676 m)   Wt (!) 148 kg (327 lb)   BMI 52.78 kg/m²     General: Patient is not in any acute/apparent distress, well nourished, well developed and cooperative. HEENT: normocephalic, atraumatic, moist membranes  Extremities: no edema noted   Skin: no lesions or rash  Musculosketal: no bony abnormalities    Neurologic Examination:   Mental status: alert, awake, oriented X 3 and following commands. Speech/Language: Speech is fluent without any dysarthria, no aphasia noted, comprehension intact    Cranial Nerves:   CN I: smell not tested  CN II: Visual fields full to confrontation  CN III, IV, VI: Extraocular movements intact bilaterally. Pupils equal round and reactive to light bilaterally. CN V: Facial sensation is normal.  CN VII: Full and symmetric facial movement. CN VIII: Hearing is normal.  CN IX, X: Palate elevates symmetrically. CN XI: Shoulder shrug strength is normal.  CN XII: Tongue midline without atrophy or fasciculations.     Motor:   Strength 5/5 in all 4 extremities. Bulk/tone - normal.  Fasiculations - none    Sensory:   Sensation intact to soft touch in all 4 extremities. Cerebellar:   Finger-to-nose intact, normal heel to shin. Reflexes: 2+ in all 4 extremities  Pathologic reflexes - babinski reflex negative    Gait:   Normal gait, Romberg sign negative     Review of Systems:     ROS:    Review of Systems   Constitutional:  Negative for appetite change, fatigue and fever. HENT: Negative. Negative for hearing loss, tinnitus, trouble swallowing and voice change. Eyes: Negative. Negative for photophobia, pain and visual disturbance. Respiratory: Negative. Negative for shortness of breath. Cardiovascular: Negative. Negative for palpitations. Gastrointestinal: Negative. Negative for nausea and vomiting. Endocrine: Negative. Negative for cold intolerance. Genitourinary: Negative. Negative for dysuria, frequency and urgency. Musculoskeletal:  Negative for back pain, gait problem, myalgias and neck pain. Skin: Negative. Negative for rash. Allergic/Immunologic: Negative. Neurological:  Positive for headaches. Negative for dizziness, tremors, seizures, syncope, facial asymmetry, speech difficulty, weakness, light-headedness and numbness. Patient stated that she has headaches that comes and go. Hematological: Negative. Does not bruise/bleed easily. Psychiatric/Behavioral: Negative. Negative for confusion, hallucinations and sleep disturbance. I have spent a total time of 57 minutes on 11/28/23 in caring for this patient including Risks and benefits of tx options, Instructions for management, Patient and family education, Risk factor reductions, Impressions, Documenting in the medical record, Reviewing / ordering tests, medicine, procedures  , and Obtaining or reviewing history  .

## 2023-12-04 ENCOUNTER — TELEPHONE (OUTPATIENT)
Dept: SLEEP CENTER | Facility: CLINIC | Age: 43
End: 2023-12-04

## 2023-12-04 NOTE — TELEPHONE ENCOUNTER
----- Message from Marily Jensen MD sent at 12/2/2023  2:29 PM EST -----  approved  ----- Message -----  From: Marcia Yo  Sent: 11/30/2023   9:52 AM EST  To: Sleep Medicine [facility] Provider    This home sleep study needs approval.     If approved please sign and return to clerical pool. If denied please include reasons why. Also provide alternative testing if warranted. Please sign and return to clerical pool. 1. Have you been to the ER, urgent care clinic since your last visit? Hospitalized since your last visit? No    2. Have you seen or consulted any other health care providers outside of the [address] [place] [address] since your last visit? Include any pap smears or colon screening.  No

## 2023-12-12 DIAGNOSIS — G43.819 OTHER MIGRAINE WITHOUT STATUS MIGRAINOSUS, INTRACTABLE: ICD-10-CM

## 2023-12-12 RX ORDER — RIZATRIPTAN BENZOATE 10 MG/1
10 TABLET ORAL AS NEEDED
Qty: 27 TABLET | Refills: 2 | Status: SHIPPED | OUTPATIENT
Start: 2023-12-12

## 2023-12-23 ENCOUNTER — HOSPITAL ENCOUNTER (OUTPATIENT)
Dept: MRI IMAGING | Facility: HOSPITAL | Age: 43
Discharge: HOME | End: 2023-12-23
Attending: STUDENT IN AN ORGANIZED HEALTH CARE EDUCATION/TRAINING PROGRAM
Payer: COMMERCIAL

## 2023-12-23 DIAGNOSIS — G43.819 OTHER MIGRAINE WITHOUT STATUS MIGRAINOSUS, INTRACTABLE: ICD-10-CM

## 2023-12-23 PROCEDURE — G1004 CDSM NDSC: HCPCS

## 2023-12-23 PROCEDURE — 70544 MR ANGIOGRAPHY HEAD W/O DYE: CPT

## 2024-01-03 ENCOUNTER — CLINICAL SUPPORT (OUTPATIENT)
Dept: FAMILY MEDICINE CLINIC | Facility: CLINIC | Age: 44
End: 2024-01-03
Payer: COMMERCIAL

## 2024-01-03 DIAGNOSIS — R79.89 LOW VITAMIN B12 LEVEL: Primary | ICD-10-CM

## 2024-01-03 PROCEDURE — 96372 THER/PROPH/DIAG INJ SC/IM: CPT

## 2024-01-03 RX ADMIN — CYANOCOBALAMIN 1000 MCG: 1000 INJECTION, SOLUTION INTRAMUSCULAR; SUBCUTANEOUS at 15:27

## 2024-01-26 DIAGNOSIS — I89.0 LYMPHEDEMA OF RIGHT LOWER EXTREMITY: ICD-10-CM

## 2024-01-26 DIAGNOSIS — Z79.01 CURRENT USE OF LONG TERM ANTICOAGULATION: ICD-10-CM

## 2024-01-26 DIAGNOSIS — Z86.718 HISTORY OF DVT (DEEP VEIN THROMBOSIS): ICD-10-CM

## 2024-01-26 RX ORDER — HYDROCHLOROTHIAZIDE 12.5 MG/1
12.5 TABLET ORAL DAILY
Qty: 90 TABLET | Refills: 0 | Status: SHIPPED | OUTPATIENT
Start: 2024-01-26 | End: 2024-04-25

## 2024-02-05 ENCOUNTER — CLINICAL SUPPORT (OUTPATIENT)
Dept: FAMILY MEDICINE CLINIC | Facility: CLINIC | Age: 44
End: 2024-02-05
Payer: COMMERCIAL

## 2024-02-05 DIAGNOSIS — R79.89 LOW VITAMIN D LEVEL: Primary | ICD-10-CM

## 2024-02-05 PROCEDURE — 96372 THER/PROPH/DIAG INJ SC/IM: CPT

## 2024-02-05 RX ADMIN — CYANOCOBALAMIN 1000 MCG: 1000 INJECTION, SOLUTION INTRAMUSCULAR; SUBCUTANEOUS at 14:36

## 2024-03-13 DIAGNOSIS — Z79.01 CURRENT USE OF LONG TERM ANTICOAGULATION: ICD-10-CM

## 2024-03-13 DIAGNOSIS — Z86.718 HISTORY OF DVT (DEEP VEIN THROMBOSIS): ICD-10-CM

## 2024-03-14 RX ORDER — RIVAROXABAN 10 MG/1
10 TABLET, FILM COATED ORAL DAILY
Qty: 90 TABLET | Refills: 0 | Status: SHIPPED | OUTPATIENT
Start: 2024-03-14

## 2024-03-27 ENCOUNTER — TELEPHONE (OUTPATIENT)
Dept: NEUROLOGY | Facility: CLINIC | Age: 44
End: 2024-03-27

## 2024-03-27 NOTE — TELEPHONE ENCOUNTER
Pt called to reschedule canceled appointment with Dr Anderson until after she gets her MRI and Sleep Study done.    Pt asked for an appt in July.    New appt scheduled on : 7/15/24 @ 11:30 with Dr. Anderson in the [place] Office    LOV: 11/28/23

## 2024-03-29 ENCOUNTER — CLINICAL SUPPORT (OUTPATIENT)
Dept: FAMILY MEDICINE CLINIC | Facility: CLINIC | Age: 44
End: 2024-03-29
Payer: COMMERCIAL

## 2024-03-29 DIAGNOSIS — E53.9 VITAMIN B DEFICIENCY: Primary | ICD-10-CM

## 2024-03-29 PROCEDURE — 96372 THER/PROPH/DIAG INJ SC/IM: CPT

## 2024-03-29 RX ADMIN — CYANOCOBALAMIN 1000 MCG: 1000 INJECTION, SOLUTION INTRAMUSCULAR; SUBCUTANEOUS at 13:39

## 2024-04-29 ENCOUNTER — TELEPHONE (OUTPATIENT)
Dept: NEUROLOGY | Facility: CLINIC | Age: 44
End: 2024-04-29

## 2024-04-29 NOTE — TELEPHONE ENCOUNTER
Patient is requesting premed for MRI, now rescheduled to 5/25 be sent to [facility] pharmacy in [place], [place] secondary to claustrophobia; please send script if in agreement, thank you.

## 2024-04-29 NOTE — TELEPHONE ENCOUNTER
Recd  4/25 10:26 AM  Marilyn lechuga. My YOB: 1980. I'm calling because I haven't and mri on May 25th at 430 at [facility]. I am a little cluster probate from the last time I went in. So I would like to have some kind of pre medication prescribed for this day. My best phone number is [phone].   _____________  per chart review, MRI not able to be completed: Narrative & Impression  Incomplete MRI of the brain.     Patient could not tolerate completion of the examination. Only diffusion weighted imaging was performed which is unremarkable.     Recommend patient return for completion of the examination. Possible solutions would include waiting to the patient's cough has resolved, utilizing sedation or anesthesia or utilizing other imaging modalities such as CT of the brain.

## 2024-04-30 DIAGNOSIS — F41.9 ANXIETY DISORDER: Primary | ICD-10-CM

## 2024-04-30 RX ORDER — LORAZEPAM 1 MG/1
1 TABLET ORAL ONCE AS NEEDED
Qty: 1 TABLET | Refills: 0 | Status: SHIPPED | OUTPATIENT
Start: 2024-04-30

## 2024-05-14 ENCOUNTER — OFFICE VISIT (OUTPATIENT)
Dept: FAMILY MEDICINE CLINIC | Facility: CLINIC | Age: 44
End: 2024-05-14
Payer: COMMERCIAL

## 2024-05-14 VITALS
TEMPERATURE: 98.2 F | OXYGEN SATURATION: 95 % | DIASTOLIC BLOOD PRESSURE: 82 MMHG | WEIGHT: 293 LBS | HEIGHT: 66 IN | RESPIRATION RATE: 14 BRPM | SYSTOLIC BLOOD PRESSURE: 110 MMHG | BODY MASS INDEX: 47.09 KG/M2 | HEART RATE: 117 BPM

## 2024-05-14 DIAGNOSIS — R60.1 GENERALIZED EDEMA: Primary | ICD-10-CM

## 2024-05-14 DIAGNOSIS — E55.9 HYPOVITAMINOSIS D: ICD-10-CM

## 2024-05-14 DIAGNOSIS — R53.83 OTHER FATIGUE: ICD-10-CM

## 2024-05-14 DIAGNOSIS — R10.84 GENERALIZED ABDOMINAL PAIN: ICD-10-CM

## 2024-05-14 DIAGNOSIS — E78.5 HYPERLIPIDEMIA, UNSPECIFIED HYPERLIPIDEMIA TYPE: ICD-10-CM

## 2024-05-14 DIAGNOSIS — K21.9 GASTROESOPHAGEAL REFLUX DISEASE WITHOUT ESOPHAGITIS: ICD-10-CM

## 2024-05-14 DIAGNOSIS — R63.5 WEIGHT GAIN: ICD-10-CM

## 2024-05-14 PROBLEM — J02.0 STREP PHARYNGITIS: Status: RESOLVED | Noted: 2022-09-29 | Resolved: 2024-05-14

## 2024-05-14 PROBLEM — R06.2 WHEEZES: Status: RESOLVED | Noted: 2022-04-21 | Resolved: 2024-05-14

## 2024-05-14 PROBLEM — K29.00 ACUTE SUPERFICIAL GASTRITIS WITHOUT HEMORRHAGE: Status: RESOLVED | Noted: 2021-07-14 | Resolved: 2024-05-14

## 2024-05-14 PROCEDURE — 99214 OFFICE O/P EST MOD 30 MIN: CPT | Performed by: NURSE PRACTITIONER

## 2024-05-14 PROCEDURE — 96372 THER/PROPH/DIAG INJ SC/IM: CPT

## 2024-05-14 RX ORDER — CYANOCOBALAMIN 1000 UG/ML
1000 INJECTION, SOLUTION INTRAMUSCULAR; SUBCUTANEOUS
Status: SHIPPED | OUTPATIENT
Start: 2024-05-14

## 2024-05-14 RX ORDER — OMEPRAZOLE 20 MG/1
20 CAPSULE, DELAYED RELEASE ORAL DAILY
Qty: 30 CAPSULE | Refills: 1 | Status: SHIPPED | OUTPATIENT
Start: 2024-05-14

## 2024-05-14 RX ADMIN — CYANOCOBALAMIN 1000 MCG: 1000 INJECTION, SOLUTION INTRAMUSCULAR; SUBCUTANEOUS at 14:19

## 2024-05-14 NOTE — PROGRESS NOTES
Name: Marilyn Andino      : 1980      MRN: [id number]  Encounter Provider: EYAD Ball  Encounter Date: 2024   Encounter department: [facility] PRIMARY CARE    Assessment & Plan     1. Generalized edema  -     Comprehensive metabolic panel; Future    2. Hypovitaminosis D  -     Vitamin D 25 hydroxy; Future    3. Other fatigue  Assessment & Plan:  Patient has been feeling fatigued, increased depression.  Has been working 2 jobs and is diet.  Discussed self-care.  Blood work ordered.  Maintain good hydration nutrition.  Vitamin B12 given in office.    Orders:  -     CBC and differential; Future  -     TSH, 3rd generation with Free T4 reflex; Future  -     Vitamin B12; Future  -     cyanocobalamin injection 1,000 mcg    4. Hyperlipidemia, unspecified hyperlipidemia type  -     Lipid panel; Future    5. Gastroesophageal reflux disease without esophagitis  -     omeprazole (PriLOSEC) 20 mg delayed release capsule; Take 1 capsule (20 mg total) by mouth daily    6. Generalized abdominal pain  Assessment & Plan:  Reports generalized abdominal pain.  Reports being very stressed lately.  Feels like her acid reflux have increased.  Prazosin ordered.  Discussed management.  Discussed self-care      7. Weight gain  Assessment & Plan:  Patient has gained weight.  Reports that she has not been eating well.  Has been doing 2 jobs.  Discussed low calorie diet increase fluids.  Increase exercise activity as tolerated.             Subjective      Seen for sick visit.  Feels very inflamed, fatigued.  Lymphedema got lymphedema therapy.  Also reports abdomen distention, discomfort.  Feels very stressed has been working 2 jobs.    Abdominal Pain  This is a recurrent problem. The current episode started yesterday. The onset quality is gradual. The problem occurs daily. The most recent episode lasted 7 days. The problem has been waxing and waning. The pain is located in the LLQ, LUQ and RLQ. The pain is at a  "severity of 3/10. The quality of the pain is burning and a sensation of fullness. The abdominal pain radiates to the LLQ, LUQ and right flank. Associated symptoms include arthralgias, constipation, diarrhea, flatus, frequency and headaches. Pertinent negatives include no anorexia, belching, dysuria, fever, hematochezia, hematuria, melena, myalgias, nausea, vomiting or weight loss. Nothing aggravates the pain. The pain is relieved by Recumbency. Prior diagnostic workup includes CT scan, GI consult, ultrasound and upper endoscopy.   Anxiety  Patient reports no nausea.         Review of Systems   Constitutional:  Negative for fever and weight loss.   Gastrointestinal:  Positive for abdominal pain, constipation, diarrhea and flatus. Negative for anorexia, hematochezia, melena, nausea and vomiting.   Genitourinary:  Positive for frequency. Negative for dysuria and hematuria.   Musculoskeletal:  Positive for arthralgias. Negative for myalgias.   Neurological:  Positive for headaches.       Current Outpatient Medications on File Prior to Visit   Medication Sig   • Bacillus Coagulans-Inulin (Probiotic) 1-250 BILLION-MG CAPS Take 1 capsule by mouth daily   • Calcium Carbonate-Vit D-Min (Calcium 600 + Minerals) 600-200 MG-UNIT TABS Take 1 tablet by mouth daily   • cholecalciferol (VITAMIN D3) 1,000 units tablet Take 1,000 Units by mouth daily   • Cranberry 1000 MG CAPS Take 1 capsule by mouth daily   • FLAXSEED, LINSEED, PO Take 1 capsule by mouth daily   • LORazepam (ATIVAN) 1 mg tablet Take 1 tablet (1 mg total) by mouth once as needed for anxiety (prior to MRI) for up to 1 dose   • Multiple Vitamin (multivitamin) capsule Take 1 capsule by mouth daily   • rivaroxaban (Xarelto) 10 mg tablet TAKE 1 TABLET BY MOUTH EVERY DAY   • hydroCHLOROthiazide 12.5 mg tablet Take 1 tablet (12.5 mg total) by mouth daily       Objective     /82   Pulse (!) 117   Temp 98.2 °F (36.8 °C) (Temporal)   Resp 14   Ht 5' 6\" (1.676 m)   " Wt (!) 159 kg (351 lb)   SpO2 95%   BMI 56.65 kg/m²     Physical Exam  Vitals and nursing note reviewed.   Constitutional:       Appearance: She is well-developed. She is ill-appearing.   HENT:      Head: Normocephalic and atraumatic.   Eyes:      Pupils: Pupils are equal, round, and reactive to light.   Cardiovascular:      Rate and Rhythm: Normal rate and regular rhythm.      Pulses: Normal pulses.      Heart sounds: Normal heart sounds.   Pulmonary:      Effort: Pulmonary effort is normal.      Breath sounds: Normal breath sounds.   Abdominal:      General: Bowel sounds are normal. There is distension.      Palpations: Abdomen is soft.      Tenderness: There is abdominal tenderness.   Musculoskeletal:         General: Swelling and tenderness present. Normal range of motion.      Cervical back: Normal range of motion.   Skin:     General: Skin is warm and dry.   Neurological:      General: No focal deficit present.      Mental Status: She is alert and oriented to person, place, and time.   Psychiatric:         Judgment: Judgment normal.       EYAD Ball

## 2024-05-14 NOTE — ASSESSMENT & PLAN NOTE
Reports generalized abdominal pain.  Reports being very stressed lately.  Feels like her acid reflux have increased.  Prazosin ordered.  Discussed management.  Discussed self-care

## 2024-05-14 NOTE — ASSESSMENT & PLAN NOTE
Patient has been feeling fatigued, increased depression.  Has been working 2 jobs and is diet.  Discussed self-care.  Blood work ordered.  Maintain good hydration nutrition.  Vitamin B12 given in office.

## 2024-05-14 NOTE — ASSESSMENT & PLAN NOTE
Patient has gained weight.  Reports that she has not been eating well.  Has been doing 2 jobs.  Discussed low calorie diet increase fluids.  Increase exercise activity as tolerated.

## 2024-05-25 ENCOUNTER — HOSPITAL ENCOUNTER (OUTPATIENT)
Dept: MRI IMAGING | Facility: HOSPITAL | Age: 44
Discharge: HOME | End: 2024-05-25
Attending: STUDENT IN AN ORGANIZED HEALTH CARE EDUCATION/TRAINING PROGRAM
Payer: COMMERCIAL

## 2024-05-25 DIAGNOSIS — G43.819 OTHER MIGRAINE WITHOUT STATUS MIGRAINOSUS, INTRACTABLE: ICD-10-CM

## 2024-05-25 PROCEDURE — A9585 GADOBUTROL INJECTION: HCPCS | Performed by: STUDENT IN AN ORGANIZED HEALTH CARE EDUCATION/TRAINING PROGRAM

## 2024-05-25 PROCEDURE — 70553 MRI BRAIN STEM W/O & W/DYE: CPT

## 2024-05-25 RX ORDER — GADOBUTROL 604.72 MG/ML
15 INJECTION INTRAVENOUS
Status: COMPLETED | OUTPATIENT
Start: 2024-05-25 | End: 2024-05-25

## 2024-05-25 RX ADMIN — GADOBUTROL 15 ML: 604.72 INJECTION INTRAVENOUS at 17:48

## 2024-06-07 ENCOUNTER — HOSPITAL ENCOUNTER (OUTPATIENT)
Dept: SLEEP CENTER | Facility: CLINIC | Age: 44
Discharge: HOME | End: 2024-06-07
Payer: COMMERCIAL

## 2024-06-07 DIAGNOSIS — G43.819 OTHER MIGRAINE WITHOUT STATUS MIGRAINOSUS, INTRACTABLE: ICD-10-CM

## 2024-06-07 PROCEDURE — G0399 HOME SLEEP TEST/TYPE 3 PORTA: HCPCS

## 2024-06-08 NOTE — PROGRESS NOTES
Home Sleep Study Documentation    HOME STUDY DEVICE: Noxturnal no                                           Rae G3 yes      Pre-Sleep Home Study:    Set-up and instructions performed by: ST    Technician performed demonstration for Patient: yes    Return demonstration performed by Patient: yes    Written instructions provided to Patient: yes    Patient signed consent form: yes        Post-Sleep Home Study:    Additional comments by Patient: None    Home Sleep Study Failed:no:    Failure reason: N/A    Reported or Detected: N/A    Scored by: JAYSON Mcfarlane

## 2024-06-12 PROBLEM — G47.33 OSA (OBSTRUCTIVE SLEEP APNEA): Status: ACTIVE | Noted: 2024-06-12

## 2024-06-13 PROCEDURE — 95806 SLEEP STUDY UNATT&RESP EFFT: CPT | Performed by: INTERNAL MEDICINE

## 2024-06-14 DIAGNOSIS — G47.33 OSA (OBSTRUCTIVE SLEEP APNEA): Primary | ICD-10-CM

## 2024-06-18 ENCOUNTER — CLINICAL SUPPORT (OUTPATIENT)
Dept: FAMILY MEDICINE CLINIC | Facility: CLINIC | Age: 44
End: 2024-06-18
Payer: COMMERCIAL

## 2024-06-18 DIAGNOSIS — R53.83 OTHER FATIGUE: Primary | ICD-10-CM

## 2024-06-18 DIAGNOSIS — Z86.718 HISTORY OF DVT (DEEP VEIN THROMBOSIS): ICD-10-CM

## 2024-06-18 DIAGNOSIS — Z79.01 CURRENT USE OF LONG TERM ANTICOAGULATION: ICD-10-CM

## 2024-06-18 PROCEDURE — 96372 THER/PROPH/DIAG INJ SC/IM: CPT | Performed by: NURSE PRACTITIONER

## 2024-06-18 RX ADMIN — CYANOCOBALAMIN 1000 MCG: 1000 INJECTION, SOLUTION INTRAMUSCULAR; SUBCUTANEOUS at 14:35

## 2024-07-11 ENCOUNTER — TELEPHONE (OUTPATIENT)
Dept: NEUROLOGY | Facility: CLINIC | Age: 44
End: 2024-07-11

## 2024-07-15 ENCOUNTER — OFFICE VISIT (OUTPATIENT)
Dept: NEUROLOGY | Facility: CLINIC | Age: 44
End: 2024-07-15
Payer: COMMERCIAL

## 2024-07-15 VITALS — HEART RATE: 70 BPM | DIASTOLIC BLOOD PRESSURE: 70 MMHG | SYSTOLIC BLOOD PRESSURE: 140 MMHG

## 2024-07-15 DIAGNOSIS — G47.33 OSA (OBSTRUCTIVE SLEEP APNEA): ICD-10-CM

## 2024-07-15 DIAGNOSIS — G43.109 MIGRAINE WITH AURA AND WITHOUT STATUS MIGRAINOSUS, NOT INTRACTABLE: Primary | ICD-10-CM

## 2024-07-15 DIAGNOSIS — M54.2 CERVICALGIA: ICD-10-CM

## 2024-07-15 PROCEDURE — 99213 OFFICE O/P EST LOW 20 MIN: CPT | Performed by: STUDENT IN AN ORGANIZED HEALTH CARE EDUCATION/TRAINING PROGRAM

## 2024-07-15 NOTE — PROGRESS NOTES
[facility] Neurology Consult  PATIENT:  Marilyn Andino  MRN:  [id number]  :  1980  DATE OF SERVICE:  2024  REFERRED BY: No ref. provider found  PMD: EYAD Ball    Assessment/Plan:     Marilyn Andino is a very pleasant 43 y.o. female, history of protein S deficiency on Xarelto, who presents today as a new patient for headaches x1 year.    Migraines with visual aura  TTH   Cervicalgia  Preventative:  - we discussed headache hygiene and lifestyle factors that may improve headaches, including sleep hygiene, proper hydration, limiting caffeine intake  - she would like to defer any prescription preventative medications at this time. Discussed possible OTC supplements for headache prevention, including magnesium and riboflavin  - given cervicogenic trigger for her migraines, discussed therapeutic options including PT, muscle relaxants, and TPI. She expressed interest in PT, referral placed    Abortive:  - discussed not taking over-the-counter or prescription pain medications more than 3 days per week to prevent medication overuse/rebound headache  - continue maxalt 10 mg PRN at onset of migraines    Mild REGINA  -home sleep study revealed mild REGINA. Recommend f/u with sleep medicine for further management    CC:   headaches    History of Present Illness:     43 y.o. female, history of protein S deficiency on Xarelto, who presents for headache f/u.     Interval hx  Since her last visit, she is endorses about 4-5 migraines per month which are 5-6/10 in severity, occurring on the R side of her head, lasting several hours and accompanied by blurred vision, visual auras, nausea, photo/phonophobia. She endorses a significant cervicogenic component to her headaches.    MRI brain performed 25 showed borderline prominence of the optic nerve sheaths without protrusion of the optic nerve heads or significant tortuosity. MRI otherwise unremarkable.     Previous hx  Pt relates that her headaches started after she hit  the right side of her head on 2 separate occasions. Patient states that she was evaluated by the emergency department and had a negative CT scan and workup.  Patient states that since the head injuries, she has had daily right sided headaches.  She describes the headaches as pressure-like.  She states that approximately 5 to 6 days out of the month, she gets a different headache which feels more like a throbbing sensation.  She states that back in July her headache was associated with word-finding difficulties and trouble speaking.  She states that she went to the ED and was evaluated and had negative workup.  She states that when she has the throbbing headaches, she will sometimes see light spots, experience photophobia, nausea and vomiting.  She notes that she takes some Tylenol and goes in a dark room and has relief with this. She states that she is having some relief with massages as well.  She notes that she has high stress levels.  Patient reports that she is worried about taking other medications or having other interventions due to her being on Xarelto.  She notes that she has never tried any abortive or preventative medications for migraines.  She notes that she has never experienced migraines prior to hitting her head.  She notes that she used to get some headaches in her teens around her menstrual cycle, but this is different.  She denies family history of migraines.   She notes that she sleeps approximately 6 to 7 hours per night.  Her  is present here and states that she snores.  She states that she had never had a sleep study, and does experience daytime sleepiness.  She reports that she last saw her optometrist last year. Patient reports that she is concerned about financial costs of medications and procedures.  No other complaints noted by patient.    Headaches started at what age? 42  How often do the headaches occur?   - as of 7/17/2024: 2x per week  What time of the day do the headaches  start?  No particular time of day   How long do the headaches last? Several hours  Are you ever headache free? Yes    Aura? with aura visual aura      Where is your headache located and pain quality? Throbbing, pressure-like. Typically located to the R side of her head.   What is the intensity of pain? Average: 7/10,  Associated symptoms:   [x] Nausea       [] Vomiting        [x] Diarrhea  [x] Stiff or sore neck   [x] Problems with concentration  [x] Photophobia     [x]Phonophobia      [x] Osmophobia  [] Blurred vision   [x] Prefer quiet, dark room  [x] Light-headed or dizzy     [] Tinnitus   [] Hands or feet tingle or feel numb/paresthesias      [] Ptosis      [] Facial droop  [x] Lacrimation  [x] Nasal congestion/rhinorrhea        Things that make the headache worse? No specific movements  Headache triggers:  stress, missing meals, coughing, fatigue, poor sleep    Have you seen someone else for headaches or pain? No  Have you had trigger point injection performed and how often? No  Have you had Botox injection performed and how often? No   Have you had epidural injections or transforaminal injections performed? No  Have you ever had any Brain imaging? yes CTH    What medications do you take or have you taken for your headaches?   ABORTIVE:    OTC medications have been ineffective     PREVENTIVE:   none    LIFESTYLE  Sleep   - averages: Sleep - 6-7 hrs. Will sometimes have witnessed wheezes/apneas?  confirms that she snores    Past Medical History:     Past Medical History:   Diagnosis Date    Diverticulosis     DVT (deep venous thrombosis) ([facility])     DVT (deep venous thrombosis) ([facility])     Endometriosis     Gastritis     Headache(784.0)     Hereditary lymphedema of legs     Lymphedema of right lower extremity     Obesity, Class III, BMI 40-49.9 (morbid obesity) ([facility])        Patient Active Problem List   Diagnosis    Lymphedema of right lower extremity    Morbid obesity with BMI of 50.0-59.9, adult ([facility])     Weight gain    Fungal infection    Acne vulgaris    Abdominal discomfort    Leg edema, left    Lymphedema    Folliculitis of both axillae    Encounter for well adult exam with abnormal findings    Bruising    Lymph node symptom    Rash    Head injury    Lipoma of left lower extremity    Wound discharge    Generalized abdominal pain    Umbilicus discharge    Menopause    Other fatigue    REGINA (obstructive sleep apnea)       Medications:      Current Outpatient Medications   Medication Sig Dispense Refill    Bacillus Coagulans-Inulin (Probiotic) 1-250 BILLION-MG CAPS Take 1 capsule by mouth daily      Calcium Carbonate-Vit D-Min (Calcium 600 + Minerals) 600-200 MG-UNIT TABS Take 1 tablet by mouth daily      cholecalciferol (VITAMIN D3) 1,000 units tablet Take 1,000 Units by mouth daily      Cranberry 1000 MG CAPS Take 1 capsule by mouth daily      FLAXSEED, LINSEED, PO Take 1 capsule by mouth daily      hydroCHLOROthiazide 12.5 mg tablet Take 1 tablet (12.5 mg total) by mouth daily 90 tablet 0    Multiple Vitamin (multivitamin) capsule Take 1 capsule by mouth daily      omeprazole (PriLOSEC) 20 mg delayed release capsule Take 1 capsule (20 mg total) by mouth daily 30 capsule 1    rivaroxaban (Xarelto) 10 mg tablet Take 1 tablet (10 mg total) by mouth daily 30 tablet 0    LORazepam (ATIVAN) 1 mg tablet Take 1 tablet (1 mg total) by mouth once as needed for anxiety (prior to MRI) for up to 1 dose (Patient not taking: Reported on 7/15/2024) 1 tablet 0     Current Facility-Administered Medications   Medication Dose Route Frequency Provider Last Rate Last Admin    cyanocobalamin injection 1,000 mcg  1,000 mcg Intramuscular Q30 Days Lupis Mil CRNP   1,000 mcg at 06/18/24 1435    cyanocobalamin injection 1,000 mcg  1,000 mcg Intramuscular Q30 Days Lupis Mil, CRNP   1,000 mcg at 03/16/23 1321    cyanocobalamin injection 1,000 mcg  1,000 mcg Intramuscular Q30 Days Lupis Mil, CRNP   1,000 mcg at 09/27/23 1310     cyanocobalamin injection 1,000 mcg  1,000 mcg Intramuscular Q30 Days    1,000 mcg at 05/14/24 1419        Allergies:    No Known Allergies    Family History:     Family History   Problem Relation Age of Onset    Clotting disorder Mother     Deep vein thrombosis Mother         Birth Control 1987    Pancreatic cancer Maternal Grandmother     Cancer Maternal Grandmother         Pancreatic    Diabetes Paternal Grandmother     Thyroid disease Paternal Grandmother     Hypertension Paternal Grandfather     Heart disease Paternal Grandfather     Stroke Maternal Aunt     Breast cancer Neg Hx     Colon cancer Neg Hx     Ovarian cancer Neg Hx     Cervical cancer Neg Hx     Uterine cancer Neg Hx        Social History:       Social History     Socioeconomic History    Marital status: /Civil Union     Spouse name: Not on file    Number of children: Not on file    Years of education: Not on file    Highest education level: Not on file   Occupational History    Not on file   Tobacco Use    Smoking status: Never    Smokeless tobacco: Never   Vaping Use    Vaping status: Never Used   Substance and Sexual Activity    Alcohol use: Not Currently     Comment: extremely rare once a year    Drug use: No    Sexual activity: Yes     Partners: Male     Birth control/protection: Post-menopausal     Comment: pain    Other Topics Concern    Not on file   Social History Narrative    Not on file     Social Determinants of Health     Financial Resource Strain: Not on file   Food Insecurity: Not on file   Transportation Needs: Not on file   Physical Activity: Not on file   Stress: Not on file   Social Connections: Not on file   Intimate Partner Violence: Not on file   Housing Stability: Not on file         Objective:   /70 (BP Location: Right arm, Patient Position: Sitting, Cuff Size: Large)   Pulse 70     General: Patient is not in any acute/apparent distress, well nourished, well developed and cooperative.   HEENT: normocephalic,  atraumatic, moist membranes  Extremities: no edema noted   Skin: no lesions or rash  Musculosketal: no bony abnormalities    Neurologic Examination:   Mental status: alert, awake, oriented X 3 and following commands.     Speech/Language: Speech is fluent without any dysarthria, no aphasia noted, comprehension intact    Cranial Nerves:   CN I: smell not tested  CN II: Visual fields full to confrontation  CN III, IV, VI: Extraocular movements intact bilaterally. Pupils equal round and reactive to light bilaterally.  CN V: Facial sensation is normal.  CN VII: Full and symmetric facial movement.  CN VIII: Hearing is normal.  CN IX, X: Palate elevates symmetrically.   CN XI: Shoulder shrug strength is normal.  CN XII: Tongue midline without atrophy or fasciculations.    Motor:   Strength 5/5 in all 4 extremities. Bulk/tone - normal.  Fasiculations - none    Sensory:   Sensation intact to soft touch in all 4 extremities.    Cerebellar:   Finger-to-nose intact, normal heel to shin.    Reflexes: 2+ in all 4 extremities  Pathologic reflexes - babinski reflex negative    Gait:   Normal gait, Romberg sign negative     Review of Systems:     ROS:    Review of Systems   Constitutional:  Negative for appetite change, fatigue and fever.   HENT: Negative.  Negative for hearing loss, tinnitus, trouble swallowing and voice change.    Eyes:  Positive for photophobia (patient states she is still having sensitivity to light,). Negative for pain and visual disturbance.   Respiratory: Negative.  Negative for shortness of breath.    Cardiovascular: Negative.  Negative for palpitations.   Gastrointestinal: Negative.  Negative for nausea and vomiting.   Endocrine: Negative.  Negative for cold intolerance.   Genitourinary: Negative.  Negative for dysuria, frequency and urgency.   Musculoskeletal:  Positive for neck pain. Negative for back pain, gait problem, myalgias and neck stiffness.        Patient states she is till having neck pain.,     Skin: Negative.  Negative for rash.   Allergic/Immunologic: Negative.    Neurological:  Positive for headaches. Negative for dizziness, tremors, seizures, syncope, facial asymmetry, speech difficulty, weakness, light-headedness and numbness.        Patient states she is still having migraines 2 -5 hours long. Migraines are on the R side of the head. Patient states has sensitivity to smells as well.    Hematological: Negative.  Does not bruise/bleed easily.   Psychiatric/Behavioral: Negative.  Negative for confusion, hallucinations and sleep disturbance.      I have spent a total time of 26 minutes in caring for this patient on the day of the visit/encounter including Risks and benefits of tx options, Instructions for management, Patient and family education, Risk factor reductions, Impressions, Documenting in the medical record, Reviewing / ordering tests, medicine, procedures  , and Obtaining or reviewing history  .

## 2024-07-19 ENCOUNTER — TELEPHONE (OUTPATIENT)
Age: 44
End: 2024-07-19

## 2024-07-19 NOTE — TELEPHONE ENCOUNTER
Left message for pt to confirm time change in appt scheduled for sleep med with shayna for 8/7/2024 from 9:20AM to a 10AM

## 2024-07-22 ENCOUNTER — EVALUATION (OUTPATIENT)
Dept: PHYSICAL THERAPY | Facility: CLINIC | Age: 44
End: 2024-07-22
Payer: COMMERCIAL

## 2024-07-22 DIAGNOSIS — M54.2 CERVICALGIA: ICD-10-CM

## 2024-07-22 PROCEDURE — 97140 MANUAL THERAPY 1/> REGIONS: CPT | Performed by: PHYSICAL THERAPIST

## 2024-07-22 PROCEDURE — 97161 PT EVAL LOW COMPLEX 20 MIN: CPT | Performed by: PHYSICAL THERAPIST

## 2024-07-22 NOTE — PROGRESS NOTES
PT Evaluation     Today's date: 2024  Patient name: Marilyn Andino  : 1980  MRN: [id number]  Referring provider: Socorro Anderson MD  Dx:   Encounter Diagnosis     ICD-10-CM    1. Cervicalgia  M54.2 Ambulatory Referral to Physical Therapy          Start Time: 1105  Stop Time: 1145  Total time in clinic (min): 40 minutes    Assessment  Impairments: abnormal or restricted ROM, activity intolerance, impaired physical strength, lacks appropriate home exercise program, pain with function, poor posture  and poor body mechanics    Assessment details: Patient is a 43 y.o. female who presents to physical therapy with c/o chronic neck pain and headaches. Pt reports infrequent right sided tingling but only occurs a few times per month and unable to provoke today with objective exam. Patient presents to evaluation with pain, headaches, decreased range of motion, decreased strength, and decreased tolerance to activity. Patient demonstrates good tolerance to treatment focusing on manual interventions with good symptom response, deferred formal exercises this date due to time constraints but will initiate next visit as symptoms allow. I discussed risks, benefits, and alternatives to treatment, and answered all patient questions to patient satisfaction. Patient is an appropriate candidate for skilled PT and would benefit from skilled PT services to address the aforementioned impairments, achieve goals, maximize function, and improve quality of life. Pt is in agreement with this plan.    Patient Education: activity modifications as needed, pacing of activities, importance of HEP compliance, PT prognosis/POC    Barriers to therapy: Chronicity of sx    Goals  ST weeks  Pt will restore full and pain-free cervical spine AROM to allow return to normal ADLs  Pt will demonstrate centralization of symptoms with repeated movements and/or traction of cervical spine  Pt will decrease neck pain to 3-4/10 with activity   Pt will  demonstrate good understanding and compliance with HEP   Pt will demonstrate improved postural awareness and ability to self-correct without reliance on external cues    LT weeks  Pt will demonstrate abolished radicular symptoms for 2 weeks  Pt will decrease neck pain to 0-1/10 with activity   Pt will exhibit proper lifting mechanics without reliance on external cues for correction of form    Pt will be able to tolerate prolonged standing/sitting activities > 30 minutes without provocation of neck pain   Pt will improve FOTO score to > or = to expected result to indicate improved functional abilities       Plan  Patient would benefit from: PT eval and skilled physical therapy  Planned modality interventions: cryotherapy and thermotherapy: hydrocollator packs    Planned therapy interventions: ADL training, activity modification, joint mobilization, manual therapy, body mechanics training, neuromuscular re-education, patient/caregiver education, postural training, self care, strengthening, stretching, therapeutic activities, therapeutic exercise, home exercise program, graded exercise, graded activity, functional ROM exercises and flexibility    Frequency: 2x week  Duration in weeks: 8  Plan of Care beginning date: 2024  Plan of Care expiration date: 2024  Treatment plan discussed with: patient        Subjective Evaluation    History of Present Illness  Mechanism of injury: Pt reports to PT with c/o right sided neck pain and headaches that started about a year ago when she hit her head. Pt reports having brain MRI which was negative and referred to PT. Pt feels her symptoms are about the same over the past year and not improving or worsening. Pt admits to infrequent tingling into right arm that can travel to her fingers, only occurs 2-3x a month. Pt finds relief with rest, massage, tylenol, epsom salt bath, ice/heat, biofreeze. Pt has increased pain with neck movements, prolonged sitting, working at  "desk, sleeping at night. Pt feels triggers for her headaches are stress but does note an association of neck pain and headaches. Pt does admit to sensitivity to light/sound. Pt denies having any treatment for this since onset.    Pt denies hx of cancer, unrelenting night pain, saddle anesthesia, unexplained weight loss, changes in B&B function, changes in balance/gait, recent fever, hx of IV drug use, prolonged corticosteroid use, hx of osteoporosis, recent trauma.    Pt denies nausea, dizziness, diplopia, drop attacks, difficulty with speech or swallowing.    Aggravating factors: neck movements, prolonged sitting, working at desk, sleeping at night    Easing Factors:  rest, massage, tylenol, epsom salt bath, ice/heat, biofreeze    PLOF:  Hx of sx for past year    PMH: (-)  Patient Goals  Patient goals for therapy: decreased pain, increased motion, increased strength, independence with ADLs/IADLs and return to sport/leisure activities    Pain  Current pain ratin  At best pain ratin  At worst pain rating: 10  Quality: dull ache, throbbing and sharp      Diagnostic Tests  MRI studies: normal        Objective     General Comments:      Cervical/Thoracic Comments  Posture: FHP, RS, increased thoracic kyphosis      Cervical AROM:   Flex: 50* (\"pulling/tight\")  Ext: 40* (pain)  Left ROT: 65* (pain/tight)  Right ROT: 65* (pain/tight)  Left Side-Bendin*  Right Side-Bendin* (pain)    UE Dermatomes:  C2: Intact/symmetrical  C3: Intact/symmetrical  C4: Intact/symmetrical  C5: Intact/symmetrical  C6: Intact/symmetrical  C7: Intact/symmetrical  C8: Intact/symmetrical  T1: Intact/symmetrical  T2: Intact/symmetrical    UE Myotomes:  Cervical Lateral Flexion C3: Left 5/5, Right 5/5  Scapular Elevation C4: Left 5/5, Right 5/5  Shoulder Abduction C5: Left 5/5, Right 5/5  Elbow Flexion C6: Left 5/5, Right 5/5  Elbow Extension C7: Left 5/5, Right 5/5  Thumb Extension C8: Left 5/5, Right 5/5  Finger Abduction T1: Left " "5/5, Right 5/5     Strength: Left 75#; Right 60#    DTRs:  Biceps Brachii (C5): Left 2+, Right 2+  Brachioradialis (C6): Left 2+, Right 2+  Triceps (C7): Left 1+, Right 1+    Cervical/Thoracic PAIVMs: deferred     Deep Neck Flexor Endurance: 20\"  Spurling compression:  (-)  Cervical distraction: (+)  ULTT-A (median n.): (-)  Quadrant: (-)  SOR (+)    Palpation: TTP about R>L UT, sub-occipital mm     FOTO: To be assessed next visit                Diagnosis: Neck pain/headaches   Precautions: (-)   POC Expires: 9/16/24   Re-evaluation Date: 8/19/24   FOTO Scores/Date: ASSESS NEXT VISIT   Visit Count 1/10       Manuals 7/22       Cervical traction KD       SOR KD       Bilateral UT STM/stretch KD               Ther Ex 7/22       Seated thoracic ext NV                                                                       Neuro Re-Ed 7/22       UBE (retro) for postural re-ed/strength NV       Deep neck flexor endurance NV       Prone scapular retraction/depression NV       Prone scapular retraction/depression w/ lift off NV       Rows/pulldowns NV                              Ther Act                                                                                 Modalities                                            "

## 2024-07-24 ENCOUNTER — APPOINTMENT (OUTPATIENT)
Dept: LAB | Facility: HOSPITAL | Age: 44
End: 2024-07-24
Payer: COMMERCIAL

## 2024-07-24 ENCOUNTER — TELEPHONE (OUTPATIENT)
Dept: FAMILY MEDICINE CLINIC | Facility: CLINIC | Age: 44
End: 2024-07-24

## 2024-07-24 DIAGNOSIS — R53.83 OTHER FATIGUE: ICD-10-CM

## 2024-07-24 DIAGNOSIS — R60.1 GENERALIZED EDEMA: ICD-10-CM

## 2024-07-24 DIAGNOSIS — Z86.718 HISTORY OF DVT (DEEP VEIN THROMBOSIS): ICD-10-CM

## 2024-07-24 DIAGNOSIS — E78.5 HYPERLIPIDEMIA, UNSPECIFIED HYPERLIPIDEMIA TYPE: ICD-10-CM

## 2024-07-24 DIAGNOSIS — E55.9 HYPOVITAMINOSIS D: ICD-10-CM

## 2024-07-24 DIAGNOSIS — Z79.01 CURRENT USE OF LONG TERM ANTICOAGULATION: ICD-10-CM

## 2024-07-24 LAB
25(OH)D3 SERPL-MCNC: 25.5 NG/ML (ref 30–100)
ALBUMIN SERPL BCG-MCNC: 4.3 G/DL (ref 3.5–5)
ALP SERPL-CCNC: 92 U/L (ref 34–104)
ALT SERPL W P-5'-P-CCNC: 15 U/L (ref 7–52)
ANION GAP SERPL CALCULATED.3IONS-SCNC: 7 MMOL/L (ref 4–13)
AST SERPL W P-5'-P-CCNC: 14 U/L (ref 13–39)
BASOPHILS # BLD AUTO: 0.05 THOUSANDS/ÂΜL (ref 0–0.1)
BASOPHILS NFR BLD AUTO: 1 % (ref 0–1)
BILIRUB SERPL-MCNC: 0.62 MG/DL (ref 0.2–1)
BUN SERPL-MCNC: 10 MG/DL (ref 5–25)
CALCIUM SERPL-MCNC: 10 MG/DL (ref 8.4–10.2)
CHLORIDE SERPL-SCNC: 102 MMOL/L (ref 96–108)
CHOLEST SERPL-MCNC: 171 MG/DL
CO2 SERPL-SCNC: 30 MMOL/L (ref 21–32)
CREAT SERPL-MCNC: 0.87 MG/DL (ref 0.6–1.3)
EOSINOPHIL # BLD AUTO: 0.11 THOUSAND/ÂΜL (ref 0–0.61)
EOSINOPHIL NFR BLD AUTO: 2 % (ref 0–6)
ERYTHROCYTE [DISTWIDTH] IN BLOOD BY AUTOMATED COUNT: 13.8 % (ref 11.6–15.1)
GFR SERPL CREATININE-BSD FRML MDRD: 81 ML/MIN/1.73SQ M
GLUCOSE P FAST SERPL-MCNC: 88 MG/DL (ref 65–99)
HCT VFR BLD AUTO: 43 % (ref 34.8–46.1)
HDLC SERPL-MCNC: 49 MG/DL
HGB BLD-MCNC: 13.6 G/DL (ref 11.5–15.4)
IMM GRANULOCYTES # BLD AUTO: 0.02 THOUSAND/UL (ref 0–0.2)
IMM GRANULOCYTES NFR BLD AUTO: 0 % (ref 0–2)
LDLC SERPL CALC-MCNC: 85 MG/DL (ref 0–100)
LYMPHOCYTES # BLD AUTO: 1.7 THOUSANDS/ÂΜL (ref 0.6–4.47)
LYMPHOCYTES NFR BLD AUTO: 26 % (ref 14–44)
MCH RBC QN AUTO: 27.5 PG (ref 26.8–34.3)
MCHC RBC AUTO-ENTMCNC: 31.6 G/DL (ref 31.4–37.4)
MCV RBC AUTO: 87 FL (ref 82–98)
MONOCYTES # BLD AUTO: 0.35 THOUSAND/ÂΜL (ref 0.17–1.22)
MONOCYTES NFR BLD AUTO: 5 % (ref 4–12)
NEUTROPHILS # BLD AUTO: 4.3 THOUSANDS/ÂΜL (ref 1.85–7.62)
NEUTS SEG NFR BLD AUTO: 66 % (ref 43–75)
NONHDLC SERPL-MCNC: 122 MG/DL
NRBC BLD AUTO-RTO: 0 /100 WBCS
PLATELET # BLD AUTO: 308 THOUSANDS/UL (ref 149–390)
PMV BLD AUTO: 9.6 FL (ref 8.9–12.7)
POTASSIUM SERPL-SCNC: 4.3 MMOL/L (ref 3.5–5.3)
PROT SERPL-MCNC: 7.9 G/DL (ref 6.4–8.4)
RBC # BLD AUTO: 4.95 MILLION/UL (ref 3.81–5.12)
SODIUM SERPL-SCNC: 139 MMOL/L (ref 135–147)
TRIGL SERPL-MCNC: 183 MG/DL
TSH SERPL DL<=0.05 MIU/L-ACNC: 1.92 UIU/ML (ref 0.45–4.5)
VIT B12 SERPL-MCNC: 588 PG/ML (ref 180–914)
WBC # BLD AUTO: 6.53 THOUSAND/UL (ref 4.31–10.16)

## 2024-07-24 PROCEDURE — 85025 COMPLETE CBC W/AUTO DIFF WBC: CPT

## 2024-07-24 PROCEDURE — 82607 VITAMIN B-12: CPT

## 2024-07-24 PROCEDURE — 80061 LIPID PANEL: CPT

## 2024-07-24 PROCEDURE — 80053 COMPREHEN METABOLIC PANEL: CPT

## 2024-07-24 PROCEDURE — 82306 VITAMIN D 25 HYDROXY: CPT

## 2024-07-24 PROCEDURE — 84443 ASSAY THYROID STIM HORMONE: CPT

## 2024-07-24 PROCEDURE — 36415 COLL VENOUS BLD VENIPUNCTURE: CPT

## 2024-07-24 NOTE — TELEPHONE ENCOUNTER
----- Message from EYAD Ball sent at 7/24/2024 12:20 PM EDT -----  Elevated triglycerides  Decrease carbohydrate intake, follow heart healthy diet. All other blood work is normal

## 2024-07-25 ENCOUNTER — OFFICE VISIT (OUTPATIENT)
Dept: PHYSICAL THERAPY | Facility: CLINIC | Age: 44
End: 2024-07-25
Payer: COMMERCIAL

## 2024-07-25 DIAGNOSIS — M54.2 CERVICALGIA: Primary | ICD-10-CM

## 2024-07-25 PROCEDURE — 97110 THERAPEUTIC EXERCISES: CPT

## 2024-07-25 PROCEDURE — 97112 NEUROMUSCULAR REEDUCATION: CPT

## 2024-07-25 NOTE — PROGRESS NOTES
"Daily Note     Today's date: 2024  Patient name: Marilyn Andino  : 1980  MRN: [id number]  Referring provider: Socorro Anderson MD  Dx:   Encounter Diagnosis     ICD-10-CM    1. Cervicalgia  M54.2           Start Time:   Stop Time:   Total time in clinic (min): 35 minutes    Subjective: patient reports R sided neck soreness post eval.  Reports improved since.  Denies any increased symptoms since       Objective: See treatment diary below      Assessment: patient was able to complete therapy with in tolerance introduce thoracic ROM and postural/scapular muscularity re-education.  Required cueing on sequencing, positioning, and mechanics of exercises.  Reports R sided radicular symptoms and min pain with performance of prone scap re-ed and t-band rows.  FOTO provided   Plan: Continue per plan of care.  Progress treatment as tolerated.       Diagnosis: Neck pain/headaches   Precautions: (-)   POC Expires: 24   Re-evaluation Date: 24   FOTO Scores/Date:    Visit Count /10 2/10      Manuals       Cervical traction KD JET      SOR KD JET      Bilateral UT STM/stretch KD JET              Ther Ex       Seated thoracic ext NV 3\" x10                                                               Pt Ed  FOTO provided      Neuro Re-Ed       UBE (retro) for postural re-ed/strength NV L1 x 5 mins      Deep neck flexor endurance NV       Prone scapular retraction/depression NV 10\"x10       Prone scapular retraction/depression w/ lift off NV       Rows/pulldowns NV GTB 2x10 ea                             Ther Act                                                                                 Modalities                                            "

## 2024-07-29 ENCOUNTER — APPOINTMENT (OUTPATIENT)
Dept: PHYSICAL THERAPY | Facility: CLINIC | Age: 44
End: 2024-07-29
Payer: COMMERCIAL

## 2024-08-01 ENCOUNTER — OFFICE VISIT (OUTPATIENT)
Dept: PHYSICAL THERAPY | Facility: CLINIC | Age: 44
End: 2024-08-01
Payer: COMMERCIAL

## 2024-08-01 DIAGNOSIS — M54.2 CERVICALGIA: Primary | ICD-10-CM

## 2024-08-01 PROCEDURE — 97110 THERAPEUTIC EXERCISES: CPT

## 2024-08-01 PROCEDURE — 97112 NEUROMUSCULAR REEDUCATION: CPT

## 2024-08-01 PROCEDURE — 97140 MANUAL THERAPY 1/> REGIONS: CPT

## 2024-08-01 NOTE — PROGRESS NOTES
"Daily Note     Today's date: 2024  Patient name: Marilyn Andino  : 1980  MRN: [id number]  Referring provider: Socorro Anderson MD  Dx:   Encounter Diagnosis     ICD-10-CM    1. Cervicalgia  M54.2                      Subjective: Patient reports tightness, states she has a mild HA.       Objective: See treatment diary below      Assessment: Cont with outlined POC with focus on postural strengthening. She is fatigued with scapular re-ed but was able to progress. Added open books fto improve thoracic mobility. She did have some R sided muscular discomfort towards end of set to L. Ended session with manuals. She was TTP but improved with STM, decreased tightness and less feeling of HA. Patient demonstrated fatigue post treatment and would benefit from continued PT      Plan: Progress treatment as tolerated.       Diagnosis: Neck pain/headaches   Precautions: (-)   POC Expires: 24   Re-evaluation Date: 24   FOTO Scores/Date:    Visit Count 1/10 2/10 3/10     Manuals      Cervical traction KD JET GERMAIN     SOR KD JET GERMAIN     Bilateral UT STM/stretch KD JET GERMAIN             Ther Ex  8     Seated thoracic ext NV 3\" x10  3\" x15 hands behind head     Open book    5\"x10 B                                                     Pt Ed  FOTO provided      Neuro Re-Ed  8     UBE (retro) for postural re-ed/strength NV L1 x 5 mins L1 x5 mins     Deep neck flexor endurance NV       Prone scapular retraction/depression NV 10\"x10  10\"x10     Prone scapular retraction/depression w/ lift off NV  5\" x10     Rows/pulldowns NV GTB 2x10 ea GTB 2x10 ea      Bilateral ER   NV                    Ther Act                                                                                 Modalities                                              "

## 2024-08-05 ENCOUNTER — OFFICE VISIT (OUTPATIENT)
Dept: PHYSICAL THERAPY | Facility: CLINIC | Age: 44
End: 2024-08-05
Payer: COMMERCIAL

## 2024-08-05 DIAGNOSIS — M54.2 CERVICALGIA: Primary | ICD-10-CM

## 2024-08-05 PROCEDURE — 97110 THERAPEUTIC EXERCISES: CPT | Performed by: PHYSICAL THERAPIST

## 2024-08-05 PROCEDURE — 97112 NEUROMUSCULAR REEDUCATION: CPT | Performed by: PHYSICAL THERAPIST

## 2024-08-05 PROCEDURE — 97140 MANUAL THERAPY 1/> REGIONS: CPT | Performed by: PHYSICAL THERAPIST

## 2024-08-05 NOTE — PROGRESS NOTES
"Daily Note     Today's date: 2024  Patient name: Marilyn Andino  : 1980  MRN: [id number]  Referring provider: Socorro Anderson MD  Dx:   Encounter Diagnosis     ICD-10-CM    1. Cervicalgia  M54.2           Start Time: 1230  Stop Time: 1315  Total time in clinic (min): 45 minutes    Subjective: Pt reports some improvement in neck pain and headaches occurring less often and not as intense. Pt rates current pain 4/10. Pt reports increased anterior chest/shoulder discomfort last visit with exercises on her stomach.       Objective: See treatment diary below      Assessment: Pt continues to progress well with manual interventions with good reduction in neck pain/tightness post manuals. Adjusted prone exercises to be performed with face pillow for additional support and improved tolerance with prone exercises with initial cues for proper scapular mechanics without onset of pain, only mild fatigue after completion. Overall good tolerance with scapular strengthening and exhibits good form/control with occasional cues required initially for postural correction and slow eccentric control, likely able to increase TB resistance next visit. Progressed scapular strength with bilateral ER with good form/tolerance and no increase in pain symptoms. Pt reports improved symptoms post tx compared to baseline, updated HEP and encouraged to continue with daily as able. Will progress next visit as able.       Plan: Continue per plan of care.  Progress treatment as tolerated.       Diagnosis: Neck pain/headaches   Precautions: (-)   POC Expires: 24   Re-evaluation Date: 24   FOTO Scores/Date:    Visit Count 1/10 2/10 3/10 4/10    Manuals  8 8/5    Cervical traction KD JET GROVES KD    SOR KD JET GROVES KD    Bilateral UT STM/stretch CHRISTINA GROVES KD            Ther Ex  8 8/5    Seated thoracic ext NV 3\" x10  3\" x15 hands behind head 15x3\"     Open book    5\"x10 B 10x5\" ea                                  " "                  Pt Ed  FOTO provided  Updated     Neuro Re-Ed 7/22 7/25 8/1 8/5    UBE (retro) for postural re-ed/strength NV L1 x 5 mins L1 x5 mins L1 x 5 min    Deep neck flexor endurance NV   3x10-15\"     Prone scapular retraction/depression NV 10\"x10  10\"x10 10x10\" w/ face pillow    Prone scapular retraction/depression w/ lift off NV  5\" x10 10x10\" w/ face pillow    Rows/pulldowns NV GTB 2x10 ea GTB 2x10 ea  Grn 2x10 ea (try blue NV)    Bilateral ER   NV Grn 2x10                    Ther Act                                                                                 Modalities                                                "

## 2024-08-07 ENCOUNTER — OFFICE VISIT (OUTPATIENT)
Dept: FAMILY MEDICINE CLINIC | Facility: CLINIC | Age: 44
End: 2024-08-07
Payer: COMMERCIAL

## 2024-08-07 ENCOUNTER — OFFICE VISIT (OUTPATIENT)
Age: 44
End: 2024-08-07

## 2024-08-07 VITALS
HEIGHT: 66 IN | SYSTOLIC BLOOD PRESSURE: 120 MMHG | DIASTOLIC BLOOD PRESSURE: 84 MMHG | WEIGHT: 293 LBS | TEMPERATURE: 98.3 F | HEART RATE: 81 BPM | OXYGEN SATURATION: 93 % | BODY MASS INDEX: 47.09 KG/M2

## 2024-08-07 VITALS
DIASTOLIC BLOOD PRESSURE: 72 MMHG | HEART RATE: 96 BPM | WEIGHT: 293 LBS | SYSTOLIC BLOOD PRESSURE: 124 MMHG | OXYGEN SATURATION: 96 % | HEIGHT: 66 IN | BODY MASS INDEX: 47.09 KG/M2 | RESPIRATION RATE: 16 BRPM | TEMPERATURE: 97.8 F

## 2024-08-07 DIAGNOSIS — E55.9 VITAMIN D DEFICIENCY: ICD-10-CM

## 2024-08-07 DIAGNOSIS — G47.09 OTHER INSOMNIA: ICD-10-CM

## 2024-08-07 DIAGNOSIS — G47.33 OSA (OBSTRUCTIVE SLEEP APNEA): ICD-10-CM

## 2024-08-07 DIAGNOSIS — G47.19 EXCESSIVE DAYTIME SLEEPINESS: ICD-10-CM

## 2024-08-07 DIAGNOSIS — G47.33 OSA (OBSTRUCTIVE SLEEP APNEA): Primary | ICD-10-CM

## 2024-08-07 DIAGNOSIS — E66.01 MORBID OBESITY WITH BMI OF 50.0-59.9, ADULT (HCC): ICD-10-CM

## 2024-08-07 DIAGNOSIS — E66.01 MORBID OBESITY (HCC): ICD-10-CM

## 2024-08-07 DIAGNOSIS — R53.83 OTHER FATIGUE: ICD-10-CM

## 2024-08-07 DIAGNOSIS — E53.8 VITAMIN B 12 DEFICIENCY: ICD-10-CM

## 2024-08-07 DIAGNOSIS — I89.0 LYMPHEDEMA OF RIGHT LOWER EXTREMITY: ICD-10-CM

## 2024-08-07 DIAGNOSIS — Z12.31 ENCOUNTER FOR SCREENING MAMMOGRAM FOR BREAST CANCER: ICD-10-CM

## 2024-08-07 DIAGNOSIS — Z00.00 ANNUAL PHYSICAL EXAM: Primary | ICD-10-CM

## 2024-08-07 DIAGNOSIS — G47.34 SLEEP RELATED HYPOXIA: ICD-10-CM

## 2024-08-07 PROCEDURE — 96372 THER/PROPH/DIAG INJ SC/IM: CPT

## 2024-08-07 PROCEDURE — 99396 PREV VISIT EST AGE 40-64: CPT | Performed by: NURSE PRACTITIONER

## 2024-08-07 RX ORDER — ERGOCALCIFEROL 1.25 MG/1
50000 CAPSULE ORAL 2 TIMES WEEKLY
Qty: 16 CAPSULE | Refills: 0 | Status: SHIPPED | OUTPATIENT
Start: 2024-08-08 | End: 2024-10-01

## 2024-08-07 RX ORDER — CYANOCOBALAMIN 1000 UG/ML
1000 INJECTION, SOLUTION INTRAMUSCULAR; SUBCUTANEOUS
Status: SHIPPED | OUTPATIENT
Start: 2024-08-07

## 2024-08-07 RX ADMIN — CYANOCOBALAMIN 1000 MCG: 1000 INJECTION, SOLUTION INTRAMUSCULAR; SUBCUTANEOUS at 17:20

## 2024-08-07 NOTE — PROGRESS NOTES
Assessment/Plan:     Diagnoses and all orders for this visit:    REGINA (obstructive sleep apnea)  -     Ambulatory Referral to Sleep Medicine  -     CPAP Auto New DME    Morbid obesity (HCC)  -     CPAP Auto New DME    Sleep related hypoxia    Excessive daytime sleepiness  -     CPAP Auto New DME    Other insomnia  -     CPAP Auto New DME          Plan for follow up:  Etiology pathogenesis of obstructive sleep apnea discussed in detail  Consequences of untreated sleep apnea discussed with excessive daytime sleepiness, increased risk for myocardial infarction stroke atrial fibrillation discussed   recent home sleep study done on 6/12/2024 reviewed with mild obstructive sleep apnea with an AHI of 7.9 associated with events related hypoxemia the lowest oxygen saturation recorded was 80% and the amount of oxygen saturation less than 90% pulse was 64.3 minutes with sleep-related hypoxia  Limitations of the home sleep study were discussed  Mild obstructive sleep apnea given the symptoms and the sleep-related hypoxia would benefit from treatment discussed regarding weight loss, PAP machine as well as mandibular advancing appliance were discussed  She would like to try the auto CPAP  Need for compliance with the CPAP machine discussed  Cleaning of the supplies and change of PAP supplies discussed  Will send in for auto CPAP and have it set up and she will start using it any concerns she will give the office a call.  Caution against driving when sleepy.  Recommend weight loss she was following up with bariatric, does not want to go in for the surgery she states.  Lifestyle changes with dietary changes and exercise discussed  Follow-up in 3 months with a CPAP download compliance    Return in about 3 months (around 11/7/2024).  All questions are answered to the patient's satisfaction and understanding.  She verbalizes understanding.  She is encouraged to call with any further questions or concerns.    Portions of the record may  "have been created with voice recognition software.  Occasional wrong word or \"sound a like\" substitutions may have occurred due to the inherent limitations of voice recognition software.  Read the chart carefully and recognize, using context, where substitutions have occurred.a    Electronically Signed by Ncia Doll MD    ______________________________________________________________________    Chief Complaint:   Chief Complaint   Patient presents with    Sleep Apnea        Patient ID: Marilyn is a 43 y.o. y.o. female has a past medical history of Diverticulosis, DVT (deep venous thrombosis) ([facility]), DVT (deep venous thrombosis) (HCC), Endometriosis, Gastritis, Headache(784.0), Hereditary lymphedema of legs, Lymphedema of right lower extremity, Obesity, Class III, BMI 40-49.9 (morbid obesity) ([facility]), and Sleep apnea, obstructive.    8/7/2024  Patient presents today for initial visit.    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 but does go in twice a week into the office, her daily sleep schedule usually goes to bed at around 10 PM does not take long to fall asleep and is out of bed between 6 to 7 AM has 1-2 nocturnal awakenings once for nocturia and the other time between 3 to 4 AM she states she does wake up unknown reason but then she starts to have some racing thoughts she states usually does fall back asleep but sometimes cannot, when she wakes up in the morning she does have an headache but that is being treated she states following up with neurology it is better now, as the day progresses she is always tired and fatigued when there is a chance she states when she is working from home she does take a 1 hour nap in the afternoon after lunch no symptoms related to restless legs        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   Eyes: Negative.    Respiratory: Negative.          Snoring witnessed apneic spells choking and gasping for air   Cardiovascular: Negative.    Gastrointestinal: Negative.    Endocrine: Negative.    Genitourinary: Negative.    Allergic/Immunologic: Negative.    Neurological:  Positive for headaches.   Psychiatric/Behavioral:  Positive for sleep disturbance.        Social history: She reports that she has never smoked. She has never used smokeless tobacco. She reports that she does not currently use alcohol. She reports that she does not use drugs.    Past surgical history:   Past Surgical History:   Procedure Laterality Date    ABLATION COLPOCLESIS      ENDOMETRIAL ABLATION  2014    HYSTERECTOMY  2016    OOPHORECTOMY Bilateral 09/2016    OVARY SURGERY      TUBAL LIGATION      WISDOM TOOTH EXTRACTION       Family history:   Family History   Problem Relation Age of Onset    Clotting disorder Mother     Deep vein thrombosis Mother         Birth Control 1987    Pancreatic cancer Maternal Grandmother     Cancer Maternal Grandmother         Pancreatic    Diabetes Paternal Grandmother     Thyroid disease Paternal Grandmother     Hypertension Paternal Grandfather     Heart disease Paternal Grandfather     Stroke Maternal Aunt     Breast cancer Neg Hx     Colon cancer Neg Hx     Ovarian cancer Neg Hx     Cervical cancer Neg Hx     Uterine cancer Neg Hx          There is no immunization history on file for this patient.  Current Outpatient Medications   Medication Sig Dispense Refill    Bacillus Coagulans-Inulin (Probiotic) 1-250 BILLION-MG CAPS Take 1 capsule by mouth daily      Calcium Carbonate-Vit D-Min (Calcium 600 + Minerals) 600-200 MG-UNIT TABS Take 1 tablet by mouth daily      cholecalciferol (VITAMIN D3) 1,000 units tablet Take 1,000 Units by mouth daily      Cranberry 1000 MG CAPS Take 1 capsule by mouth daily      FLAXSEED, LINSEED, PO Take 1 capsule by mouth daily      Multiple Vitamin (multivitamin)  "capsule Take 1 capsule by mouth daily      omeprazole (PriLOSEC) 20 mg delayed release capsule Take 1 capsule (20 mg total) by mouth daily 30 capsule 1    rivaroxaban (Xarelto) 10 mg tablet Take 1 tablet (10 mg total) by mouth daily 90 tablet 1    hydroCHLOROthiazide 12.5 mg tablet Take 1 tablet (12.5 mg total) by mouth daily 90 tablet 0    LORazepam (ATIVAN) 1 mg tablet Take 1 tablet (1 mg total) by mouth once as needed for anxiety (prior to MRI) for up to 1 dose (Patient not taking: Reported on 7/15/2024) 1 tablet 0     Current Facility-Administered Medications   Medication Dose Route Frequency Provider Last Rate Last Admin    cyanocobalamin injection 1,000 mcg  1,000 mcg Intramuscular Q30 Days Lupis Mil, CRNP   1,000 mcg at 06/18/24 1435    cyanocobalamin injection 1,000 mcg  1,000 mcg Intramuscular Q30 Days Lupis Mil, CRNP   1,000 mcg at 03/16/23 1321    cyanocobalamin injection 1,000 mcg  1,000 mcg Intramuscular Q30 Days Lupis Mil, CRNP   1,000 mcg at 09/27/23 1310    cyanocobalamin injection 1,000 mcg  1,000 mcg Intramuscular Q30 Days    1,000 mcg at 05/14/24 1419     Allergies: Patient has no known allergies.    Objective:  Vitals:    08/07/24 1019   BP: 120/84   Pulse: 81   Temp: 98.3 °F (36.8 °C)   SpO2: 93%   Weight: (!) 158 kg (349 lb)   Height: 5' 6\" (1.676 m)   Oxygen Therapy  SpO2: 93 %  .  Wt Readings from Last 3 Encounters:   08/07/24 (!) 158 kg (349 lb)   05/14/24 (!) 159 kg (351 lb)   12/23/23 (!) 148 kg (327 lb)     Body mass index is 56.33 kg/m².    Physical Exam  Constitutional:       Appearance: She is well-developed.   HENT:      Head: Normocephalic and atraumatic.   Eyes:      Pupils: Pupils are equal, round, and reactive to light.   Neck:      Comments: Short and wide neck  Cardiovascular:      Rate and Rhythm: Normal rate and regular rhythm.      Heart sounds: Normal heart sounds.   Pulmonary:      Effort: Pulmonary effort is normal.      Breath sounds: Normal breath sounds. "   Musculoskeletal:         General: Normal range of motion.      Cervical back: Normal range of motion and neck supple.   Skin:     General: Skin is warm and dry.   Neurological:      Mental Status: She is alert and oriented to person, place, and time.   Psychiatric:         Behavior: Behavior normal.         Lab Review:   Appointment on 07/24/2024   Component Date Value    WBC 07/24/2024 6.53     RBC 07/24/2024 4.95     Hemoglobin 07/24/2024 13.6     Hematocrit 07/24/2024 43.0     MCV 07/24/2024 87     MCH 07/24/2024 27.5     MCHC 07/24/2024 31.6     RDW 07/24/2024 13.8     MPV 07/24/2024 9.6     Platelets 07/24/2024 308     nRBC 07/24/2024 0     Segmented % 07/24/2024 66     Immature Grans % 07/24/2024 0     Lymphocytes % 07/24/2024 26     Monocytes % 07/24/2024 5     Eosinophils Relative 07/24/2024 2     Basophils Relative 07/24/2024 1     Absolute Neutrophils 07/24/2024 4.30     Absolute Immature Grans 07/24/2024 0.02     Absolute Lymphocytes 07/24/2024 1.70     Absolute Monocytes 07/24/2024 0.35     Eosinophils Absolute 07/24/2024 0.11     Basophils Absolute 07/24/2024 0.05     Sodium 07/24/2024 139     Potassium 07/24/2024 4.3     Chloride 07/24/2024 102     CO2 07/24/2024 30     ANION GAP 07/24/2024 7     BUN 07/24/2024 10     Creatinine 07/24/2024 0.87     Glucose, Fasting 07/24/2024 88     Calcium 07/24/2024 10.0     AST 07/24/2024 14     ALT 07/24/2024 15     Alkaline Phosphatase 07/24/2024 92     Total Protein 07/24/2024 7.9     Albumin 07/24/2024 4.3     Total Bilirubin 07/24/2024 0.62     eGFR 07/24/2024 81     Cholesterol 07/24/2024 171     Triglycerides 07/24/2024 183 (H)     HDL, Direct 07/24/2024 49 (L)     LDL Calculated 07/24/2024 85     Non-HDL-Chol (CHOL-HDL) 07/24/2024 122     TSH 3RD GENERATON 07/24/2024 1.922     Vitamin B-12 07/24/2024 588     Vit D, 25-Hydroxy 07/24/2024 25.5 (L)        Diagnostics:  I have personally reviewed pertinent reports.    Home sleep study done on 6/12/2024:  significant for mild obstructive sleep apnea with an AHI of 7.9 and sleep-related hypoxia with oxygen saturation less than 90% for 64.3 minutes.  ESS: Total score: 10

## 2024-08-07 NOTE — PROGRESS NOTES
Adult Annual Physical  Name: Marilyn Andino      : 1980      MRN: [id number]  Encounter Provider: EYAD Ball  Encounter Date: 2024   Encounter department: [facility]    Assessment & Plan   1. Annual physical exam  Assessment & Plan:  Annual physical completed.  Up-to-date with blood work.  Mammogram ordered.  Patient has some anxiety, is trying to get a therapist.  Declined any pharmacological intervention.  Does take homeopathic medicine.  Continue with self-care.  2. Encounter for screening mammogram for breast cancer  -     Mammo screening bilateral w 3d & cad; Future; Expected date: 2024  3. Vitamin D deficiency  -     ergocalciferol (VITAMIN D2) 50,000 units; Take 1 capsule (50,000 Units total) by mouth 2 (two) times a week for 16 doses  4. Vitamin B 12 deficiency  -     cyanocobalamin injection 1,000 mcg  5. Morbid obesity with BMI of 50.0-59.9, adult (HCC)  Assessment & Plan:  Continues to have challenges with weight gain.  Continue with low calorie diet exercise as tolerated.  Patient recently diagnosed with sleep apnea reports will start CPAP machine then evaluate for other weight loss medications.  6. Other fatigue  Assessment & Plan:  Diagnosed with sleep apnea.  Has vitamin D deficiency.  Vitamin B12 given in office.  Continue with self-care continue good nutrition.  7. REGINA (obstructive sleep apnea)  Assessment & Plan:  Follows with sleep medicine.  Will start CPAP machine soon.  8. Lymphedema of right lower extremity  Assessment & Plan:  Patient has lymphedema.  Was using a lymphedema pump, pump was broken. Will reorder    Immunizations and preventive care screenings were discussed with patient today. Appropriate education was printed on patient's after visit summary.    Counseling:  Alcohol/drug use: discussed moderation in alcohol intake, the recommendations for healthy alcohol use, and avoidance of illicit drug use.  Dental Health: discussed  "importance of regular tooth brushing, flossing, and dental visits.  Injury prevention: discussed safety/seat belts, safety helmets, smoke detectors, carbon dioxide detectors, and smoking near bedding or upholstery.  Sexual health: discussed sexually transmitted diseases, partner selection, use of condoms, avoidance of unintended pregnancy, and contraceptive alternatives.  Exercise: the importance of regular exercise/physical activity was discussed. Recommend exercise 3-5 times per week for at least 30 minutes.          History of Present Illness     Adult Annual Physical:  Patient presents for annual physical.     Diet and Physical Activity:  - Diet/Nutrition: well balanced diet.  - Exercise: no formal exercise.    Depression Screening:  - PHQ-2 Score: 2    General Health:  - Sleep: sleeps poorly.  - Hearing: normal hearing bilateral ears.  - Vision: goes for regular eye exams and most recent eye exam > 1 year ago.  - Dental: no dental visits for > 1 year and brushes teeth twice daily.    /GYN Health:  - Follows with GYN: no.   - Contraception:. hysterectomy      Review of Systems   Constitutional: Negative.    HENT: Negative.     Eyes: Negative.    Respiratory: Negative.     Cardiovascular: Negative.    Gastrointestinal: Negative.    Endocrine: Negative.    Genitourinary: Negative.    Musculoskeletal: Negative.    Skin: Negative.    Allergic/Immunologic: Negative.    Neurological: Negative.    Psychiatric/Behavioral:  Positive for dysphoric mood and sleep disturbance. The patient is nervous/anxious.          Objective     /72   Pulse 96   Temp 97.8 °F (36.6 °C) (Temporal)   Resp 16   Ht 5' 6\" (1.676 m)   Wt (!) 159 kg (349 lb 9.6 oz)   SpO2 96%   BMI 56.43 kg/m²     Physical Exam  Constitutional:       General: She is not in acute distress.     Appearance: Normal appearance. She is obese. She is not ill-appearing.   HENT:      Head: Normocephalic and atraumatic.      Nose: Nose normal.      " Mouth/Throat:      Mouth: Mucous membranes are moist.   Eyes:      Pupils: Pupils are equal, round, and reactive to light.   Cardiovascular:      Rate and Rhythm: Regular rhythm. Tachycardia present.      Pulses: Normal pulses.   Pulmonary:      Effort: Pulmonary effort is normal. No respiratory distress.      Breath sounds: Normal breath sounds.   Chest:      Chest wall: No tenderness.   Abdominal:      General: Abdomen is flat. Bowel sounds are normal. There is no distension.      Palpations: There is no mass.      Tenderness: There is no abdominal tenderness.   Musculoskeletal:         General: Normal range of motion.      Cervical back: Normal range of motion and neck supple.   Skin:     General: Skin is warm and dry.   Neurological:      General: No focal deficit present.      Mental Status: She is alert and oriented to person, place, and time.   Psychiatric:         Mood and Affect: Mood normal.         Behavior: Behavior normal.         Thought Content: Thought content normal.         Judgment: Judgment normal.

## 2024-08-07 NOTE — PATIENT INSTRUCTIONS
"Patient Education     Routine physical for adults   The Basics   Written by the doctors and editors at [facility]   What is a physical? -- A physical is a routine visit, or \"check-up,\" with your doctor. You might also hear it called a \"wellness visit\" or \"preventive visit.\"  During each visit, the doctor will:   Ask about your physical and mental health   Ask about your habits, behaviors, and lifestyle   Do an exam   Give you vaccines if needed   Talk to you about any medicines you take   Give advice about your health   Answer your questions  Getting regular check-ups is an important part of taking care of your health. It can help your doctor find and treat any problems you have. But it's also important for preventing health problems.  A routine physical is different from a \"sick visit.\" A sick visit is when you see a doctor because of a health concern or problem. Since physicals are scheduled ahead of time, you can think about what you want to ask the doctor.  How often should I get a physical? -- It depends on your age and health. In general, for people age 21 years and older:   If you are younger than 50 years, you might be able to get a physical every 3 years.   If you are 50 years or older, your doctor might recommend a physical every year.  If you have an ongoing health condition, like diabetes or high blood pressure, your doctor will probably want to see you more often.  What happens during a physical? -- In general, each visit will include:   Physical exam - The doctor or nurse will check your height, weight, heart rate, and blood pressure. They will also look at your eyes and ears. They will ask about how you are feeling and whether you have any symptoms that bother you.   Medicines - It's a good idea to bring a list of all the medicines you take to each doctor visit. Your doctor will talk to you about your medicines and answer any questions. Tell them if you are having any side effects that bother you. You " "should also tell them if you are having trouble paying for any of your medicines.   Habits and behaviors - This includes:   Your diet   Your exercise habits   Whether you smoke, drink alcohol, or use drugs   Whether you are sexually active   Whether you feel safe at home  Your doctor will talk to you about things you can do to improve your health and lower your risk of health problems. They will also offer help and support. For example, if you want to quit smoking, they can give you advice and might prescribe medicines. If you want to improve your diet or get more physical activity, they can help you with this, too.   Lab tests, if needed - The tests you get will depend on your age and situation. For example, your doctor might want to check your:   Cholesterol   Blood sugar   Iron level   Vaccines - The recommended vaccines will depend on your age, health, and what vaccines you already had. Vaccines are very important because they can prevent certain serious or deadly infections.   Discussion of screening - \"Screening\" means checking for diseases or other health problems before they cause symptoms. Your doctor can recommend screening based on your age, risk, and preferences. This might include tests to check for:   Cancer, such as breast, prostate, cervical, ovarian, colorectal, prostate, lung, or skin cancer   Sexually transmitted infections, such as chlamydia and gonorrhea   Mental health conditions like depression and anxiety  Your doctor will talk to you about the different types of screening tests. They can help you decide which screenings to have. They can also explain what the results might mean.   Answering questions - The physical is a good time to ask the doctor or nurse questions about your health. If needed, they can refer you to other doctors or specialists, too.  Adults older than 65 years often need other care, too. As you get older, your doctor will talk to you about:   How to prevent falling at " home   Hearing or vision tests   Memory testing   How to take your medicines safely   Making sure that you have the help and support you need at home  All topics are updated as new evidence becomes available and our peer review process is complete.  This topic retrieved from [employer] on: May 02, 2024.  Topic 740554 Version 1.0  Release: 32.4.3 - C32.122  © 2024 UpToDate, Inc. and/or its affiliates. All rights reserved.  Consumer Information Use and Disclaimer   Disclaimer: This generalized information is a limited summary of diagnosis, treatment, and/or medication information. It is not meant to be comprehensive and should be used as a tool to help the user understand and/or assess potential diagnostic and treatment options. It does NOT include all information about conditions, treatments, medications, side effects, or risks that may apply to a specific patient. It is not intended to be medical advice or a substitute for the medical advice, diagnosis, or treatment of a health care provider based on the health care provider's examination and assessment of a patient's specific and unique circumstances. Patients must speak with a health care provider for complete information about their health, medical questions, and treatment options, including any risks or benefits regarding use of medications. This information does not endorse any treatments or medications as safe, effective, or approved for treating a specific patient. UpToDate, Inc. and its affiliates disclaim any warranty or liability relating to this information or the use thereof.The use of this information is governed by the Terms of Use, available at https://www.woltersThinkUpuwer.com/en/know/clinical-effectiveness-terms. 2024© UpToDate, Inc. and its affiliates and/or licensors. All rights reserved.  Copyright   © 2024 UpToDate, Inc. and/or its affiliates. All rights reserved.

## 2024-08-08 ENCOUNTER — OFFICE VISIT (OUTPATIENT)
Dept: PHYSICAL THERAPY | Facility: CLINIC | Age: 44
End: 2024-08-08
Payer: COMMERCIAL

## 2024-08-08 DIAGNOSIS — M54.2 CERVICALGIA: Primary | ICD-10-CM

## 2024-08-08 PROCEDURE — 97112 NEUROMUSCULAR REEDUCATION: CPT | Performed by: PHYSICAL THERAPIST

## 2024-08-08 PROCEDURE — 97140 MANUAL THERAPY 1/> REGIONS: CPT | Performed by: PHYSICAL THERAPIST

## 2024-08-08 NOTE — ASSESSMENT & PLAN NOTE
Continues to have challenges with weight gain.  Continue with low calorie diet exercise as tolerated.  Patient recently diagnosed with sleep apnea reports will start CPAP machine then evaluate for other weight loss medications.

## 2024-08-08 NOTE — ASSESSMENT & PLAN NOTE
Annual physical completed.  Up-to-date with blood work.  Mammogram ordered.  Patient has some anxiety, is trying to get a therapist.  Declined any pharmacological intervention.  Does take homeopathic medicine.  Continue with self-care.

## 2024-08-08 NOTE — PROGRESS NOTES
"Daily Note     Today's date: 2024  Patient name: Marilyn Andino  : 1980  MRN: [id number]  Referring provider: Socorro Anderson MD  Dx:   Encounter Diagnosis     ICD-10-CM    1. Cervicalgia  M54.2           Start Time: 1555  Stop Time: 1637  Total time in clinic (min): 42 minutes    Subjective: Pt reports current pain is 3-4/10, feels steady improvement in neck pain/mobility and headache frequency since starting therapy. Pt denies increase in pain after last session.       Objective: See treatment diary below      Assessment: Pt continues to progress well with manual interventions with good reduction in pain post manual interventions, overall improved tissue quality with bilateral UT but did report more tenderness along right UT today but improves with STM/stretching. Pt able to increase volume with DNF strengthening today but does exhibit more fatigue and decreased hold times due to fatigue after 3rd set, however denies increase in pain symptoms. Progressed scapular strengthening per chart with good tolerance but reliant on cues for proper scapular mechanics and eccentric control. HEP was updated and reviewed, will continue to progress next visit as symptoms allow.      Plan: Continue per plan of care.  Progress treatment as tolerated.       Diagnosis: Neck pain/headaches   Precautions: (-)   POC Expires: 24   Re-evaluation Date: 24   FOTO Scores/Date:    Visit Count 1/10 2/10 3/10 4/10 5/10   Manuals  8/ 8/   Cervical traction KD JET GERMAIN KD KD   SOR KD JET GERMAIN KD KD   Bilateral UT STM/stretch KD JET GERMAIN KD KD           Ther Ex  8/ 8/   Seated thoracic ext NV 3\" x10  3\" x15 hands behind head 15x3\"  15x3\"    Open book    5\"x10 B 10x5\" ea 10x5\" ea                                                   Pt Ed  FOTO provided  Updated  Updated    Neuro Re-Ed  8   UBE (retro) for postural re-ed/strength NV L1 x 5 mins L1 x5 mins L1 x 5 min NT   Deep neck " "flexor endurance NV   3x10-15\"  5x10-15\"   Prone scapular retraction/depression NV 10\"x10  10\"x10 10x10\" w/ face pillow 10x10\" w/ face pillow   Prone scapular retraction/depression w/ lift off NV  5\" x10 10x10\" w/ face pillow 10x10\" w/ face pillow   Rows/pulldowns NV GTB 2x10 ea GTB 2x10 ea  Grn 2x10 ea (try blue NV) Blue 2x10 ea   Bilateral ER   NV Grn 2x10  Grn 2x10   Bilateral horizontal abd     Grn 2x10                                 Ther Act                                                                                 Modalities                                                  "

## 2024-08-08 NOTE — ASSESSMENT & PLAN NOTE
Diagnosed with sleep apnea.  Has vitamin D deficiency.  Vitamin B12 given in office.  Continue with self-care continue good nutrition.

## 2024-08-12 ENCOUNTER — OFFICE VISIT (OUTPATIENT)
Dept: PHYSICAL THERAPY | Facility: CLINIC | Age: 44
End: 2024-08-12
Payer: COMMERCIAL

## 2024-08-12 DIAGNOSIS — M54.2 CERVICALGIA: Primary | ICD-10-CM

## 2024-08-12 PROCEDURE — 97112 NEUROMUSCULAR REEDUCATION: CPT

## 2024-08-12 PROCEDURE — 97140 MANUAL THERAPY 1/> REGIONS: CPT

## 2024-08-12 NOTE — PROGRESS NOTES
"Daily Note     Today's date: 2024  Patient name: Marilyn Andino  : 1980  MRN: [id number]  Referring provider: Socorro Anderson MD  Dx:   Encounter Diagnosis     ICD-10-CM    1. Cervicalgia  M54.2           Start Time: 1630  Stop Time: 1703  Total time in clinic (min): 33 minutes    Subjective: patient reports 4/10 coming into therapy.  Reports head ache related.  Reports increased lifting and carrying with watching of grandson.  Request not laying on belly and shortened session.        Objective: See treatment diary below      Assessment:  due to patient request prone scap/postural re-ed and further progression was held.  Reports no increase of baseline pain levels reporting improved cervical muscularity perceived tightness. Cueing on corrective t-band positioning and mechanics along with upper trap compensations      Plan: Continue per plan of care.  Progress treatment as tolerated.       Diagnosis: Neck pain/headaches   Precautions: (-)   POC Expires: 24   Re-evaluation Date: 24   FOTO Scores/Date:    Visit Count 6/10 2/10 3/10 4/10 5/10   Manuals    Cervical traction JET JET GERMIAN KD KD   SOR JET JET GERMAIN KD KD   Bilateral UT STM/stretch JET JET GROVES KD KD           Ther Ex    Seated thoracic ext 3\"x10  3\" x10  3\" x15 hands behind head 15x3\"  15x3\"    Open book  5\"x10 ea  5\"x10 B 10x5\" ea 10x5\" ea                                                   Pt Ed  FOTO provided  Updated  Updated    Neuro Re-Ed    UBE (retro) for postural re-ed/strength L1 x 5 mins  L1 x 5 mins L1 x5 mins L1 x 5 min NT   Deep neck flexor endurance 10\"-15\" x 5    3x10-15\"  5x10-15\"   Prone scapular retraction/depression NV 10\"x10  10\"x10 10x10\" w/ face pillow 10x10\" w/ face pillow   Prone scapular retraction/depression w/ lift off NV  5\" x10 10x10\" w/ face pillow 10x10\" w/ face pillow   Rows/pulldowns BTB 2x10 ea  GTB 2x10 ea GTB 2x10 ea  Grn 2x10 ea (try blue NV) " Blue 2x10 ea   Bilateral ER GTB 2x10   NV Grn 2x10  Grn 2x10   Bilateral horizontal abd GTB 2x10     Grn 2x10                                Ther Act                                                                              Modalities

## 2024-08-15 ENCOUNTER — APPOINTMENT (OUTPATIENT)
Dept: PHYSICAL THERAPY | Facility: CLINIC | Age: 44
End: 2024-08-15
Payer: COMMERCIAL

## 2024-08-19 ENCOUNTER — OFFICE VISIT (OUTPATIENT)
Dept: PHYSICAL THERAPY | Facility: CLINIC | Age: 44
End: 2024-08-19
Payer: COMMERCIAL

## 2024-08-19 DIAGNOSIS — M54.2 CERVICALGIA: Primary | ICD-10-CM

## 2024-08-19 PROCEDURE — 97112 NEUROMUSCULAR REEDUCATION: CPT | Performed by: PHYSICAL THERAPIST

## 2024-08-19 PROCEDURE — 97110 THERAPEUTIC EXERCISES: CPT | Performed by: PHYSICAL THERAPIST

## 2024-08-19 PROCEDURE — 97140 MANUAL THERAPY 1/> REGIONS: CPT | Performed by: PHYSICAL THERAPIST

## 2024-08-19 NOTE — PROGRESS NOTES
"Daily Note     Today's date: 2024  Patient name: Marilyn Andino  : 1980  MRN: [id number]  Referring provider: Socorro Anderson MD  Dx:   Encounter Diagnosis     ICD-10-CM    1. Cervicalgia  M54.2           Start Time: 1635  Stop Time: 1715  Total time in clinic (min): 40 minutes    Subjective: Pt reports overall good improvement in neck pain and headaches since enrolling in therapy, rates current pain 3/10.       Objective: See treatment diary below      Assessment: Pt continues to respond well to manual interventions with good pain reduction. Discharged prone scapular strengthening due to positional intolerance but was able to complete standing scapular strengthening without increase symptoms, however did transition from standing to supine positioning for bilateral horizontal abduction to improve tolerance as she c/o pain with this exercise during previous visits. Pt remains reliant on cues during scapular strengthening and DNF strengthening for proper form/sequencing, remains easily challenged/fatigued throughout. Pt reports decreased neck pain to 2/10 post tx, encouraged to continue with home exercises daily as symptoms allow.       Plan: Continue per plan of care.  Progress treatment as tolerated.       Diagnosis: Neck pain/headaches   Precautions: (-)   POC Expires: 24   Re-evaluation Date: 24   FOTO Scores/Date:    Visit Count 6/10 2/10 3/10 4/10 5/10   Manuals    Cervical traction JET KD GERMAIN KD KD   SOR JET KD GERMAIN KD KD   Bilateral UT STM/stretch JET KD GERMAIN KD KD           Ther Ex    Seated thoracic ext 3\"x10  3\" x10  3\" x15 hands behind head 15x3\"  15x3\"    Open book  5\"x10 ea 10x5\" ea 5\"x10 B 10x5\" ea 10x5\" ea                                                   Pt Ed  Updated   Updated  Updated    Neuro Re-Ed    UBE (retro) for postural re-ed/strength L1 x 5 mins  L1 x 5 mins L1 x5 mins L1 x 5 min NT   Deep neck flexor " "endurance 10\"-15\" x 5  5x10-15\"   3x10-15\"  5x10-15\"   Prone scapular retraction/depression NV DC 10\"x10 10x10\" w/ face pillow 10x10\" w/ face pillow   Prone scapular retraction/depression w/ lift off NV DC 5\" x10 10x10\" w/ face pillow 10x10\" w/ face pillow   Rows/pulldowns BTB 2x10 ea  Blue 2x10 ea GTB 2x10 ea  Grn 2x10 ea (try blue NV) Blue 2x10 ea   Bilateral ER GTB 2x10  Grn 2x10 NV Grn 2x10  Grn 2x10   Bilateral horizontal abd GTB 2x10  Grn 2x10 supine   Grn 2x10                                Ther Act                                                                              Modalities                                                     "

## 2024-08-21 ENCOUNTER — TELEPHONE (OUTPATIENT)
Age: 44
End: 2024-08-21

## 2024-08-21 ENCOUNTER — OFFICE VISIT (OUTPATIENT)
Dept: PHYSICAL THERAPY | Facility: CLINIC | Age: 44
End: 2024-08-21
Payer: COMMERCIAL

## 2024-08-21 DIAGNOSIS — M54.2 CERVICALGIA: Primary | ICD-10-CM

## 2024-08-21 PROCEDURE — 97140 MANUAL THERAPY 1/> REGIONS: CPT

## 2024-08-21 PROCEDURE — 97112 NEUROMUSCULAR REEDUCATION: CPT

## 2024-08-21 NOTE — PROGRESS NOTES
"Daily Note     Today's date: 2024  Patient name: Marilyn Andino  : 1980  MRN: [id number]  Referring provider: Socorro Anderson MD  Dx:   Encounter Diagnosis     ICD-10-CM    1. Cervicalgia  M54.2           Start Time: 1102  Stop Time: 1136  Total time in clinic (min): 34 minutes    Subjective: patient reports no new complaints or incidents.  Reports continued R sided cervical pain.  Denies headache      Objective: See treatment diary below      Assessment: patient was able to complete therapy without onset of reported headache or increase to baseline R sided cervical pain.  Required cueing throughout on sequencing of exercises along with corrective t-band mechanics to ensure proper muscular recruitment and avoid compensatory patterns.  Plan: Continue per plan of care.  Progress treatment as tolerated.       Diagnosis: Neck pain/headaches   Precautions: (-)   POC Expires: 24   Re-evaluation Date: 24   FOTO Scores/Date:    Visit Count 6/10 2/10 3/10 4/10 5/10   Manuals    Cervical traction JET KD JET KD KD   SOR JET KD JET KD KD   Bilateral UT STM/stretch JET KD JET KD KD           Ther Ex    Seated thoracic ext 3\"x10  3\" x10  3\"x10  15x3\"  15x3\"    Open book  5\"x10 ea 10x5\" ea 5\"x10 ea 10x5\" ea 10x5\" ea                                                   Pt Ed  Updated   Updated  Updated    Neuro Re-Ed    UBE (retro) for postural re-ed/strength L1 x 5 mins  L1 x 5 mins L1 x 5 mins L1 x 5 min NT   Deep neck flexor endurance 10\"-15\" x 5  5x10-15\"  10\"-15\" x5 3x10-15\"  5x10-15\"   Prone scapular retraction/depression NV DC  10x10\" w/ face pillow 10x10\" w/ face pillow   Prone scapular retraction/depression w/ lift off NV DC  10x10\" w/ face pillow 10x10\" w/ face pillow   Rows/pulldowns BTB 2x10 ea  Blue 2x10 ea Blue 2x10 ea  Grn 2x10 ea (try blue NV) Blue 2x10 ea   Bilateral ER GTB 2x10  Grn 2x10 GTB 2x10  Grn 2x10  Grn 2x10   Bilateral " horizontal abd GTB 2x10  Grn 2x10 supine Supine GTB 2x10  Grn 2x10                               Ther Act                                                                             Modalities

## 2024-08-21 NOTE — TELEPHONE ENCOUNTER
Patient called in following up on her CPAP . Its been 2 weeks and she has not heard from anyone . Please advise

## 2024-08-26 ENCOUNTER — OFFICE VISIT (OUTPATIENT)
Dept: PHYSICAL THERAPY | Facility: CLINIC | Age: 44
End: 2024-08-26
Payer: COMMERCIAL

## 2024-08-26 ENCOUNTER — TELEPHONE (OUTPATIENT)
Age: 44
End: 2024-08-26

## 2024-08-26 DIAGNOSIS — M54.2 CERVICALGIA: Primary | ICD-10-CM

## 2024-08-26 PROCEDURE — 97112 NEUROMUSCULAR REEDUCATION: CPT

## 2024-08-26 PROCEDURE — 97140 MANUAL THERAPY 1/> REGIONS: CPT

## 2024-08-26 NOTE — TELEPHONE ENCOUNTER
[employer] calling to verify CPAP supplies were ordered.  Reported to [employer] supplies were ordered on 8/21/24

## 2024-08-26 NOTE — PROGRESS NOTES
"Daily Note     Today's date: 2024  Patient name: Marilyn Andino  : 1980  MRN: [id number]  Referring provider: Socorro Anderson MD  Dx:   Encounter Diagnosis     ICD-10-CM    1. Cervicalgia  M54.2           Start Time: 1631  Stop Time: 1704  Total time in clinic (min): 33 minutes    Subjective: patient reports overall improvement.  Reports getting approx 3 to 4 days relief post therapy session.  Reports increased soreness with having her grand child over weekend      Objective: See treatment diary below      Assessment:  patient was able to complete therapy with out incident or onset of reported pain.  Small cues on corrective t-band positioning and mechanics but demonstrates good control without compensation      Plan: Continue per plan of care.  Progress treatment as tolerated.       Diagnosis: Neck pain/headaches   Precautions: (-)   POC Expires: 24   Re-evaluation Date: 24   FOTO Scores/Date:    Visit Count 6/10 2/10 3/10 4/10 5/10   Manuals    Cervical traction JET KD JET JET KD   SOR JET KD JET JET KD   Bilateral UT STM/stretch JET KD JET JET KD           Ther Ex    Seated thoracic ext 3\"x10  3\" x10  3\"x10  3\"x10  15x3\"    Open book  5\"x10 ea 10x5\" ea 5\"x10 ea 5\"x10 ea 10x5\" ea                                                   Pt Ed  Updated    Updated    Neuro Re-Ed    UBE (retro) for postural re-ed/strength L1 x 5 mins  L1 x 5 mins L1 x 5 mins L1 x 5 mins NT   Deep neck flexor endurance 10\"-15\" x 5  5x10-15\"  10\"-15\" x5 10\"-15\" x5 5x10-15\"   Prone scapular retraction/depression NV DC   10x10\" w/ face pillow   Prone scapular retraction/depression w/ lift off NV DC   10x10\" w/ face pillow   Rows/pulldowns BTB 2x10 ea  Blue 2x10 ea Blue 2x10 ea  BTB 2x10 ea  Blue 2x10 ea   Bilateral ER GTB 2x10  Grn 2x10 GTB 2x10  GTB 2x10  Grn 2x10   Bilateral horizontal abd GTB 2x10  Grn 2x10 supine Supine GTB 2x10 Supine GTB 2x10 Grn 2x10 "                              Ther Act                                                                             Modalities

## 2024-08-27 LAB

## 2024-08-29 ENCOUNTER — EVALUATION (OUTPATIENT)
Dept: PHYSICAL THERAPY | Facility: CLINIC | Age: 44
End: 2024-08-29
Payer: COMMERCIAL

## 2024-08-29 DIAGNOSIS — M54.2 CERVICALGIA: Primary | ICD-10-CM

## 2024-08-29 PROCEDURE — 97110 THERAPEUTIC EXERCISES: CPT | Performed by: PHYSICAL THERAPIST

## 2024-08-29 PROCEDURE — 97140 MANUAL THERAPY 1/> REGIONS: CPT | Performed by: PHYSICAL THERAPIST

## 2024-08-29 NOTE — PROGRESS NOTES
PT Discharge    Today's date: 2024  Patient name: Marilyn Andino  : 1980  MRN: [id number]  Referring provider: Socorro Anderson MD  Dx:   Encounter Diagnosis     ICD-10-CM    1. Cervicalgia  M54.2           Start Time: 1100  Stop Time: 1145  Total time in clinic (min): 45 minutes    Assessment  Impairments: abnormal or restricted ROM, activity intolerance, impaired physical strength, lacks appropriate home exercise program, pain with function, poor posture  and poor body mechanics    Assessment details: Pt has been seen for 10 visits and has made excellent subjective/objective improvements since enrolling in therapy with improved FOTO score from 65 to 96 since initial evaluation. Pt requests transition to HEP at this time as insurance benefits are resetting in September and has large deductible that she won't be able to afford. Pt encouraged to continue with HEP on regular basis per tolerance and was educated on how to progress/regress exercises per symptoms/tolerance. Pt is in agreement with plan.      Goals  ST weeks  Pt will restore full and pain-free cervical spine AROM to allow return to normal ADLs MET  Pt will demonstrate centralization of symptoms with repeated movements and/or traction of cervical spine MET  Pt will decrease neck pain to 3-4/10 with activity MET  Pt will demonstrate good understanding and compliance with HEP MET  Pt will demonstrate improved postural awareness and ability to self-correct without reliance on external cues MET    LT weeks  Pt will demonstrate abolished radicular symptoms for 2 weeks MET  Pt will decrease neck pain to 0-1/10 with activity NOT MET  Pt will exhibit proper lifting mechanics without reliance on external cues for correction of form  MET  Pt will be able to tolerate prolonged standing/sitting activities > 30 minutes without provocation of neck pain MET  Pt will improve FOTO score to > or = to expected result to indicate improved functional  abilities MET      Plan  Patient would benefit from: PT eval and skilled physical therapy  Planned modality interventions: cryotherapy and thermotherapy: hydrocollator packs    Planned therapy interventions: ADL training, activity modification, joint mobilization, manual therapy, body mechanics training, neuromuscular re-education, patient/caregiver education, postural training, self care, strengthening, stretching, therapeutic activities, therapeutic exercise, home exercise program, graded exercise, graded activity, functional ROM exercises and flexibility    Plan of Care beginning date: 7/22/2024  Plan of Care expiration date: 9/16/2024  Treatment plan discussed with: patient  Plan details: Discharge to [facility]        Subjective Evaluation    History of Present Illness  Mechanism of injury: DISCHARGE:   Pt has completed 10 visits to date with notable improvement in neck pain/function and decreased frequency/intensity of headaches. Pt reports that her insurance benefits are resetting and won't be able to afford continued therapy at this time and would like to transition to [facility] today.       IE:  Pt reports to PT with c/o right sided neck pain and headaches that started about a year ago when she hit her head. Pt reports having brain MRI which was negative and referred to PT. Pt feels her symptoms are about the same over the past year and not improving or worsening. Pt admits to infrequent tingling into right arm that can travel to her fingers, only occurs 2-3x a month. Pt finds relief with rest, massage, tylenol, epsom salt bath, ice/heat, biofreeze. Pt has increased pain with neck movements, prolonged sitting, working at desk, sleeping at night. Pt feels triggers for her headaches are stress but does note an association of neck pain and headaches. Pt does admit to sensitivity to light/sound. Pt denies having any treatment for this since onset.    Pt denies hx of cancer, unrelenting night pain, saddle anesthesia,  "unexplained weight loss, changes in B&B function, changes in balance/gait, recent fever, hx of IV drug use, prolonged corticosteroid use, hx of osteoporosis, recent trauma.    Pt denies nausea, dizziness, diplopia, drop attacks, difficulty with speech or swallowing.    Aggravating factors: neck movements, prolonged sitting, working at desk, sleeping at night    Easing Factors:  rest, massage, tylenol, epsom salt bath, ice/heat, biofreeze    PLOF:  Hx of sx for past year    PMH: (-)  Pain  Current pain ratin  At best pain ratin  At worst pain ratin  Quality: dull ache and throbbing      Diagnostic Tests  MRI studies: normal        Objective     General Comments:      Cervical/Thoracic Comments  Cervical AROM:   Flex: 50* (\"pulling/tight\")  Ext: 45*  Left ROT: 70* (tight)  Right ROT: 70* (pain/tight)  Left Side-Bendin* (tight)  Right Side-Bendin* (pain)    UE Dermatomes:  C2: Intact/symmetrical  C3: Intact/symmetrical  C4: Intact/symmetrical  C5: Intact/symmetrical  C6: Intact/symmetrical  C7: Intact/symmetrical  C8: Intact/symmetrical  T1: Intact/symmetrical  T2: Intact/symmetrical    UE Myotomes:  Cervical Lateral Flexion C3: Left 5/5, Right 5/5  Scapular Elevation C4: Left 5/5, Right 5/5  Shoulder Abduction C5: Left 5/5, Right 5/5  Elbow Flexion C6: Left 5/5, Right 5/5  Elbow Extension C7: Left 5/5, Right 5/5  Thumb Extension C8: Left 5/5, Right 5/5  Finger Abduction T1: Left 5/5, Right 5/5     Strength: Left 75#; Right 60#    Palpation: TTP about R>L UT, sub-occipital mm     FOTO: 96 (improved from 65 at IE)               Diagnosis: Neck pain/headaches   Precautions: (-)   POC Expires: 24   Re-evaluation Date: 24   FOTO Scores/Date: -;  -    Visit Count 6/10 2/10 3/10 4/10 5/10   Manuals    Cervical traction JETNANCY VASQUEZ JD, JD KD   SOR JETNANCY VASQUEZ JD, JD KD   Bilateral UT STM/stretch JET VASQUEZ           Ther Ex    Seated " "thoracic ext 3\"x10  3\" x10  3\"x10  3\"x10     Open book  5\"x10 ea 10x5\" ea 5\"x10 ea 5\"x10 ea                                                    Pt Ed  Updated    Re-assessed c-spine and FOTO; HEP creation/instruction, educated on importance of continued compliance with HEP and how to progress/regress as needed    Neuro Re-Ed 8/12 8/19 8/21 8/26 8/29   UBE (retro) for postural re-ed/strength L1 x 5 mins  L1 x 5 mins L1 x 5 mins L1 x 5 mins L1 x 5 min   Deep neck flexor endurance 10\"-15\" x 5  5x10-15\"  10\"-15\" x5 10\"-15\" x5    Prone scapular retraction/depression NV DC      Prone scapular retraction/depression w/ lift off NV DC      Rows/pulldowns BTB 2x10 ea  Blue 2x10 ea Blue 2x10 ea  BTB 2x10 ea     Bilateral ER GTB 2x10  Grn 2x10 GTB 2x10  GTB 2x10     Bilateral horizontal abd GTB 2x10  Grn 2x10 supine Supine GTB 2x10 Supine GTB 2x10                               Ther Act                                                                             Modalities                                           "

## 2024-09-03 LAB
DME PARACHUTE DELIVERY DATE ACTUAL: NORMAL
DME PARACHUTE DELIVERY DATE EXPECTED: NORMAL
DME PARACHUTE DELIVERY DATE REQUESTED: NORMAL
DME PARACHUTE ITEM DESCRIPTION: NORMAL
DME PARACHUTE ORDER STATUS: NORMAL
DME PARACHUTE SUPPLIER NAME: NORMAL
DME PARACHUTE SUPPLIER PHONE: NORMAL

## 2024-09-12 ENCOUNTER — CLINICAL SUPPORT (OUTPATIENT)
Dept: FAMILY MEDICINE CLINIC | Facility: CLINIC | Age: 44
End: 2024-09-12
Payer: COMMERCIAL

## 2024-09-12 DIAGNOSIS — E53.8 VITAMIN B 12 DEFICIENCY: Primary | ICD-10-CM

## 2024-09-12 PROCEDURE — 96372 THER/PROPH/DIAG INJ SC/IM: CPT

## 2024-09-12 RX ADMIN — CYANOCOBALAMIN 1000 MCG: 1000 INJECTION, SOLUTION INTRAMUSCULAR; SUBCUTANEOUS at 14:42

## 2024-10-25 DIAGNOSIS — Z86.718 HISTORY OF DVT (DEEP VEIN THROMBOSIS): ICD-10-CM

## 2024-10-25 DIAGNOSIS — Z79.01 CURRENT USE OF LONG TERM ANTICOAGULATION: ICD-10-CM

## 2024-12-05 DIAGNOSIS — I89.0 LYMPHEDEMA OF RIGHT LOWER EXTREMITY: ICD-10-CM

## 2024-12-05 RX ORDER — HYDROCHLOROTHIAZIDE 12.5 MG/1
12.5 TABLET ORAL DAILY
Qty: 90 TABLET | Refills: 0 | Status: SHIPPED | OUTPATIENT
Start: 2024-12-05 | End: 2025-03-05

## 2024-12-20 DIAGNOSIS — Z79.01 CURRENT USE OF LONG TERM ANTICOAGULATION: ICD-10-CM

## 2024-12-20 DIAGNOSIS — Z86.718 HISTORY OF DVT (DEEP VEIN THROMBOSIS): ICD-10-CM

## 2025-01-07 DIAGNOSIS — Z79.01 CURRENT USE OF LONG TERM ANTICOAGULATION: ICD-10-CM

## 2025-01-07 DIAGNOSIS — Z86.718 HISTORY OF DVT (DEEP VEIN THROMBOSIS): ICD-10-CM

## 2025-01-09 DIAGNOSIS — I89.0 LYMPHEDEMA OF RIGHT LOWER EXTREMITY: ICD-10-CM

## 2025-01-10 RX ORDER — HYDROCHLOROTHIAZIDE 12.5 MG/1
12.5 TABLET ORAL DAILY
Qty: 90 TABLET | Refills: 1 | Status: SHIPPED | OUTPATIENT
Start: 2025-01-10 | End: 2025-07-09

## 2025-01-17 ENCOUNTER — OFFICE VISIT (OUTPATIENT)
Age: 45
End: 2025-01-17
Payer: COMMERCIAL

## 2025-01-17 VITALS
SYSTOLIC BLOOD PRESSURE: 128 MMHG | BODY MASS INDEX: 47.09 KG/M2 | TEMPERATURE: 98.3 F | RESPIRATION RATE: 16 BRPM | DIASTOLIC BLOOD PRESSURE: 82 MMHG | HEART RATE: 99 BPM | WEIGHT: 293 LBS | OXYGEN SATURATION: 95 % | HEIGHT: 66 IN

## 2025-01-17 DIAGNOSIS — E66.01 MORBID OBESITY (HCC): ICD-10-CM

## 2025-01-17 DIAGNOSIS — G47.33 OSA ON CPAP: Primary | ICD-10-CM

## 2025-01-17 PROCEDURE — 99214 OFFICE O/P EST MOD 30 MIN: CPT | Performed by: INTERNAL MEDICINE

## 2025-01-17 NOTE — ASSESSMENT & PLAN NOTE
Mild obstructive sleep apnea with an AHI of 7.9 on auto CPAP  Although she is having some issues with the mask initially currently better also having some issues with the humidifier she is going to talk to [facility]/[employer]  Reviewed the CPAP download compliance with 62% compliance with still has to increase for greater than 4 hours  Discussed the need for compliance with the CPAP machine and also discussed regarding increasing it for more than 4 hours understands and verbalizes  She does states that she has decreased nocturnal awakenings and does feel well rested when he is she is up in the morning also she does not have the headaches when she wakes up in the morning she states.  Cleaning of the supplies and change of PAP supplies discussed  Cautioned against driving when sleepy.  Orders:    PAP DME Resupply/Reorder

## 2025-01-17 NOTE — ASSESSMENT & PLAN NOTE
Recommend weight loss she states she was in a meal plan  Last visit was discussed regarding bariatric referral which she was not inclined to by this time she states she is inclined to go to medical weight management program for which I have referred    Orders:    Ambulatory Referral to Weight Management; Future

## 2025-01-17 NOTE — PROGRESS NOTES
Name: Marilyn Andino      : 1980      MRN: [id number]  Encounter Provider: Nica Doll MD  Encounter Date: 2025   Encounter department: [facility] PULMONARY [facility]  :  Assessment & Plan  [place] on CPAP  Mild obstructive sleep apnea with an AHI of 7.9 on auto CPAP  Although she is having some issues with the mask initially currently better also having some issues with the humidifier she [facility]  Reviewed the CPAP download compliance with 62% compliance with still has to increase for greater than 4 hours  Discussed the need for compliance with the CPAP machine and also discussed regarding increasing it for more than 4 hours understands and verbalizes  She does states that she has decreased nocturnal awakenings and does feel well rested when he is she is up in the morning also she does not have the headaches when she wakes up in the morning she states.  Cleaning of the supplies and change of PAP supplies discussed  Cautioned against driving when sleepy.  Orders:    PAP DME Resupply/Reorder    Morbid obesity (HCC)  Recommend weight loss she states she was in a meal plan  Last visit was discussed regarding bariatric referral which she was not inclined to by this time she states she is inclined to go to medical weight management program for which I have referred    Orders:    Ambulatory Referral to Weight Management; Future        History of Present Illness   Marilyn Andino is a 44 y.o. female who presents for follow-up with prior history of mild obstructive sleep apnea with an AHI of 7.9 on auto CPAP    Review of Systems   Constitutional: Negative.  Negative for appetite change and fever.   HENT:  Positive for ear pain, postnasal drip, rhinorrhea, sneezing and sore throat. Negative for trouble swallowing.    Eyes: Negative.    Respiratory:  Positive for cough and wheezing.    Cardiovascular: Negative.  Negative for chest pain.   Gastrointestinal:  Negative.    Endocrine: Negative.    Genitourinary: Negative.    Musculoskeletal: Negative.  Negative for myalgias.   Allergic/Immunologic: Negative.    Neurological: Negative.  Negative for headaches.   Hematological: Negative.    Psychiatric/Behavioral: Negative.       Medical History Reviewed by provider this encounter:  Meds  Problems     .  Current Outpatient Medications on File Prior to Visit   Medication Sig Dispense Refill    Bacillus Coagulans-Inulin (Probiotic) 1-250 BILLION-MG CAPS Take 1 capsule by mouth daily      cholecalciferol (VITAMIN D3) 1,000 units tablet Take 1,000 Units by mouth daily      Cranberry 1000 MG CAPS Take 1 capsule by mouth daily      FLAXSEED, LINSEED, PO Take 1 capsule by mouth daily      hydroCHLOROthiazide 12.5 mg tablet Take 1 tablet (12.5 mg total) by mouth daily 90 tablet 1    Multiple Vitamin (multivitamin) capsule Take 1 capsule by mouth daily      omeprazole (PriLOSEC) 20 mg delayed release capsule Take 1 capsule (20 mg total) by mouth daily 30 capsule 1    rivaroxaban (Xarelto) 10 mg tablet Take 1 tablet (10 mg total) by mouth daily 90 tablet 1    Calcium Carbonate-Vit D-Min (Calcium 600 + Minerals) 600-200 MG-UNIT TABS Take 1 tablet by mouth daily      ergocalciferol (VITAMIN D2) 50,000 units Take 1 capsule (50,000 Units total) by mouth 2 (two) times a week for 16 doses 16 capsule 0    LORazepam (ATIVAN) 1 mg tablet Take 1 tablet (1 mg total) by mouth once as needed for anxiety (prior to MRI) for up to 1 dose (Patient not taking: Reported on 7/15/2024) 1 tablet 0     Current Facility-Administered Medications on File Prior to Visit   Medication Dose Route Frequency Provider Last Rate Last Admin    cyanocobalamin injection 1,000 mcg  1,000 mcg Intramuscular Q30 Days    1,000 mcg at 09/12/24 1442      Social History     Tobacco Use    Smoking status: Never    Smokeless tobacco: Never   Vaping Use    Vaping status: Never Used   Substance and Sexual Activity    Alcohol use:  "Never     Comment: extremely rare once a year    Drug use: Never    Sexual activity: Yes     Partners: Male     Birth control/protection: Post-menopausal     Comment: pain         Historical Information   Tobacco History: never      Objective   /82 (BP Location: Left arm, Patient Position: Sitting, Cuff Size: Large)   Pulse 99   Temp 98.3 °F (36.8 °C) (Oral)   Resp 16   Ht 5' 6\" (1.676 m)   Wt (!) 161 kg (355 lb)   SpO2 95%   BMI 57.30 kg/m²      Physical Exam  Constitutional:       Appearance: She is well-developed.   HENT:      Head: Normocephalic and atraumatic.   Eyes:      Pupils: Pupils are equal, round, and reactive to light.   Neck:      Comments: Short and wide neck  Cardiovascular:      Rate and Rhythm: Normal rate and regular rhythm.      Heart sounds: Normal heart sounds.   Pulmonary:      Effort: Pulmonary effort is normal.      Breath sounds: Normal breath sounds.   Musculoskeletal:         General: Normal range of motion.      Cervical back: Normal range of motion and neck supple.   Skin:     General: Skin is warm and dry.   Neurological:      Mental Status: She is alert and oriented to person, place, and time.   Psychiatric:         Behavior: Behavior normal.           Lab Results: I have reviewed pertinent labs.    Radiology Results Review : No pertinent imaging studies reviewed.  Other Study Results: Other Study Results Review : No additional pertinent studies reviewed.        Answers submitted by the patient for this visit:  Pulmonology Questionnaire (Submitted on 1/16/2025)  Chief Complaint: Primary symptoms  Do you experience frequent throat clearing?: Yes  Chronicity: chronic  When did you first notice your symptoms?: more than 1 year ago  How often do your symptoms occur?: daily  Since you first noticed this problem, how has it changed?: unchanged  Do you have shortness of breath that occurs with effort or exertion?: Yes  Do you have ear congestion?: Yes  Do you have heartburn?: " No  Do you have fatigue?: No  Do you have nasal congestion?: Yes  Do you have shortness of breath when lying flat?: No  Do you have shortness of breath when you wake up?: No  Do you have sweats?: No  Have you experienced weight loss?: No  Which of the following makes your symptoms worse?: nothing  Which of the following makes your symptoms better?: nothing

## 2025-01-24 LAB

## 2025-02-14 ENCOUNTER — HOSPITAL ENCOUNTER (OUTPATIENT)
Dept: MAMMOGRAPHY | Facility: CLINIC | Age: 45
End: 2025-02-14
Payer: COMMERCIAL

## 2025-02-14 DIAGNOSIS — Z12.31 ENCOUNTER FOR SCREENING MAMMOGRAM FOR BREAST CANCER: ICD-10-CM

## 2025-02-14 PROCEDURE — 77067 SCR MAMMO BI INCL CAD: CPT

## 2025-02-14 PROCEDURE — 77063 BREAST TOMOSYNTHESIS BI: CPT

## 2025-02-19 ENCOUNTER — RESULTS FOLLOW-UP (OUTPATIENT)
Dept: FAMILY MEDICINE CLINIC | Facility: CLINIC | Age: 45
End: 2025-02-19

## 2025-02-19 DIAGNOSIS — E55.9 HYPOVITAMINOSIS D: ICD-10-CM

## 2025-02-19 DIAGNOSIS — Z00.00 HEALTHCARE MAINTENANCE: ICD-10-CM

## 2025-02-19 DIAGNOSIS — E66.01 MORBID OBESITY (HCC): Primary | ICD-10-CM

## 2025-02-19 DIAGNOSIS — R63.5 ABNORMAL WEIGHT GAIN: ICD-10-CM

## 2025-02-19 DIAGNOSIS — R53.83 OTHER FATIGUE: ICD-10-CM

## 2025-02-19 DIAGNOSIS — E78.2 MIXED HYPERLIPIDEMIA: ICD-10-CM

## 2025-02-27 ENCOUNTER — APPOINTMENT (OUTPATIENT)
Dept: LAB | Facility: CLINIC | Age: 45
End: 2025-02-27
Payer: COMMERCIAL

## 2025-02-27 DIAGNOSIS — E55.9 HYPOVITAMINOSIS D: ICD-10-CM

## 2025-02-27 DIAGNOSIS — E78.2 MIXED HYPERLIPIDEMIA: ICD-10-CM

## 2025-02-27 DIAGNOSIS — R53.83 OTHER FATIGUE: ICD-10-CM

## 2025-02-27 DIAGNOSIS — Z00.00 HEALTHCARE MAINTENANCE: ICD-10-CM

## 2025-02-27 DIAGNOSIS — R63.5 ABNORMAL WEIGHT GAIN: ICD-10-CM

## 2025-02-27 DIAGNOSIS — E66.01 MORBID OBESITY (HCC): ICD-10-CM

## 2025-02-27 LAB
25(OH)D3 SERPL-MCNC: 30.5 NG/ML (ref 30–100)
ALBUMIN SERPL BCG-MCNC: 4.1 G/DL (ref 3.5–5)
ALP SERPL-CCNC: 96 U/L (ref 34–104)
ALT SERPL W P-5'-P-CCNC: 18 U/L (ref 7–52)
ANION GAP SERPL CALCULATED.3IONS-SCNC: 11 MMOL/L (ref 4–13)
AST SERPL W P-5'-P-CCNC: 18 U/L (ref 13–39)
BASOPHILS # BLD AUTO: 0.05 THOUSANDS/ÂΜL (ref 0–0.1)
BASOPHILS NFR BLD AUTO: 1 % (ref 0–1)
BILIRUB SERPL-MCNC: 0.46 MG/DL (ref 0.2–1)
BUN SERPL-MCNC: 13 MG/DL (ref 5–25)
CALCIUM SERPL-MCNC: 9.6 MG/DL (ref 8.4–10.2)
CHLORIDE SERPL-SCNC: 102 MMOL/L (ref 96–108)
CHOLEST SERPL-MCNC: 172 MG/DL (ref ?–200)
CO2 SERPL-SCNC: 26 MMOL/L (ref 21–32)
CORTIS AM PEAK SERPL-MCNC: 10.9 UG/DL (ref 6.7–22.6)
CREAT SERPL-MCNC: 0.76 MG/DL (ref 0.6–1.3)
EOSINOPHIL # BLD AUTO: 0.36 THOUSAND/ÂΜL (ref 0–0.61)
EOSINOPHIL NFR BLD AUTO: 6 % (ref 0–6)
ERYTHROCYTE [DISTWIDTH] IN BLOOD BY AUTOMATED COUNT: 14.5 % (ref 11.6–15.1)
GFR SERPL CREATININE-BSD FRML MDRD: 95 ML/MIN/1.73SQ M
GLUCOSE P FAST SERPL-MCNC: 98 MG/DL (ref 65–99)
HCT VFR BLD AUTO: 44.4 % (ref 34.8–46.1)
HDLC SERPL-MCNC: 50 MG/DL
HGB BLD-MCNC: 13.5 G/DL (ref 11.5–15.4)
IMM GRANULOCYTES # BLD AUTO: 0.03 THOUSAND/UL (ref 0–0.2)
IMM GRANULOCYTES NFR BLD AUTO: 1 % (ref 0–2)
INSULIN SERPL-ACNC: 17.38 UIU/ML (ref 1.9–23)
LDLC SERPL CALC-MCNC: 96 MG/DL (ref 0–100)
LYMPHOCYTES # BLD AUTO: 1.52 THOUSANDS/ÂΜL (ref 0.6–4.47)
LYMPHOCYTES NFR BLD AUTO: 24 % (ref 14–44)
MCH RBC QN AUTO: 27.2 PG (ref 26.8–34.3)
MCHC RBC AUTO-ENTMCNC: 30.4 G/DL (ref 31.4–37.4)
MCV RBC AUTO: 89 FL (ref 82–98)
MONOCYTES # BLD AUTO: 0.4 THOUSAND/ÂΜL (ref 0.17–1.22)
MONOCYTES NFR BLD AUTO: 6 % (ref 4–12)
NEUTROPHILS # BLD AUTO: 3.88 THOUSANDS/ÂΜL (ref 1.85–7.62)
NEUTS SEG NFR BLD AUTO: 62 % (ref 43–75)
NONHDLC SERPL-MCNC: 122 MG/DL
NRBC BLD AUTO-RTO: 0 /100 WBCS
PLATELET # BLD AUTO: 324 THOUSANDS/UL (ref 149–390)
PMV BLD AUTO: 10.2 FL (ref 8.9–12.7)
POTASSIUM SERPL-SCNC: 4.2 MMOL/L (ref 3.5–5.3)
PROT SERPL-MCNC: 7.4 G/DL (ref 6.4–8.4)
RBC # BLD AUTO: 4.97 MILLION/UL (ref 3.81–5.12)
SODIUM SERPL-SCNC: 139 MMOL/L (ref 135–147)
TRIGL SERPL-MCNC: 132 MG/DL (ref ?–150)
TSH SERPL DL<=0.05 MIU/L-ACNC: 3.47 UIU/ML (ref 0.45–4.5)
WBC # BLD AUTO: 6.24 THOUSAND/UL (ref 4.31–10.16)

## 2025-02-27 PROCEDURE — 83525 ASSAY OF INSULIN: CPT

## 2025-02-27 PROCEDURE — 84443 ASSAY THYROID STIM HORMONE: CPT

## 2025-02-27 PROCEDURE — 80061 LIPID PANEL: CPT

## 2025-02-27 PROCEDURE — 80053 COMPREHEN METABOLIC PANEL: CPT

## 2025-02-27 PROCEDURE — 82533 TOTAL CORTISOL: CPT

## 2025-02-27 PROCEDURE — 36415 COLL VENOUS BLD VENIPUNCTURE: CPT

## 2025-02-27 PROCEDURE — 82306 VITAMIN D 25 HYDROXY: CPT

## 2025-02-27 PROCEDURE — 82024 ASSAY OF ACTH: CPT

## 2025-02-27 PROCEDURE — 85025 COMPLETE CBC W/AUTO DIFF WBC: CPT

## 2025-02-28 LAB — ACTH PLAS-MCNC: 9.3 PG/ML (ref 7.2–63.3)

## 2025-03-05 ENCOUNTER — RESULTS FOLLOW-UP (OUTPATIENT)
Dept: FAMILY MEDICINE CLINIC | Facility: CLINIC | Age: 45
End: 2025-03-05

## 2025-03-06 ENCOUNTER — CLINICAL SUPPORT (OUTPATIENT)
Dept: FAMILY MEDICINE CLINIC | Facility: CLINIC | Age: 45
End: 2025-03-06
Payer: COMMERCIAL

## 2025-03-06 DIAGNOSIS — E53.8 VITAMIN B12 DEFICIENCY: Primary | ICD-10-CM

## 2025-03-06 PROCEDURE — 96372 THER/PROPH/DIAG INJ SC/IM: CPT

## 2025-03-14 RX ADMIN — CYANOCOBALAMIN 1000 MCG: 1000 INJECTION, SOLUTION INTRAMUSCULAR; SUBCUTANEOUS at 09:03

## 2025-03-19 ENCOUNTER — TELEPHONE (OUTPATIENT)
Age: 45
End: 2025-03-19

## 2025-03-19 NOTE — TELEPHONE ENCOUNTER
Left message for pt to schedule recommended fu with patrick or rajeev for July   please schedule pt

## 2025-04-07 ENCOUNTER — TELEPHONE (OUTPATIENT)
Age: 45
End: 2025-04-07

## 2025-04-07 NOTE — TELEPHONE ENCOUNTER
Left message for pt to reschedule pulm appt with dr corral due to provider not being in office - please reschedule pt with partick/rajeev

## 2025-04-07 NOTE — TELEPHONE ENCOUNTER
Patient is calling to notify us that the note piece for her CPAP makes her nose wet and it causes her to breathe in water. Patient was advised it should not feel or be like that. Patient thought we were going to order a full mouth and nose set for her. She never received a call from the DME and was unsure of what to do.      Please advise.

## 2025-04-08 DIAGNOSIS — G47.33 OSA (OBSTRUCTIVE SLEEP APNEA): Primary | ICD-10-CM

## 2025-04-10 ENCOUNTER — CLINICAL SUPPORT (OUTPATIENT)
Dept: FAMILY MEDICINE CLINIC | Facility: CLINIC | Age: 45
End: 2025-04-10
Payer: COMMERCIAL

## 2025-04-10 DIAGNOSIS — E53.8 VITAMIN B12 DEFICIENCY: Primary | ICD-10-CM

## 2025-04-10 LAB
DME PARACHUTE DELIVERY DATE REQUESTED: NORMAL
DME PARACHUTE ITEM DESCRIPTION: NORMAL
DME PARACHUTE ITEM DESCRIPTION: NORMAL
DME PARACHUTE ORDER STATUS: NORMAL
DME PARACHUTE SUPPLIER NAME: NORMAL
DME PARACHUTE SUPPLIER PHONE: NORMAL

## 2025-04-10 PROCEDURE — 96372 THER/PROPH/DIAG INJ SC/IM: CPT | Performed by: NURSE PRACTITIONER

## 2025-04-10 RX ADMIN — CYANOCOBALAMIN 1000 MCG: 1000 INJECTION, SOLUTION INTRAMUSCULAR; SUBCUTANEOUS at 11:14

## 2025-05-28 DIAGNOSIS — I89.0 LYMPHEDEMA OF RIGHT LOWER EXTREMITY: ICD-10-CM

## 2025-05-28 DIAGNOSIS — K21.9 GASTROESOPHAGEAL REFLUX DISEASE WITHOUT ESOPHAGITIS: ICD-10-CM

## 2025-05-29 ENCOUNTER — APPOINTMENT (EMERGENCY)
Dept: CT IMAGING | Facility: HOSPITAL | Age: 45
End: 2025-05-29
Payer: COMMERCIAL

## 2025-05-29 ENCOUNTER — APPOINTMENT (EMERGENCY)
Dept: RADIOLOGY | Facility: HOSPITAL | Age: 45
End: 2025-05-29
Payer: COMMERCIAL

## 2025-05-29 ENCOUNTER — HOSPITAL ENCOUNTER (EMERGENCY)
Facility: HOSPITAL | Age: 45
Discharge: HOME | End: 2025-05-29
Attending: EMERGENCY MEDICINE | Admitting: EMERGENCY MEDICINE
Payer: COMMERCIAL

## 2025-05-29 VITALS
DIASTOLIC BLOOD PRESSURE: 49 MMHG | WEIGHT: 293 LBS | HEART RATE: 78 BPM | OXYGEN SATURATION: 96 % | RESPIRATION RATE: 22 BRPM | BODY MASS INDEX: 57.3 KG/M2 | SYSTOLIC BLOOD PRESSURE: 122 MMHG | TEMPERATURE: 98.5 F

## 2025-05-29 DIAGNOSIS — R07.9 CHEST PAIN: Primary | ICD-10-CM

## 2025-05-29 DIAGNOSIS — I89.0 LYMPHEDEMA OF RIGHT LOWER EXTREMITY: ICD-10-CM

## 2025-05-29 LAB
ALBUMIN SERPL BCG-MCNC: 4 G/DL (ref 3.5–5)
ALP SERPL-CCNC: 89 U/L (ref 34–104)
ALT SERPL W P-5'-P-CCNC: 10 U/L (ref 7–52)
ANION GAP SERPL CALCULATED.3IONS-SCNC: 7 MMOL/L (ref 4–13)
APTT PPP: 25 SECONDS (ref 23–34)
AST SERPL W P-5'-P-CCNC: 28 U/L (ref 13–39)
ATRIAL RATE: 68 BPM
ATRIAL RATE: 70 BPM
ATRIAL RATE: 79 BPM
BASOPHILS # BLD AUTO: 0.06 THOUSANDS/ÂΜL (ref 0–0.1)
BASOPHILS NFR BLD AUTO: 1 % (ref 0–1)
BILIRUB SERPL-MCNC: 0.57 MG/DL (ref 0.2–1)
BUN SERPL-MCNC: 9 MG/DL (ref 5–25)
CALCIUM SERPL-MCNC: 9.1 MG/DL (ref 8.4–10.2)
CARDIAC TROPONIN I PNL SERPL HS: <2 NG/L (ref ?–50)
CARDIAC TROPONIN I PNL SERPL HS: <2 NG/L (ref ?–50)
CHLORIDE SERPL-SCNC: 105 MMOL/L (ref 96–108)
CO2 SERPL-SCNC: 26 MMOL/L (ref 21–32)
CREAT SERPL-MCNC: 0.76 MG/DL (ref 0.6–1.3)
EOSINOPHIL # BLD AUTO: 0.24 THOUSAND/ÂΜL (ref 0–0.61)
EOSINOPHIL NFR BLD AUTO: 3 % (ref 0–6)
ERYTHROCYTE [DISTWIDTH] IN BLOOD BY AUTOMATED COUNT: 14.6 % (ref 11.6–15.1)
GFR SERPL CREATININE-BSD FRML MDRD: 95 ML/MIN/1.73SQ M
GLUCOSE SERPL-MCNC: 94 MG/DL (ref 65–140)
HCG SERPL QL: NEGATIVE
HCT VFR BLD AUTO: 43.5 % (ref 34.8–46.1)
HGB BLD-MCNC: 13.5 G/DL (ref 11.5–15.4)
IMM GRANULOCYTES # BLD AUTO: 0.02 THOUSAND/UL (ref 0–0.2)
IMM GRANULOCYTES NFR BLD AUTO: 0 % (ref 0–2)
INR PPP: 0.92 (ref 0.85–1.19)
LYMPHOCYTES # BLD AUTO: 2.03 THOUSANDS/ÂΜL (ref 0.6–4.47)
LYMPHOCYTES NFR BLD AUTO: 29 % (ref 14–44)
MAGNESIUM SERPL-MCNC: 2.2 MG/DL (ref 1.9–2.7)
MCH RBC QN AUTO: 27.3 PG (ref 26.8–34.3)
MCHC RBC AUTO-ENTMCNC: 31 G/DL (ref 31.4–37.4)
MCV RBC AUTO: 88 FL (ref 82–98)
MONOCYTES # BLD AUTO: 0.51 THOUSAND/ÂΜL (ref 0.17–1.22)
MONOCYTES NFR BLD AUTO: 7 % (ref 4–12)
NEUTROPHILS # BLD AUTO: 4.1 THOUSANDS/ÂΜL (ref 1.85–7.62)
NEUTS SEG NFR BLD AUTO: 60 % (ref 43–75)
NRBC BLD AUTO-RTO: 0 /100 WBCS
P AXIS: 65 DEGREES
P AXIS: 67 DEGREES
P AXIS: 89 DEGREES
PLATELET # BLD AUTO: 302 THOUSANDS/UL (ref 149–390)
PMV BLD AUTO: 9.9 FL (ref 8.9–12.7)
POTASSIUM SERPL-SCNC: 5.1 MMOL/L (ref 3.5–5.3)
PR INTERVAL: 144 MS
PR INTERVAL: 148 MS
PR INTERVAL: 154 MS
PROT SERPL-MCNC: 7.5 G/DL (ref 6.4–8.4)
PROTHROMBIN TIME: 13.1 SECONDS (ref 12.3–15)
QRS AXIS: 11 DEGREES
QRS AXIS: 2 DEGREES
QRS AXIS: 7 DEGREES
QRSD INTERVAL: 78 MS
QRSD INTERVAL: 84 MS
QRSD INTERVAL: 86 MS
QT INTERVAL: 366 MS
QT INTERVAL: 380 MS
QT INTERVAL: 396 MS
QTC INTERVAL: 410 MS
QTC INTERVAL: 419 MS
QTC INTERVAL: 421 MS
RBC # BLD AUTO: 4.94 MILLION/UL (ref 3.81–5.12)
SODIUM SERPL-SCNC: 138 MMOL/L (ref 135–147)
T WAVE AXIS: 54 DEGREES
T WAVE AXIS: 65 DEGREES
T WAVE AXIS: 73 DEGREES
VENTRICULAR RATE: 68 BPM
VENTRICULAR RATE: 70 BPM
VENTRICULAR RATE: 79 BPM
WBC # BLD AUTO: 6.96 THOUSAND/UL (ref 4.31–10.16)

## 2025-05-29 PROCEDURE — 99285 EMERGENCY DEPT VISIT HI MDM: CPT | Performed by: EMERGENCY MEDICINE

## 2025-05-29 PROCEDURE — 84703 CHORIONIC GONADOTROPIN ASSAY: CPT | Performed by: EMERGENCY MEDICINE

## 2025-05-29 PROCEDURE — 85730 THROMBOPLASTIN TIME PARTIAL: CPT | Performed by: EMERGENCY MEDICINE

## 2025-05-29 PROCEDURE — 71046 X-RAY EXAM CHEST 2 VIEWS: CPT

## 2025-05-29 PROCEDURE — 80053 COMPREHEN METABOLIC PANEL: CPT

## 2025-05-29 PROCEDURE — 83735 ASSAY OF MAGNESIUM: CPT | Performed by: EMERGENCY MEDICINE

## 2025-05-29 PROCEDURE — 99285 EMERGENCY DEPT VISIT HI MDM: CPT

## 2025-05-29 PROCEDURE — 93010 ELECTROCARDIOGRAM REPORT: CPT | Performed by: INTERNAL MEDICINE

## 2025-05-29 PROCEDURE — 36415 COLL VENOUS BLD VENIPUNCTURE: CPT

## 2025-05-29 PROCEDURE — 85025 COMPLETE CBC W/AUTO DIFF WBC: CPT

## 2025-05-29 PROCEDURE — 93005 ELECTROCARDIOGRAM TRACING: CPT

## 2025-05-29 PROCEDURE — 85610 PROTHROMBIN TIME: CPT | Performed by: EMERGENCY MEDICINE

## 2025-05-29 PROCEDURE — 84484 ASSAY OF TROPONIN QUANT: CPT

## 2025-05-29 PROCEDURE — 71275 CT ANGIOGRAPHY CHEST: CPT

## 2025-05-29 RX ORDER — HYDROCHLOROTHIAZIDE 12.5 MG/1
12.5 TABLET ORAL DAILY
Qty: 30 TABLET | Refills: 0 | Status: SHIPPED | OUTPATIENT
Start: 2025-05-29 | End: 2025-05-30

## 2025-05-29 RX ORDER — OMEPRAZOLE 20 MG/1
20 CAPSULE, DELAYED RELEASE ORAL DAILY
Qty: 30 CAPSULE | Refills: 0 | Status: SHIPPED | OUTPATIENT
Start: 2025-05-29

## 2025-05-29 RX ADMIN — IOHEXOL 85 ML: 350 INJECTION, SOLUTION INTRAVENOUS at 08:33

## 2025-05-29 NOTE — ED PROVIDER NOTES
Time reflects when diagnosis was documented in both MDM as applicable and the Disposition within this note       Time User Action Codes Description Comment    5/29/2025 10:48 AM Katiana Simon Add [R07.9] Chest pain           ED Disposition       ED Disposition   Discharge    Condition   Stable    Date/Time   Thu May 29, 2025 10:48 AM    Comment   Marilyn Andino discharge to home/self care.                   Assessment & Plan       Medical Decision Making  45 y/o female with chest pain- will get labs, cxr, trop/EKG, and reassess. Will also get ct scan to r/o PE.     Patient re-evaluated and feels improved. Instructed to follow up with PCP and pulmonology in regards to ct findings. Return precautions given     Amount and/or Complexity of Data Reviewed  Labs: ordered.  Radiology: ordered.    Risk  Prescription drug management.             Medications   iohexol (OMNIPAQUE) 350 MG/ML injection (MULTI-DOSE) 85 mL (85 mL Intravenous Given 5/29/25 0833)       ED Risk Strat Scores      HEART Risk Score      Flowsheet Row Most Recent Value   Heart Score Risk Calculator    History 0 Filed at: 05/29/2025 1023   ECG 0 Filed at: 05/29/2025 1023   Age 0 Filed at: 05/29/2025 1023   Risk Factors 0 Filed at: 05/29/2025 1023   Troponin 0 Filed at: 05/29/2025 1023   HEART Score 0 Filed at: 05/29/2025 1023                      No data recorded        SBIRT 20yo+      Flowsheet Row Most Recent Value   Initial Alcohol Screen: US AUDIT-C     1. How often do you have a drink containing alcohol? 0 Filed at: 05/29/2025 0941   2. How many drinks containing alcohol do you have on a typical day you are drinking?  0 Filed at: 05/29/2025 0941   3a. Male UNDER 65: How often do you have five or more drinks on one occasion? 0 Filed at: 05/29/2025 0941   3b. FEMALE Any Age, or MALE 65+: How often do you have 4 or more drinks on one occassion? 0 Filed at: 05/29/2025 0941   Audit-C Score 0 Filed at: 05/29/2025 0941   KWABENA: How many times in the past  "year have you...    Used an illegal drug or used a prescription medication for non-medical reasons? Never Filed at: 05/29/2025 0928                            History of Present Illness       Chief Complaint   Patient presents with    Chest Pain     Pt reports dull pain in center of chest and intermittent heaviness and tingling in left arm. Slight SOB.       Past Medical History[1]   Past Surgical History[2]   Family History[3]   Social History[4]   E-Cigarette/Vaping    E-Cigarette Use Never User       E-Cigarette/Vaping Substances    Nicotine No     THC No     CBD No     Flavoring No     Other No     Unknown No       I have reviewed and agree with the history as documented.     43 y/o female presents to the ED for chest pain x 4 days. States that she has had constant heaviness and ache in the L side of her chest that radiates to her L arm. States that she has paresthesias in her L arm and it feels \"heavy.\" Denies any other neuro complaints. Has associated sob. Chest pain is worse with certain positions. She states that she has a hx of DVT and is on xarelto but over the last few months she has been taking it every other day because she is also on a thyroid supplement that is a blood thinner so she was told to alternate these 2 medications. She denies any n/v, abd pain, or back pain. No hx of cardiac dz. No family hx of cardiac dz. No other complaints.       History provided by:  Patient  Chest Pain  Pain location:  L chest  Pain quality: aching    Pain quality comment:  Heaviness  Pain radiates to:  L arm  Pain radiates to the back: no    Pain severity:  Mild  Onset quality:  Sudden  Timing:  Constant  Progression:  Waxing and waning  Chronicity:  New  Relieved by:  None tried  Worsened by:  Nothing tried  Ineffective treatments:  None tried  Associated symptoms: shortness of breath    Associated symptoms: no abdominal pain, no back pain, no cough, no fever, no headache, no nausea, no numbness, not vomiting and no " weakness        Review of Systems   Constitutional:  Negative for chills and fever.   HENT:  Negative for congestion, ear pain and sore throat.    Eyes:  Negative for pain and visual disturbance.   Respiratory:  Positive for shortness of breath. Negative for cough and wheezing.    Cardiovascular:  Positive for chest pain. Negative for leg swelling.   Gastrointestinal:  Negative for abdominal pain, diarrhea, nausea and vomiting.   Genitourinary:  Negative for dysuria, frequency, hematuria and urgency.   Musculoskeletal:  Negative for back pain, neck pain and neck stiffness.   Skin:  Negative for rash and wound.   Neurological:  Negative for weakness, numbness and headaches.   Psychiatric/Behavioral:  Negative for agitation and confusion.    All other systems reviewed and are negative.          Objective       ED Triage Vitals   Temperature Pulse Blood Pressure Respirations SpO2 Patient Position - Orthostatic VS   05/29/25 0736 05/29/25 0737 05/29/25 0737 05/29/25 0737 05/29/25 0736 05/29/25 0736   98.5 °F (36.9 °C) 75 141/91 19 99 % Lying      Temp Source Heart Rate Source BP Location FiO2 (%) Pain Score    05/29/25 0736 05/29/25 0737 05/29/25 0736 -- --    Oral Monitor Right arm        Vitals      Date and Time Temp Pulse SpO2 Resp BP Pain Score FACES Pain Rating User   05/29/25 1100 -- 78 96 % 22 122/49 -- -- LM   05/29/25 1050 -- 67 99 % 18 109/78 -- -- RG   05/29/25 0900 -- 67 96 % 11 114/55 -- -- LM   05/29/25 0845 -- 73 96 % 15 123/90 -- -- LM   05/29/25 0737 -- 75 99 % 19 141/91 -- -- LF   05/29/25 0736 98.5 °F (36.9 °C) -- 99 % -- -- -- -- LF            Physical Exam  Vitals and nursing note reviewed.   Constitutional:       Appearance: She is well-developed.   HENT:      Head: Normocephalic and atraumatic.     Eyes:      Pupils: Pupils are equal, round, and reactive to light.       Cardiovascular:      Rate and Rhythm: Normal rate and regular rhythm.   Pulmonary:      Effort: Pulmonary effort is normal.       Breath sounds: Normal breath sounds.   Abdominal:      General: Bowel sounds are normal.      Palpations: Abdomen is soft.     Musculoskeletal:         General: Normal range of motion.      Cervical back: Normal range of motion and neck supple.     Skin:     General: Skin is warm and dry.     Neurological:      General: No focal deficit present.      Mental Status: She is alert and oriented to person, place, and time.      Comments: No focal deficits       Results Reviewed       Procedure Component Value Units Date/Time    HS Troponin I 2hr [[id number]] Collected: 05/29/25 0941    Lab Status: Final result Specimen: Blood from Arm, Left Updated: 05/29/25 1008     hs TnI 2hr <2 ng/L      Delta 2hr hsTnI --    hCG, qualitative pregnancy [[id number]]  (Normal) Collected: 05/29/25 0759    Lab Status: Final result Specimen: Blood Updated: 05/29/25 0908     Preg, Serum Negative    Magnesium [[id number]]  (Normal) Collected: 05/29/25 0759    Lab Status: Final result Specimen: Blood Updated: 05/29/25 0837     Magnesium 2.2 mg/dL     Comprehensive metabolic panel [[id number]] Collected: 05/29/25 0739    Lab Status: Final result Specimen: Blood from Arm, Right Updated: 05/29/25 0818     Sodium 138 mmol/L      Potassium 5.1 mmol/L      Chloride 105 mmol/L      CO2 26 mmol/L      ANION GAP 7 mmol/L      BUN 9 mg/dL      Creatinine 0.76 mg/dL      Glucose 94 mg/dL      Calcium 9.1 mg/dL      AST 28 U/L      ALT 10 U/L      Alkaline Phosphatase 89 U/L      Total Protein 7.5 g/dL      Albumin 4.0 g/dL      Total Bilirubin 0.57 mg/dL      eGFR 95 ml/min/1.73sq m     Narrative:      National Kid[employer] Foundation guidelines for Chronic Kidney Disease (CKD):     Stage 1 with normal or high GFR (GFR > 90 mL/min/1.73 square meters)    Stage 2 Mild CKD (GFR = 60-89 mL/min/1.73 square meters)    Stage 3A Moderate CKD (GFR = 45-59 mL/min/1.73 square meters)    Stage 3B Moderate CKD (GFR = 30-44 mL/min/1.73 square meters)    Stage 4 Severe  CKD (GFR = 15-29 mL/min/1.73 square meters)    Stage 5 End Stage CKD (GFR <15 mL/min/1.73 square meters)  Note: GFR calculation is accurate only with a steady state creatinine    Protime-INR [[id number]]  (Normal) Collected: 05/29/25 0739    Lab Status: Final result Specimen: Blood from Arm, Right Updated: 05/29/25 0815     Protime 13.1 seconds      INR 0.92    Narrative:      INR Therapeutic Range    Indication                                             INR Range      Atrial Fibrillation                                               2.0-3.0  Hypercoagulable State                                    2.0.2.3  Left Ventricular Asist Device                            2.0-3.0  Mechanical Heart Valve                                  -    Aortic(with afib, MI, embolism, HF, LA enlargement,    and/or coagulopathy)                                     2.0-3.0 (2.5-3.5)     Mitral                                                             2.5-3.5  Prosthetic/Bioprosthetic Heart Valve               2.0-3.0  Venous thromboembolism (VTE: VT, PE        2.0-3.0    APTT [[id number]]  (Normal) Collected: 05/29/25 0739    Lab Status: Final result Specimen: Blood from Arm, Right Updated: 05/29/25 0815     PTT 25 seconds     HS Troponin 0hr (reflex protocol) [[id number]]  (Normal) Collected: 05/29/25 0739    Lab Status: Final result Specimen: Blood from Arm, Right Updated: 05/29/25 0811     hs TnI 0hr <2 ng/L     CBC and differential [[id number]]  (Abnormal) Collected: 05/29/25 0739    Lab Status: Final result Specimen: Blood from Arm, Right Updated: 05/29/25 0750     WBC 6.96 Thousand/uL      RBC 4.94 Million/uL      Hemoglobin 13.5 g/dL      Hematocrit 43.5 %      MCV 88 fL      MCH 27.3 pg      MCHC 31.0 g/dL      RDW 14.6 %      MPV 9.9 fL      Platelets 302 Thousands/uL      nRBC 0 /100 WBCs      Segmented % 60 %      Immature Grans % 0 %      Lymphocytes % 29 %      Monocytes % 7 %      Eosinophils Relative 3 %      Basophils  Relative 1 %      Absolute Neutrophils 4.10 Thousands/µL      Absolute Immature Grans 0.02 Thousand/uL      Absolute Lymphocytes 2.03 Thousands/µL      Absolute Monocytes 0.51 Thousand/µL      Eosinophils Absolute 0.24 Thousand/µL      Basophils Absolute 0.06 Thousands/µL             XR chest 2 views   Final Interpretation by Trupti Epps MD (05/29 0839)   No acute consolidation or congestion   Stable chest x-ray. Mild hyperinflation            Workstation performed: [id number]         CTA chest pe study   Final Interpretation by Little Looney MD (05/29 0932)      Negative for acute pulmonary thromboembolism.      Mild bronchial wall thickening in the lower lobes with fluid/debris in some of the lower lobe bronchioles. No consolidation.                  Computerized Assisted Algorithm (CAA) may have aided analysis of applicable images. In the ER K      Workstation performed: [id number]             ECG 12 Lead Documentation Only    Date/Time: 5/29/2025 9:39 AM    Performed by: Katiana Simon DO  Authorized by: Katiana Simon DO    Indications / Diagnosis:  Chest pain  Patient location:  ED  Rate:     ECG rate:  70    ECG rate assessment: normal    Rhythm:     Rhythm: sinus rhythm    Ectopy:     Ectopy: none    QRS:     QRS axis:  Normal    QRS intervals:  Normal  ST segments:     ST segments:  Normal  T waves:     T waves: normal        ED Medication and Procedure Management   Prior to Admission Medications   Prescriptions Last Dose Informant Patient Reported? Taking?   Bacillus Coagulans-Inulin (Probiotic) 1-250 BILLION-MG CAPS  Self Yes No   Sig: Take 1 capsule by mouth daily   Calcium Carbonate-Vit D-Min (Calcium 600 + Minerals) 600-200 MG-UNIT TABS  Self Yes No   Sig: Take 1 tablet by mouth daily   Cranberry 1000 MG CAPS  Self Yes No   Sig: Take 1 capsule by mouth daily   FLAXSEED, LINSEED, PO  Self Yes No   Sig: Take 1 capsule by mouth daily   LORazepam (ATIVAN) 1 mg tablet   No No   Sig: Take 1  tablet (1 mg total) by mouth once as needed for anxiety (prior to MRI) for up to 1 dose   Patient not taking: Reported on 7/15/2024   Multiple Vitamin (multivitamin) capsule  Self Yes No   Sig: Take 1 capsule by mouth daily   cholecalciferol (VITAMIN D3) 1,000 units tablet  Self Yes No   Sig: Take 1,000 Units by mouth daily   ergocalciferol (VITAMIN D2) 50,000 units   No No   Sig: Take 1 capsule (50,000 Units total) by mouth 2 (two) times a week for 16 doses   hydroCHLOROthiazide 12.5 mg tablet   No No   Sig: Take 1 tablet (12.5 mg total) by mouth daily   omeprazole (PriLOSEC) 20 mg delayed release capsule   No No   Sig: Take 1 capsule (20 mg total) by mouth daily   rivaroxaban (Xarelto) 10 mg tablet   No No   Sig: Take 1 tablet (10 mg total) by mouth daily      Facility-Administered Medications Last Administration Doses Remaining   cyanocobalamin injection 1,000 mcg 4/10/2025 11:14 AM         Discharge Medication List as of 5/29/2025 10:56 AM        CONTINUE these medications which have NOT CHANGED    Details   Bacillus Coagulans-Inulin (Probiotic) 1-250 BILLION-MG CAPS Take 1 capsule by mouth daily, Historical Med      Calcium Carbonate-Vit D-Min (Calcium 600 + Minerals) 600-200 MG-UNIT TABS Take 1 tablet by mouth daily, Historical Med      cholecalciferol (VITAMIN D3) 1,000 units tablet Take 1,000 Units by mouth daily, Historical Med      Cranberry 1000 MG CAPS Take 1 capsule by mouth daily, Historical Med      ergocalciferol (VITAMIN D2) 50,000 units Take 1 capsule (50,000 Units total) by mouth 2 (two) times a week for 16 doses, Starting Thu 8/8/2024, Until Tue 10/1/2024, Normal      FLAXSEED, LINSEED, PO Take 1 capsule by mouth daily, Historical Med      hydroCHLOROthiazide 12.5 mg tablet Take 1 tablet (12.5 mg total) by mouth daily, Starting Fri 1/10/2025, Until Wed 7/9/2025, Normal      LORazepam (ATIVAN) 1 mg tablet Take 1 tablet (1 mg total) by mouth once as needed for anxiety (prior to MRI) for up to 1  dose, Starting Tue 4/30/2024, Normal      Multiple Vitamin (multivitamin) capsule Take 1 capsule by mouth daily, Historical Med      omeprazole (PriLOSEC) 20 mg delayed release capsule Take 1 capsule (20 mg total) by mouth daily, Starting Tue 5/14/2024, Normal      rivaroxaban (Xarelto) 10 mg tablet Take 1 tablet (10 mg total) by mouth daily, Starting Tue 1/7/2025, Normal           No discharge procedures on file.  ED SEPSIS DOCUMENTATION   Time reflects when diagnosis was documented in both MDM as applicable and the Disposition within this note       Time User Action Codes Description Comment    5/29/2025 10:48 AM Katiana Simon Add [R07.9] Chest pain                      [1]   Past Medical History:  Diagnosis Date    Diverticulosis     DVT (deep venous thrombosis) (HCC)     DVT (deep venous thrombosis) (HCC)     Endometriosis     Gastritis     Headache(784.0)     Hereditary lymphedema of legs     Lymphedema of right lower extremity     Obesity, Class III, BMI 40-49.9 (morbid obesity)     Sleep apnea, obstructive    [2]   Past Surgical History:  Procedure Laterality Date    ABLATION COLPOCLESIS      ENDOMETRIAL ABLATION  2014    HYSTERECTOMY  2016    OOPHORECTOMY Bilateral 09/2016    OVARY SURGERY      TUBAL LIGATION      WISDOM TOOTH EXTRACTION     [3]   Family History  Problem Relation Name Age of Onset    Clotting disorder Mother Kim Theodore     Deep vein thrombosis Mother Kim Theodore         Birth Control 1987    Pancreatic cancer Maternal Grandmother Rosalina     Cancer Maternal Grandmother Rosalina         Pancreatic    Diabetes Paternal Grandmother Elizabeth     Thyroid disease Paternal Grandmother Elizabeth     Hypertension Paternal Grandfather Nathan     Heart disease Paternal Grandfather Nathan     Stroke Maternal Aunt      Clotting disorder Maternal Aunt Kym     Breast cancer Neg Hx      Colon cancer Neg Hx      Ovarian cancer Neg Hx      Cervical cancer Neg Hx      Uterine cancer Neg Hx     [4]   Social  History  Tobacco Use    Smoking status: Never    Smokeless tobacco: Never   Vaping Use    Vaping status: Never Used   Substance Use Topics    Alcohol use: Never     Comment: extremely rare once a year    Drug use: Never        Katiana Simon DO  05/29/25 1146

## 2025-05-30 ENCOUNTER — VBI (OUTPATIENT)
Dept: FAMILY MEDICINE CLINIC | Facility: CLINIC | Age: 45
End: 2025-05-30

## 2025-05-30 RX ORDER — HYDROCHLOROTHIAZIDE 12.5 MG/1
12.5 TABLET ORAL DAILY
Qty: 30 TABLET | Refills: 0 | Status: SHIPPED | OUTPATIENT
Start: 2025-05-30

## 2025-05-30 NOTE — TELEPHONE ENCOUNTER
05/30/25 10:29 AM    Patient contacted post ED visit, first outreach attempt made. Message was left for patient to return a call to the VBI Department at [facility]: Phone [phone].    Thank you.  Nydia Zhou MA  [facility]

## 2025-05-30 NOTE — TELEPHONE ENCOUNTER
05/30/25 12:20 PM    Patient contacted post ED visit, VBI department spoke with patient/caregiver and outreach was successful.    Thank you.  Nydia Zhou MA  [facility]

## 2025-06-03 ENCOUNTER — OFFICE VISIT (OUTPATIENT)
Dept: FAMILY MEDICINE CLINIC | Facility: CLINIC | Age: 45
End: 2025-06-03
Payer: COMMERCIAL

## 2025-06-03 VITALS
SYSTOLIC BLOOD PRESSURE: 100 MMHG | HEART RATE: 108 BPM | TEMPERATURE: 98.3 F | BODY MASS INDEX: 47.09 KG/M2 | RESPIRATION RATE: 18 BRPM | WEIGHT: 293 LBS | OXYGEN SATURATION: 98 % | HEIGHT: 66 IN | DIASTOLIC BLOOD PRESSURE: 80 MMHG

## 2025-06-03 DIAGNOSIS — I89.0 LYMPHEDEMA: ICD-10-CM

## 2025-06-03 DIAGNOSIS — E53.8 VITAMIN B 12 DEFICIENCY: Primary | ICD-10-CM

## 2025-06-03 DIAGNOSIS — E66.01 MORBID OBESITY WITH BMI OF 50.0-59.9, ADULT (HCC): ICD-10-CM

## 2025-06-03 DIAGNOSIS — E55.9 HYPOVITAMINOSIS D: ICD-10-CM

## 2025-06-03 DIAGNOSIS — Z00.00 HEALTHCARE MAINTENANCE: ICD-10-CM

## 2025-06-03 DIAGNOSIS — J40 BRONCHIAL IRRITATION: ICD-10-CM

## 2025-06-03 PROCEDURE — 99214 OFFICE O/P EST MOD 30 MIN: CPT | Performed by: NURSE PRACTITIONER

## 2025-06-03 PROCEDURE — 96372 THER/PROPH/DIAG INJ SC/IM: CPT | Performed by: NURSE PRACTITIONER

## 2025-06-03 RX ORDER — CYANOCOBALAMIN 1000 UG/ML
1000 INJECTION, SOLUTION INTRAMUSCULAR; SUBCUTANEOUS ONCE
Status: COMPLETED | OUTPATIENT
Start: 2025-06-03 | End: 2025-06-03

## 2025-06-03 RX ADMIN — CYANOCOBALAMIN 1000 MCG: 1000 INJECTION, SOLUTION INTRAMUSCULAR; SUBCUTANEOUS at 17:16

## 2025-06-03 NOTE — ASSESSMENT & PLAN NOTE
Patient is morbidly obese.  Has comorbidities of lymphedema sleep apnea.  Referral made to weight management  Orders:    Ambulatory Referral to Weight Management; Future

## 2025-06-03 NOTE — ASSESSMENT & PLAN NOTE
Patient has ongoing history of lymphedema.  Had a lymphedema pump that is broken and unable to get a new one.  Several referrals was made to get a new lymphedema pump to know resolved.  Referral made to physical therapy for treatment  Orders:    Ambulatory Referral to Physical Therapy; Future

## 2025-06-03 NOTE — PROGRESS NOTES
Name: Marilyn Andino      : 1980      MRN: [id number]  Encounter Provider: EYAD Ball  Encounter Date: 6/3/2025   Encounter department: [facility]IMARY [facility]  :  Assessment & Plan  Bronchial irritation  Patient was seen in the emergency room.  Had a CTA done showed-Mild bronchial wall thickening in the lower lobes with fluid/debris in some of the lower lobe bronchioles   Has had an ongoing cough for more than a few years that has not been resolving.  Had multiple treatments of steroids nonpharmacological intervention.  Referral made to pulmonology for management  Orders:    Ambulatory Referral to Pulmonology; Future    Vitamin B 12 deficiency    Orders:    cyanocobalamin injection 1,000 mcg    Morbid obesity with BMI of 50.0-59.9, adult (HCC)    Patient is morbidly obese.  Has comorbidities of lymphedema sleep apnea.  Referral made to weight management  Orders:    Ambulatory Referral to Weight Management; Future    Lymphedema  Patient has ongoing history of lymphedema.  Had a lymphedema pump that is broken and unable to get a new one.  Several referrals was made to get a new lymphedema pump to know resolved.  Referral made to physical therapy for treatment  Orders:    Ambulatory Referral to Physical Therapy; Future    Hypovitaminosis D    Orders:    Vitamin D 25 hydroxy; Future    Healthcare maintenance    Orders:    CBC and differential; Future    Comprehensive metabolic panel; Future    Lipid panel; Future    TSH, 3rd generation with Free T4 reflex; Future          Depression Screening and Follow-up Plan: Patient was screened for depression during today's encounter. They screened negative with a PHQ-2 score of 0.        History of Present Illness   Patient has been seen for a follow-up after going to the emergency room.  Her chest pain cardiac was negative.  However there was some abnormal diagnostic test on lungs.  Patient has been having ongoing cough for more than a few  "years.      Review of Systems   Constitutional: Negative.    HENT: Negative.     Eyes: Negative.    Respiratory:  Positive for cough.    Cardiovascular:  Positive for leg swelling.   Gastrointestinal: Negative.    Endocrine: Negative.    Genitourinary: Negative.    Musculoskeletal: Negative.    Skin: Negative.    Allergic/Immunologic: Negative.    Neurological: Negative.    Psychiatric/Behavioral: Negative.         Objective   /80 (BP Location: Left arm, Patient Position: Sitting, Cuff Size: Extra-Large)   Pulse (!) 108   Temp 98.3 °F (36.8 °C) (Temporal)   Resp 18   Ht 5' 6\" (1.676 m)   Wt (!) 162 kg (357 lb)   SpO2 98%   BMI 57.62 kg/m²      Physical Exam  Constitutional:       General: She is not in acute distress.     Appearance: Normal appearance. She is obese. She is not ill-appearing.   HENT:      Head: Normocephalic and atraumatic.      Nose: Nose normal.      Mouth/Throat:      Mouth: Mucous membranes are moist.     Cardiovascular:      Rate and Rhythm: Regular rhythm. Tachycardia present.      Pulses: Normal pulses.      Heart sounds: Normal heart sounds.   Pulmonary:      Effort: Pulmonary effort is normal. No respiratory distress.      Breath sounds: Normal breath sounds.   Chest:      Chest wall: No tenderness.   Abdominal:      General: Bowel sounds are normal.     Musculoskeletal:         General: Swelling and tenderness present. Normal range of motion.      Cervical back: Normal range of motion and neck supple.     Skin:     General: Skin is warm and dry.     Neurological:      General: No focal deficit present.      Mental Status: She is alert and oriented to person, place, and time.     Psychiatric:         Mood and Affect: Mood normal.         Behavior: Behavior normal.         Thought Content: Thought content normal.         Judgment: Judgment normal.         "

## 2025-06-12 ENCOUNTER — OFFICE VISIT (OUTPATIENT)
Dept: BARIATRICS | Facility: CLINIC | Age: 45
End: 2025-06-12
Payer: COMMERCIAL

## 2025-06-12 VITALS
WEIGHT: 293 LBS | BODY MASS INDEX: 47.09 KG/M2 | RESPIRATION RATE: 12 BRPM | HEIGHT: 66 IN | OXYGEN SATURATION: 97 % | HEART RATE: 98 BPM | SYSTOLIC BLOOD PRESSURE: 124 MMHG | DIASTOLIC BLOOD PRESSURE: 80 MMHG

## 2025-06-12 DIAGNOSIS — E66.01 MORBID OBESITY WITH BMI OF 50.0-59.9, ADULT (HCC): ICD-10-CM

## 2025-06-12 DIAGNOSIS — G47.33 OSA (OBSTRUCTIVE SLEEP APNEA): Primary | ICD-10-CM

## 2025-06-12 PROCEDURE — 99203 OFFICE O/P NEW LOW 30 MIN: CPT

## 2025-06-12 RX ORDER — TIRZEPATIDE 2.5 MG/.5ML
2.5 INJECTION, SOLUTION SUBCUTANEOUS WEEKLY
Qty: 2 ML | Refills: 0 | Status: SHIPPED | OUTPATIENT
Start: 2025-06-12 | End: 2025-07-10

## 2025-06-12 NOTE — PROGRESS NOTES
Assessment/Plan:    Morbid obesity with BMI of 50.0-59.9, adult (HCC)  - Discussed options of HealthyCORE-Intensive Lifestyle Intervention Program, Very Low Calorie Diet-VLCD, and Conservative Program and the role of weight loss medications.  - Patient is interested in pursuing Conservative Program and follow up visits with medical weight management provider.  - Explained the importance of making lifestyle changes in addition to starting any anti-obesity medications.   - Initial weight loss goal of 5-10% weight loss for improved health. Weight loss can improve patient's co-morbid conditions and/or prevent weight-related complications.  - Weight is not at goal and patient has been unable to achieve a meaningful weight loss above 5% using various programs and tools for more than 6 months  - Labs reviewed from 5/2025    General Recommendations:  Nutrition:  Eat breakfast daily.  Do not skip meals.      Food log (ie.) www.[employer].com, sparkpeople.com, loseit.com, calorieking.com, etc.     Practice mindful eating.  Be sure to set aside time to eat, eat slowly, and savor your food.     Hydration:    At least 64oz of water daily.  No sugar sweetened beverages.  No juice (eat the fruit instead).     Exercise:  Studies have shown that the ideal exercise goal is somewhere between 150 to 300 minutes of moderate intensity exercise a week.  Start with exercising 10 minutes every other day and gradually increase physical activity with a goal of at least 150 minutes of moderate intensity exercise a week, divided over at least 3 days a week.  An example of this would be exercising 30 minutes a day, 5 days a week.  Resistance training can increase muscle mass and increase our resting metabolic rate.   FULL BODY resistance training is recommended 2-3 times a week.  Do not do this on consecutive days to allow for muscle recovery.     Aim for a bare minimum 5000 steps, even on days you do not exercise.     Monitoring:   Weigh  yourself daily.  If this causes undue stress, then just weigh yourself once a week.  Weigh yourself the same time of the day with the same amount of clothing on.  Preferably this should be done after waking up, before you eat, and with no clothing or minimal clothing on.     Specific Goals:  Calorie goal:  6656-5715 joan/day (Provided with meal plan to follow).  -Discussed her nutrition and lifestyle  and she is encouraged to track her meals, increase her physical exercise, and increase her fiber and water intake. Discussed protein based snack ideas and shakes she can use.   -Discussed medications and she is interested in zepbound and was recommended by her sleep med doctor to take this. Zepbound is recommended for her as it will help her to maximize her weight loss and prevent future potential complications from her REGINA.  Patient denies personal history of pancreatitis. Patient also denies personal and family history of medullary thyroid cancer and multiple endocrine neoplasia type 2 (MEN 2 tumor).   Zepbound Instructions:    - Begin Zepbound 2.5 mg subcutaneously once a week. Dose changes may occur after 4 doses if medication is tolerated. You will be assessed prior to each dose change to make sure you are tolerating the medication well.  - Please message me when you have 2 pens left from the prescription so there are no lapses in treatment.  - If you have been off the medication for more than 14 days please contact the office as you will need to restart the titration at the starting dose again to avoid significant side effects or adverse events.  - Visit [email or web address] for further information/injection instructions.   -Please eat small frequent meals to help reduce nausea. Lemon water and saltine crackers may help with this.   - Side effects of Zepbound discussed: nausea, vomiting, diarrhea, and constipation. If you experience fever, nausea/vomiting, and pain radiating to your back this may be a sign of  pancreatitis. Please go to the emergency room if this occurs.  - If on oral birth control a 2nd method of birth control is recommended during the 1st 8 weeks of therapy and for 4 weeks after any dosage change.   - Patient understands the side effects of the medication and proper administration. Patient agrees with the treatment plan and all questions were answered. Medication consent was reviewed today and all questions were answered.  Patient agreed with all bulleted points.  Consent was signed, scanned into chart and patient was provided with a copy for their records.          Marilyn was seen today for follow-up.    Diagnoses and all orders for this visit:    REGINA (obstructive sleep apnea)  -     tirzepatide (Zepbound) 2.5 mg/0.5 mL auto-injector; Inject 0.5 mL (2.5 mg total) under the skin once a week for 28 days    Morbid obesity with BMI of 50.0-59.9, adult (HCC)  -     tirzepatide (Zepbound) 2.5 mg/0.5 mL auto-injector; Inject 0.5 mL (2.5 mg total) under the skin once a week for 28 days    Other orders  -     Ambulatory Referral to Weight Management           Total time spent reviewing chart, interviewing patient, examining patient, discussing plan, answering all questions, and documentin min, with >50% face-to-face time spent counseling patient on nonsurgical interventions for the treatment of excess weight. Discussed in detail nonsurgical options including intensive lifestyle intervention program, very low-calorie diet program and conservative program.  Discussed the role of weight loss medications.  Counseled patient on diet behavior and exercise modification for weight loss.    Follow up in approximately 3 months with Non-Surgical Physician/Advanced Practitioner.    Subjective:   Chief Complaint   Patient presents with    Follow-up     Patient presents for f/u, last OV 2023, She does have Sleep Apnea.       Patient ID: Marilyn Andino  is a 44 y.o. female with excess weight/obesity here to pursue weight  management.  Previous notes and records have been reviewed.    Past Medical History[1]  Past Surgical History[2]    HPI:  Wt Readings from Last 20 Encounters:   06/12/25 (!) 162 kg (357 lb)   06/03/25 (!) 162 kg (357 lb)   05/29/25 (!) 161 kg (355 lb)   01/17/25 (!) 161 kg (355 lb)   08/07/24 (!) 159 kg (349 lb 9.6 oz)   08/07/24 (!) 158 kg (349 lb)   05/14/24 (!) 159 kg (351 lb)   12/23/23 (!) 148 kg (327 lb)   11/28/23 (!) 148 kg (327 lb)   09/27/23 (!) 153 kg (338 lb)   07/21/23 (!) 153 kg (338 lb 3.2 oz)   06/29/23 (!) 154 kg (339 lb)   05/26/23 (!) 153 kg (337 lb 3.2 oz)   05/02/23 (!) 155 kg (340 lb 12.8 oz)   02/13/23 (!) 153 kg (337 lb)   01/11/23 (!) 153 kg (337 lb 3.2 oz)   12/05/22 (!) 153 kg (336 lb 9.6 oz)   11/07/22 (!) 154 kg (339 lb)   08/11/22 (!) 154 kg (340 lb)   08/11/22 (!) 154 kg (340 lb)       Patient presents today to medical weight management office for consult. She states that she is trying to lose weight and eat healthy by doing low calories and high protein and she is unable to lose weight. She is active by walking her 4 dogs, she has grand kids at home and is doing lawn work and mowing her own grass. She is frustrated with seeing no weight loss. She has  along history of obesity and lymphedema to which she gets treatment for.         Starting MWM weight: 357 lbs   Starting BMI: 57  Goal weight: healthy     Obesity/Excess Weight:  Severity: Very Severe  Onset:  16 years     Modifiers: Diet and Exercise and Commercial Weight Loss Programs-ie. Weight Watchers, Gianna Woo, Nutrisystem, etc.  Contributing factors: Poor Food Choices, Insufficient Physical Activity, Stress/Emotional Eating, Binge Eating, Menopause, Lack of knowledge of appropriate lifestyle changes, Depression, and Insufficient time to make appropriate lifestyle changes  Associated symptoms: comorbid conditions, fatigue, increased joint pain, decreased exercise capacity, body image issues, decreased self esteem, decreased  "mobility, depression, inability to do certain activities, and clothes do not fit    Diet recall:  B: Protein coffee  S: no  L: Trail mix, sometimes eats something  S: skips  D: protein veggie starch  S: sweets   Take out frequency: 1-3 times per month    Hydration: water   Alcohol: no  Smoking: no  Exercise: no  Sleep: 6-7 hours   STOP bang: mayank      The following portions of the patient's history were reviewed and updated as appropriate: allergies, current medications, past family history, past medical history, past social history, past surgical history, and problem list.    Family History[3]     Review of Systems   Constitutional:  Negative for fatigue.   HENT:  Negative for sore throat.    Respiratory:  Negative for cough and shortness of breath.    Cardiovascular:  Negative for chest pain, palpitations and leg swelling.   Gastrointestinal:  Negative for abdominal pain, constipation, diarrhea, nausea and vomiting.   Genitourinary:  Negative for dysuria.   Musculoskeletal:  Negative for arthralgias and back pain.   Skin:  Negative for rash.   Neurological:  Negative for headaches.   Psychiatric/Behavioral:  Negative for dysphoric mood. The patient is not nervous/anxious.        Objective:  /80 (BP Location: Right arm, Patient Position: Sitting, Cuff Size: Large)   Pulse 98   Resp 12   Ht 5' 6\" (1.676 m)   Wt (!) 162 kg (357 lb)   SpO2 97%   BMI 57.62 kg/m²     Physical Exam  Vitals and nursing note reviewed.   Constitutional:       Appearance: Normal appearance. She is obese.   HENT:      Head: Normocephalic.   Pulmonary:      Effort: Pulmonary effort is normal.     Neurological:      Mental Status: She is oriented to person, place, and time.     Psychiatric:         Mood and Affect: Mood normal.         Behavior: Behavior normal.         Thought Content: Thought content normal.         Judgment: Judgment normal.              Labs and Imaging  Recent labs and imaging have been personally reviewed.  Lab " Results   Component Value Date    WBC 6.96 05/29/2025    HGB 13.5 05/29/2025    HCT 43.5 05/29/2025    MCV 88 05/29/2025     05/29/2025     Lab Results   Component Value Date    SODIUM 138 05/29/2025    K 5.1 05/29/2025     05/29/2025    CO2 26 05/29/2025    AGAP 7 05/29/2025    BUN 9 05/29/2025    CREATININE 0.76 05/29/2025    GLUC 94 05/29/2025    GLUF 98 02/27/2025    CALCIUM 9.1 05/29/2025    AST 28 05/29/2025    ALT 10 05/29/2025    ALKPHOS 89 05/29/2025    TP 7.5 05/29/2025    TBILI 0.57 05/29/2025    EGFR 95 05/29/2025     Lab Results   Component Value Date    HGBA1C 5.2 07/01/2023     Lab Results   Component Value Date    FVI3ZPUZRSDO 3.466 02/27/2025     Lab Results   Component Value Date    CHOLESTEROL 172 02/27/2025     Lab Results   Component Value Date    HDL 50 02/27/2025     Lab Results   Component Value Date    TRIG 132 02/27/2025     Lab Results   Component Value Date    LDLCALC 96 02/27/2025          [1]   Past Medical History:  Diagnosis Date    Breathing difficulty     Cluster headache     Diverticulosis     DVT (deep venous thrombosis) (HCC)     DVT (deep venous thrombosis) (HCC)     Endometriosis     Environmental and seasonal allergies 2022    Gastritis     Head injury 2023    Headache(784.0)     Headache, tension-type     Hereditary lymphedema of legs     Hernia 07/04/21    Lymphedema of right lower extremity     Migraine 2023    Obesity, Class III, BMI 40-49.9 (morbid obesity)     PONV (postoperative nausea and vomiting)     Sleep apnea, obstructive    [2]   Past Surgical History:  Procedure Laterality Date    ABLATION COLPOCLESIS      ENDOMETRIAL ABLATION  2014    HYSTERECTOMY  2016    OOPHORECTOMY Bilateral 09/2016    OVARY SURGERY      TUBAL LIGATION      WISDOM TOOTH EXTRACTION     [3]   Family History  Problem Relation Name Age of Onset    Clotting disorder Mother Kmi     Deep vein thrombosis Mother Kim         Birth Control 1987    Pancreatic cancer Maternal  Grandmother Rosalina     Cancer Maternal Grandmother Rosalina         Pancreatic    Diabetes Paternal Grandmother Elizabeth     Thyroid disease Paternal Grandmother Elizabeth     Dementia Paternal Grandmother Elizabeth     Hypertension Paternal Grandfather Nathan     Heart disease Paternal Grandfather Nathan     Sleep apnea Paternal Grandfather Nathan     Stroke Maternal Aunt      Clotting disorder Maternal Aunt Kym     Stroke Maternal Aunt Kym     Clotting disorder Maternal Aunt Kym     Breast cancer Neg Hx      Colon cancer Neg Hx      Ovarian cancer Neg Hx      Cervical cancer Neg Hx      Uterine cancer Neg Hx

## 2025-06-12 NOTE — ASSESSMENT & PLAN NOTE
- Discussed options of HealthyCORE-Intensive Lifestyle Intervention Program, Very Low Calorie Diet-VLCD, and Conservative Program and the role of weight loss medications.  - Patient is interested in pursuing Conservative Program and follow up visits with medical weight management provider.  - Explained the importance of making lifestyle changes in addition to starting any anti-obesity medications.   - Initial weight loss goal of 5-10% weight loss for improved health. Weight loss can improve patient's co-morbid conditions and/or prevent weight-related complications.  - Weight is not at goal and patient has been unable to achieve a meaningful weight loss above 5% using various programs and tools for more than 6 months  - Labs reviewed from 5/2025    General Recommendations:  Nutrition:  Eat breakfast daily.  Do not skip meals.      Food log (ie.) www.myfitnesspal.com, sparkpeople.com, loseit.com, calorieking.com, etc.     Practice mindful eating.  Be sure to set aside time to eat, eat slowly, and savor your food.     Hydration:    At least 64oz of water daily.  No sugar sweetened beverages.  No juice (eat the fruit instead).     Exercise:  Studies have shown that the ideal exercise goal is somewhere between 150 to 300 minutes of moderate intensity exercise a week.  Start with exercising 10 minutes every other day and gradually increase physical activity with a goal of at least 150 minutes of moderate intensity exercise a week, divided over at least 3 days a week.  An example of this would be exercising 30 minutes a day, 5 days a week.  Resistance training can increase muscle mass and increase our resting metabolic rate.   FULL BODY resistance training is recommended 2-3 times a week.  Do not do this on consecutive days to allow for muscle recovery.     Aim for a bare minimum 5000 steps, even on days you do not exercise.     Monitoring:   Weigh yourself daily.  If this causes undue stress, then just weigh yourself once  a week.  Weigh yourself the same time of the day with the same amount of clothing on.  Preferably this should be done after waking up, before you eat, and with no clothing or minimal clothing on.     Specific Goals:  Calorie goal:  4665-2964 joan/day (Provided with meal plan to follow).  -Discussed her nutrition and lifestyle  and she is encouraged to track her meals, increase her physical exercise, and increase her fiber and water intake. Discussed protein based snack ideas and shakes she can use.   -Discussed medications and she is interested in zepbound and was recommended by her sleep med doctor to take this. Zepbound is recommended for her as it will help her to maximize her weight loss and prevent future potential complications from her REGINA.  Patient denies personal history of pancreatitis. Patient also denies personal and family history of medullary thyroid cancer and multiple endocrine neoplasia type 2 (MEN 2 tumor).   Zepbound Instructions:    - Begin Zepbound 2.5 mg subcutaneously once a week. Dose changes may occur after 4 doses if medication is tolerated. You will be assessed prior to each dose change to make sure you are tolerating the medication well.  - Please message me when you have 2 pens left from the prescription so there are no lapses in treatment.  - If you have been off the medication for more than 14 days please contact the office as you will need to restart the titration at the starting dose again to avoid significant side effects or adverse events.  - Visit [email or web address] for further information/injection instructions.   -Please eat small frequent meals to help reduce nausea. Lemon water and saltine crackers may help with this.   - Side effects of Zepbound discussed: nausea, vomiting, diarrhea, and constipation. If you experience fever, nausea/vomiting, and pain radiating to your back this may be a sign of pancreatitis. Please go to the emergency room if this occurs.  - If on oral birth  control a 2nd method of birth control is recommended during the 1st 8 weeks of therapy and for 4 weeks after any dosage change.   - Patient understands the side effects of the medication and proper administration. Patient agrees with the treatment plan and all questions were answered. Medication consent was reviewed today and all questions were answered.  Patient agreed with all bulleted points.  Consent was signed, scanned into chart and patient was provided with a copy for their records.

## 2025-06-23 ENCOUNTER — TELEPHONE (OUTPATIENT)
Age: 45
End: 2025-06-23

## 2025-06-23 NOTE — TELEPHONE ENCOUNTER
"Patient of Vonnie calling about her Zepbound. States her insurance \"approved\" the medication but her [facility] cannot fill it. It appears she needs PA.    Reason for call:   [x] Prior Auth  [] Other:     Caller:  [x] Patient  [] Pharmacy  Name:   Address:   Callback Number:     Medication: Zepbound 5mg    Dose/Frequency:  Inject 0.5 mL (2.5 mg total) under the skin once a week for 28 days     Quantity: 2mL    Ordering Provider:   [] PCP/Provider -   [x] Speciality/Provider - Vonnie CASTANO  "

## 2025-06-24 ENCOUNTER — TELEPHONE (OUTPATIENT)
Dept: BARIATRICS | Facility: CLINIC | Age: 45
End: 2025-06-24

## 2025-06-26 ENCOUNTER — TELEPHONE (OUTPATIENT)
Age: 45
End: 2025-06-26

## 2025-06-26 NOTE — TELEPHONE ENCOUNTER
[facility] called stating the therapist that works with Lymphedema is an OT    Asking if Lupis Jaeger would change the order from PT to OT and upload in chart

## 2025-06-27 DIAGNOSIS — Z86.718 HISTORY OF DVT (DEEP VEIN THROMBOSIS): ICD-10-CM

## 2025-06-27 DIAGNOSIS — Z79.01 CURRENT USE OF LONG TERM ANTICOAGULATION: ICD-10-CM

## 2025-06-30 ENCOUNTER — EVALUATION (OUTPATIENT)
Dept: OCCUPATIONAL THERAPY | Age: 45
End: 2025-06-30
Payer: COMMERCIAL

## 2025-06-30 DIAGNOSIS — I89.0 LYMPHEDEMA OF RIGHT LOWER EXTREMITY: Primary | ICD-10-CM

## 2025-06-30 DIAGNOSIS — I89.0 LYMPHEDEMA: Primary | ICD-10-CM

## 2025-06-30 DIAGNOSIS — I89.0 LYMPHEDEMA OF RIGHT LOWER EXTREMITY: ICD-10-CM

## 2025-06-30 PROCEDURE — 97166 OT EVAL MOD COMPLEX 45 MIN: CPT | Performed by: OCCUPATIONAL THERAPIST

## 2025-06-30 NOTE — TELEPHONE ENCOUNTER
Penny from [facility] physical therapy is following up regarding her call from 6/26. Let her know it was sent over to provider but she isnt in until tomorrow. They're asking if someone else could change the script from PT to OT as patient is being seen today at 11:30.     Please advise and give penny a call back at [phone]

## 2025-06-30 NOTE — LETTER
2025    EYAD Ball  [address] [facility] [place] [address]    Patient: Marilyn Andino   YOB: 1980   Date of Visit: 2025     Encounter Diagnosis     ICD-10-CM    1. Lymphedema  I89.0           Dear EYAD Singleton:    Thank you for your recent referral of Marilyn Andino. Please review the attached evaluation summary from Marilyn's recent visit.     Please verify that you agree with the plan of care by signing the attached order.     If you have any questions or concerns, please do not hesitate to call.     I sincerely appreciate the opportunity to share in the care of one of your patients and hope to have another opportunity to work with you in the near future.     Sincerely,    Catarina Woodruff, OT      Referring Provider:     I certify that I have read the below Plan of Care and certify the need for these services furnished under this plan of treatment while under my care.                    EYAD Ball  [address] [facility] [address]  Via In Basket        Lymphedema OT Evaluation     Today's date: 2025  Patient name: Marilyn Andino  : 1980  MRN: [id number]  Referring provider: Lupis Jaeger CRNP  Dx:   Encounter Diagnosis     ICD-10-CM    1. Lymphedema  I89.0                      Assessment  Impairments: lacks appropriate home exercise program  Symptom irritability: high    Assessment details: Marilyn Andino is a 44 y.o. female with complaints of worsening RLE edema, as well as worsening edema and fluid retention in her groin area, abdomen, trunk, and LUE.  Patient's presentation is consistent with Secondary stage 2 (on border of stage 3 as pelvis in more involved) lymphedema with symptoms of lipo-lymphedema and vascular insuffiencey,  with the following impairments found on evaluation: pitting edema, dimpling of skin, moderate fibrotic tissue.     Marilyn Andino is a good candidate for occupational lymphedema therapy and would benefit from skilled OT to address  the above impairments in order to allow the patient to achieve the goals listed and return to prior level of function. OT plan of care to include MLD to abdomen, trunk, and groin, and use of short stretch compression wrapping for de congestions, exercise and with pump, and formulate a well edu and individualized home tx plan. Compression prescription includes: TBD.     During initial evaluation, education was provided on lymphatic anatomy and physiology, edema differential diagnosis, edema risk reduction behaviors, and healthy habits; decongestion vs maintenance treatment options. Recommendations were made for skin care, elevation of the edematous limb, and ROM and muscle pump exercises to address edema and patient consented to these recommendations. Patient also educated on diagnosis, plan of care and prognosis.  Pt is in agreement with recommended plan of care and goals for therapy, and demonstrates motivation for active participation in proposed plan of care.    Barriers to therapy: Pt lives approx 1 hour away from clinic but highly motivated.  Understanding of Dx/Px/POC: good     Prognosis: good    Goals  STG  - Patient and/or caregiver will understand lymphedema precautions to decrease risk of infection and exacerbation of lymphedema  - Patient will develop a tolerance for wearing multi-layer, short stretch bandages betweens sessions, during exercise, and with use of pump to facilitate limb decongestion  - Patient will experience a 6% dec in edema to BLE  which will improve tissue health and decrease risk of infection.   - Patient will perform HEP of ROM and MLD independently or with minimal assistance to help improve lymphatic flow and venous return     LTG  - Patient will experience increased ROM and functional mobility to aid with ADL's at time of discharge  - Pt will demo inc and improved score with reported functional improvements on LLIS  - Patient will be independent with donning and doffing of  "compression garments which will enable regular daily garment wear at time of discharge, skin maintenance, and self- MLD   - Patient and/or caregiver will be independent with HEP and lymphedema management to prevent relapse and reduce risk of infection and hospitalizations at time of discharge  - Patient will demo dec in edema to BLE by 15%  - Pt will report dec fullness to abdomin and groin area by 50%    Plan  Patient would benefit from: skilled physical therapy and skilled occupational therapy    Planned therapy interventions: IASTM, joint mobilization, kinesiology taping, manual therapy, massage, muscle pump exercises, self care, strengthening, stretching, therapeutic activities, therapeutic exercise, home exercise program and functional ROM exercises    Frequency: 1-2x per week.  Duration in weeks: 12  Plan of Care beginning date: 6/30/2025  Plan of Care expiration date: 9/22/2025  Treatment plan discussed with: patient    Subjective/History:  Chief Complaint: Worsening BLE lymphedema (R>L), and worsening groin, vaginal, abdominal and LUE edema as well. Pt c/o filling up in her trunk.  HPI: Patient is a 44year old female with complex medical history including RLE DVT in 2007 and September 2018, hx of cellulitis in November 2018 RLE, B LE lymphedema(diagnosed in 2016) as well as hysterectomy, oophorectomy endometrial ablation and dilation of uterus. She presents today with complaints of swelling in B LE's R>L as well as vulvar area. She reports her mother was diagnosed recently with lymphedema as well. She reports having larger ankles in her teen years but really noticed swelling after her first incidence with DVT in RLE. Swelling only worsened after the multiple gynecological surgeries which she then noted swelling in LLE as well as vaginal area.   Prior Edema Treatments: [facility] treatment in 2019- \"Patient was seen to address B LE lipo lymphedema. Underwent treatment with significant reduction in edema. Fitted for " "custom garments. Has a custom Elvarex knee highs and compression capris during the day and Farrow compression alternatives for night time. Still feels she is filling up in the abdominal area from time to time. Instructed on physioball exercises to assist in lymphatic stimulation in abdominal area and groin. Provided with handout. Patient demonstrated good technique with these exercises. Recommend she continue with use of compression daily along with therapy ball exercises. She also plans to undergo lymphatic massage 2x month.\" Has pump, but not working properly.  Imaging: July 2021 Doppler- Normal   Relevant PMHx: Past Medical History[1]     Lymphedema special questions  Feeling of heaviness, fullness, pressure? yes  Resolves with elevation: No  Sleeping location? bed  Change in fit of shoes/clothes/jewelry?yes  History of Cellulitis?  yes  History of S/DVT? yes  History of Leg wounds? no  Sees a podiatrist regularly for foot care? no  Presence of trunk/abdominal/genital edema? Yes- Moderate edema  Latex Allergy? no    Systems Review:  Personal history of Cancer? No  Cardiovascular hx: Yes  On diuretics? no  Thyroid dysfunction: No  Diabetes: No  Kidney disease:No   Liver disease: No  Abdominal surgeries: Yes- hysterectomy, endometrial ablation   Orthopedic injuries/surgeries: No    Pain: 0/10  Function: independent with functional mobility without AD, independence in I/ADLs- inc fatgue and heaviness of body affecting completion and frustration  Working Status:   Exercise status: limited- goal to improve  Patient goals: \"to lessen the fluid from my abdomen and groin.\"  Functional self-reported outcome measure: inc LLIS    Objective       06/30/25 1100   LOWER EXTREMITY LEFT   8cm proximal to 1st MTP 27 cm   4cm proximal to 1st MTP 24.5 cm   MTP 25.1 cm   Malleoli (cm) 31.1 cm   M+4 cm 31.3 cm   M+8 cm 35 cm   M+12 cm 39.4 cm   M+16 cm 44.5 cm   M+20 cm 48 cm   M+24 cm 51.5 cm   M+28 cm 54 cm   M+32 cm 57.5 cm   M+36 " cm 52.2 cm   M+40 cm 53 cm   M+44 cm 61 cm   M+48 cm 68.5 cm   M+52 cm 77.5 cm   M+56 cm 82 cm   M+60 cm 86.5 cm   Volume LE (mL) 17189   Difference from last visit (mL)  -17189   Difference from first visit (mL)  -17189   LOWER EXTREMITY RIGHT   8cm proximal to 1st MTP 26.6 cm   4cm proximal to 1st MTP 24.5 cm   MTP 25.5 cm   Malleoli (cm) 33.1 cm   M+4 cm 32 cm   M+8 cm 35.6 cm   M+12 cm 41 cm   M+16 cm 45.5 cm   M+20 cm 50.5 cm   M+24 cm 53 cm   M+28 cm 55.7 cm   M+32 cm 56 cm   M+36 cm 54.6 cm   M+40 cm 55 cm   M+44 cm 68.5 cm   M+48 cm 72.3 cm   M+52 cm 75 cm   M+56 cm 80 cm   M+60 cm 83 cm   M+64 cm 89 cm   Volume LE (mL) 20198   Difference from last visit (mL)  -20198   Difference from first visit (mL)  -20198 2019 Measurements:  Right LE on d/c 13962.3 ml;   Left LE on d/c   44919.05 ml;     6/30/25- Pt has demo continue decrease in size of BLE.            6/30 2.     3.     4.    5.     6.       7.     8.     9.     10.     11.     12.       13.     14.     15.     16.  17.  18.       19.     20.    21.    22.    23.    24.       25.     26. 27. 28. 29. 30.   31. 32.  33. 34. 35. 36.          Precautions: Universal;     POC expires Auth Status? (BOMN, approved, pending) Unit limit (Daily) Auth Start date Expiration date PT/OT + Visit Limit?   9/22/25 pending  6/30/25       Date of Service 6/30        Visits used         Visits remaining         Compression Rx                                    Manual Ther         volumetrics perf        MLD         Compression Bandaging         Wound Care         Fibrotic tissue         Ther Ex         Remedial Exercise; HEP provided Provided/edu        Step ups         UBE bike                  Ther Activity & Self Care         Skin care         Garment Fit/Train                           Pt Education         Edema differential dx edu        Lymphatic A&P edu        Compression options edu                                                        [1]  Past Medical  History:  Diagnosis Date   • Breathing difficulty    • Cluster headache    • Diverticulosis    • DVT (deep venous thrombosis) (HCC)    • DVT (deep venous thrombosis) (HCC)    • Endometriosis    • Environmental and seasonal allergies 2022   • Gastritis    • Head injury 2023   • Headache(784.0)    • Headache, tension-type    • Hereditary lymphedema of legs    • Hernia 07/04/21   • Lymphedema of right lower extremity    • Migraine 2023   • Obesity, Class III, BMI 40-49.9 (morbid obesity)    • PONV (postoperative nausea and vomiting)    • Sleep apnea, obstructive

## 2025-07-01 NOTE — PROGRESS NOTES
Lymphedema OT Evaluation     Today's date: 2025  Patient name: Marilyn Andino  : 1980  MRN: [id number]  Referring provider: Lupis Jaeger CRNP  Dx:   Encounter Diagnosis     ICD-10-CM    1. Lymphedema  I89.0                      Assessment  Impairments: lacks appropriate home exercise program  Symptom irritability: high    Assessment details: Marilny Andino is a 44 y.o. female with complaints of worsening RLE edema, as well as worsening edema and fluid retention in her groin area, abdomen, trunk, and LUE.  Patient's presentation is consistent with Secondary stage 2 (on border of stage 3 as pelvis in more involved) lymphedema with symptoms of lipo-lymphedema and vascular insuffiencey,  with the following impairments found on evaluation: pitting edema, dimpling of skin, moderate fibrotic tissue.     Marilyn Andino is a good candidate for occupational lymphedema therapy and would benefit from skilled OT to address the above impairments in order to allow the patient to achieve the goals listed and return to prior level of function. OT plan of care to include MLD to abdomen, trunk, and groin, and use of short stretch compression wrapping for de congestions, exercise and with pump, and formulate a well edu and individualized home tx plan. Compression prescription includes: TBD.     During initial evaluation, education was provided on lymphatic anatomy and physiology, edema differential diagnosis, edema risk reduction behaviors, and healthy habits; decongestion vs maintenance treatment options. Recommendations were made for skin care, elevation of the edematous limb, and ROM and muscle pump exercises to address edema and patient consented to these recommendations. Patient also educated on diagnosis, plan of care and prognosis.  Pt is in agreement with recommended plan of care and goals for therapy, and demonstrates motivation for active participation in proposed plan of care.    Barriers to therapy: Pt lives approx 1  hour away from clinic but highly motivated.  Understanding of Dx/Px/POC: good     Prognosis: good    Goals  STG  - Patient and/or caregiver will understand lymphedema precautions to decrease risk of infection and exacerbation of lymphedema  - Patient will develop a tolerance for wearing multi-layer, short stretch bandages betweens sessions, during exercise, and with use of pump to facilitate limb decongestion  - Patient will experience a 6% dec in edema to BLE  which will improve tissue health and decrease risk of infection.   - Patient will perform HEP of ROM and MLD independently or with minimal assistance to help improve lymphatic flow and venous return     LTG  - Patient will experience increased ROM and functional mobility to aid with ADL's at time of discharge  - Pt will demo inc and improved score with reported functional improvements on LLIS  - Patient will be independent with donning and doffing of compression garments which will enable regular daily garment wear at time of discharge, skin maintenance, and self- MLD   - Patient and/or caregiver will be independent with HEP and lymphedema management to prevent relapse and reduce risk of infection and hospitalizations at time of discharge  - Patient will demo dec in edema to BLE by 15%  - Pt will report dec fullness to abdomin and groin area by 50%    Plan  Patient would benefit from: skilled physical therapy and skilled occupational therapy    Planned therapy interventions: IASTM, joint mobilization, kinesiology taping, manual therapy, massage, muscle pump exercises, self care, strengthening, stretching, therapeutic activities, therapeutic exercise, home exercise program and functional ROM exercises    Frequency: 1-2x per week.  Duration in weeks: 12  Plan of Care beginning date: 6/30/2025  Plan of Care expiration date: 9/22/2025  Treatment plan discussed with: patient    Subjective/History:  Chief Complaint: Worsening BLE lymphedema (R>L), and worsening  "groin, vaginal, abdominal and LUE edema as well. Pt c/o filling up in her trunk.  HPI: Patient is a 44year old female with complex medical history including RLE DVT in 2007 and September 2018, hx of cellulitis in November 2018 RLE, B LE lymphedema(diagnosed in 2016) as well as hysterectomy, oophorectomy endometrial ablation and dilation of uterus. She presents today with complaints of swelling in B LE's R>L as well as vulvar area. She reports her mother was diagnosed recently with lymphedema as well. She reports having larger ankles in her teen years but really noticed swelling after her first incidence with DVT in RLE. Swelling only worsened after the multiple gynecological surgeries which she then noted swelling in LLE as well as vaginal area.   Prior Edema Treatments: [facility] treatment in 2019- \"Patient was seen to address B LE lipo lymphedema. Underwent treatment with significant reduction in edema. Fitted for custom garments. Has a custom Elvarex knee highs and compression capris during the day and Farrow compression alternatives for night time. Still feels she is filling up in the abdominal area from time to time. Instructed on physioball exercises to assist in lymphatic stimulation in abdominal area and groin. Provided with handout. Patient demonstrated good technique with these exercises. Recommend she continue with use of compression daily along with therapy ball exercises. She also plans to undergo lymphatic massage 2x month.\" Has pump, but not working properly.  Imaging: July 2021 Doppler- Normal   Relevant PMHx: Past Medical History[1]     Lymphedema special questions  Feeling of heaviness, fullness, pressure? yes  Resolves with elevation: No  Sleeping location? bed  Change in fit of shoes/clothes/jewelry?yes  History of Cellulitis?  yes  History of S/DVT? yes  History of Leg wounds? no  Sees a podiatrist regularly for foot care? no  Presence of trunk/abdominal/genital edema? Yes- Moderate edema  Latex " "Allergy? no    Systems Review:  Personal history of Cancer? No  Cardiovascular hx: Yes  On diuretics? no  Thyroid dysfunction: No  Diabetes: No  Kidney disease:No   Liver disease: No  Abdominal surgeries: Yes- hysterectomy, endometrial ablation   Orthopedic injuries/surgeries: No    Pain: 0/10  Function: independent with functional mobility without AD, independence in I/ADLs- inc fatgue and heaviness of body affecting completion and frustration  Working Status:   Exercise status: limited- goal to improve  Patient goals: \"to lessen the fluid from my abdomen and groin.\"  Functional self-reported outcome measure: inc LLIS    Objective       06/30/25 1100   LOWER EXTREMITY LEFT   8cm proximal to 1st MTP 27 cm   4cm proximal to 1st MTP 24.5 cm   MTP 25.1 cm   Malleoli (cm) 31.1 cm   M+4 cm 31.3 cm   M+8 cm 35 cm   M+12 cm 39.4 cm   M+16 cm 44.5 cm   M+20 cm 48 cm   M+24 cm 51.5 cm   M+28 cm 54 cm   M+32 cm 57.5 cm   M+36 cm 52.2 cm   M+40 cm 53 cm   M+44 cm 61 cm   M+48 cm 68.5 cm   M+52 cm 77.5 cm   M+56 cm 82 cm   M+60 cm 86.5 cm   Volume LE (mL) 17189   Difference from last visit (mL)  -17189   Difference from first visit (mL)  -17189   LOWER EXTREMITY RIGHT   8cm proximal to 1st MTP 26.6 cm   4cm proximal to 1st MTP 24.5 cm   MTP 25.5 cm   Malleoli (cm) 33.1 cm   M+4 cm 32 cm   M+8 cm 35.6 cm   M+12 cm 41 cm   M+16 cm 45.5 cm   M+20 cm 50.5 cm   M+24 cm 53 cm   M+28 cm 55.7 cm   M+32 cm 56 cm   M+36 cm 54.6 cm   M+40 cm 55 cm   M+44 cm 68.5 cm   M+48 cm 72.3 cm   M+52 cm 75 cm   M+56 cm 80 cm   M+60 cm 83 cm   M+64 cm 89 cm   Volume LE (mL) 20198   Difference from last visit (mL)  -20198   Difference from first visit (mL)  -20198 2019 Measurements:  Right LE on d/c 20996.3 ml;   Left LE on d/c   56707.05 ml;     6/30/25- Pt has demo continue decrease in size of BLE.            6/30 2.     3.     4.    5.     6.       7.     8.     9.     10.     11.     12.       13.     14.     15.     16.  17.  18.       19.    "  20.    21.    22.    23.    24.       25.     26. 27. 28. 29. 30.   31. 32.  33. 34. 35. 36.          Precautions: Universal;     POC expires Auth Status? (BOMN, approved, pending) Unit limit (Daily) Auth Start date Expiration date PT/OT + Visit Limit?   9/22/25 pending  6/30/25       Date of Service 6/30        Visits used         Visits remaining         Compression Rx                                    Manual Ther         volumetrics perf        MLD         Compression Bandaging         Wound Care         Fibrotic tissue         Ther Ex         Remedial Exercise; HEP provided Provided/edu        Step ups         UBE bike                  Ther Activity & Self Care         Skin care         Garment Fit/Train                           Pt Education         Edema differential dx edu        Lymphatic A&P edu        Compression options edu                                                  [1]  Past Medical History:  Diagnosis Date   • Breathing difficulty    • Cluster headache    • Diverticulosis    • DVT (deep venous thrombosis) (HCC)    • DVT (deep venous thrombosis) (HCC)    • Endometriosis    • Environmental and seasonal allergies 2022   • Gastritis    • Head injury 2023   • Headache(784.0)    • Headache, tension-type    • Hereditary lymphedema of legs    • Hernia 07/04/21   • Lymphedema of right lower extremity    • Migraine 2023   • Obesity, Class III, BMI 40-49.9 (morbid obesity)    • PONV (postoperative nausea and vomiting)    • Sleep apnea, obstructive

## 2025-07-02 ENCOUNTER — OFFICE VISIT (OUTPATIENT)
Age: 45
End: 2025-07-02
Payer: COMMERCIAL

## 2025-07-02 ENCOUNTER — APPOINTMENT (OUTPATIENT)
Age: 45
End: 2025-07-02
Payer: COMMERCIAL

## 2025-07-02 VITALS
HEART RATE: 90 BPM | BODY MASS INDEX: 47.09 KG/M2 | HEIGHT: 66 IN | OXYGEN SATURATION: 97 % | SYSTOLIC BLOOD PRESSURE: 128 MMHG | RESPIRATION RATE: 16 BRPM | TEMPERATURE: 98 F | DIASTOLIC BLOOD PRESSURE: 82 MMHG | WEIGHT: 293 LBS

## 2025-07-02 DIAGNOSIS — G47.33 OSA ON CPAP: Primary | ICD-10-CM

## 2025-07-02 DIAGNOSIS — J40 BRONCHIAL IRRITATION: ICD-10-CM

## 2025-07-02 PROCEDURE — 99214 OFFICE O/P EST MOD 30 MIN: CPT

## 2025-07-02 RX ORDER — ALBUTEROL SULFATE 90 UG/1
2 INHALANT RESPIRATORY (INHALATION) EVERY 6 HOURS PRN
Qty: 18 G | Refills: 3 | Status: SHIPPED | OUTPATIENT
Start: 2025-07-02

## 2025-07-02 NOTE — ASSESSMENT & PLAN NOTE
-Compliance reviewed from 6/1/2025 - 6/30/2025.  Showed 100% compliance using > = 4 hours 93% of the time.  Average nightly use 6 hours 48 minutes.  Residual AHI 0.9.  Average mask leakage 3.0L/min.  Average pressure use 11.5 cm H2O.  Patient sleeping well and benefiting from use.    -Patient still did not get new fullface mask ordered in April.  Will resubmit this order.  -Continue wearing auto CPAP 5-15 cm H2O nightly

## 2025-07-02 NOTE — PROGRESS NOTES
Follow-up  Visit - Pulmonary Medicine   Name: Marilyn Andino      : 1980      MRN: [id number]  Encounter Provider: EYAD Trivedi  Encounter Date: 2025   Encounter department: [facility] [place]  :  Assessment & Plan  Bronchial irritation  - Recent ED visit on  for chest heaviness.  CTA with mild lower lobe bronchial wall thickening, patchy areas of mosaic attenuation.  Likely secondary to small airways disease versus inflammation.  -No prior history of asthma or COPD.  Lifelong non-smoker  -Does have elevated eosinophils as high as 450 cells  -Has chronic cough with mucus, occasional wheezing.  Lungs are clear on exam today    Plan:  -Check PFTs with postbronchodilator to evaluate for possible asthma  - Patient deferred starting on low-dose ICS/LABA at this time, will hold off until after PFTs.  For now, continue albuterol HFA every 6 hours as needed  -Check [facility] allergy panel to evaluate for any environmental allergens and IgE  -Follow-up in 3 months or sooner if needed    Orders:    Ambulatory Referral to Pulmonology    Complete PFT with post bronchodilator; Future    [facility] Allergy Panel, Adult; Future    albuterol (Ventolin HFA) 90 mcg/act inhaler; Inhale 2 puffs every 6 (six) hours as needed for wheezing or shortness of breath    REG[place] on CPAP  -Compliance reviewed from 2025 - 2025.  Showed 100% compliance using > = 4 hours 93% of the time.  Average nightly use 6 hours 48 minutes.  Residual AHI 0.9.  Average mask leakage 3.0L/min.  Average pressure use 11.5 cm H2O.  Patient sleeping well and benefiting from use.    -Patient still did not get new fullface mask ordered in April.  Will resubmit this order.  -Continue wearing auto CPAP 5-15 cm H2O nightly           No follow-ups on file.    History of Present Illness   Marilyn Andino is a 44 y.o. female who is here today for REGINA follow-up.  Also referred recently by her PCP due to bronchial  "irritation.  She was last in the office 6 months ago, maintained on auto CPAP nightly.     Patient recently in the ED on 5/29 for chest heaviness and slight shortness of breath.  CTA PE study negative for acute PE, but with mild bronchial wall thickening in the lower lobes and patchy areas of mosaic attenuation likely related to small airways disease.  Patient was at her PCP office after ED visit, reported ongoing cough for more than a few years prompting referral to pulmonary.    Denies any history of asthma or COPD.  Chest tightness resolved since recent ED visit.  No significant occupational exposure history.  Has 4 dogs and 4 cats in the home.  Reports seasonal allergies, symptoms usually worse in the fall/winter.  Has postnasal drainage, Flonase did not help previously.  Last use of prednisone 2 years ago, cannot tolerate due to stomach issues.  States chronic cough developed after her CHELSEA/BSO for severe endometriosis in September 2018.  Has occasional wheezing.  Has a rescue inhaler, has not used for the past 2 months.  Using her CPAP nightly, sleeping well and benefiting from use.  Still not get full facemask that was ordered for her in April.      Review of Systems    Aside from what is mentioned in the HPI, ROS is otherwise negative         Medical History Reviewed by provider this encounter:     .    Objective   /82 (BP Location: Left arm, Patient Position: Sitting, Cuff Size: Large)   Pulse 90   Temp 98 °F (36.7 °C) (Oral)   Resp 16   Ht 5' 6\" (1.676 m)   Wt (!) 161 kg (355 lb)   SpO2 97%   BMI 57.30 kg/m²     Physical Exam  Vitals and nursing note reviewed.   Constitutional:       General: She is not in acute distress.     Appearance: Normal appearance. She is well-developed. She is morbidly obese.     Cardiovascular:      Rate and Rhythm: Normal rate and regular rhythm.      Heart sounds: Normal heart sounds, S1 normal and S2 normal. No murmur heard.  Pulmonary:      Effort: Pulmonary " "effort is normal.      Breath sounds: Normal breath sounds. No decreased breath sounds, wheezing, rhonchi or rales.     Musculoskeletal:         General: No swelling.      Right lower leg: No edema.      Left lower leg: No edema.     Neurological:      Mental Status: She is alert.     Psychiatric:         Mood and Affect: Mood and affect normal.         Behavior: Behavior normal. Behavior is cooperative.           Diagnostic Data:  Labs: I personally reviewed the most recent laboratory data pertinent to today's visit.      Radiology results:  Radiology Results Review: I have reviewed radiology reports from 5/29/2025 including: CT chest.      PFT/spirometry results:  No results found for: \"FEV1\", \"FVC\", \"OFH8UGY\", \"TLC\", \"DLCO\"       Oximetry testing:      Other studies:      EYAD Trivedi      "

## 2025-07-03 LAB
DME PARACHUTE DELIVERY DATE REQUESTED: NORMAL
DME PARACHUTE ITEM DESCRIPTION: NORMAL
DME PARACHUTE ORDER STATUS: NORMAL
DME PARACHUTE SUPPLIER NAME: NORMAL
DME PARACHUTE SUPPLIER PHONE: NORMAL

## 2025-07-07 ENCOUNTER — OFFICE VISIT (OUTPATIENT)
Dept: OCCUPATIONAL THERAPY | Age: 45
End: 2025-07-07
Payer: COMMERCIAL

## 2025-07-07 DIAGNOSIS — I89.0 LYMPHEDEMA OF RIGHT LOWER EXTREMITY: ICD-10-CM

## 2025-07-07 DIAGNOSIS — I89.0 LYMPHEDEMA: Primary | ICD-10-CM

## 2025-07-07 PROCEDURE — 97140 MANUAL THERAPY 1/> REGIONS: CPT | Performed by: OCCUPATIONAL THERAPIST

## 2025-07-07 PROCEDURE — 97110 THERAPEUTIC EXERCISES: CPT | Performed by: OCCUPATIONAL THERAPIST

## 2025-07-07 NOTE — PROGRESS NOTES
"Daily Note     Today's date: 2025  Patient name: Marilyn Andino  : 1980  MRN: [id number]  Referring provider: Lupis Jaeger CRNP  Dx:   Encounter Diagnosis     ICD-10-CM    1. Lymphedema  I89.0       2. Lymphedema of right lower extremity  I89.0                      Subjective: \"I feel like my left side is filling up.\"      Objective: See treatment diary below      Assessment: Tolerated treatment well. Therapist initiate treatment with short stretch bandage wrappinf + cellona to BLE foot to thigh, once wrapped pt then participated in BLE and abdominal exercises such as nu-step and supine lori exercises to stimulate lymphatic flow, venous return and dec edema to abdominals and legs with inc muscle and bandage pumping. Pt also utilized IPC with bandages, and therapist perf MLD to abdominal/groin area. Pt calves noted dec tightness/loose but pt reporting most concern for her his the weight at her thigh/hips/abdominals filling up with fluid. Pt did report at nights- burning pain at proxima calf- compression would help dec pain. Pt brought in old [place] wraps -foot and calf but too small right now and would benefit from newer one in better condition- as well as a custom legging 2* inc sensitivity and abnormal body contour for OTS. Patient would benefit from continued OT      Plan: Continue per plan of care.    Focus on abd/groin spine exercise, tools for MLD, compression options  Precautions: Universal;     POC expires Auth Status? (BOMN, approved, pending) Unit limit (Daily) Auth Start date Expiration date PT/OT + Visit Limit?   25 pending  25       Date of Service        Visits used 1 2       Visits remaining 6 5       Compression Rx                                    Manual Ther         volumetrics perf        MLD  10'       Compression Bandaging  20'       Wound Care         Fibrotic tissue         Ther Ex         Remedial Exercise; HEP provided Provided/edu Supine- bridge, leg raises, " hip flexion       Step ups         UBE bike/nustep  10' level 4       pump  20'       Ther Activity & Self Care         Skin care         Garment Fit/Train                           Pt Education         Edema differential dx edu        Lymphatic A&P edu        Compression options edu

## 2025-07-09 ENCOUNTER — OFFICE VISIT (OUTPATIENT)
Dept: OCCUPATIONAL THERAPY | Age: 45
End: 2025-07-09
Payer: COMMERCIAL

## 2025-07-09 DIAGNOSIS — E66.01 MORBID OBESITY WITH BMI OF 50.0-59.9, ADULT (HCC): ICD-10-CM

## 2025-07-09 DIAGNOSIS — G47.33 OSA (OBSTRUCTIVE SLEEP APNEA): ICD-10-CM

## 2025-07-09 DIAGNOSIS — I89.0 LYMPHEDEMA OF RIGHT LOWER EXTREMITY: ICD-10-CM

## 2025-07-09 DIAGNOSIS — I89.0 LYMPHEDEMA: Primary | ICD-10-CM

## 2025-07-09 PROCEDURE — 97140 MANUAL THERAPY 1/> REGIONS: CPT | Performed by: OCCUPATIONAL THERAPIST

## 2025-07-09 PROCEDURE — 97110 THERAPEUTIC EXERCISES: CPT | Performed by: OCCUPATIONAL THERAPIST

## 2025-07-09 RX ORDER — TIRZEPATIDE 2.5 MG/.5ML
2.5 INJECTION, SOLUTION SUBCUTANEOUS WEEKLY
Qty: 2 ML | Refills: 1 | Status: SHIPPED | OUTPATIENT
Start: 2025-07-09 | End: 2025-09-03

## 2025-07-09 NOTE — PROGRESS NOTES
"Daily Note     Today's date: 2025  Patient name: Marilyn Andino  : 1980  MRN: [id number]  Referring provider: Lupis Jaeger CRNP  Dx:   Encounter Diagnosis     ICD-10-CM    1. Lymphedema  I89.0       2. Lymphedema of right lower extremity  I89.0                      Subjective: \"My left side groin area is hard and tender.\"      Objective: See treatment diary below      Assessment: Tolerated treatment well. Tx foused on MLD to ABD; directly fluid to R axilla lymph nodes, ABD lymph nodes on R side and back; as well as fibrotic tissue massage with IASTM to L medial arm and L groin/inguinal area. Therapist also trialling KT to inc lymphatic flow and assist with directing fluid to proper places.  Patient would benefit from continued OT      Plan: Continue per plan of care.    Focus on abd/groin spine exercise, tools for MLD, compression options  Precautions: Universal;     POC expires Auth Status? (BOMN, approved, pending) Unit limit (Daily) Auth Start date Expiration date PT/OT + Visit Limit?   25 pending  25       Date of Service       Visits used 1 2 3      Visits remaining 6 5 4      Compression Rx                                    Manual Ther         volumetrics perf        MLD  10' 40'      Compression Bandaging  20' KT ABD       Wound Care         Fibrotic tissue   10- abd and LUE      Ther Ex         Remedial Exercise; HEP provided Provided/edu Supine- bridge, leg raises, hip flexion Supine- bridge, leg raises, hip flexion      Step ups         UBE bike/nustep  10' level 4       pump  20'       Ther Activity & Self Care         Skin care         Garment Fit/Train                           Pt Education         Edema differential dx edu        Lymphatic A&P edu        Compression options edu                               "

## 2025-07-14 ENCOUNTER — OFFICE VISIT (OUTPATIENT)
Dept: OCCUPATIONAL THERAPY | Age: 45
End: 2025-07-14
Payer: COMMERCIAL

## 2025-07-14 DIAGNOSIS — I89.0 LYMPHEDEMA: Primary | ICD-10-CM

## 2025-07-14 DIAGNOSIS — I89.0 LYMPHEDEMA OF RIGHT LOWER EXTREMITY: ICD-10-CM

## 2025-07-14 PROCEDURE — 97140 MANUAL THERAPY 1/> REGIONS: CPT | Performed by: OCCUPATIONAL THERAPIST

## 2025-07-14 NOTE — PROGRESS NOTES
"Daily Note     Today's date: 2025  Patient name: Marilyn Andino  : 1980  MRN: [id number]  Referring provider: Lupis Jaeger CRNP  Dx:   Encounter Diagnosis     ICD-10-CM    1. Lymphedema  I89.0       2. Lymphedema of right lower extremity  I89.0                      Subjective: \"The tape did help and made things softer.\"      Objective: See treatment diary below      Assessment: Tolerated treatment well. Pt reporting Abd taping inc her circulation, as she felt less bloated. Tx today focused on continued ABD and groin MLD (skipping tape today for skin care and will re-apply Wednesday), as well as inc compression and lymphatic flow to thighs and prox leg with short stretch bandage wrapping and 50mmHg pump- after pt BLE noted looser and softer. Insurance coverage 90%- possible evelyne with knee wrap or full leg velcro wrap with Abd/groin option. Patient would benefit from continued OT      Plan: Continue per plan of care.    Focus on abd/groin spine exercise, tools for MLD, compression options  Precautions: Universal;     POC expires Auth Status? (BOMN, approved, pending) Unit limit (Daily) Auth Start date Expiration date PT/OT + Visit Limit?   25 pending  25       Date of Service      Visits used 1 2 3 4     Visits remaining 6 5 4 3     Compression Rx                                    Manual Ther         volumetrics perf        MLD  10' 40' 35'     Compression Bandaging  20' KT ABD  20'     Wound Care         Fibrotic tissue   10- abd and LUE      Ther Ex         Remedial Exercise; HEP provided Provided/edu Supine- bridge, leg raises, hip flexion Supine- bridge, leg raises, hip flexion      Step ups         UBE bike/nustep  10' level 4       pump  20'  20'     Ther Activity & Self Care         Skin care         Garment Fit/Train                           Pt Education         Edema differential dx edu        Lymphatic A&P edu        Compression options edu                        "

## 2025-07-16 ENCOUNTER — OFFICE VISIT (OUTPATIENT)
Dept: OCCUPATIONAL THERAPY | Age: 45
End: 2025-07-16
Payer: COMMERCIAL

## 2025-07-16 DIAGNOSIS — I89.0 LYMPHEDEMA OF RIGHT LOWER EXTREMITY: ICD-10-CM

## 2025-07-16 DIAGNOSIS — I89.0 LYMPHEDEMA: Primary | ICD-10-CM

## 2025-07-16 PROCEDURE — 97140 MANUAL THERAPY 1/> REGIONS: CPT | Performed by: OCCUPATIONAL THERAPIST

## 2025-07-16 NOTE — PROGRESS NOTES
"Daily Note     Today's date: 2025  Patient name: Marilyn Andino  : 1980  MRN: [id number]  Referring provider: Lupis Jaeger CRNP  Dx:   Encounter Diagnosis     ICD-10-CM    1. Lymphedema  I89.0       2. Lymphedema of right lower extremity  I89.0                      Subjective: \"My legs are feeling sore from mowing the grass.\"      Objective: See treatment diary below      Assessment: Tolerated treatment well. Tx focused on ABD MLD + use of vibrations and graston tool for assistance with lymph flow and scar tissue on L inguinal area, while pneumatic compression pump on to dec edema at thighs. MLD to thighs and calf completed. KT on thighs and ABD to pull up extra skin/fluid and inc lymphatic flow. Pt would fit into Sigvaris compreshape evelyne and then use of velcro compression for calf. Note for pump NV.  Patient would benefit from continued OT      Plan: Continue per plan of care.    Focus on abd/groin spine exercise, tools for MLD, compression options  Precautions: Universal;     POC expires Auth Status? (BOMN, approved, pending) Unit limit (Daily) Auth Start date Expiration date PT/OT + Visit Limit?   25 pending  25       Date of Service     Visits used 1 2 3 4 5    Visits remaining 6 5 4 3 2    Compression Rx                                    Manual Ther         volumetrics perf        MLD  10' 40' 35' 30'    Compression Bandaging  20' KT ABD  20'     Wound Care         Fibrotic tissue   10- abd and LUE  10' +KT ABD & thigh    Ther Ex         Remedial Exercise; HEP provided Provided/edu Supine- bridge, leg raises, hip flexion Supine- bridge, leg raises, hip flexion      Step ups         UBE bike/nustep  10' level 4       pump  20'  20' 15' ABD + vibrations while pump on BLE    Ther Activity & Self Care         Skin care         Garment Fit/Train                           Pt Education         Edema differential dx edu        Lymphatic A&P edu                 Compression " options edu

## 2025-07-17 ENCOUNTER — CLINICAL SUPPORT (OUTPATIENT)
Dept: FAMILY MEDICINE CLINIC | Facility: CLINIC | Age: 45
End: 2025-07-17
Payer: COMMERCIAL

## 2025-07-17 DIAGNOSIS — E53.8 VITAMIN B 12 DEFICIENCY: Primary | ICD-10-CM

## 2025-07-17 PROCEDURE — 96372 THER/PROPH/DIAG INJ SC/IM: CPT

## 2025-07-17 RX ORDER — CYANOCOBALAMIN 1000 UG/ML
1000 INJECTION, SOLUTION INTRAMUSCULAR; SUBCUTANEOUS
Status: SHIPPED | OUTPATIENT
Start: 2025-07-17

## 2025-07-17 RX ADMIN — CYANOCOBALAMIN 1000 MCG: 1000 INJECTION, SOLUTION INTRAMUSCULAR; SUBCUTANEOUS at 15:23

## 2025-07-24 ENCOUNTER — PATIENT MESSAGE (OUTPATIENT)
Age: 45
End: 2025-07-24

## 2025-07-24 DIAGNOSIS — G47.33 OSA ON CPAP: Primary | ICD-10-CM

## 2025-07-24 NOTE — PATIENT COMMUNICATION
Called [facility]  [phone]     Per [employer] Insurance does not cover more than 1 face mask/nasal pillow per 90 days. Price advised to have patient call her insurance and explain why she needs a new full face mask. Pt not due for new mask until September 2025

## 2025-07-25 ENCOUNTER — TELEPHONE (OUTPATIENT)
Age: 45
End: 2025-07-25

## 2025-07-25 NOTE — TELEPHONE ENCOUNTER
L/M asking Pt to pls call back to let me know the size and style of her CPAP mask so that we can mail it to her home.

## 2025-07-25 NOTE — TELEPHONE ENCOUNTER
Spoke with Pt, she told me that she wishes to transition to a full face mask because the nasal mask is not working well for her, I sold her that I would contact Erich bourgeois (liaison for [employer] since [employer] is her DME so that she can help select the appropriate mask or let me know if the doctor needs to put in a new script so that she can go to the office in [place].

## 2025-08-04 ENCOUNTER — OFFICE VISIT (OUTPATIENT)
Dept: OCCUPATIONAL THERAPY | Age: 45
End: 2025-08-04
Payer: COMMERCIAL

## 2025-08-04 DIAGNOSIS — I89.0 LYMPHEDEMA: Primary | ICD-10-CM

## 2025-08-04 DIAGNOSIS — I89.0 LYMPHEDEMA OF RIGHT LOWER EXTREMITY: ICD-10-CM

## 2025-08-04 PROCEDURE — 97110 THERAPEUTIC EXERCISES: CPT | Performed by: OCCUPATIONAL THERAPIST

## 2025-08-04 PROCEDURE — 97140 MANUAL THERAPY 1/> REGIONS: CPT | Performed by: OCCUPATIONAL THERAPIST

## 2025-08-04 PROCEDURE — 97535 SELF CARE MNGMENT TRAINING: CPT | Performed by: OCCUPATIONAL THERAPIST

## 2025-08-05 DIAGNOSIS — I89.0 LYMPHEDEMA: ICD-10-CM

## 2025-08-05 DIAGNOSIS — I89.0 LYMPHEDEMA OF RIGHT LOWER EXTREMITY: Primary | ICD-10-CM

## 2025-08-19 ENCOUNTER — HOSPITAL ENCOUNTER (OUTPATIENT)
Dept: PULMONOLOGY | Facility: HOSPITAL | Age: 45
Discharge: HOME | End: 2025-08-19
Payer: COMMERCIAL

## 2025-08-19 DIAGNOSIS — J40 BRONCHIAL IRRITATION: ICD-10-CM

## 2025-08-19 PROCEDURE — 94729 DIFFUSING CAPACITY: CPT | Performed by: INTERNAL MEDICINE

## 2025-08-19 PROCEDURE — 94060 EVALUATION OF WHEEZING: CPT | Performed by: INTERNAL MEDICINE

## 2025-08-19 PROCEDURE — 94729 DIFFUSING CAPACITY: CPT

## 2025-08-19 PROCEDURE — 94726 PLETHYSMOGRAPHY LUNG VOLUMES: CPT | Performed by: INTERNAL MEDICINE

## 2025-08-19 PROCEDURE — 94760 N-INVAS EAR/PLS OXIMETRY 1: CPT

## 2025-08-19 PROCEDURE — 94726 PLETHYSMOGRAPHY LUNG VOLUMES: CPT

## 2025-08-19 PROCEDURE — 94060 EVALUATION OF WHEEZING: CPT

## 2025-08-19 RX ORDER — ALBUTEROL SULFATE 0.83 MG/ML
2.5 SOLUTION RESPIRATORY (INHALATION) ONCE
Status: COMPLETED | OUTPATIENT
Start: 2025-08-19 | End: 2025-08-19

## 2025-08-19 RX ADMIN — ALBUTEROL SULFATE 2.5 MG: 2.5 SOLUTION RESPIRATORY (INHALATION) at 10:27

## 2025-08-20 ENCOUNTER — OFFICE VISIT (OUTPATIENT)
Dept: OCCUPATIONAL THERAPY | Age: 45
End: 2025-08-20
Payer: COMMERCIAL

## 2025-08-20 DIAGNOSIS — I89.0 LYMPHEDEMA: Primary | ICD-10-CM

## 2025-08-20 DIAGNOSIS — I89.0 LYMPHEDEMA OF RIGHT LOWER EXTREMITY: ICD-10-CM

## 2025-08-20 PROCEDURE — 97168 OT RE-EVAL EST PLAN CARE: CPT | Performed by: OCCUPATIONAL THERAPIST

## 2025-08-20 PROCEDURE — 97140 MANUAL THERAPY 1/> REGIONS: CPT | Performed by: OCCUPATIONAL THERAPIST
